# Patient Record
Sex: MALE | Race: WHITE | URBAN - METROPOLITAN AREA
[De-identification: names, ages, dates, MRNs, and addresses within clinical notes are randomized per-mention and may not be internally consistent; named-entity substitution may affect disease eponyms.]

---

## 2023-12-22 ENCOUNTER — HOSPITAL ENCOUNTER (INPATIENT)
Age: 73
DRG: 949 | End: 2023-12-22
Attending: STUDENT IN AN ORGANIZED HEALTH CARE EDUCATION/TRAINING PROGRAM | Admitting: STUDENT IN AN ORGANIZED HEALTH CARE EDUCATION/TRAINING PROGRAM
Payer: MEDICARE

## 2023-12-22 DIAGNOSIS — I49.8 ATRIAL ARRHYTHMIA: Primary | ICD-10-CM

## 2023-12-22 PROBLEM — S06.5XAA SDH (SUBDURAL HEMATOMA) (HCC): Status: ACTIVE | Noted: 2023-12-22

## 2023-12-22 LAB
GLUCOSE BLD-MCNC: 141 MG/DL (ref 70–99)
PERFORMED ON: ABNORMAL

## 2023-12-22 PROCEDURE — 2700000000 HC OXYGEN THERAPY PER DAY

## 2023-12-22 PROCEDURE — 6370000000 HC RX 637 (ALT 250 FOR IP): Performed by: STUDENT IN AN ORGANIZED HEALTH CARE EDUCATION/TRAINING PROGRAM

## 2023-12-22 PROCEDURE — 1280000000 HC REHAB R&B

## 2023-12-22 PROCEDURE — 94761 N-INVAS EAR/PLS OXIMETRY MLT: CPT

## 2023-12-22 RX ORDER — QUETIAPINE FUMARATE 25 MG/1
12.5 TABLET, FILM COATED ORAL NIGHTLY PRN
Status: DISCONTINUED | OUTPATIENT
Start: 2023-12-22 | End: 2024-01-14 | Stop reason: HOSPADM

## 2023-12-22 RX ORDER — SENNA AND DOCUSATE SODIUM 50; 8.6 MG/1; MG/1
2 TABLET, FILM COATED ORAL 2 TIMES DAILY
Status: DISCONTINUED | OUTPATIENT
Start: 2023-12-22 | End: 2024-01-14 | Stop reason: HOSPADM

## 2023-12-22 RX ORDER — INSULIN LISPRO 100 [IU]/ML
0-8 INJECTION, SOLUTION INTRAVENOUS; SUBCUTANEOUS EVERY 6 HOURS
Status: DISCONTINUED | OUTPATIENT
Start: 2023-12-23 | End: 2024-01-13

## 2023-12-22 RX ORDER — INSULIN LISPRO 100 [IU]/ML
0-8 INJECTION, SOLUTION INTRAVENOUS; SUBCUTANEOUS
Status: DISCONTINUED | OUTPATIENT
Start: 2023-12-22 | End: 2023-12-22

## 2023-12-22 RX ORDER — ATORVASTATIN CALCIUM 40 MG/1
40 TABLET, FILM COATED ORAL NIGHTLY
Status: DISCONTINUED | OUTPATIENT
Start: 2023-12-22 | End: 2024-01-14 | Stop reason: HOSPADM

## 2023-12-22 RX ORDER — MODAFINIL 200 MG/1
300 TABLET ORAL DAILY
Status: DISCONTINUED | OUTPATIENT
Start: 2023-12-23 | End: 2024-01-10

## 2023-12-22 RX ORDER — DEXTROSE MONOHYDRATE 100 MG/ML
INJECTION, SOLUTION INTRAVENOUS CONTINUOUS PRN
Status: DISCONTINUED | OUTPATIENT
Start: 2023-12-22 | End: 2024-01-14 | Stop reason: HOSPADM

## 2023-12-22 RX ORDER — SIMETHICONE 80 MG
80 TABLET,CHEWABLE ORAL 4 TIMES DAILY PRN
Status: DISCONTINUED | OUTPATIENT
Start: 2023-12-22 | End: 2024-01-14 | Stop reason: HOSPADM

## 2023-12-22 RX ORDER — DIGOXIN 125 MCG
125 TABLET ORAL DAILY
Status: DISCONTINUED | OUTPATIENT
Start: 2023-12-23 | End: 2024-01-11

## 2023-12-22 RX ORDER — MECOBALAMIN 5000 MCG
5 TABLET,DISINTEGRATING ORAL NIGHTLY
Status: DISCONTINUED | OUTPATIENT
Start: 2023-12-22 | End: 2024-01-14 | Stop reason: HOSPADM

## 2023-12-22 RX ORDER — INSULIN LISPRO 100 [IU]/ML
0-4 INJECTION, SOLUTION INTRAVENOUS; SUBCUTANEOUS NIGHTLY
Status: DISCONTINUED | OUTPATIENT
Start: 2023-12-22 | End: 2023-12-22

## 2023-12-22 RX ORDER — BISACODYL 10 MG
10 SUPPOSITORY, RECTAL RECTAL DAILY PRN
Status: DISCONTINUED | OUTPATIENT
Start: 2023-12-22 | End: 2024-01-14 | Stop reason: HOSPADM

## 2023-12-22 RX ORDER — METOPROLOL TARTRATE 50 MG/1
100 TABLET, FILM COATED ORAL EVERY 6 HOURS
Status: DISCONTINUED | OUTPATIENT
Start: 2023-12-22 | End: 2024-01-04

## 2023-12-22 RX ORDER — ZINC OXIDE 20 %
OINTMENT (GRAM) TOPICAL 4 TIMES DAILY PRN
Status: DISCONTINUED | OUTPATIENT
Start: 2023-12-22 | End: 2024-01-14 | Stop reason: HOSPADM

## 2023-12-22 RX ORDER — DONEPEZIL HYDROCHLORIDE 5 MG/1
5 TABLET, FILM COATED ORAL DAILY
Status: DISCONTINUED | OUTPATIENT
Start: 2023-12-23 | End: 2024-01-14 | Stop reason: HOSPADM

## 2023-12-22 RX ORDER — GLUCAGON 1 MG/ML
1 KIT INJECTION PRN
Status: DISCONTINUED | OUTPATIENT
Start: 2023-12-22 | End: 2024-01-14 | Stop reason: HOSPADM

## 2023-12-22 RX ORDER — PROCHLORPERAZINE MALEATE 5 MG/1
5 TABLET ORAL EVERY 6 HOURS PRN
Status: DISCONTINUED | OUTPATIENT
Start: 2023-12-22 | End: 2024-01-14 | Stop reason: HOSPADM

## 2023-12-22 RX ORDER — ACETAMINOPHEN 325 MG/1
650 TABLET ORAL EVERY 4 HOURS PRN
Status: DISCONTINUED | OUTPATIENT
Start: 2023-12-22 | End: 2024-01-14 | Stop reason: HOSPADM

## 2023-12-22 RX ORDER — POLYETHYLENE GLYCOL 3350 17 G/17G
17 POWDER, FOR SOLUTION ORAL DAILY
Status: DISCONTINUED | OUTPATIENT
Start: 2023-12-23 | End: 2024-01-14 | Stop reason: HOSPADM

## 2023-12-22 RX ORDER — ACETAMINOPHEN 500 MG
1000 TABLET ORAL EVERY 8 HOURS SCHEDULED
Status: DISCONTINUED | OUTPATIENT
Start: 2023-12-22 | End: 2024-01-08

## 2023-12-22 RX ORDER — OXYCODONE HYDROCHLORIDE 5 MG/1
5 TABLET ORAL EVERY 4 HOURS PRN
Status: DISCONTINUED | OUTPATIENT
Start: 2023-12-22 | End: 2024-01-14 | Stop reason: HOSPADM

## 2023-12-22 RX ADMIN — METOPROLOL TARTRATE 100 MG: 50 TABLET, FILM COATED ORAL at 20:36

## 2023-12-22 RX ADMIN — ACETAMINOPHEN 1000 MG: 500 TABLET ORAL at 23:50

## 2023-12-22 RX ADMIN — LEVETIRACETAM 750 MG: 500 TABLET, FILM COATED ORAL at 23:49

## 2023-12-22 RX ADMIN — METFORMIN HYDROCHLORIDE 500 MG: 500 TABLET ORAL at 23:50

## 2023-12-22 RX ADMIN — SENNOSIDES AND DOCUSATE SODIUM 2 TABLET: 50; 8.6 TABLET ORAL at 23:54

## 2023-12-22 RX ADMIN — APIXABAN 5 MG: 5 TABLET, FILM COATED ORAL at 23:17

## 2023-12-22 RX ADMIN — ATORVASTATIN CALCIUM 40 MG: 40 TABLET, FILM COATED ORAL at 23:49

## 2023-12-22 ASSESSMENT — PAIN SCALES - WONG BAKER: WONGBAKER_NUMERICALRESPONSE: 0

## 2023-12-22 NOTE — PROGRESS NOTES
NURSING ASSESSMENT: 2011 [facility]    Patient:Isac CHOI [address] [facility] Dx/Hx: SDH (subdural hematoma) ([address] [facility]) [S06. 5XAA]   WXW:1/9/0425  AWP:[id number]  Date of Admit: 12/22/2023  Room #: 8631/5547-34    Subjective:   Patient admitted to [facility] from  via stretcher. Alert and oriented x0. Family oriented to room and call light system. Oriented to rehab routine and therapy schedules. Informed about care conferences and ordering of meals with PCA. Drug / Medication Review:   Medications were reviewed by RN at time of admission  [x]  No potential or actual clinically significant medication issues were noted. []  Potential or actual clinically significant medication issues were found and MD was notified. (Specified below if applicable)   []  Allergy to medication   []  Drug interactions (drug/drug, drug/food, drug/disease interactions)   []  Duplicate drug   []  Omission (drug missing from prescribed regimen)   []  Non adherence   []  Adverse reaction   []  Wrong patient, drug, dose, route, time error   []  Ineffective drug therapy    Unable to assess patient alert to no one, unable to answer appropriately. Patient Mood Interview  Symptom Presence  0. No (enter 0 in column 2)  1. Yes (enter 0-3 in column 2)  9. No response (leave column 2 blank  Symptom Frequency  0. Never or 1 day  1. 2-6 days (several days)  2. 7-11 days (half or more days)  3. 12-14 days (nearly everyday) 1. Symptom Presence 2. Symptom Frequency    Enter scores in boxes   Little interest or pleasure in doing things       Feeling down, depressed, or hopeless       If either A or B is coded 2 or 3, CONTINUE asking the questions below. If not, END the interview.      Trouble falling or staying asleep, or sleeping too much       Feeling tired or having little energy       Poor appetite or overeating       Feeling bad about yourself - or that you are a failure or have let yourself or your family down   Trouble concentrating on things, such as reading the newspaper or watching television       Moving or speaking so slowly that other people could have noticed.  Or the opposite- being so fidgety or restless that you have been moving around a lot more than usual.       Thoughts that you would be better off dead, or of hurting yourself in some way.       Total Severity: Add scores for all frequency responses in column 2       Social isolation (from [employer])  How often do you feel lonely or isolated from those around you?   [] 0. Never  [] 1. Rarely  [] 2. Sometimes  [] 3. Often  [] 4. Always  [] 7. Patient declines to respond  [x] 8. Patient unable to respond     Unable to assess patient, alert to no one, unable to answer appropriately.    Admission BIMS:  Number of word repeated after first attempt:  [x]  0. None []  1. One []  2. Two []  3. Three    Able to report correct year:  [x]  0. Missed by >5 years, or no answer  []  1. Missed by 2-5 years  []  2. Missed by 1 year  []  3. Correct    Able to report correct month:   [x]  0. Missed by >1 month, or no answer  []  1. Missed by 6 days to 1 month  []  2. Accurate within 5 days    Able to report correct day of the week:  []  0. Incorrect or no answer  []  1. Correct    Recall:   Able to recall \"sock\":  [x]  0. No could not recall  []  1. Yes, after cueing  []  2. Yes, no cue required  Able to recall \"blue\":  [x]  0. No could not recall  []  1. Yes, after cueing  []  2. Yes, no cue required  Able to recall \"bed\":  [x]  0. No could not recall  []  1. Yes, after cueing  []  2. Yes, no cue required      4 Eyes Skin Assessment   The patient is being assessed for: Admission     I agree that 2 RN's have performed a thorough Head to Toe Skin Assessment on the patient. ALL assessment sites listed below have been assessed.       Areas assessed by both nurses:   [x]   Head, Face, and Ears   [x]   Shoulders, Back, and Chest, Abdomen  [x]   Arms, Elbows, and Hands   [x]

## 2023-12-22 NOTE — PROGRESS NOTES
IRF KEILY Admission Assessment  [facility]    Patient:Isac CHOI [address] [facility] Dx/Hx: SDH (subdural hematoma) ([address]) [S06. 5XAA]   CPI:8/8/7[birth year]  ZOSCARY:[id number]  Date of Admit: 12/22/2023     Pain Effect on Sleep:  Over the past 5 days, how much of the time has pain made it hard for you to sleep at night?  []  0. Does not apply - I have not had any pain or hurting in the past 5 days  [x]  1. Rarely or not at all  []  2. Occasionally  []  3. Frequently  []  4. Almost constantly  []  8. Unable to answer    Over the past 5 days, how often have you limited your participation in rehabilitation therapy sessions due to pain?  []  0. Does not apply - I have not received rehabilitation therapy in the past 5 days  [x]  1. Rarely or not at all  []  2. Occasionally  []  3. Frequently  []  4. Almost constantly  []  8. Unable to answer    Pain Interference with Day-to-Day Activities:  Over the past 5 days, how often have you limited your day-to-day activities (excluding rehabilitation therapy session)? [x]  1. Rarely or not at all  []  2. Occasionally  []  3. Frequently  []  4. Almost constantly  []  8. Unable to answer      High-Risk Drug Classes: Use and Indication   Is taking: Check if the pt is taking any medications by pharmacological classification  Indication noted: If column 1 is checked, check if there is an indication noted for all meds in the drug class Is taking  (check all that apply) Indication noted (check all that apply)   Antipsychotic [x] [x]   Anticoagulant [x] [x]   Antibiotic [] []   Opioid [] []   Antiplatelet [] []   Hypoglycemic (including insulin) [x] [x]   None of the above [] []     Special Treatments, Procedures, and Programs   Check all of the following treatments, procedures, and programs that apply on admission. On admission (check all that apply)   Cancer Treatments    A1. Chemotherapy []   A2. IV []   A3. Oral []   A10. Other []   B1. Radiation []   Respiratory Therapies    C1. Oxygen Therapy []   C2. Continuous [x]   C3. Intermittent []   C4. High-concentration []   D1. Suctioning []   D2. Scheduled []   D3. As needed []   E1. Tracheostomy Care []   F1. Invasive Mechanical Ventilator (ventilator or respirator) []   G1. Non-invasive Mechanical Ventilator []   G2. BiPAP []   G3. CPAP []   Other    H1. IV Medications []   H2. Vasoactive medications []   H3. Antibiotics []   H4. Anticoagulation []   H10. Other []   I1. Transfusions []   J1. Dialysis []   J2. Hemodialysis []   J3. Peritoneal dialysis []   O1. IV access []   O2. Peripheral []   O3. Midline []   O4. Central (PICC, tunneled, port) []   None of the above []     Signature:  Aditya Miller RN

## 2023-12-22 NOTE — PLAN OF CARE
ARU PATIENT TREATMENT PLAN  [address], [address] [place] [address]  [phone]    Princess Ramey    : 1950  Acct #: [de-identified]  MRN: [id number]   PHYSICIAN:  Sunday Ramirez MD  Primary Problem    Patient Active Problem List   Diagnosis    Chest pain    DM (diabetes mellitus) ([address])    HTN (hypertension)    Obesity    SDH (subdural hematoma) ([facility])       Rehabilitation Diagnosis:     SDH (subdural hematoma) ([address]) [S06. 5XAA]       ADMIT DATE:2023    Patient Goals: Pt. Unable to respond    Admitting Impairments: Pt. Admitted d/t SDH resulting in Decreased functional mobility ; Decreased endurance;Decreased ADL status; Decreased balance;Decreased strength;Decreased safe awareness;Decreased cognition;Decreased fine motor control;Decreased high-level IADLs;Decreased ROM   Barriers: level of assistance, endurance, comorbidities     Participation: Fair     CARE PLAN     NURSING:  Princess Ramey while on this unit will:     [x] Be continent of bowel and bladder     [x] Have an adequate number of bowel movements  [] Urinate with no urinary retention >300ml in bladder  [] Complete bladder protocol with scott removal  [] Maintain O2 SATs at ___%  [x] Have pain managed while on ARU       [] Be pain free by discharge   [x] Have no skin breakdown while on ARU  [] Have improved skin integrity via wound measurements  [] Have no signs/symptoms of infection at the wound site  [x] Be free from injury during hospitalization   [x] Complete education with patient/family with understanding demonstrated for:  [x] Adjustment   [x] Other:   Nursing interventions may include bowel/bladder training, education for medical assistive devices, medication education, O2 saturation management, energy conservation, stress management techniques, fall prevention, alarms protocol, seating and positioning, skin/wound care, pressure relief instruction,dressing changes,  infection protection, DVT Partial/moderate assistance   Sit to Stand CARE Score: 88 Discharge Goal: Partial/moderate assistance   Chair/Bed to Chair Transfer CARE Score: 88 Discharge Goal: Partial/moderate assistance   Toilet Transfer CARE Score: 88 Discharge Goal: Partial/moderate assistance   Car Transfer CARE Score: 88 Discharge Goal: Partial/moderate assistance   Walk 10 Feet CARE Score: 88 Discharge Goal: Partial/moderate assistance   Walk 50 Feet, 2 Turns CARE Score: 88 Discharge Goal: Substantial/maximal assistance   Walk 150 Feet CARE Score: 88 Discharge Goal: Dependent   Walk 10 Feet, Uneven Surface CARE Score: 88 Discharge Goal: Substantial/maximal assistance   1 Step (Curb) CARE Score: 88 Discharge Goal: Partial/moderate assistance   4 Steps CARE Score: 88 Discharge Goal: Substantial/maximal assistance   12 Steps CARE Score: 88 Discharge Goal: Substantial/maximal assistance    Object CARE Score: 88 Discharge Goal: Partial/moderate assistance   Wheel 50 feet, 2 turns       Wheel 150 Feet              Isac Lemus will be seen a minimum of 3 hours of therapy per day, a minimum of 5 out of 7 days per week.    [] In this rare instance due to the nature of this patient's medical involvement, this patient will be seen 15 hours per week (900 minutes within a 7 day period).    Treatments may include therapeutic exercises, gait training, neuromuscular re-ed, transfer training, community reintegration, bed mobility, w/c mobility and training, self care, home mgmt, cognitive training, energy conservation,dysphagia tx, speech/language/communication therapy, group therapy, and patient/family education. In addition, dietician/nutritionist may monitor calorie count as well as intake and collaboratively work with SLP on dietary upgrades.  Neuropsychology/Psychology may evaluate and provide necessary support.    Medical issues being managed closely and that require 24 hour availability of a physician:   [x] Swallowing Precautions  [x]

## 2023-12-23 LAB
ANION GAP SERPL CALCULATED.3IONS-SCNC: 8 MMOL/L (ref 3–16)
BASOPHILS # BLD: 0 K/UL (ref 0–0.2)
BASOPHILS NFR BLD: 0.4 %
BUN SERPL-MCNC: 17 MG/DL (ref 7–20)
CALCIUM SERPL-MCNC: 8.5 MG/DL (ref 8.3–10.6)
CHLORIDE SERPL-SCNC: 102 MMOL/L (ref 99–110)
CO2 SERPL-SCNC: 29 MMOL/L (ref 21–32)
CREAT SERPL-MCNC: <0.5 MG/DL (ref 0.8–1.3)
DEPRECATED RDW RBC AUTO: 15.5 % (ref 12.4–15.4)
EOSINOPHIL # BLD: 0.2 K/UL (ref 0–0.6)
EOSINOPHIL NFR BLD: 3.1 %
GFR SERPLBLD CREATININE-BSD FMLA CKD-EPI: >60 ML/MIN/{1.73_M2}
GLUCOSE BLD-MCNC: 142 MG/DL (ref 70–99)
GLUCOSE BLD-MCNC: 173 MG/DL (ref 70–99)
GLUCOSE BLD-MCNC: 197 MG/DL (ref 70–99)
GLUCOSE BLD-MCNC: 202 MG/DL (ref 70–99)
GLUCOSE BLD-MCNC: 235 MG/DL (ref 70–99)
GLUCOSE SERPL-MCNC: 179 MG/DL (ref 70–99)
HCT VFR BLD AUTO: 30.5 % (ref 40.5–52.5)
HGB BLD-MCNC: 9.9 G/DL (ref 13.5–17.5)
LYMPHOCYTES # BLD: 1.1 K/UL (ref 1–5.1)
LYMPHOCYTES NFR BLD: 18.5 %
MCH RBC QN AUTO: 32.9 PG (ref 26–34)
MCHC RBC AUTO-ENTMCNC: 32.3 G/DL (ref 31–36)
MCV RBC AUTO: 101.7 FL (ref 80–100)
MONOCYTES # BLD: 0.4 K/UL (ref 0–1.3)
MONOCYTES NFR BLD: 6.6 %
NEUTROPHILS # BLD: 4.2 K/UL (ref 1.7–7.7)
NEUTROPHILS NFR BLD: 71.4 %
PERFORMED ON: ABNORMAL
PLATELET # BLD AUTO: 219 K/UL (ref 135–450)
PMV BLD AUTO: 8.9 FL (ref 5–10.5)
POTASSIUM SERPL-SCNC: 4.5 MMOL/L (ref 3.5–5.1)
RBC # BLD AUTO: 3 M/UL (ref 4.2–5.9)
SODIUM SERPL-SCNC: 139 MMOL/L (ref 136–145)
TRIGL SERPL-MCNC: 111 MG/DL (ref 0–150)
WBC # BLD AUTO: 5.9 K/UL (ref 4–11)

## 2023-12-23 PROCEDURE — 92610 EVALUATE SWALLOWING FUNCTION: CPT

## 2023-12-23 PROCEDURE — 97535 SELF CARE MNGMENT TRAINING: CPT

## 2023-12-23 PROCEDURE — 92523 SPEECH SOUND LANG COMPREHEN: CPT

## 2023-12-23 PROCEDURE — 97530 THERAPEUTIC ACTIVITIES: CPT

## 2023-12-23 PROCEDURE — 80048 BASIC METABOLIC PNL TOTAL CA: CPT

## 2023-12-23 PROCEDURE — 6370000000 HC RX 637 (ALT 250 FOR IP): Performed by: STUDENT IN AN ORGANIZED HEALTH CARE EDUCATION/TRAINING PROGRAM

## 2023-12-23 PROCEDURE — 84478 ASSAY OF TRIGLYCERIDES: CPT

## 2023-12-23 PROCEDURE — 1280000000 HC REHAB R&B

## 2023-12-23 PROCEDURE — 97163 PT EVAL HIGH COMPLEX 45 MIN: CPT

## 2023-12-23 PROCEDURE — 97167 OT EVAL HIGH COMPLEX 60 MIN: CPT

## 2023-12-23 PROCEDURE — 2700000000 HC OXYGEN THERAPY PER DAY

## 2023-12-23 PROCEDURE — 94761 N-INVAS EAR/PLS OXIMETRY MLT: CPT

## 2023-12-23 PROCEDURE — 92526 ORAL FUNCTION THERAPY: CPT

## 2023-12-23 PROCEDURE — 85025 COMPLETE CBC W/AUTO DIFF WBC: CPT

## 2023-12-23 PROCEDURE — 36415 COLL VENOUS BLD VENIPUNCTURE: CPT

## 2023-12-23 RX ADMIN — LEVETIRACETAM 750 MG: 500 TABLET, FILM COATED ORAL at 23:04

## 2023-12-23 RX ADMIN — SENNOSIDES AND DOCUSATE SODIUM 2 TABLET: 50; 8.6 TABLET ORAL at 23:03

## 2023-12-23 RX ADMIN — INSULIN LISPRO 2 UNITS: 100 INJECTION, SOLUTION INTRAVENOUS; SUBCUTANEOUS at 14:02

## 2023-12-23 RX ADMIN — ACETAMINOPHEN 1000 MG: 500 TABLET ORAL at 14:38

## 2023-12-23 RX ADMIN — ACETAMINOPHEN 1000 MG: 500 TABLET ORAL at 04:45

## 2023-12-23 RX ADMIN — METOPROLOL TARTRATE 100 MG: 50 TABLET, FILM COATED ORAL at 16:58

## 2023-12-23 RX ADMIN — DONEPEZIL HYDROCHLORIDE 5 MG: 5 TABLET, FILM COATED ORAL at 11:19

## 2023-12-23 RX ADMIN — METOPROLOL TARTRATE 100 MG: 50 TABLET, FILM COATED ORAL at 11:18

## 2023-12-23 RX ADMIN — Medication 5 MG: at 23:03

## 2023-12-23 RX ADMIN — INSULIN LISPRO 2 UNITS: 100 INJECTION, SOLUTION INTRAVENOUS; SUBCUTANEOUS at 23:22

## 2023-12-23 RX ADMIN — ATORVASTATIN CALCIUM 40 MG: 40 TABLET, FILM COATED ORAL at 23:04

## 2023-12-23 RX ADMIN — DIGOXIN 125 MCG: 125 TABLET ORAL at 11:19

## 2023-12-23 RX ADMIN — APIXABAN 5 MG: 5 TABLET, FILM COATED ORAL at 23:04

## 2023-12-23 RX ADMIN — METFORMIN HYDROCHLORIDE 500 MG: 500 TABLET ORAL at 23:04

## 2023-12-23 RX ADMIN — ACETAMINOPHEN 1000 MG: 500 TABLET ORAL at 23:03

## 2023-12-23 RX ADMIN — LEVETIRACETAM 750 MG: 500 TABLET, FILM COATED ORAL at 11:17

## 2023-12-23 RX ADMIN — METOPROLOL TARTRATE 100 MG: 50 TABLET, FILM COATED ORAL at 04:46

## 2023-12-23 RX ADMIN — METFORMIN HYDROCHLORIDE 500 MG: 500 TABLET ORAL at 11:18

## 2023-12-23 RX ADMIN — METOPROLOL TARTRATE 100 MG: 50 TABLET, FILM COATED ORAL at 23:03

## 2023-12-23 RX ADMIN — APIXABAN 5 MG: 5 TABLET, FILM COATED ORAL at 11:19

## 2023-12-23 NOTE — PROGRESS NOTES
Speech Language Pathology  Clinical Bedside Swallow Assessment  Facility/Department: [facility]        Recommendations:  Diet recommendation: NPO; NPO and Ice chips with SLP/RN only to assist with oral care; Meds via alt means of nutrition  Instrumentation: FEES is recommended to further evaluate pharyngeal phase and mechanisms (e.g. vocal folds, UES, reflux, etc)  Risk management: upright for all intake, total feed, oral care q4 hrs to reduce adverse affects in the event of aspiration, STRICT aspiration precautions, and hold PO and contact SLP if s/s of aspiration or worsening respiratory status develop. NAME:Isac Camejo  : 1950 (74 y.o.)   MRN: [id number]  ROOM: [address] [place] [address]  ADMISSION DATE: 2023  PATIENT DIAGNOSIS(ES): SDH (subdural hematoma) ([address]) [S06. 5XAA]  No chief complaint on file. Patient Active Problem List    Diagnosis Date Noted    Chest pain 10/16/2014    SDH (subdural hematoma) ([address]) 2023    DM (diabetes mellitus) ([address]) 10/16/2014    HTN (hypertension) 10/16/2014    Obesity 10/16/2014     Past Medical History:   Diagnosis Date    Acid reflux     CAD (coronary artery disease)     Diabetes mellitus ([address])     Heartburn     Hiatal hernia     Hypertension     Seasonal allergies      Past Surgical History:   Procedure Laterality Date    COLONOSCOPY      POLYPS    COLONOSCOPY  3/16/16    polyps    CORONARY ARTERY BYPASS GRAFT  10/2014    X5     Allergies   Allergen Reactions    Heparin Other (See Comments)     Causes clots     DATE ONSET: 2023    Date of Evaluation: 2023   Evaluating Therapist: Delphine Hadley, SLP    Chart Reviewed: : [x] Yes [] No     Pain: Pt unable to state pain, no s/s of physical discomfort observed. Current Diet: Diet NPO  ADULT TUBE FEEDING; PEG; Standard with Fiber; Continuous; 60; No; 100;  Other (specify); q2 hours      Most Recent Imaging    No orders to display       Reason for Referral  Franckeshia Bottom was referred for a bedside [x] No  SOB with PO intake: [] Yes [x] No  Increased WOB with PO intake: [] Yes [x] No  Coughing/Choking with PO intake: [] Yes [x] No  Weight loss: [] Yes [x] No  Reduced Appetite: [] Yes [x] No  Premature satiety: [] Yes [x] No  Recent and/or Recurrent PNA: [] Yes [x] No  Changes in voice/vocal quality: [] Yes [x] No  Trouble with Mastication: [] Yes [x] No  Avoidance of certain foods due to trouble swallowing/chewing: [] Yes [x] No    *N/A pt is not able to respond appropriately to questions regarding swallowing due to cognitive status. Family denies any swallow deficits prior to recent event     Baseline Method of Oral Meds: Whole with thin liquid     Additional Reported Symptoms/Complaints/Hospital Course: Extensive course at [facility], refer to EMR for full detail    Pt's goals: pt unable to state due to language and cognitive deficits; pt's family at bedside states their goal is for pt to return home      Vitals/labs:   SpO2: 95%  RR: 17  BP: 117/57  HR: 74  O2 device: Nasal cannula     Lab Values:    CBC:   Recent Labs     12/23/23  0833   WBC 5.9   HGB 9.9*         BMP:  Recent Labs     12/23/23  0833      K 4.5      CO2 29   BUN 17   CREATININE <0.5*   GLUCOSE 179*      Cranial nerve exam:   CN V (trigeminal): ophthalmic, maxillary, and mandibular facial sensation- WNL  CN VII (facial): WNL  CN IX/X (glossopharyngeal/vagus): vocal quality: WNL; cough: Strong-perceptually  CN XII (hypoglossal): WNL  *Informally assessed due to pt's limited ability to follow commands    Laryngeal function exam:   Secretions: Xerostomia, thick coating on lingual surface  Vocal quality: See CN exam above  Cough: See CN exam above    Oral Care Status:    [] Oral Care WFL  [] Poor oral care status  [] Edentulous  [] Upper Dentures  [] Lower Dentures  [x] Missing/Broken Teeth  [] Evidence of dental cavities/carries  [x] Other: thick coating on lingual surface    PO trials:   IDDSI 0 (thin): via ice  intake, total feed, oral care q4 hrs to reduce adverse affects in the event of aspiration, STRICT aspiration precautions, and hold PO and contact SLP if s/s of aspiration or worsening respiratory status develop.    Prognosis: Fair - Good    Recommended Intervention:    [x] Dysphagia tx  [] Videostroboscopy                      [x] NPO   [] MBS       [] Speech/Cog Eval    [x] Therapeutic PO Trials     [x] Ice Chips   [] Other:  [x] FEES                                                 Dysphagia Therapeutic Intervention:   []  Bolus control Exercises  []  Oral Motor Exercises  []  Thompson Water Protocol  []  Thermal Stimulation  [x]  Oral Care    []  Vital Stim/NMES  []  Laryngeal Exercises  [x]  Patient/Family Education  []  Pharyngeal Exercises  [x]  Therapeutic PO trials with SLP  []  Diet tolerance monitoring  []  Other:     Referrals:   [] ENT    []   [] Pulmonology [] GI  [] Neurology  [] RD  [] OT/ PT  [x] N/A    Goals:  Short Term Goals:  Timeframe for Short Term Goals: 14 Days (1/6/2023)  Goal 1: The patient will tolerate therapeutic diet upgrade trials with no clinical s/s of aspiration 5/5  Goal 2: The patient/caregiver will demonstrate understanding of compensatory swallow strategies, for improved swallow safety  Goal 3: The patient will tolerate repeat BSE when able for ongoing assessment  Goal 4: The patient will tolerate instrumental assessment when able     Long Term Goals:   Timeframe for Long Term Goals: 21 Days (1/11/2024)  Goal 1: The patient will tolerate least restrictive diet with no clinical s/s of aspiration or worsening respiratory/pulmonary status    Treatment:  Skilled instruction completed with patient re: evidenced based practice regarding recommendations and POC, importance of oral care to reduce adverse affects in the event of aspiration, and instruction of recommended compensatory strategies developed based upon clinical exam. Pt unable to recall/demonstrate compensatory

## 2023-12-23 NOTE — PROGRESS NOTES
Physical Therapy  Facility/Department: [facility] ARU  Rehabilitation Physical Therapy Initial Assessment    NAME: Monik Mack  : 1950 (74 y.o.)  MRN: [id number]  CODE STATUS: Full Code    Date of Service: 23      Past Medical History:   Diagnosis Date    Acid reflux     CAD (coronary artery disease)     Diabetes mellitus ([address])     Heartburn     Hiatal hernia     Hypertension     Seasonal allergies      Past Surgical History:   Procedure Laterality Date    COLONOSCOPY      POLYPS    COLONOSCOPY  3/16/16    polyps    CORONARY ARTERY BYPASS GRAFT  10/2014    X5       Patient assessed for rehabilitation services?: Yes  Referring Practitioner: Oly Goldman MD  Referral Date : 23    Restrictions:  Restrictions/Precautions: Seizure; Fall Risk;Contact Precautions; Up as Tolerated;NPO;Aspiration Risk  Position Activity Restriction  Other position/activity restrictions: PEG     SUBJECTIVE  Subjective: Pt resting initially semi fowlers in bed with some unintelligle mumbling/non-verbal responses but soon fell asleep and unable to rouse. Wife, son and daughter present. Wife asked if PT could return to allow pt to take a nap. Pt provided 1 hour rest before returning to complete evaluation/treatment.               Prior Level of Function:  Social/Functional History  Lives With: Spouse (family lives across the street)  Type of Home: House  Home Layout: One level  Home Access: Stairs to enter without rails (planning to install railings prior to pt return home)  Entrance Stairs - Number of Steps: 2  Bathroom Shower/Tub: Walk-in shower  Bathroom Toilet: Standard (sink in front of toilet)  Bathroom Equipment: Grab bars in shower, Built-in shower seat, Toilet raiser, 3-in-1 commode  Bathroom Accessibility: Walker accessible  Home Equipment: Claudia , standard, Walker, rolling, Rollator, Cane  Has the patient had two or more falls in the past year or any fall with injury in the past year?: Yes (had a fall 1 Therapy Plan  General Plan: 5-7 times per week  Therapy Duration: 3 Weeks  Current Treatment Recommendations: Strengthening;ROM;Balance training;Functional mobility training;Transfer training;Endurance training;Neuromuscular re-education;Gait training;Stair training;Home exercise program;Safety education & training;Patient/Caregiver education & training;Equipment evaluation, education, & procurement;Therapeutic activities  Safety Devices  Type of Devices: All fall risk precautions in place;Call light within reach;Left in bed (handoff to PCA)  Restraints  Restraints Initially in Place: No    EDUCATION  Education  Education Given To: Patient;Family  Education Provided: Role of Therapy;Plan of Care;Precautions;Mobility Training  Education Method: Verbal  Barriers to Learning: Other (Comment) (asleep/decreased alertness)  Education Outcome: Continued education needed    ELOS: 21 days         Therapy Time   Individual Concurrent Group Co-treatment   Time In  (12:30 to 13:00)         Time Out  (14:00 to 14:30)         Minutes  60 total           Timed Code Treatment Minutes: 60 Minutes       Traci Olivia PT, 12/23/23 at 4:00 PM

## 2023-12-23 NOTE — PROGRESS NOTES
Transportation needs:    Has lack of transportation kept you from medical appointments, meetings, work, or ADL's? [] Yes, it has kept me from medical appointments or from getting my meds  [] Yes, It has kept me from non-medical meetings, appointments, work, or getting things I need  [] No  [x] Pt unable to respond  [] Pt declines to respond     How often do you need to have someone help you when you read instructions, pamphlets, or other written material from your doctor or pharmacy? [] Never                             [] Always  [] Rarely                            [x] Patient unable to respond  [] Sometimes                     [] Pt declines to respond  [] Often         Ethnicity: Are you of , /a, or [place] origin?   [] No, not of , /a, or [place] origin  [] [facility], [facility] [address] [place] [address] Galdino Cooper/adi  [] Yes, [facility]  [] Yes, [place]  [] Yes, another , , or [place] origin  [x] Pt unable to respond  [] Pt declines to respond     Race: [] White                                                [] [place]              [] [address]  [] [place] or [place] American                [] [place]          [] [place] or [place]  [] American [place] or [place] Native     [] [place]             [] Ethiopian  []  [place]                                      [] [place]      [] Other   [] [place]                                             [] Other        [x] Pt unable to respond                      [] Pt declines to respond                  [] None of the above   Preferred Language: Eros

## 2023-12-23 NOTE — PROGRESS NOTES
Speech Language Pathology  Facility/Department: [facility] ARU  Initial Speech/Language/Cognitive Assessment       NAME:Isac Brooks Farragut  : 1950 (74 y.o.)   MRN: [id number]  ROOM: [facility]/8490-  ADMISSION DATE: 2023  PATIENT DIAGNOSIS(ES): SDH (subdural hematoma) ([address]) [S06. 5XAA]  Patient Active Problem List    Diagnosis Date Noted    Chest pain 10/16/2014    SDH (subdural hematoma) ([address]) 2023    DM (diabetes mellitus) ([address]) 10/16/2014    HTN (hypertension) 10/16/2014    Obesity 10/16/2014     Past Medical History:   Diagnosis Date    Acid reflux     CAD (coronary artery disease)     Diabetes mellitus ([address])     Heartburn     Hiatal hernia     Hypertension     Seasonal allergies      Past Surgical History:   Procedure Laterality Date    COLONOSCOPY      POLYPS    COLONOSCOPY  3/16/16    polyps    CORONARY ARTERY BYPASS GRAFT  10/2014    X5     Allergies   Allergen Reactions    Heparin Other (See Comments)     Causes clots       Date of Evaluation: 2023   Evaluating Therapist: RED Hollingsworth    Subjective:   Chart Reviewed: : [x] Yes [] No    Onset Date: 2023    Most Recent Imaging     No orders to display       Medical record review/interview:  Per MD H&P on 2023: \"Isac Macias is a 74year old male with a past medical history significant for afib on Eliquis, CAD s/p CABG (), HLD, DM2, HTN, and GERD who presented to [facility] on 23 as a transfer from [place] after a fall with head trauma while on anticoagulation. CT head revealed bilateral SDH, diffuse traumatic SAH, and skull base fractures through the sella turcica and right lateral wall of the sphenoid sinus. He was given kcentra, keppra, 1 unit platelets, and FFP.  His hospital course was notable for pulmonary insufficiency requiring high flow oxygen, atrial fibrillation with RVR, HIT, intermittent fevers, right peroneal DVT, RLL segmental PE, pulmonary edema, colonic pseudoobstruction, aspiration pneumonia, and dysphagia. EEG was negative for seizures. On 12/16 he underwent PEG placement. He remains NPO with tube feeds. He was admitted to [facility] on 12/22 due to functional deficits below his baseline. Today he is seen with his wife and daughter present. History is limited from the patient. His family reports that he has been more engaged with his environment lately. He has been holding their hands and smiling at them. They report that he has been constipated and required an enema yesterday. He lives with his wife in a one level home.\"     Behavior / Cognition: alert, cooperative, and impulsive    Primary Complaint: Unable to state     Pt's goals: pt unable to state due to language and cognitive deficits; pt's family at bedside states their goal is for pt to return home     Pain: Pt unable to state pain, no s/s of physical discomfort observed.     Vitals/labs:   SpO2: 93%  RR: 17  BP: 117/57  HR: 74    Vision / Hearing:  Vision: WFL  Hearing: WFL    Previous Level of Function:  Living Status: Spouse  Occupation: Retired - previously employed as    Homemaking Responsibilities:   Meal Prep Responsibility: Primary  Laundry Responsibility: Primary  Cleaning Responsibility: Primary  Bill Paying / Finance Responsibility: Primary  Shopping Responsibility: Primary  Health Care Management: Primary  Active : Yes  Mode of Transportation: Car  Additional Comments: Biographical information obtained from family at bedside. Per caregiver report, pt very active prior to recent injury. 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       Assessment   Cognitive Diagnosis: Suspect severe deficits   Communication Diagnosis: Suspect severe receptive and expressive aphasia, further evaluation warranted as alertness improves    Impressions:   Much of biographical information obtained from caregivers at bedside due to pt reduced cognitive and language skills. Per caregivers,

## 2023-12-23 NOTE — PROGRESS NOTES
Alert, non-verbal. GCS 12 (E: 4 V:2 M: 6). No grimacing. Appears comfortable in bed. With episodes of tachycardia, regular-irregular heart rhythms -on metoprolol and eliquis. No active bleeding. With intact PEG, with minimal leaking, milky and color, no bleeding. With penile abrasion, with bruising on both hips.

## 2023-12-24 LAB
GLUCOSE BLD-MCNC: 153 MG/DL (ref 70–99)
GLUCOSE BLD-MCNC: 154 MG/DL (ref 70–99)
GLUCOSE BLD-MCNC: 186 MG/DL (ref 70–99)
GLUCOSE BLD-MCNC: 200 MG/DL (ref 70–99)
PERFORMED ON: ABNORMAL

## 2023-12-24 PROCEDURE — 1280000000 HC REHAB R&B

## 2023-12-24 PROCEDURE — 6370000000 HC RX 637 (ALT 250 FOR IP): Performed by: STUDENT IN AN ORGANIZED HEALTH CARE EDUCATION/TRAINING PROGRAM

## 2023-12-24 PROCEDURE — 94761 N-INVAS EAR/PLS OXIMETRY MLT: CPT

## 2023-12-24 PROCEDURE — 2700000000 HC OXYGEN THERAPY PER DAY

## 2023-12-24 RX ORDER — LEVETIRACETAM 100 MG/ML
750 SOLUTION ORAL 2 TIMES DAILY
Status: DISCONTINUED | OUTPATIENT
Start: 2023-12-24 | End: 2024-01-14 | Stop reason: HOSPADM

## 2023-12-24 RX ADMIN — Medication 5 MG: at 22:02

## 2023-12-24 RX ADMIN — ACETAMINOPHEN 1000 MG: 500 TABLET ORAL at 15:13

## 2023-12-24 RX ADMIN — ATORVASTATIN CALCIUM 40 MG: 40 TABLET, FILM COATED ORAL at 22:02

## 2023-12-24 RX ADMIN — APIXABAN 5 MG: 5 TABLET, FILM COATED ORAL at 09:29

## 2023-12-24 RX ADMIN — LEVETIRACETAM 750 MG: 100 SOLUTION ORAL at 22:03

## 2023-12-24 RX ADMIN — METFORMIN HYDROCHLORIDE 500 MG: 500 TABLET ORAL at 09:29

## 2023-12-24 RX ADMIN — ACETAMINOPHEN 1000 MG: 500 TABLET ORAL at 06:11

## 2023-12-24 RX ADMIN — MODAFINIL 300 MG: 200 TABLET ORAL at 09:29

## 2023-12-24 RX ADMIN — SENNOSIDES AND DOCUSATE SODIUM 2 TABLET: 50; 8.6 TABLET ORAL at 22:02

## 2023-12-24 RX ADMIN — METOPROLOL TARTRATE 100 MG: 50 TABLET, FILM COATED ORAL at 06:11

## 2023-12-24 RX ADMIN — DONEPEZIL HYDROCHLORIDE 5 MG: 5 TABLET, FILM COATED ORAL at 09:29

## 2023-12-24 RX ADMIN — ACETAMINOPHEN 1000 MG: 500 TABLET ORAL at 22:02

## 2023-12-24 RX ADMIN — METOPROLOL TARTRATE 100 MG: 50 TABLET, FILM COATED ORAL at 22:02

## 2023-12-24 RX ADMIN — METFORMIN HYDROCHLORIDE 500 MG: 500 TABLET ORAL at 22:02

## 2023-12-24 RX ADMIN — DIGOXIN 125 MCG: 125 TABLET ORAL at 09:29

## 2023-12-24 RX ADMIN — LEVETIRACETAM 750 MG: 100 SOLUTION ORAL at 11:49

## 2023-12-24 RX ADMIN — METOPROLOL TARTRATE 100 MG: 50 TABLET, FILM COATED ORAL at 09:29

## 2023-12-24 RX ADMIN — METOPROLOL TARTRATE 100 MG: 50 TABLET, FILM COATED ORAL at 17:37

## 2023-12-24 RX ADMIN — APIXABAN 5 MG: 5 TABLET, FILM COATED ORAL at 22:03

## 2023-12-24 RX ADMIN — INSULIN LISPRO 2 UNITS: 100 INJECTION, SOLUTION INTRAVENOUS; SUBCUTANEOUS at 12:36

## 2023-12-24 NOTE — CONSULTS
Comprehensive Nutrition Assessment    Type and Reason for Visit:  Initial, Consult, Positive Nutrition Screen    Nutrition Recommendations/Plan:   NPO - ADAT per SLP  Modify current TF: Initiate glucerna 1.5 (diabetic formula) at 25 mL/hr and as tolerated, increase by 25 mL/hr q 4 hours until goal of 60 mL/hr.  Recommend 60 mL H20 q 4 hours.  Recommend reevaluate IV fluids at this time.  Increase flush if Na+ increases greater than 145 mEq/L.  Monitor for tolerance (bowel habits, N/V, cramping, abdominal exam findings: distended, firm, tense, guarded, discomfort).  Monitor nutrition adequacy, pertinent labs, bowel habits, wt changes, and clinical progress     Malnutrition Assessment:  Malnutrition Status:  At risk for malnutrition (Comment) (12/24/23 1158)    Context:  Acute Illness     Findings of the 6 clinical characteristics of malnutrition:  Energy Intake:  Mild decrease in energy intake (Comment)  Weight Loss:  Greater than 7.5% over 3 months     Fluid Accumulation:  Mild Extremities    Nutrition Assessment:    Consult for TF order and management and positive nutrition screen for decreased appetite and weight loss: 72 yo M w pmh CAD, DM, HTN, s/p PEG placement 12/16 admitted with subdural hematoma. Currently NPO with Osmolite 1.5 running at goal of 60 ml/hr. SLP recommending NPO 12/23. Pt sleeping at time of visit, spoke with daughter. Reports pt seems to be tolerating TF. Reports baseline po intake prior to fall. Reports recent weight loss has been intentional and gradual. Denied any TF questions. Pt had bowel movement this AM. Recommend modifying TF to carb control d/t elevated BG. Updated recommendations provided. Will monitor.    Nutrition Related Findings:    -235 x24 hrs. On bowel reg. +active BS. +2 pitting BLE edema. No updated electrolyte labs. Wound Type: Open Wounds, Deep Tissue Injury       Current Nutrition Intake & Therapies:    Average Meal Intake: NPO  Average Supplements Intake:  Swallowing, Weight, GI Status    Discharge Planning:     Too soon to determine     Bethany Brar, [address] [facility]  Contact: Office: [phone]; [address]: [address]

## 2023-12-24 NOTE — PLAN OF CARE
Problem: Discharge Planning  Goal: Discharge to home or other facility with appropriate resources  Outcome: Progressing     Problem: Pain  Goal: Verbalizes/displays adequate comfort level or baseline comfort level  Outcome: Progressing     Problem: Skin/Tissue Integrity  Goal: Absence of new skin breakdown  Description: 1. Monitor for areas of redness and/or skin breakdown  2. Assess vascular access sites hourly  3. Every 4-6 hours minimum:  Change oxygen saturation probe site  4. Every 4-6 hours:  If on nasal continuous positive airway pressure, respiratory therapy assess nares and determine need for appliance change or resting period. Outcome: Progressing     Problem: Safety - Adult  Goal: Free from fall injury  Outcome: Progressing     Problem: ABCDS Injury Assessment  Goal: Absence of physical injury  Outcome: Progressing     Problem: Confusion  Goal: Confusion, delirium, dementia, or psychosis is improved or at baseline  Description: INTERVENTIONS:  1. Assess for possible contributors to thought disturbance, including medications, impaired vision or hearing, underlying metabolic abnormalities, dehydration, psychiatric diagnoses, and notify attending LIP  2. [place] high risk fall precautions, as indicated  3. Provide frequent short contacts to provide reality reorientation, refocusing and direction  4. Decrease environmental stimuli, including noise as appropriate  5. Monitor and intervene to maintain adequate nutrition, hydration, elimination, sleep and activity  6. If unable to ensure safety without constant attention obtain sitter and review sitter guidelines with assigned personnel  7.  Initiate Psychosocial CNS and Spiritual Care consult, as indicated  Outcome: Progressing

## 2023-12-25 LAB
ANION GAP SERPL CALCULATED.3IONS-SCNC: 9 MMOL/L (ref 3–16)
BASOPHILS # BLD: 0 K/UL (ref 0–0.2)
BASOPHILS NFR BLD: 0.4 %
BUN SERPL-MCNC: 20 MG/DL (ref 7–20)
CALCIUM SERPL-MCNC: 9 MG/DL (ref 8.3–10.6)
CHLORIDE SERPL-SCNC: 103 MMOL/L (ref 99–110)
CO2 SERPL-SCNC: 30 MMOL/L (ref 21–32)
CREAT SERPL-MCNC: <0.5 MG/DL (ref 0.8–1.3)
DEPRECATED RDW RBC AUTO: 15.9 % (ref 12.4–15.4)
EOSINOPHIL # BLD: 0.1 K/UL (ref 0–0.6)
EOSINOPHIL NFR BLD: 0.9 %
GFR SERPLBLD CREATININE-BSD FMLA CKD-EPI: >60 ML/MIN/{1.73_M2}
GLUCOSE BLD-MCNC: 125 MG/DL (ref 70–99)
GLUCOSE BLD-MCNC: 134 MG/DL (ref 70–99)
GLUCOSE BLD-MCNC: 149 MG/DL (ref 70–99)
GLUCOSE BLD-MCNC: 157 MG/DL (ref 70–99)
GLUCOSE SERPL-MCNC: 144 MG/DL (ref 70–99)
HCT VFR BLD AUTO: 29.5 % (ref 40.5–52.5)
HGB BLD-MCNC: 9.5 G/DL (ref 13.5–17.5)
LYMPHOCYTES # BLD: 0.9 K/UL (ref 1–5.1)
LYMPHOCYTES NFR BLD: 16.1 %
MCH RBC QN AUTO: 33.1 PG (ref 26–34)
MCHC RBC AUTO-ENTMCNC: 32.2 G/DL (ref 31–36)
MCV RBC AUTO: 102.8 FL (ref 80–100)
MONOCYTES # BLD: 0.4 K/UL (ref 0–1.3)
MONOCYTES NFR BLD: 7.5 %
NEUTROPHILS # BLD: 4.2 K/UL (ref 1.7–7.7)
NEUTROPHILS NFR BLD: 75.1 %
PERFORMED ON: ABNORMAL
PLATELET # BLD AUTO: 255 K/UL (ref 135–450)
PMV BLD AUTO: 8.6 FL (ref 5–10.5)
POTASSIUM SERPL-SCNC: 4.8 MMOL/L (ref 3.5–5.1)
RBC # BLD AUTO: 2.87 M/UL (ref 4.2–5.9)
SODIUM SERPL-SCNC: 142 MMOL/L (ref 136–145)
WBC # BLD AUTO: 5.5 K/UL (ref 4–11)

## 2023-12-25 PROCEDURE — 6370000000 HC RX 637 (ALT 250 FOR IP): Performed by: STUDENT IN AN ORGANIZED HEALTH CARE EDUCATION/TRAINING PROGRAM

## 2023-12-25 PROCEDURE — 36415 COLL VENOUS BLD VENIPUNCTURE: CPT

## 2023-12-25 PROCEDURE — 94761 N-INVAS EAR/PLS OXIMETRY MLT: CPT

## 2023-12-25 PROCEDURE — 97530 THERAPEUTIC ACTIVITIES: CPT

## 2023-12-25 PROCEDURE — 1280000000 HC REHAB R&B

## 2023-12-25 PROCEDURE — 97110 THERAPEUTIC EXERCISES: CPT

## 2023-12-25 PROCEDURE — 92507 TX SP LANG VOICE COMM INDIV: CPT

## 2023-12-25 PROCEDURE — 80048 BASIC METABOLIC PNL TOTAL CA: CPT

## 2023-12-25 PROCEDURE — 2700000000 HC OXYGEN THERAPY PER DAY

## 2023-12-25 PROCEDURE — 92526 ORAL FUNCTION THERAPY: CPT

## 2023-12-25 PROCEDURE — 85025 COMPLETE CBC W/AUTO DIFF WBC: CPT

## 2023-12-25 RX ADMIN — METOPROLOL TARTRATE 100 MG: 50 TABLET, FILM COATED ORAL at 17:01

## 2023-12-25 RX ADMIN — METFORMIN HYDROCHLORIDE 500 MG: 500 TABLET ORAL at 08:00

## 2023-12-25 RX ADMIN — SENNOSIDES AND DOCUSATE SODIUM 2 TABLET: 50; 8.6 TABLET ORAL at 22:01

## 2023-12-25 RX ADMIN — METFORMIN HYDROCHLORIDE 500 MG: 500 TABLET ORAL at 22:01

## 2023-12-25 RX ADMIN — LEVETIRACETAM 750 MG: 100 SOLUTION ORAL at 10:47

## 2023-12-25 RX ADMIN — APIXABAN 5 MG: 5 TABLET, FILM COATED ORAL at 22:02

## 2023-12-25 RX ADMIN — ACETAMINOPHEN 1000 MG: 500 TABLET ORAL at 22:01

## 2023-12-25 RX ADMIN — METOPROLOL TARTRATE 100 MG: 50 TABLET, FILM COATED ORAL at 10:48

## 2023-12-25 RX ADMIN — DONEPEZIL HYDROCHLORIDE 5 MG: 5 TABLET, FILM COATED ORAL at 07:50

## 2023-12-25 RX ADMIN — POLYETHYLENE GLYCOL 3350 17 G: 17 POWDER, FOR SOLUTION ORAL at 08:01

## 2023-12-25 RX ADMIN — RUGBY ZINC OXIDE 20%: 20 OINTMENT TOPICAL at 22:03

## 2023-12-25 RX ADMIN — ACETAMINOPHEN 1000 MG: 500 TABLET ORAL at 04:58

## 2023-12-25 RX ADMIN — MODAFINIL 300 MG: 200 TABLET ORAL at 07:50

## 2023-12-25 RX ADMIN — METOPROLOL TARTRATE 100 MG: 50 TABLET, FILM COATED ORAL at 22:02

## 2023-12-25 RX ADMIN — APIXABAN 5 MG: 5 TABLET, FILM COATED ORAL at 07:59

## 2023-12-25 RX ADMIN — Medication 5 MG: at 22:01

## 2023-12-25 RX ADMIN — SENNOSIDES AND DOCUSATE SODIUM 2 TABLET: 50; 8.6 TABLET ORAL at 08:01

## 2023-12-25 RX ADMIN — DIGOXIN 125 MCG: 125 TABLET ORAL at 07:50

## 2023-12-25 RX ADMIN — LEVETIRACETAM 750 MG: 100 SOLUTION ORAL at 22:02

## 2023-12-25 RX ADMIN — ATORVASTATIN CALCIUM 40 MG: 40 TABLET, FILM COATED ORAL at 22:02

## 2023-12-25 ASSESSMENT — PAIN SCALES - GENERAL
PAINLEVEL_OUTOF10: 8
PAINLEVEL_OUTOF10: 0

## 2023-12-25 ASSESSMENT — PAIN SCALES - WONG BAKER: WONGBAKER_NUMERICALRESPONSE: 0

## 2023-12-25 ASSESSMENT — PAIN DESCRIPTION - LOCATION: LOCATION: CHEST

## 2023-12-25 NOTE — PROGRESS NOTES
Occupational Therapy  Facility/Department: [facility]  Rehabilitation Occupational Therapy Daily Treatment Note    Date: 23  Patient Name: Isac Lemus       Room: 0154/0154-01  MRN: [id number]  Account: [id number]   : 1950  (73 y.o.) Gender: male                    Past Medical History:  has a past medical history of Acid reflux, CAD (coronary artery disease), Diabetes mellitus (HCC), Heartburn, Hiatal hernia, Hypertension, and Seasonal allergies.  Past Surgical History:   has a past surgical history that includes Coronary artery bypass graft (10/2014); Colonoscopy (); and Colonoscopy (3/16/16).    Restrictions  Restrictions/Precautions: Seizure, Fall Risk, Up as Tolerated, NPO, Aspiration Risk, General Precautions  Other position/activity restrictions: PEG    Subjective  Subjective: pt in bed and inc lethargy this date. RN approved therapy. pt wife present  Restrictions/Precautions: Seizure;Fall Risk;Up as Tolerated;NPO;Aspiration Risk;General Precautions   Pt did not appear to be in pain when asked, pt responded \"yeah\" when asked if he wanted to sit in the recliner          Objective     Cognition  Overall Cognitive Status: Exceptions  Arousal/Alertness: Inconsistent responses to stimuli;Unresponsive to stimuli  Following Commands: Does not follow commands  Attention Span: Unable to maintain attention  Memory:  (GIA)  Safety Judgement: Decreased awareness of need for assistance;Decreased awareness of need for safety  Problem Solving: Assistance required to generate solutions;Assistance required to correct errors made;Assistance required to identify errors made;Assistance required to implement solutions;Decreased awareness of errors  Insights: Not aware of deficits  Initiation: Requires cues for all  Sequencing: Requires cues for all  Orientation  Overall Orientation Status: Impaired (pt with only grunt noises this date due to inc lethargy)  Orientation Level: Unable to assess      Call light within reach; All fall risk precautions in place; Chair alarm in place;Gait belt;Patient at risk for falls; Left in chair;Nurse notified  Restraints  Initially in place: No    Patient Education  Education  Education Given To: Patient  Education Provided: Role of Therapy;Plan of Care;Safety;ADL Function;Mobility Training;Transfer Training;Energy Conservation;Cognition; Family Education;Equipment;DME/Home Modifications; Fall Prevention Strategies  Education Method: Demonstration;Verbal  Barriers to Learning: Cognition  Education Outcome: Unable to verbalize; Unable to demonstrate understanding;Continued education needed  Disease specific: Pt educated on benefits of OOB activity to decrease risks associated with prolonged bedrest while in hospital.       Plan  Occupational Therapy Plan  Times Per Week: 5-7x/wk  Current Treatment Recommendations: Strengthening;ROM;Balance training;Functional mobility training; Endurance training;Neuromuscular re-education;Cognitive reorientation;Pain management; Safety education & training;Patient/Caregiver education & training;Equipment evaluation, education, & procurement;Positioning;Self-Care / ADL; Coordination training;Gait training    Goals all non-met goals ongoing as of 12/25/2023  Patient Goals   Patient goals : \"to say a few words so we can know what he wants, and hopefully walk with a walker\" - per wife as pt unable to respond  Short Term Goals  Time Frame for Short Term Goals: by 1/04/23 unless otherwise noted  Short Term Goal 1: Pt will tolerate dynamic sitting balance ax with CGA  in prep for ADLs  Short Term Goal 2: Pt will follow commands to sequence 2 step task with only 1 tactile/visual cue  Short Term Goal 3: Pt will perform sit pivot transfer to toilet with mod Ax2  Short Term Goal 4: Pt will perform grooming tasks with mod A and 2-3 VC for sequencing  Short Term Goal 5: Pt will tolerate x15 BUE ther ex  Long Term Goals  Time Frame for Long Term Goals : to be met by 1/11/23 unless otherwise noted  Long Term Goal 1: Pt will perform TB dressing with min A and LRAD  Long Term Goal 2: Pt will perform TB bathing with min A and LRAD  Long Term Goal 3: Pt will perform toileting tasks with min A and LRAD  Long Term Goal 4: Pt will grooming tasks with min A        Therapy Time   Individual Concurrent Group Co-treatment   Time In   0815       Time Out   0915       Minutes   60       Timed Code Treatment Minutes: 60 Minutes       KARAN Recinos/L

## 2023-12-25 NOTE — PROGRESS NOTES
[facility] Inpatient Rehabilitation Progress Note    Isac Lemus  12/24/2023  [id number]    Chief Complaint: SDH (subdural hematoma) (HCC)    Subjective:   No acute events overnight. Patient family at bedside, state that he is more awake today and seems more interactive.    ROS: Patient Denies Fevers/Chills, Nausea/vomiting, or Chest pain.     Objective:  Patient Vitals for the past 24 hrs:   BP Temp Temp src Pulse Resp SpO2   12/24/23 1737 (!) 109/54 -- -- 82 -- --   12/24/23 0929 (!) 101/57 -- -- 74 -- --   12/24/23 0857 (!) 101/57 98 °F (36.7 °C) Axillary 74 16 96 %   12/24/23 0600 112/67 -- -- 62 -- --   12/23/23 2000 119/67 97 °F (36.1 °C) Axillary 74 18 95 %       Gen: No distress, pleasant.   HEENT: Normocephalic, atraumatic.   CV: Regular rate, rhythm  Resp: No respiratory distress.   Abd: Soft, nontender   Ext: No edema.   Neuro: Alert, oriented, appropriately interactive.     Wt Readings from Last 3 Encounters:   12/22/23 96.9 kg (213 lb 10 oz)   04/06/16 115.7 kg (255 lb)   03/16/16 115.7 kg (255 lb)       Laboratory data:   Lab Results   Component Value Date    WBC 5.9 12/23/2023    HGB 9.9 (L) 12/23/2023    HCT 30.5 (L) 12/23/2023    .7 (H) 12/23/2023     12/23/2023       Lab Results   Component Value Date/Time     12/23/2023 08:33 AM    K 4.5 12/23/2023 08:33 AM     12/23/2023 08:33 AM    CO2 29 12/23/2023 08:33 AM    BUN 17 12/23/2023 08:33 AM    CREATININE <0.5 12/23/2023 08:33 AM    GLUCOSE 179 12/23/2023 08:33 AM    CALCIUM 8.5 12/23/2023 08:33 AM        Therapy progress:    PT    Supine to Sit:    Sit to Supine:     Sit to Stand:    Chair/Bed to Chair Transfer:    Car Transfer:    Ambulation 10 ft:    Ambulation 50 ft:    Ambulation 150 ft:    Stairs - 1 Step:    Stairs - 4 Step:    Stairs - 12 Step:      OT    Eating:    Oral Hygiene: Dependent  Bathing: Dependent  Upper Body Dressing: Dependent  Lower Body Dressing: Dependent  Toilet Transfer:    Toilet Hygiene:  nightly     PPX  DVT: on [facility]  GI: not indicated     Follow up appointments: PCP, Neurosurgery    Varun Paul DO   12/24/2023, 7:24 PM

## 2023-12-25 NOTE — PROGRESS NOTES
[facility]: ACUTE REHAB UNIT  SPEECH-LANGUAGE PATHOLOGY      [x] Daily  [] Weekly Care Conference Note  [] Discharge    Patient:Isac Mejia      BJL:[birth year]  BOC:[id number]  Rehab Dx/Hx: SDH (subdural hematoma) ([address]) [S06. 5XAA]    Precautions: falls and aspirations  Home situation: home with wife, family nearby   Dx: [x] Aphasia  [x] Dysarthria  [] Apraxia   [x] Oropharyngeal dysphagia [x] Cognitive Impairment  [] Other:   Date of Admit: 12/22/2023  Room #: 6827/6835-41    Current functional status (updated daily):         Pt being seen for : [x] Speech/Language Treatment  [x] Dysphagia Treatment [x] Cognitive Treatment  [] Other:  Communication: []WFL  [x] Aphasia  [x] Dysarthria  [] Apraxia  [] Pragmatic Impairment [] Non-verbal  [] Hearing Loss  [] Other:   Cognition: [] WFL  [] Mild  [] Moderate  [] Severe [x] Unable to Assess  [x] Other: suspect deficit  Memory: [] WFL  [] Mild  [] Moderate  [] Severe [x] Unable to Assess  [] Other:  Behavior: [] Alert  [x] Cooperative  []  Pleasant  [] Confused  [] Agitated  [] Uncooperative  [] Distractible [] Motivated  [] Self-Limiting [] Anxious  [] Other:  Endurance:  [x] Adequate for participation in SLP sessions  [] Reduced overall  [] Lethargic  [] Other:  Safety: [] No concerns at this time  [] Reduced insight into deficits  []  Reduced safety awareness [] Not following call light procedures  [x] Unable to Assess  [] Other:    Current Diet Order:Diet NPO  ADULT TUBE FEEDING; PEG; Diabetic; Continuous; 25; Yes; 25; Q 4 hours; 60; 60; Q 4 hours  Swallowing Precautions: Reflux and aspiration precautions; Frequent oral hygiene; allow small volume ice chips with SLP/RN only, hold PO and contact SLP if s/s aspiration or worsening respiratory status develop        Date: 12/25/2023      Tx session 1  6047-0987 Tx session 2   Total Timed Code Min 0    Total Treatment Minutes 60    Individual Treatment Minutes 60    Group Treatment Minutes 0 0   Co-Treat Minutes 0 0 attention.    Goal 2: Pt will communicate basic wants and needs via verbal speech, yes/no, etc. with 80% accuracy given mod cues   -yes/no: responding in over 50% of opportunities, responses approx 75% accurate  -automatic speech: unable to elicit despite cueing and modeling  -occasional mumbling, poor articulatory precision, often out of context. However, did respond \"alright\" x1 when asked if he was ready for more ice.      Other areas targeted: Followed one-step directions with VISUAL cues/clinician model in >50% of opportunities.    Education:   Education with wife on deficits, progress, and ongoing plan of care.    Safety Devices: [] Call light within reach  [x] Chair alarm activated  [] Bed alarm activated  [x] Other: wife at bedside [] Call light within reach  [] Chair alarm activated  [] Bed alarm activated  [] Other:    Assessment: Patient continues to present with significant cognitive-communication deficits and oropharyngeal dysphagia s/p SDH. However, level of alertness and participation do appear to be improving. Decreased endurance as session progressed.  Presents with overt s/s oral and pharyngeal dysphagia. Team can consider FEES to assess for safety of PO intake and to identify areas of weakness for targeted intervention. At this time recommend continue NPO with small volume ice chips with SLP/RN only following oral hygiene and with strict aspiration precautions.  Presents with significant aphasia, benefiting from visual cues/modeling for participation in routines as well as use of yes/no questions. Anticipate continued improvement as intervention and healing continue.   Plan: Continue as per plan of care.      Additional Information:     Barriers toward progress: Pain, Confusion, Communication deficit, Decreased endurance, and Medical complications  Discharge recommendations:  [] Home independently  [] Home with assistance [x]  24 hour supervision  [] ECF [] Other:  Continued Tx Upon Discharge: ?

## 2023-12-25 NOTE — PLAN OF CARE
Patient free of falls during the shift, call light within reach, wife at bedside. bed locked and in lowest position. Will Continue to monitor closely.

## 2023-12-25 NOTE — PROGRESS NOTES
Physical Therapy  Facility/Department: [facility]  Rehabilitation Physical Therapy Treatment Note    NAME: Jacque Cabot  : 1950 (74 y.o.)  MRN: [id number]  CODE STATUS: Full Code    Date of Service: 23       Restrictions:  Restrictions/Precautions: Seizure, Fall Risk, Up as Tolerated, NPO, Aspiration Risk, General Precautions  Position Activity Restriction  Other position/activity restrictions: PEG     SUBJECTIVE  Subjective  Subjective: pt's wife present. Pt arasleep at apporoach and lethargic entire session w moments of more alertness  Pain: pt w/ no response to questions        Post Treatment Pain Screening   Pt unable to articulate. OBJECTIVE  Cognition  Overall Cognitive Status: Exceptions  Arousal/Alertness: Inconsistent responses to stimuli;Unresponsive to stimuli  Following Commands: Does not follow commands  Attention Span: Unable to maintain attention  Memory:  (GIA)  Safety Judgement: Decreased awareness of need for assistance;Decreased awareness of need for safety  Problem Solving: Assistance required to generate solutions;Assistance required to correct errors made;Assistance required to identify errors made;Assistance required to implement solutions;Decreased awareness of errors  Insights: Not aware of deficits  Initiation: Requires cues for all  Sequencing: Requires cues for all  Orientation  Overall Orientation Status: Impaired (pt with only grunt noises this date due to inc lethargy)  Orientation Level: Unable to assess    Functional Mobility  Sit to Supine  Assistance Level: Requires x 2 assistance;Maximum assistance;Dependent  Scooting  Assistance Level: Requires x 2 assistance;Maximum assistance;Dependent  Balance  Standing Balance: Maximum assistance  Transfers  Surface:  To chair with arms  Bed To/From Chair  Assistance Level: Dependent (lucas haile)      Environmental Mobility  Ambulation  Surface:  (GIA)             PT Exercises  Exercise Treatment: PROM BLEs w/ only

## 2023-12-26 LAB
GLUCOSE BLD-MCNC: 128 MG/DL (ref 70–99)
GLUCOSE BLD-MCNC: 133 MG/DL (ref 70–99)
GLUCOSE BLD-MCNC: 134 MG/DL (ref 70–99)
GLUCOSE BLD-MCNC: 143 MG/DL (ref 70–99)
GLUCOSE BLD-MCNC: 162 MG/DL (ref 70–99)
PERFORMED ON: ABNORMAL

## 2023-12-26 PROCEDURE — 97535 SELF CARE MNGMENT TRAINING: CPT

## 2023-12-26 PROCEDURE — 92526 ORAL FUNCTION THERAPY: CPT

## 2023-12-26 PROCEDURE — 97530 THERAPEUTIC ACTIVITIES: CPT

## 2023-12-26 PROCEDURE — 1280000000 HC REHAB R&B

## 2023-12-26 PROCEDURE — 97112 NEUROMUSCULAR REEDUCATION: CPT

## 2023-12-26 PROCEDURE — 92507 TX SP LANG VOICE COMM INDIV: CPT

## 2023-12-26 PROCEDURE — 6370000000 HC RX 637 (ALT 250 FOR IP): Performed by: STUDENT IN AN ORGANIZED HEALTH CARE EDUCATION/TRAINING PROGRAM

## 2023-12-26 RX ADMIN — LEVETIRACETAM 750 MG: 100 SOLUTION ORAL at 11:53

## 2023-12-26 RX ADMIN — MODAFINIL 300 MG: 200 TABLET ORAL at 07:32

## 2023-12-26 RX ADMIN — APIXABAN 5 MG: 5 TABLET, FILM COATED ORAL at 21:56

## 2023-12-26 RX ADMIN — DONEPEZIL HYDROCHLORIDE 5 MG: 5 TABLET, FILM COATED ORAL at 07:33

## 2023-12-26 RX ADMIN — METFORMIN HYDROCHLORIDE 500 MG: 500 TABLET ORAL at 07:33

## 2023-12-26 RX ADMIN — Medication 5 MG: at 21:57

## 2023-12-26 RX ADMIN — METOPROLOL TARTRATE 100 MG: 50 TABLET, FILM COATED ORAL at 11:53

## 2023-12-26 RX ADMIN — SENNOSIDES AND DOCUSATE SODIUM 2 TABLET: 50; 8.6 TABLET ORAL at 07:32

## 2023-12-26 RX ADMIN — METFORMIN HYDROCHLORIDE 500 MG: 500 TABLET ORAL at 21:57

## 2023-12-26 RX ADMIN — METOPROLOL TARTRATE 100 MG: 50 TABLET, FILM COATED ORAL at 05:06

## 2023-12-26 RX ADMIN — ATORVASTATIN CALCIUM 40 MG: 40 TABLET, FILM COATED ORAL at 21:57

## 2023-12-26 RX ADMIN — APIXABAN 5 MG: 5 TABLET, FILM COATED ORAL at 07:33

## 2023-12-26 RX ADMIN — DIGOXIN 125 MCG: 125 TABLET ORAL at 07:33

## 2023-12-26 RX ADMIN — METOPROLOL TARTRATE 100 MG: 50 TABLET, FILM COATED ORAL at 17:15

## 2023-12-26 RX ADMIN — ACETAMINOPHEN 1000 MG: 500 TABLET ORAL at 21:57

## 2023-12-26 RX ADMIN — ACETAMINOPHEN 1000 MG: 500 TABLET ORAL at 05:07

## 2023-12-26 RX ADMIN — METOPROLOL TARTRATE 100 MG: 50 TABLET, FILM COATED ORAL at 21:57

## 2023-12-26 ASSESSMENT — PAIN SCALES - WONG BAKER: WONGBAKER_NUMERICALRESPONSE: 0

## 2023-12-26 ASSESSMENT — PAIN SCALES - GENERAL: PAINLEVEL_OUTOF10: 0

## 2023-12-26 NOTE — CARE COORDINATION
[facility]    Referral received from  to follow for home care services.      [facility] unable to accept  Patient not a previous patient of [facility]  Will need alternate agency for tubefeeds    12/27 referral sent to Yvon Valdez at [facility]; accepted; Isabel Kaye will call patient and pull referral when notified of discharge    Gracelyn Bloch RN, BSN [phone]  [facility]   [phone] fax [address] [address] [phone]

## 2023-12-26 NOTE — PROGRESS NOTES
[facility]: ACUTE REHAB UNIT  SPEECH-LANGUAGE PATHOLOGY      [x] Daily  [] Weekly Care Conference Note  [] Discharge    Patient:Isac Chadwick      JNN:[birth year]  [facility] access hospital:[phone]  Rehab Dx/Hx: SDH (subdural hematoma) ([address]) [S06. 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; independent with household tasks and medication/financial management; family nearby  [address] Dx: [x] Aphasia  [x] Dysarthria  [] Apraxia   [x] Oropharyngeal dysphagia [x] Cognitive Impairment  [] Other:   Date of Admit: 12/22/2023  Room #: 1408/8679-12    Current functional status (updated daily):         Pt being seen for : [x] Speech/Language Treatment  [x] Dysphagia Treatment [x] Cognitive Treatment  [] Other:  Communication: []WFL  [x] Aphasia  [x] Dysarthria  [] Apraxia  [] Pragmatic Impairment [] Non-verbal  [] Hearing Loss  [] Other:   Cognition: [] WFL  [] Mild  [] Moderate  [] Severe [x] Unable to Assess  [x] Other: suspect deficit  Memory: [] WFL  [] Mild  [] Moderate  [] Severe [x] Unable to Assess  [] Other:  Behavior: [] Alert  [] Cooperative  []  Pleasant  [] Confused  [] Agitated  [] Uncooperative  [x] Distractible [] Motivated  [] Self-Limiting [] Anxious  [] Other:  Endurance:  [] Adequate for participation in SLP sessions  [x] Reduced overall  [x] Lethargic  [] Other:  Safety: [] No concerns at this time  [] Reduced insight into deficits  []  Reduced safety awareness [] Not following call light procedures  [x] Unable to Assess  [] Other:    Current Diet Order:Diet NPO  ADULT TUBE FEEDING; PEG; Diabetic; Continuous; 25; Yes; 25; Q 4 hours; 60; 60; Q 4 hours  Swallowing Precautions: Reflux and aspiration precautions; Frequent oral hygiene; allow small volume ice chips with SLP/RN only, hold PO and contact SLP if s/s aspiration or worsening respiratory status develop        Date: 12/26/2023      Tx session 1  [id number] Tx session

## 2023-12-26 NOTE — PATIENT CARE CONFERENCE
[facility]  Inpatient Rehabilitation  Weekly Team Conference Note    Date: 2023  Patient Name: Rishi Chou        MRN: [id number]    : 1950  (74 y.o.)  Gender: male   Referring Practitioner: Allie Toure MD         Interventions to be utilized toward barriers to discharge, per discipline:  [address] observed barriers to dc: Cognitive deficit, Communication deficit, Decreased endurance, Upper extremity weakness, Lower extremity weakness, Incontinence of bowel, Incontinence of bladder, Skin Care, and Medication managment  Nursing interventions:  Assist with ADLs, increase strength to improve balance and mobility, pain control and medication management  Family Education: Yes  Fall Risk:  Yes    Stay with me?: Yes    PHYSICAL THERAPY  Physical therapy observed barriers to dc:    Baseline: lives with wife in 1 level home with 2 MARGUERITE. Ind with no AD for mobility    Current level: TD bed mobility, TD via cathryn for transfers   Barriers to DC: level of assist/alertness, limited assist upon d/c, participation/cog   Needs in order to achieve dc home/next level of care: anticipate pt will require some level of physical assist upon dc. Rec family training and DME TBD pending progress. Physical therapy interventions:   Current Treatment Recommendations: Strengthening, ROM, Balance training, Functional mobility training, Transfer training, Endurance training, Neuromuscular re-education, Gait training, Stair training, Home exercise program, Safety education & training, Patient/Caregiver education & training, Equipment evaluation, education, & procurement, Therapeutic activities    PT Goals:            Short Term Goals  Time Frame for Short Term Goals: 10 days (24)  Short Term Goal 1: Pt to perform bed mobility with mod A x 2. Short Term Goal 2: Pt to tolerate sitting EOB x 3 min with mod A x 1 for balance support.   Short Term Goal 3: Pt to transfer bed <> chair with Recommendations: Strengthening, ROM, Balance training, Functional mobility training, Endurance training, Neuromuscular re-education, Cognitive reorientation, Pain management, Safety education & training, Patient/Caregiver education & training, Equipment evaluation, education, & procurement, Positioning, Self-Care / ADL, Coordination training, Gait training    OT Goals:  Patient Goals   Patient goals : \"to say a few words so we can know what he wants, and hopefully walk with a walker\" - per wife as pt unable to respond  Short Term Goals  Time Frame for Short Term Goals: by 1/04/23 unless otherwise noted  Short Term Goal 1: Pt will tolerate dynamic sitting balance ax with CGA  in prep for ADLs  Short Term Goal 2: Pt will follow commands to sequence 2 step task with only 1 tactile/visual cue  Short Term Goal 3: Pt will perform sit pivot transfer to toilet with mod Ax2  Short Term Goal 4: Pt will perform grooming tasks with mod A and 2-3 VC for sequencing  Short Term Goal 5: Pt will tolerate x15 BUE ther ex  Long Term Goals  Time Frame for Long Term Goals : to be met by 1/11/23 unless otherwise noted  Long Term Goal 1: Pt will perform TB dressing with min A and LRAD  Long Term Goal 2: Pt will perform TB bathing with min A and LRAD  Long Term Goal 3: Pt will perform toileting tasks with min A and LRAD  Long Term Goal 4: Pt will grooming tasks with min A    OT Assessment:  Assessment  Assessment: Pt agreeable to OT/PT session. Pt demonstrated mild improvement in alertness during EOB ADLs but still required total assist for all transfers, sitting balance, bed mobility, and ADLs. Pt demonstrated severe lethargy and inability to follow commands with total assist for all tasks. Pt able to minimally grasp hand during dynamic sitting balance tasks but no initiation for all ADLs. Pt closing eyes for 25-50% of session. Pt tolerated PROM and family educated on benefit of PROM for UE/LEs. Family verbalized understanding. Pt

## 2023-12-26 NOTE — PLAN OF CARE
Problem: Safety - Adult  Goal: Free from fall injury  12/26/2023 1108 by Purnima Munroe  Outcome: Progressing  12/26/2023 0155 by Naga Smyth RN  Outcome: Progressing

## 2023-12-26 NOTE — PROGRESS NOTES
Physical Therapy  Facility/Department: [facility]  Rehabilitation Physical Therapy Treatment Note    NAME: Gabino Hamilton  : 1950 (74 y.o.)  MRN: [id number]  CODE STATUS: Full Code    Date of Service: 23       Restrictions:  Restrictions/Precautions: Seizure, Fall Risk, Up as Tolerated, NPO, Aspiration Risk, General Precautions  Position Activity Restriction  Other position/activity restrictions: PEG, purewick, tube feed     SUBJECTIVE  Subjective  Subjective: pt found in bed, wife present throughout. lethargic and limited to no participation during session  Pain: pt with no pain indicators during session, does not respond verbally or with communication board          OBJECTIVE  Cognition  Overall Cognitive Status: Exceptions  Arousal/Alertness: Inconsistent responses to stimuli;Unresponsive to stimuli  Following Commands: Does not follow commands  Attention Span: Unable to maintain attention  Memory:  (GIA)  Safety Judgement: Decreased awareness of need for assistance;Decreased awareness of need for safety  Problem Solving: Decreased awareness of errors  Insights: Not aware of deficits  Initiation: Requires cues for all  Sequencing: Requires cues for all  Cognition Comment: no voluntary movements in relation of commands  Orientation  Overall Orientation Status: Impaired  Orientation Level: Unable to assess    Functional Mobility  Roll Left  Assistance Level: Dependent  Roll Right  Assistance Level: Dependent  Sit to Supine  Assistance Level: Dependent  Supine to Sit  Assistance Level: Dependent  Balance  Sitting Balance: Dependent/Total  Standing Balance  Activity: pt sitting EOB with PT providing TD for sitting balance (pt unable to hold self in midline, hold self upright. multiple LOB anterior/posterior/latearlly) with no self correct or attempt to self correct.  x 25-30 min while OT assisted/ facilitated ADL  Bed To/From Chair  Assistance Level: Dependent  Skilled Clinical Factors: via cathryn        PT Exercises  Exercise Treatment: therapist provided PROM x 15 BLE ankle DF/PF, inversion/eversion, toe circumduction/flexion/extension, knee and hip flexion/extension and hip IR/ER      ASSESSMENT/PROGRESS TOWARDS GOALS  Vital Signs  Pulse: 89  Heart Rate Source: Monitor  BP: 119/69  BP Location: Left upper arm  MAP (Calculated): 86  SpO2: 93 %  O2 Device: Nasal cannula  Comment: 3L02    Assessment  Assessment: pt seen as co treat with OT for increased safeyt progressing functional mobility. pt initially with eyes open however limited to no command following demonstrated. as session conitnues, lethargy increases with eyes closing often. pt requires TD for bed mobility, TD for transfers via cathryn and TD for sitting balance EOB. pt limited by arousal sate, command following and balance. conitnue to assess d/c pending progress. family stating they wish to take him home  Activity Tolerance: Patient limited by fatigue;Patient limited by endurance;Treatment limited secondary to decreased cognition;Patient tolerated treatment well  Discharge Recommendations: Continue to assess pending progress    Goals  Patient Goals   Patient Goals : pt unable to verbalize  Short Term Goals  Time Frame for Short Term Goals: 10 days (1/1/24)  Short Term Goal 1: Pt to perform bed mobility with mod A x 2.  Short Term Goal 2: Pt to tolerate sitting EOB x 3 min with mod A x 1 for balance support.  Short Term Goal 3: Pt to transfer bed <> chair with LRAD with mod A x 2.  Short Term Goal 4: Pt to perform 12-15 reps of LE HEP to target strength/ROM.  Short Term Goal 5: Pt to perform sit to/from stand transfer at LRAD with mod Ax 2.  Long Term Goals  Time Frame for Long Term Goals : 21 days (1/12/24)  Long Term Goal 1: Pt to perform bed mobility with min Ax 1.  Long Term Goal 2: Pt to transfer bed <> chair with LRAD with min Ax 1.  Long Term Goal 3: Pt to ambulate 25 ft with LRAD with min A x1.  Long Term Goal 4: Pt to manage 2 steps with rail (for

## 2023-12-26 NOTE — PROGRESS NOTES
Occupational Therapy  Facility/Department: [facility]  Rehabilitation Occupational Therapy Daily Treatment Note    Date: 23  Patient Name: Oly Godoy       Room: 0964/7646-10  MRN: [id number]  Account: [de-identified]   : 1950  (74 y.o.) Gender: male                    Past Medical History:  has a past medical history of Acid reflux, CAD (coronary artery disease), Diabetes mellitus ([address]), Heartburn, Hiatal hernia, Hypertension, and Seasonal allergies. Past Surgical History:   has a past surgical history that includes Coronary artery bypass graft (10/2014); Colonoscopy (); and Colonoscopy (3/16/16). Restrictions  Restrictions/Precautions: Seizure, Fall Risk, Up as Tolerated, NPO, Aspiration Risk, General Precautions  Other position/activity restrictions: PEG, purewick, tube feed    Subjective  Subjective: Pt in bed, lethargic, eyes open but not responding to any commands, no s/s of pain, spouse present, agreeable to OT/PT session, /69, HR 98, O2 sats 94% on 3L O2. Restrictions/Precautions: Seizure; Fall Risk;Up as Tolerated;NPO;Aspiration Risk;General Precautions             Objective     Cognition  Overall Cognitive Status: Exceptions  Arousal/Alertness: Inconsistent responses to stimuli;Unresponsive to stimuli  Following Commands: Does not follow commands  Attention Span: Unable to maintain attention  Memory:  (GIA)  Safety Judgement: Decreased awareness of need for assistance;Decreased awareness of need for safety  Problem Solving: Decreased awareness of errors  Insights: Not aware of deficits  Initiation: Requires cues for all  Sequencing: Requires cues for all  Cognition Comment: no voluntary movements in relation of commands  Orientation  Overall Orientation Status: Impaired  Orientation Level: Unable to assess         ADL  Feeding  Assistance Level: Dependent  Skilled Clinical Factors: PEG  Grooming/Oral Hygiene  Assistance Level: Dependent  Skilled Clinical Factors: total assist to complete oral hygiene, max VCs to open mouth, assist to fully open mouth  Upper Extremity Bathing  Assistance Level: Dependent  Skilled Clinical Factors: total assist to sit at EOB and total assist to use bath wipes to bathe UB  Lower Extremity Bathing  Assistance Level: Dependent  Skilled Clinical Factors: total assist to sit at EOB and total assist to use bath wipes to bathe LEs  Upper Extremity Dressing  Assistance Level: Dependent  Skilled Clinical Factors: to doff/don gown  Lower Extremity Dressing  Assistance Level: Dependent  Toileting  Assistance Level: Dependent  Skilled Clinical Factors: external catheter          Functional Mobility  Assistance Level: Dependent  Skilled Clinical Factors: total assist for sitting balance at EOB, total assist for transfer bed>recliner with MaxiMove  Bed Mobility  Overall Assistance Level: Dependent  Roll Left  Assistance Level: Dependent  Roll Right  Assistance Level: Dependent  Sit to Supine  Assistance Level: Dependent  Supine to Sit  Assistance Level: Dependent  Scooting  Assistance Level: Dependent  Transfers  Surface: From bed;To chair with arms  Device: Lift equipment  Bed To/From Chair  Assistance Level: Dependent  Skilled Clinical Factors: MaxiMove bed>recliner   Weight Bearing  Weight Bearing Technique: Yes  RUE Weight Bearing: Forearm seated  LUE Weight Bearing: Forearm seated  Response To Weight Bearing Technique: elbow props x5 each side while seated on EOB with total assist of PT for trunk and max A of OT for supporting UEs, minimal ability grasp OTs hand when correcting posture back to midline, total assist for all task  OT Exercises  PROM Exercises: PROM BUEs for shoulder flexion to 90*, shoulder abduction to 90*, elbow flexion/extension, wrist flexion/extension, supination/pronation, and finger flexiox/extension x10     Assessment  Assessment  Assessment: Pt agreeable to OT/PT session. Pt demonstrated mild improvement in alertness during EOB ADLs but still

## 2023-12-26 NOTE — CARE COORDINATION
Case Management ARU Admission [address]     Rehab Dx/Hx: SDH (subdural hematoma) ([address]) [S06. 5XAA]   SHREYA:[birth year]  XWD:[id number]  Date of Admit: 12/22/2023  Room #: 3877/5773-13       Objective:  reviewed chart and to complete initial assessment and review the role of Case Management while on the ARU. Educate patient on team conferences. Discuss family training with the patient/family on how it is encouraged on the unit. Order for \"discharge planning\" has been addressed. Family Present: No   Primary : [facility] Decision Maker:   Primary Decision Maker: Hiram Anaya Spouse - [phone]    Secondary Decision Maker: Faiza Michel - Child - 21    Current PCP:  VANGIE Thomas CNP       Admit date:  12/22/2023   Insurance: [facility] MEDICARE ADVANTAGE    Precert required for SNF:  [] No       [x] Yes   3 Night stay required: [x] No       [] Yes   Admitting diagnosis: SDH (subdural hematoma) ([address]) [S06. 5XAA]       Current home situation: Independent PLOF. Lives with spouse in a one level home with 2 steps to enter. Home has walk-n-shower. DME at home: [x] Darrel Bird     [x] Jluis Lineadi       [] RTS        [] [facility]      [] Shower Chair        [] O2       [] HHN     [] CPAP       [] BiPap      [] Hospital Bed       [] W/C        [] Other:    Medical concerns requiring training: Restrictions:  Restrictions/Precautions: Seizure, Fall Risk, Up as Tolerated, NPO, Aspiration Risk, General Precautions  Position Activity Restriction  Other position/activity restrictions: PEG w/tube feeds   Medication concerns:  Require financial assistance with meds?  [x] No       [] If yes, please explain:   [] Yes              Services prior to admission: [facility]   Preference for [address] or OP Therapy: [x] Home health       [] Outpatient       [] No preference   Patient's goal(s): pt unable to verbalize    Working or volunteering PTA? retired       Transportation needs: Spouse assist   Has lack of transportation kept you from medical appointments, meetings, work, or ADL's? [] Yes, it has kept me from medical appointments or from getting my meds  [] Yes, It has kept me from non-medical meetings, appointments, work, or getting things I need  [x] No  [] Pt unable to respond  [] Pt declines to respond     How often do you need to have someone help you when you read instructions, pamphlets, or other written material from your doctor or pharmacy? [] Never                             [] Always  [] Rarely                            [] Patient unable to respond  [x] Sometimes                     [] Pt declines to respond  [] Often   Active with: [] Senior services        [] [place] on aging         [] Other:         Dialysis Facility (if applicable) Name:  Address:  Dialysis Schedule:  Phone:  Fax:       Support system at home and in the community: Spouse and children   Caregiver physical ability: [x] Cue patient for safety  [] Physical lift/lower: [] Light [] Moderate [] Heavy  [x] Cook / Clean  [x] Transport   Who will be the one to contact for family training: Nancy   24 hour care on discharge?: Yes, wife can assist   What level does the patient need to be at to return home:  Mod I   Discharge plan:  Pending therapy recommendations   Barriers to discharge: Patient progress, tube feed       Ethnicity: Are you of , /a, or Cameroonian origin?  [x] No, not of , /a, or Cameroonian origin  [] Yes, Angolan, Angolan American, Chicano/a  [] Yes, Nepalese  [] Yes, Solomon Islander  [] Yes, another , , or Cameroonian origin  [] Pt unable to respond  [] Pt declines to respond     Race: [x] White                                                [] Mosotho              []   [] Black or                 [] Guyanese          [] Chadian or Benito  []  or      [] Thai

## 2023-12-27 LAB
ANION GAP SERPL CALCULATED.3IONS-SCNC: 8 MMOL/L (ref 3–16)
BASOPHILS # BLD: 0 K/UL (ref 0–0.2)
BASOPHILS NFR BLD: 0.5 %
BUN SERPL-MCNC: 23 MG/DL (ref 7–20)
CALCIUM SERPL-MCNC: 9.2 MG/DL (ref 8.3–10.6)
CHLORIDE SERPL-SCNC: 104 MMOL/L (ref 99–110)
CO2 SERPL-SCNC: 32 MMOL/L (ref 21–32)
CREAT SERPL-MCNC: <0.5 MG/DL (ref 0.8–1.3)
DEPRECATED RDW RBC AUTO: 16 % (ref 12.4–15.4)
EOSINOPHIL # BLD: 0.1 K/UL (ref 0–0.6)
EOSINOPHIL NFR BLD: 1.1 %
GFR SERPLBLD CREATININE-BSD FMLA CKD-EPI: >60 ML/MIN/{1.73_M2}
GLUCOSE BLD-MCNC: 148 MG/DL (ref 70–99)
GLUCOSE BLD-MCNC: 149 MG/DL (ref 70–99)
GLUCOSE SERPL-MCNC: 142 MG/DL (ref 70–99)
HCT VFR BLD AUTO: 31.9 % (ref 40.5–52.5)
HGB BLD-MCNC: 10.4 G/DL (ref 13.5–17.5)
LYMPHOCYTES # BLD: 1.4 K/UL (ref 1–5.1)
LYMPHOCYTES NFR BLD: 15.2 %
MCH RBC QN AUTO: 33.5 PG (ref 26–34)
MCHC RBC AUTO-ENTMCNC: 32.7 G/DL (ref 31–36)
MCV RBC AUTO: 102.2 FL (ref 80–100)
MONOCYTES # BLD: 0.6 K/UL (ref 0–1.3)
MONOCYTES NFR BLD: 6.5 %
NEUTROPHILS # BLD: 7 K/UL (ref 1.7–7.7)
NEUTROPHILS NFR BLD: 76.7 %
PERFORMED ON: ABNORMAL
PERFORMED ON: ABNORMAL
PLATELET # BLD AUTO: 281 K/UL (ref 135–450)
PMV BLD AUTO: 8.5 FL (ref 5–10.5)
POTASSIUM SERPL-SCNC: 4.6 MMOL/L (ref 3.5–5.1)
RBC # BLD AUTO: 3.12 M/UL (ref 4.2–5.9)
SODIUM SERPL-SCNC: 144 MMOL/L (ref 136–145)
WBC # BLD AUTO: 9.2 K/UL (ref 4–11)

## 2023-12-27 PROCEDURE — 1280000000 HC REHAB R&B

## 2023-12-27 PROCEDURE — 97112 NEUROMUSCULAR REEDUCATION: CPT

## 2023-12-27 PROCEDURE — 36415 COLL VENOUS BLD VENIPUNCTURE: CPT

## 2023-12-27 PROCEDURE — 97535 SELF CARE MNGMENT TRAINING: CPT

## 2023-12-27 PROCEDURE — 92526 ORAL FUNCTION THERAPY: CPT

## 2023-12-27 PROCEDURE — 92507 TX SP LANG VOICE COMM INDIV: CPT

## 2023-12-27 PROCEDURE — 97530 THERAPEUTIC ACTIVITIES: CPT

## 2023-12-27 PROCEDURE — 6370000000 HC RX 637 (ALT 250 FOR IP): Performed by: STUDENT IN AN ORGANIZED HEALTH CARE EDUCATION/TRAINING PROGRAM

## 2023-12-27 PROCEDURE — 80048 BASIC METABOLIC PNL TOTAL CA: CPT

## 2023-12-27 PROCEDURE — 85025 COMPLETE CBC W/AUTO DIFF WBC: CPT

## 2023-12-27 RX ADMIN — METOPROLOL TARTRATE 100 MG: 50 TABLET, FILM COATED ORAL at 04:41

## 2023-12-27 RX ADMIN — LEVETIRACETAM 750 MG: 100 SOLUTION ORAL at 11:18

## 2023-12-27 RX ADMIN — MODAFINIL 300 MG: 200 TABLET ORAL at 08:50

## 2023-12-27 RX ADMIN — Medication 5 MG: at 22:13

## 2023-12-27 RX ADMIN — APIXABAN 5 MG: 5 TABLET, FILM COATED ORAL at 08:49

## 2023-12-27 RX ADMIN — APIXABAN 5 MG: 5 TABLET, FILM COATED ORAL at 22:13

## 2023-12-27 RX ADMIN — ATORVASTATIN CALCIUM 40 MG: 40 TABLET, FILM COATED ORAL at 22:13

## 2023-12-27 RX ADMIN — LEVETIRACETAM 750 MG: 100 SOLUTION ORAL at 00:04

## 2023-12-27 RX ADMIN — ACETAMINOPHEN 1000 MG: 500 TABLET ORAL at 04:42

## 2023-12-27 RX ADMIN — METOPROLOL TARTRATE 100 MG: 50 TABLET, FILM COATED ORAL at 11:18

## 2023-12-27 RX ADMIN — METOPROLOL TARTRATE 100 MG: 50 TABLET, FILM COATED ORAL at 22:13

## 2023-12-27 RX ADMIN — METFORMIN HYDROCHLORIDE 500 MG: 500 TABLET ORAL at 08:50

## 2023-12-27 RX ADMIN — DONEPEZIL HYDROCHLORIDE 5 MG: 5 TABLET, FILM COATED ORAL at 08:50

## 2023-12-27 RX ADMIN — METFORMIN HYDROCHLORIDE 500 MG: 500 TABLET ORAL at 22:13

## 2023-12-27 RX ADMIN — DIGOXIN 125 MCG: 125 TABLET ORAL at 08:50

## 2023-12-27 RX ADMIN — LEVETIRACETAM 750 MG: 100 SOLUTION ORAL at 22:12

## 2023-12-27 RX ADMIN — ACETAMINOPHEN 1000 MG: 500 TABLET ORAL at 22:12

## 2023-12-27 RX ADMIN — METOPROLOL TARTRATE 100 MG: 50 TABLET, FILM COATED ORAL at 16:28

## 2023-12-27 RX ADMIN — ACETAMINOPHEN 1000 MG: 500 TABLET ORAL at 16:00

## 2023-12-27 ASSESSMENT — PAIN SCALES - WONG BAKER
WONGBAKER_NUMERICALRESPONSE: 0
WONGBAKER_NUMERICALRESPONSE: 0

## 2023-12-27 ASSESSMENT — PAIN - FUNCTIONAL ASSESSMENT: PAIN_FUNCTIONAL_ASSESSMENT: ACTIVITIES ARE NOT PREVENTED

## 2023-12-27 ASSESSMENT — PAIN SCALES - GENERAL
PAINLEVEL_OUTOF10: 0
PAINLEVEL_OUTOF10: 0

## 2023-12-27 NOTE — PROGRESS NOTES
Physical Therapy  Facility/Department: [facility]  Rehabilitation Physical Therapy Treatment Note    NAME: Isac Lemus  : 1950 (73 y.o.)  MRN: [id number]  CODE STATUS: Full Code    Date of Service: 23       Restrictions:  Restrictions/Precautions: Seizure, Fall Risk, Up as Tolerated, NPO, Aspiration Risk, General Precautions  Position Activity Restriction  Other position/activity restrictions: PEG, purewick, tube feed     SUBJECTIVE  Subjective  Subjective: pt found in bed  Pain: no pain indicated verbally or physically          OBJECTIVE  Cognition  Overall Cognitive Status: Exceptions  Arousal/Alertness: Inconsistent responses to stimuli;Unresponsive to stimuli  Following Commands: Does not follow commands  Attention Span: Unable to maintain attention  Safety Judgement: Decreased awareness of need for assistance;Decreased awareness of need for safety  Problem Solving: Decreased awareness of errors  Insights: Not aware of deficits  Initiation: Requires cues for all  Sequencing: Requires cues for all  Cognition Comment: no voluntary movements in relation of commands  Orientation  Overall Orientation Status: Impaired  Orientation Level: Unable to assess    Functional Mobility  Roll Left  Assistance Level: Dependent  Skilled Clinical Factors: supine in bed to complete pericare  Roll Right  Assistance Level: Dependent  Supine to Sit  Assistance Level: Dependent  Balance  Sitting Balance: Dependent/Total  Standing Balance  Activity: pt sitting EOB with PT providing TD for sitting balance (pt unable to hold self in midline, hold self upright. multiple LOB anterior/posterior/latearlly) with no self correct or attempt to self correct. x 15 min  Bed To/From Chair  Assistance Level: Dependent  Skilled Clinical Factors: via cathryn    Neuromuscular Education  NDT Treatment: Sitting  Facilitation techniques: pt completed 2 forward weight shifts to increase WBing through BLEs, TD (max A of 3) and TD to return trunk  Therapy Plan  General Plan: 5-7 times per week  Current Treatment Recommendations: Strengthening;ROM;Balance training;Functional mobility training;Transfer training; Endurance training;Neuromuscular re-education;Gait training;Stair training;Home exercise program;Safety education & training;Patient/Caregiver education & training;Equipment evaluation, education, & procurement; Therapeutic activities  Safety Devices  Type of Devices: All fall risk precautions in place;Call light within reach; Left in chair;Chair alarm in place; Heels elevated for pressure relief;Nurse notified; All harry prominences offloaded    EDUCATION  Education  Education Given To: Patient; Family  Education Provided: Role of Therapy;Plan of Care;Precautions; Mobility Training  Education Method: Verbal  Barriers to Learning: Cognition; Hearing  Education Outcome: Continued education needed; Unable to verbalize; Unable to demonstrate understanding        Therapy Time   Individual Concurrent Group Co-treatment   Time In       0930   Time Out       1030   Minutes       60     Timed Code Treatment Minutes: 61 Minutes       [place], [place], 12/27/23 at 11:27 AM       Second Session Therapy Time: Pegvalencia Sumner PT, DPT)   Individual Concurrent Group Co-treatment   Time In       1030   Time Out       1100   Minutes       30     Timed Code Treatment Minutes:  30 minutes    Total Treatment Minutes:  60 minutes    Pegge Sumner PT, DPT

## 2023-12-27 NOTE — CARE COORDINATION
Weekly team care conference with interdisciplinary teamon:12/27/23 Chart reviewed for length of stay. IP day # 5  Unit: ARU   Diagnosis and current status as per MD progress:SDH (subdural hematoma)   Following:N/A  Planned Discharge Date: 01/14/24  Durable medical equipment needed: CTA  Discharge Plan: Home with home care/Private duty resources    Discussion: CM spoke with patient and family in patients room. CM discussed team conference DCP including dc date of 01/14/24, therapy recommendations CTA and DME CTA. Daughter stated that they will take patient home and subacute skilled nursing facility is not an option. CM acknowledged and will help coordinate services for home including home care ([facility]), community services (COA,senior services) and DME needed. CM asked if the patient was a  and spouse stated yes. CM encourage her to call and find out what kind of programs or services that he can get and update writer.     Fay Barr RN

## 2023-12-27 NOTE — PROGRESS NOTES
[facility]: ACUTE REHAB UNIT  SPEECH-LANGUAGE PATHOLOGY      [x] Daily  [] Weekly Care Conference Note  [] Discharge    Patient:Isac Mora      LUT:[birth year]  IMQ:[id number]  Rehab Dx/Hx: SDH (subdural hematoma) ([address]) [S06. 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; independent with household tasks and medication/financial management; family nearby  [address] Dx: [x] Aphasia  [x] Dysarthria  [] Apraxia   [x] Oropharyngeal dysphagia [x] Cognitive Impairment  [] Other:   Date of Admit: 12/22/2023  Room #: 3483/8471-14    Current functional status (updated daily):         Pt being seen for : [x] Speech/Language Treatment  [x] Dysphagia Treatment [x] Cognitive Treatment  [] Other:  Communication: []WFL  [x] Aphasia  [x] Dysarthria  [] Apraxia  [] Pragmatic Impairment [] Non-verbal  [] Hearing Loss  [] Other:   Cognition: [] WFL  [] Mild  [] Moderate  [] Severe [x] Unable to Assess  [x] Other: suspect deficit  Memory: [] WFL  [] Mild  [] Moderate  [] Severe [x] Unable to Assess  [] Other:  Behavior: [] Alert  [] Cooperative  []  Pleasant  [] Confused  [] Agitated  [] Uncooperative  [x] Distractible [] Motivated  [] Self-Limiting [] Anxious  [] Other:  Endurance:  [] Adequate for participation in SLP sessions  [x] Reduced overall  [x] Lethargic  [] Other:  Safety: [] No concerns at this time  [] Reduced insight into deficits  []  Reduced safety awareness [] Not following call light procedures  [x] Unable to Assess  [] Other:    Current Diet Order:Diet NPO  ADULT TUBE FEEDING; PEG; Diabetic; Continuous; 25; Yes; 25; Q 4 hours; 60; 60; Q 4 hours  Swallowing Precautions: Reflux and aspiration precautions; Frequent oral hygiene; allow small volume ice chips with SLP/RN only, hold PO and contact SLP if s/s aspiration or worsening respiratory status develop        Date: 12/27/2023      Tx session 1  [id number] Tx session

## 2023-12-27 NOTE — PROGRESS NOTES
Occupational Therapy  Facility/Department: [facility]  Rehabilitation Occupational Therapy Daily Treatment Note    Date: 23  Patient Name: Jaylyn Haywood       Room: 6318/0165-20  MRN: [id number]  Account: [de-identified]   : 1950  (74 y.o.) Gender: male                    Past Medical History:  has a past medical history of Acid reflux, CAD (coronary artery disease), Diabetes mellitus ([address]), Heartburn, Hiatal hernia, Hypertension, and Seasonal allergies. Past Surgical History:   has a past surgical history that includes Coronary artery bypass graft (10/2014); Colonoscopy (); and Colonoscopy (3/16/16). Restrictions  Restrictions/Precautions: Seizure, Fall Risk, Up as Tolerated, NPO, Aspiration Risk, General Precautions  Other position/activity restrictions: PEG, purewick, tube feed    Subjective  Subjective: Pt in bed, awake on arrival, closing eyes once repositioned in bed, no s/s of pain, spouse present and agreeable to OT/PT session, /62, , O2 sats 95% on 3L O2. Restrictions/Precautions: Seizure; Fall Risk;Up as Tolerated;NPO;Aspiration Risk;General Precautions             Objective     Cognition  Overall Cognitive Status: Exceptions  Arousal/Alertness: Inconsistent responses to stimuli;Unresponsive to stimuli  Following Commands: Does not follow commands  Attention Span: Unable to maintain attention  Memory:  (GIA)  Safety Judgement: Decreased awareness of need for assistance;Decreased awareness of need for safety  Problem Solving: Decreased awareness of errors  Insights: Not aware of deficits  Initiation: Requires cues for all  Sequencing: Requires cues for all  Orientation  Overall Orientation Status: Impaired  Orientation Level: Unable to assess         ADL  Feeding  Assistance Level: Dependent  Skilled Clinical Factors: PEG  Grooming/Oral Hygiene  Assistance Level: Dependent  Skilled Clinical Factors: total assist to complete oral hygiene, max VCs to open mouth, Ute to hold Strategies;Precautions;Visual Perceptual Function  Education Provided Comments: role of OT, ADL sequencing, posture/sitting balance, family educated on benefit of OOB and therapy as well as PROM  Education Method: Demonstration;Verbal  Barriers to Learning: Cognition  Education Outcome: Unable to verbalize;Unable to demonstrate understanding;Continued education needed  Skilled Clinical Factors: Pt unable to demonstrate/verbalize understanding. Family verbalized understanding of education but would benefit from reinforcement.    Plan  Occupational Therapy Plan  Times Per Week: 5-7x/wk  Current Treatment Recommendations: Strengthening;ROM;Balance training;Functional mobility training;Endurance training;Neuromuscular re-education;Cognitive reorientation;Pain management;Safety education & training;Patient/Caregiver education & training;Equipment evaluation, education, & procurement;Positioning;Self-Care / ADL;Coordination training;Gait training    Goals  Short Term Goals  Time Frame for Short Term Goals: by 1/04/23 unless otherwise noted  Short Term Goal 1: Pt will tolerate dynamic sitting balance ax with CGA  in prep for ADLs  Short Term Goal 2: Pt will follow commands to sequence 2 step task with only 1 tactile/visual cue  Short Term Goal 3: Pt will perform sit pivot transfer to toilet with mod Ax2  Short Term Goal 4: Pt will perform grooming tasks with mod A and 2-3 VC for sequencing  Short Term Goal 5: Pt will tolerate x15 BUE ther ex  Long Term Goals  Time Frame for Long Term Goals : to be met by 1/11/23 unless otherwise noted  Long Term Goal 1: Pt will perform TB dressing with min A and LRAD  Long Term Goal 2: Pt will perform TB bathing with min A and LRAD  Long Term Goal 3: Pt will perform toileting tasks with min A and LRAD  Long Term Goal 4: Pt will grooming tasks with min A    Therapy Time   Individual Concurrent Group Co-treatment   Time In       0900   Time Out       0930   Minutes       30   Timed Code

## 2023-12-27 NOTE — PLAN OF CARE
Patient repositioned every 2 hours, pillow support provided, heels elevated and skin cleansed PRN as well. Will continue to monitor the patient closely.  Marcin Mansfield RN,BSN

## 2023-12-28 LAB
ALBUMIN SERPL-MCNC: 2.7 G/DL (ref 3.4–5)
ALBUMIN/GLOB SERPL: 0.7 {RATIO} (ref 1.1–2.2)
ALP SERPL-CCNC: 94 U/L (ref 40–129)
ALT SERPL-CCNC: 42 U/L (ref 10–40)
ANION GAP SERPL CALCULATED.3IONS-SCNC: 8 MMOL/L (ref 3–16)
AST SERPL-CCNC: 47 U/L (ref 15–37)
BASE EXCESS BLDA CALC-SCNC: 7.9 MMOL/L (ref -3–3)
BILIRUB SERPL-MCNC: 0.3 MG/DL (ref 0–1)
BUN SERPL-MCNC: 22 MG/DL (ref 7–20)
CALCIUM SERPL-MCNC: 9.1 MG/DL (ref 8.3–10.6)
CHLORIDE SERPL-SCNC: 103 MMOL/L (ref 99–110)
CO2 BLDA-SCNC: 34.8 MMOL/L
CO2 SERPL-SCNC: 32 MMOL/L (ref 21–32)
COHGB MFR BLDA: 0.5 % (ref 0–1.5)
CREAT SERPL-MCNC: <0.5 MG/DL (ref 0.8–1.3)
GFR SERPLBLD CREATININE-BSD FMLA CKD-EPI: >60 ML/MIN/{1.73_M2}
GLUCOSE BLD-MCNC: 134 MG/DL (ref 70–99)
GLUCOSE BLD-MCNC: 135 MG/DL (ref 70–99)
GLUCOSE BLD-MCNC: 141 MG/DL (ref 70–99)
GLUCOSE BLD-MCNC: 146 MG/DL (ref 70–99)
GLUCOSE SERPL-MCNC: 158 MG/DL (ref 70–99)
HCO3 BLDA-SCNC: 33.3 MMOL/L (ref 21–29)
HGB BLDA-MCNC: 11.7 G/DL (ref 13.5–17.5)
METHGB MFR BLDA: 0.6 %
O2 THERAPY: ABNORMAL
PCO2 BLDA: 50.1 MMHG (ref 35–45)
PERFORMED ON: ABNORMAL
PH BLDA: 7.44 [PH] (ref 7.35–7.45)
PO2 BLDA: 70.2 MMHG (ref 75–108)
POTASSIUM SERPL-SCNC: 4.7 MMOL/L (ref 3.5–5.1)
PROT SERPL-MCNC: 6.6 G/DL (ref 6.4–8.2)
SAO2 % BLDA: 93 %
SODIUM SERPL-SCNC: 143 MMOL/L (ref 136–145)

## 2023-12-28 PROCEDURE — 94761 N-INVAS EAR/PLS OXIMETRY MLT: CPT

## 2023-12-28 PROCEDURE — 97530 THERAPEUTIC ACTIVITIES: CPT

## 2023-12-28 PROCEDURE — 6370000000 HC RX 637 (ALT 250 FOR IP): Performed by: STUDENT IN AN ORGANIZED HEALTH CARE EDUCATION/TRAINING PROGRAM

## 2023-12-28 PROCEDURE — 82803 BLOOD GASES ANY COMBINATION: CPT

## 2023-12-28 PROCEDURE — 1280000000 HC REHAB R&B

## 2023-12-28 PROCEDURE — 97535 SELF CARE MNGMENT TRAINING: CPT

## 2023-12-28 PROCEDURE — 92526 ORAL FUNCTION THERAPY: CPT

## 2023-12-28 PROCEDURE — 92507 TX SP LANG VOICE COMM INDIV: CPT

## 2023-12-28 PROCEDURE — 80053 COMPREHEN METABOLIC PANEL: CPT

## 2023-12-28 PROCEDURE — 36415 COLL VENOUS BLD VENIPUNCTURE: CPT

## 2023-12-28 PROCEDURE — 36600 WITHDRAWAL OF ARTERIAL BLOOD: CPT

## 2023-12-28 PROCEDURE — 97112 NEUROMUSCULAR REEDUCATION: CPT

## 2023-12-28 PROCEDURE — 2700000000 HC OXYGEN THERAPY PER DAY

## 2023-12-28 RX ADMIN — APIXABAN 5 MG: 5 TABLET, FILM COATED ORAL at 22:23

## 2023-12-28 RX ADMIN — METFORMIN HYDROCHLORIDE 500 MG: 500 TABLET ORAL at 08:29

## 2023-12-28 RX ADMIN — METFORMIN HYDROCHLORIDE 500 MG: 500 TABLET ORAL at 22:28

## 2023-12-28 RX ADMIN — LEVETIRACETAM 750 MG: 100 SOLUTION ORAL at 22:24

## 2023-12-28 RX ADMIN — METOPROLOL TARTRATE 100 MG: 50 TABLET, FILM COATED ORAL at 10:42

## 2023-12-28 RX ADMIN — ACETAMINOPHEN 1000 MG: 500 TABLET ORAL at 15:01

## 2023-12-28 RX ADMIN — ACETAMINOPHEN 1000 MG: 500 TABLET ORAL at 05:36

## 2023-12-28 RX ADMIN — METOPROLOL TARTRATE 100 MG: 50 TABLET, FILM COATED ORAL at 16:01

## 2023-12-28 RX ADMIN — LEVETIRACETAM 750 MG: 100 SOLUTION ORAL at 10:42

## 2023-12-28 RX ADMIN — DONEPEZIL HYDROCHLORIDE 5 MG: 5 TABLET, FILM COATED ORAL at 08:29

## 2023-12-28 RX ADMIN — METOPROLOL TARTRATE 100 MG: 50 TABLET, FILM COATED ORAL at 22:28

## 2023-12-28 RX ADMIN — METOPROLOL TARTRATE 100 MG: 50 TABLET, FILM COATED ORAL at 05:36

## 2023-12-28 RX ADMIN — APIXABAN 5 MG: 5 TABLET, FILM COATED ORAL at 08:29

## 2023-12-28 RX ADMIN — SENNOSIDES AND DOCUSATE SODIUM 2 TABLET: 50; 8.6 TABLET ORAL at 22:28

## 2023-12-28 RX ADMIN — DIGOXIN 125 MCG: 125 TABLET ORAL at 08:29

## 2023-12-28 RX ADMIN — ATORVASTATIN CALCIUM 40 MG: 40 TABLET, FILM COATED ORAL at 22:23

## 2023-12-28 RX ADMIN — MODAFINIL 300 MG: 200 TABLET ORAL at 08:29

## 2023-12-28 RX ADMIN — Medication 5 MG: at 22:28

## 2023-12-28 RX ADMIN — ACETAMINOPHEN 1000 MG: 500 TABLET ORAL at 22:23

## 2023-12-28 ASSESSMENT — PAIN SCALES - WONG BAKER
WONGBAKER_NUMERICALRESPONSE: 0
WONGBAKER_NUMERICALRESPONSE: 4
WONGBAKER_NUMERICALRESPONSE: 0

## 2023-12-28 ASSESSMENT — PAIN - FUNCTIONAL ASSESSMENT
PAIN_FUNCTIONAL_ASSESSMENT: PREVENTS OR INTERFERES SOME ACTIVE ACTIVITIES AND ADLS
PAIN_FUNCTIONAL_ASSESSMENT: ACTIVITIES ARE NOT PREVENTED

## 2023-12-28 ASSESSMENT — PAIN DESCRIPTION - DESCRIPTORS
DESCRIPTORS: PATIENT UNABLE TO DESCRIBE
DESCRIPTORS: ACHING

## 2023-12-28 ASSESSMENT — PAIN SCALES - GENERAL
PAINLEVEL_OUTOF10: 0
PAINLEVEL_OUTOF10: 4
PAINLEVEL_OUTOF10: 0
PAINLEVEL_OUTOF10: 0

## 2023-12-28 ASSESSMENT — PAIN DESCRIPTION - LOCATION: LOCATION: GENERALIZED

## 2023-12-28 NOTE — PROGRESS NOTES
12/28/23 1627   Oxygen Therapy/Pulse Ox   O2 Therapy Oxygen   O2 Device Nasal cannula   O2 Flow Rate (L/min) 2 L/min   SpO2 93 %   Blood Gas  Performed? Yes   $ABG $Arterial Puncture   Rafi's Test #1 Pos   Site #1 Right Radial   Site Prepped #1 Yes   Number of Attempts #1 1   Pressure Held #1 Yes   Complications #1 None   Post-procedure #1 Standard   Specimen Status #1 To lab   How Tolerated? GIA     This RT took over coverage of ARU at 1500, noticed ABG order from 1245. Sent secure message to Dr. Carolee Mijares to see if ABG was still needed.  ABG obtained at this time

## 2023-12-28 NOTE — PROGRESS NOTES
Comprehensive Nutrition Assessment    Type and Reason for Visit:  Reassess    Nutrition Recommendations/Plan:   NPO - ADAT per SLP  Continue Glucerna 1.5 (diabetic formula) at goal of 60 mL/hr via PEG. Recommend 60 mL H20 q 4 hours. Recommend reevaluate IV fluids at this time. Increase flush if Na+ increases greater than 145 mEq/L - RD to order  Monitor for tolerance (bowel habits, N/V, cramping, abdominal exam findings: distended, firm, tense, guarded, discomfort). Monitor nutrition adequacy, pertinent labs, bowel habits, wt changes, and clinical progress     Malnutrition Assessment:  Malnutrition Status: At risk for malnutrition (Comment) (12/28/23 1220)    Context:  Acute Illness     Findings of the 6 clinical characteristics of malnutrition:  Energy Intake:  Mild decrease in energy intake (Comment)  Weight Loss:  Greater than 7.5% over 3 months     Fluid Accumulation:  Mild Extremities    Nutrition Assessment:    Follow up: Pt continues to be NPO with TF at goal of 60 ml/hr via PEG. Pt limited historian, spoke to family in room. Reports pt has been tolerating TF. Reports pt has been having looser bowel movements but has also been on a stool softener. Stool softener not given today. Family denied any TF questions or nutrition concerns at time of visit. Weight appears stable since admit per EMR. Continue current recommendations. Will continue to monitor. Nutrition Related Findings:    -149 x24 hrs. x2 BM 12/27. +2 pitting BLE edema. No updated electolyte labs today.  Wound Type: Open Wounds, Deep Tissue Injury       Current Nutrition Intake & Therapies:    Average Meal Intake: NPO  Average Supplements Intake: NPO  Diet NPO  ADULT TUBE FEEDING; PEG; Diabetic; Continuous; 25; Yes; 25; Q 4 hours; 60; 60; Q 4 hours  Current Tube Feeding (TF) Orders:  Feeding Route: PEG  Formula: Diabetic  Schedule: Continuous  Goal TF & Flush Orders Provides: Glucerna at goal rate of 60 ml/hr x20 hrs to provide 1200 ml

## 2023-12-28 NOTE — PROGRESS NOTES
Physical Therapy  Facility/Department: [facility]  Rehabilitation Physical Therapy Treatment Note    NAME: Isac Lemus  : 1950 (73 y.o.)  MRN: [id number]  CODE STATUS: Full Code    Date of Service: 23       Restrictions:  Restrictions/Precautions: Seizure, Fall Risk, Up as Tolerated, NPO, Aspiration Risk, General Precautions  Position Activity Restriction  Other position/activity restrictions: PEG, tube feed     SUBJECTIVE  Subjective  Subjective: pt found in bed, incontinent of bowel  Pain: no pain indicated     OBJECTIVE  Cognition  Overall Cognitive Status: Exceptions  Arousal/Alertness: Inconsistent responses to stimuli;Unresponsive to stimuli  Following Commands: Does not follow commands  Attention Span: Unable to maintain attention  Memory:  (GIA)  Safety Judgement: Decreased awareness of need for assistance;Decreased awareness of need for safety  Problem Solving: Decreased awareness of errors  Insights: Not aware of deficits  Initiation: Requires cues for all  Sequencing: Requires cues for all  Cognition Comment: very minimal voluntary movements in relation of commands  Orientation  Overall Orientation Status: Impaired  Orientation Level: Unable to assess    Functional Mobility  Roll Left  Assistance Level: Dependent  Roll Right  Assistance Level: Dependent  Bed To/From Chair  Assistance Level: Dependent  Skilled Clinical Factors: via cathryn from bed to tilt table      Neuromuscular Education  NDT Treatment: Standing  Neuromuscular Comments: pt in tilt table for approx 7-8 min. pt initially at 30* standing with WBing through Bles appearing more alert and following commands to squeeze therapist hands. /70, pulse= 59 bpm. therapist increased tilt table to 45* standing , Bp remains stable at 112/70 and pt continues to apppear more alert and following commands to look L and squeeze hands.       Pt in bed at end of session with call light/needs within reach and family present. Tube feed doffed  pending progress  PT Equipment Recommendations  Equipment Needed: Yes    Goals  Patient Goals   Patient Goals : pt unable to verbalize  Short Term Goals  Time Frame for Short Term Goals: 10 days (1/1/24)  Short Term Goal 1: Pt to perform bed mobility with mod A x 2.  Short Term Goal 2: Pt to tolerate sitting EOB x 3 min with mod A x 1 for balance support.  Short Term Goal 3: Pt to transfer bed <> chair with LRAD with mod A x 2.  Short Term Goal 4: Pt to perform 12-15 reps of LE HEP to target strength/ROM.  Short Term Goal 5: Pt to perform sit to/from stand transfer at LRAD with mod Ax 2.  Long Term Goals  Time Frame for Long Term Goals : 21 days (1/12/24)  Long Term Goal 1: Pt to perform bed mobility with min Ax 1.  Long Term Goal 2: Pt to transfer bed <> chair with LRAD with min Ax 1.  Long Term Goal 3: Pt to ambulate 25 ft with LRAD with min A x1.  Long Term Goal 4: Pt to manage 2 steps with rail (for home entry) with LRAD with min A x1.  Long Term Goal 5: Pt to perform sit to/from stand transfer at LRAD with min A x 1.    PLAN OF CARE/SAFETY  Physical Therapy Plan  General Plan: 5-7 times per week  Current Treatment Recommendations: Strengthening;ROM;Balance training;Functional mobility training;Transfer training;Endurance training;Neuromuscular re-education;Gait training;Stair training;Home exercise program;Safety education & training;Patient/Caregiver education & training;Equipment evaluation, education, & procurement;Therapeutic activities  Safety Devices  Type of Devices: All fall risk precautions in place;Call light within reach;Left in chair;Chair alarm in place;Heels elevated for pressure relief;Nurse notified;All harry prominences offloaded    EDUCATION  Education  Education Given To: Patient;Family  Education Provided: Role of Therapy;Plan of Care;Precautions;Mobility Training  Education Method: Verbal  Barriers to Learning: Cognition;Hearing  Education Outcome: Continued education needed;Unable to  verbalize; Unable to demonstrate understanding        Therapy Time   Individual Concurrent Group Co-treatment   Time In       1000   Time Out       1030   Minutes       30     Timed Code Treatment Minutes: 30 Minutes     Second Session Therapy Time:   Individual Concurrent Group Co-treatment   Time In       1400   Time Out       1430   Minutes       30     Timed Code Treatment Minutes:  30    Total Treatment Minutes:  4101 Pradip Wesley, PT, 12/28/23 at 11:48 AM

## 2023-12-28 NOTE — PLAN OF CARE
Problem: Discharge Planning  Goal: Discharge to home or other facility with appropriate resources  Outcome: Progressing  Flowsheets (Taken 12/28/2023 1308)  Discharge to home or other facility with appropriate resources:   Identify barriers to discharge with patient and caregiver   Arrange for needed discharge resources and transportation as appropriate   Identify discharge learning needs (meds, wound care, etc)     Problem: Pain  Goal: Verbalizes/displays adequate comfort level or baseline comfort level  Outcome: Progressing     Problem: Skin/Tissue Integrity  Goal: Absence of new skin breakdown  Description: 1. Monitor for areas of redness and/or skin breakdown  2. Assess vascular access sites hourly  3. Every 4-6 hours minimum:  Change oxygen saturation probe site  4. Every 4-6 hours:  If on nasal continuous positive airway pressure, respiratory therapy assess nares and determine need for appliance change or resting period. 12/28/2023 1311 by Davon Ojeda RN  Outcome: Progressing  12/28/2023 0041 by Gladys Flores RN  Outcome: Progressing     Problem: Safety - Adult  Goal: Free from fall injury  Outcome: Progressing     Problem: ABCDS Injury Assessment  Goal: Absence of physical injury  Outcome: Progressing     Problem: Confusion  Goal: Confusion, delirium, dementia, or psychosis is improved or at baseline  Description: INTERVENTIONS:  1. Assess for possible contributors to thought disturbance, including medications, impaired vision or hearing, underlying metabolic abnormalities, dehydration, psychiatric diagnoses, and notify attending LIP  2. [place] high risk fall precautions, as indicated  3. Provide frequent short contacts to provide reality reorientation, refocusing and direction  4. Decrease environmental stimuli, including noise as appropriate  5. Monitor and intervene to maintain adequate nutrition, hydration, elimination, sleep and activity  6.  If unable to ensure safety without constant attention obtain sitter and review sitter guidelines with assigned personnel  7. Initiate Psychosocial CNS and Spiritual Care consult, as indicated  Outcome: Progressing     Problem: Nutrition Deficit:  Goal: Optimize nutritional status  Outcome: Progressing  Flowsheets (Taken 12/28/2023 1216 by Rose Marie Story, SUKUMAR)  Nutrient intake appropriate for improving, restoring, or maintaining nutritional needs:   Assess nutritional status and recommend course of action   Recommend appropriate diets, oral nutritional supplements, and vitamin/mineral supplements   Recommend, monitor, and adjust tube feedings and TPN/PPN based on assessed needs   Monitor oral intake, labs, and treatment plans

## 2023-12-28 NOTE — PROGRESS NOTES
Occupational Therapy  Facility/Department: [facility]  Rehabilitation Occupational Therapy Daily Treatment Note    Date: 23  Patient Name: Maria Kennedy       Room: 9874/0698-00  MRN: [id number]  Account: [de-identified]   : 1950  (74 y.o.) Gender: male                    Past Medical History:  has a past medical history of Acid reflux, CAD (coronary artery disease), Diabetes mellitus ([address]), Heartburn, Hiatal hernia, Hypertension, and Seasonal allergies. Past Surgical History:   has a past surgical history that includes Coronary artery bypass graft (10/2014); Colonoscopy (); and Colonoscopy (3/16/16). Restrictions  Restrictions/Precautions: Seizure, Fall Risk, Up as Tolerated, NPO, Aspiration Risk, General Precautions  Other position/activity restrictions: PEG, tube feed    Subjective  Subjective: Pt in bed, slow to arouse initially but more alert after bed mobility for remainder of session, no s/s of pain, family present and agreeable to OT/PT session, /56, , O2 sats 94% on 2.5L O2. Restrictions/Precautions: Seizure; Fall Risk;Up as Tolerated;NPO;Aspiration Risk;General Precautions             Objective     Cognition  Overall Cognitive Status: Exceptions  Arousal/Alertness: Inconsistent responses to stimuli;Unresponsive to stimuli  Following Commands: Does not follow commands  Attention Span: Unable to maintain attention  Memory:  (GIA)  Safety Judgement: Decreased awareness of need for assistance;Decreased awareness of need for safety  Problem Solving: Decreased awareness of errors  Insights: Not aware of deficits  Initiation: Requires cues for all  Sequencing: Requires cues for all  Cognition Comment: very minimal voluntary movements in relation of commands  Orientation  Overall Orientation Status: Impaired  Orientation Level: Unable to assess   Perception  Overall Perceptual Status: Impaired  Unilateral Attention: Cues to attend right visual field (Gaze averted to L for majority of discomfort on face. Pt demonstrated increased verbalizations this session but not intelligible. Pt tolerated tilt table well for 3 minutes at 30* and 45* each with vitals stable. Pt able to follow minimal commands of squeezing hands or looking toward family. Pt returned to bed at end of session.   Second Session: Pt's family present for family training during second session. Pt assisted to roll by therapists and family members. Pt's family demonstrated ability to assist with rolling and verbalized understanding of body mechanics and technique for bed mobility. Pt sat at EOB for ~5 minutes with total assist from PT for sitting balance while OT provided [place] to brush teeth and rinse mouth with total assistance. Pt awake and minimal verbalizations during sitting at EOB. Pt assisted to recliner via MaxiMove at end of session. Continue POC.  Activity Tolerance: Patient limited by fatigue;Patient limited by endurance;Treatment limited secondary to decreased cognition  Discharge Recommendations: 24 hour supervision or assist;Home with Home health OT;S Level 4  OT Equipment Recommendations  Other: CTA  Safety Devices  Safety Devices in place: Yes  Type of devices: Call light within reach;All fall risk precautions in place;Gait belt;Patient at risk for falls;Nurse notified;Left in bed;Bed alarm in place    Patient Education  Education  Education Given To: Patient;Family  Education Provided: Role of Therapy;Plan of Care;Safety;ADL Function;Mobility Training;Transfer Training;Energy Conservation;Cognition;Family Education;Equipment;DME/Home Modifications;Fall Prevention Strategies;Precautions;Visual Perceptual Function  Education Provided Comments: role of OT, ADL sequencing, posture/sitting balance, weight bearing/upright posture family educated on benefit of OOB and therapy as well as PROM  Education Method: Demonstration;Verbal  Barriers to Learning: Cognition  Education Outcome: Unable to verbalize;Unable to demonstrate  understanding;Continued education needed    Plan  Occupational Therapy Plan  Times Per Week: 5-7x/wk  Current Treatment Recommendations: Strengthening;ROM;Balance training;Functional mobility training;Endurance training;Neuromuscular re-education;Cognitive reorientation;Pain management;Safety education & training;Patient/Caregiver education & training;Equipment evaluation, education, & procurement;Positioning;Self-Care / ADL;Coordination training;Gait training    Goals  Patient Goals   Patient goals : \"to say a few words so we can know what he wants, and hopefully walk with a walker\" - per wife as pt unable to respond  Short Term Goals  Time Frame for Short Term Goals: by 1/04/23 unless otherwise noted  Short Term Goal 1: Pt will tolerate dynamic sitting balance ax with CGA  in prep for ADLs  Short Term Goal 2: Pt will follow commands to sequence 2 step task with only 1 tactile/visual cue  Short Term Goal 3: Pt will perform sit pivot transfer to toilet with mod Ax2  Short Term Goal 4: Pt will perform grooming tasks with mod A and 2-3 VC for sequencing  Short Term Goal 5: Pt will tolerate x15 BUE ther ex  Long Term Goals  Time Frame for Long Term Goals : to be met by 1/11/23 unless otherwise noted  Long Term Goal 1: Pt will perform TB dressing with min A and LRAD  Long Term Goal 2: Pt will perform TB bathing with min A and LRAD  Long Term Goal 3: Pt will perform toileting tasks with min A and LRAD  Long Term Goal 4: Pt will grooming tasks with min A    Therapy Time   Individual Concurrent Group Co-treatment   Time In       1000   Time Out       1030   Minutes       30   Timed Code Treatment Minutes: 30 Minutes   Second Session Therapy Time:   Individual Concurrent Group Co-treatment   Time In     1400   Time Out     1430   Minutes     30     Timed Code Treatment Minutes:  30 Minutes    Total Treatment Minutes:  60 minutes      Marito Florian, OT

## 2023-12-28 NOTE — PROGRESS NOTES
Consult to pulmonology placed at this time. Informed of ABG results.    Pulmonologist says he will see the patient in the am.

## 2023-12-28 NOTE — PROGRESS NOTES
[facility]: ACUTE REHAB UNIT  SPEECH-LANGUAGE PATHOLOGY      [x] Daily  [] Weekly Care Conference Note  [] Discharge    Patient:Isac Davila      TBY:7/0/0661  [facility]:[phone]  Rehab Dx/Hx: SDH (subdural hematoma) ([address]) [S06. 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; independent with household tasks and medication/financial management; family nearby  [address] Dx: [x] Aphasia  [x] Dysarthria  [] Apraxia   [x] Oropharyngeal dysphagia [x] Cognitive Impairment  [] Other:   Date of Admit: 12/22/2023  Room #: 0156/0156-01    Current functional status (updated daily):         Pt being seen for : [x] Speech/Language Treatment  [x] Dysphagia Treatment [] Cognitive Treatment  [] Other:  Communication: []WFL  [x] Aphasia  [x] Dysarthria  [] Apraxia  [] Pragmatic Impairment [] Non-verbal  [] Hearing Loss  [] Other:   Cognition: [] WFL  [] Mild  [] Moderate  [] Severe [x] Unable to Assess  [x] Other: suspect deficit  Memory: [] WFL  [] Mild  [] Moderate  [] Severe [x] Unable to Assess  [] Other:  Behavior: [] Alert  [] Cooperative  [x]  Pleasant  [] Confused  [] Agitated  [] Uncooperative  [x] Distractible [] Motivated  [] Self-Limiting [] Anxious  [] Other:  Endurance:  [] Adequate for participation in SLP sessions  [x] Reduced overall  [x] Lethargic  [] Other:  Safety: [] No concerns at this time  [] Reduced insight into deficits  []  Reduced safety awareness [] Not following call light procedures  [x] Unable to Assess  [] Other:    Current Diet Order:Diet NPO  ADULT TUBE FEEDING; PEG; Diabetic; Continuous; 25; Yes; 25; Q 4 hours; 60; 60; Q 4 hours  Swallowing Precautions: Reflux and aspiration precautions; Frequent oral hygiene; allow small volume ice chips with SLP/RN only, hold PO and contact SLP if s/s aspiration or worsening respiratory status develop        Date: 12/28/2023      Tx session 1  [id number] Tx session

## 2023-12-28 NOTE — PROGRESS NOTES
[facility] Inpatient Rehabilitation Progress Note    Isac Lemus  12/28/2023  [id number]    Chief Complaint: SDH (subdural hematoma) (HCC)    Subjective:   No acute events overnight. Today Jack is seen with his wife and daughter present. He is asleep in bed. Discussed Keppra course with family.     ROS: unable to reliably obtain    Objective:  Patient Vitals for the past 24 hrs:   BP Temp Temp src Pulse Resp SpO2   12/28/23 1215 -- -- -- -- -- 94 %   12/28/23 1144 124/65 -- -- 98 -- 94 %   12/28/23 1030 124/65 -- -- 98 -- --   12/28/23 0825 131/61 98.3 °F (36.8 °C) Oral 95 18 94 %   12/28/23 0515 129/71 -- -- 65 -- --   12/27/23 2330 -- -- -- 88 -- --   12/27/23 2200 117/70 99 °F (37.2 °C) Axillary 100 18 94 %   12/27/23 1625 132/79 100 °F (37.8 °C) Axillary 71 -- --     Gen: No distress  HEENT: Normocephalic, atraumatic.   CV: extremities well perfused  Resp: No respiratory distress. On oxygen via nasal cannula  Abd: Soft, nondistended  Ext: No edema.   Neuro: asleep in bed    Wt Readings from Last 3 Encounters:   12/26/23 97.1 kg (214 lb 1.1 oz)   04/06/16 115.7 kg (255 lb)   03/16/16 115.7 kg (255 lb)       Laboratory data:   Lab Results   Component Value Date    WBC 9.2 12/27/2023    HGB 10.4 (L) 12/27/2023    HCT 31.9 (L) 12/27/2023    .2 (H) 12/27/2023     12/27/2023       Lab Results   Component Value Date/Time     12/27/2023 06:55 AM    K 4.6 12/27/2023 06:55 AM     12/27/2023 06:55 AM    CO2 32 12/27/2023 06:55 AM    BUN 23 12/27/2023 06:55 AM    CREATININE <0.5 12/27/2023 06:55 AM    GLUCOSE 142 12/27/2023 06:55 AM    CALCIUM 9.2 12/27/2023 06:55 AM        Therapy progress:    PT    Supine to Sit:    Sit to Supine:     Sit to Stand:    Chair/Bed to Chair Transfer:    Car Transfer:    Ambulation 10 ft:    Ambulation 50 ft:    Ambulation 150 ft:    Stairs - 1 Step:    Stairs - 4 Step:    Stairs - 12 Step:      OT    Eating:    Oral Hygiene: Dependent  Bathing: Dependent  Upper  lisinopril, losartan held  - metoprolol     Bowels: adjust medications as needed for regular bowel movements     Bladder: Check PVR x 3. [place] if PVR > 200ml or if any volume is > 500 ml.       Sleep: melatonin provided nightly     PPX  DVT: on AC  GI: not indicated     Follow up appointments: PCP, Neurosurgery    Therapy progress: pending therapies today  Services: [facility] PT, OT, ST, nursing  EDOD: 1/14    Chandler Iqbal MD   12/28/2023, 12:36 PM

## 2023-12-29 ENCOUNTER — APPOINTMENT (OUTPATIENT)
Dept: GENERAL RADIOLOGY | Age: 73
DRG: 949 | End: 2023-12-29
Attending: STUDENT IN AN ORGANIZED HEALTH CARE EDUCATION/TRAINING PROGRAM
Payer: MEDICARE

## 2023-12-29 ENCOUNTER — APPOINTMENT (OUTPATIENT)
Dept: CT IMAGING | Age: 73
DRG: 949 | End: 2023-12-29
Attending: STUDENT IN AN ORGANIZED HEALTH CARE EDUCATION/TRAINING PROGRAM
Payer: MEDICARE

## 2023-12-29 PROBLEM — J96.12 CHRONIC RESPIRATORY FAILURE WITH HYPERCAPNIA (HCC): Status: ACTIVE | Noted: 2023-12-29

## 2023-12-29 LAB
ANION GAP SERPL CALCULATED.3IONS-SCNC: 4 MMOL/L (ref 3–16)
BASE EXCESS BLDA CALC-SCNC: 10.8 MMOL/L (ref -3–3)
BASOPHILS # BLD: 0 K/UL (ref 0–0.2)
BASOPHILS NFR BLD: 0.3 %
BUN SERPL-MCNC: 24 MG/DL (ref 7–20)
CALCIUM SERPL-MCNC: 8.9 MG/DL (ref 8.3–10.6)
CHLORIDE SERPL-SCNC: 105 MMOL/L (ref 99–110)
CO2 BLDA-SCNC: 38.6 MMOL/L
CO2 SERPL-SCNC: 35 MMOL/L (ref 21–32)
COHGB MFR BLDA: 1.6 % (ref 0–1.5)
CREAT SERPL-MCNC: <0.5 MG/DL (ref 0.8–1.3)
DEPRECATED RDW RBC AUTO: 16.8 % (ref 12.4–15.4)
EOSINOPHIL # BLD: 0.1 K/UL (ref 0–0.6)
EOSINOPHIL NFR BLD: 0.7 %
GFR SERPLBLD CREATININE-BSD FMLA CKD-EPI: >60 ML/MIN/{1.73_M2}
GLUCOSE BLD-MCNC: 139 MG/DL (ref 70–99)
GLUCOSE BLD-MCNC: 147 MG/DL (ref 70–99)
GLUCOSE BLD-MCNC: 150 MG/DL (ref 70–99)
GLUCOSE BLD-MCNC: 151 MG/DL (ref 70–99)
GLUCOSE BLD-MCNC: 159 MG/DL (ref 70–99)
GLUCOSE BLD-MCNC: 168 MG/DL (ref 70–99)
GLUCOSE SERPL-MCNC: 150 MG/DL (ref 70–99)
HCO3 BLDA-SCNC: 36.9 MMOL/L (ref 21–29)
HCT VFR BLD AUTO: 32 % (ref 40.5–52.5)
HGB BLD-MCNC: 10.3 G/DL (ref 13.5–17.5)
HGB BLDA-MCNC: 12.1 G/DL (ref 13.5–17.5)
LYMPHOCYTES # BLD: 1.3 K/UL (ref 1–5.1)
LYMPHOCYTES NFR BLD: 16.5 %
MCH RBC QN AUTO: 33.3 PG (ref 26–34)
MCHC RBC AUTO-ENTMCNC: 32.1 G/DL (ref 31–36)
MCV RBC AUTO: 103.6 FL (ref 80–100)
METHGB MFR BLDA: 0.4 %
MONOCYTES # BLD: 0.7 K/UL (ref 0–1.3)
MONOCYTES NFR BLD: 8.3 %
NEUTROPHILS # BLD: 6 K/UL (ref 1.7–7.7)
NEUTROPHILS NFR BLD: 74.2 %
O2 THERAPY: ABNORMAL
PCO2 BLDA: 55.8 MMHG (ref 35–45)
PERFORMED ON: ABNORMAL
PH BLDA: 7.44 [PH] (ref 7.35–7.45)
PLATELET # BLD AUTO: 295 K/UL (ref 135–450)
PMV BLD AUTO: 8.6 FL (ref 5–10.5)
PO2 BLDA: 63.8 MMHG (ref 75–108)
POTASSIUM SERPL-SCNC: 4.7 MMOL/L (ref 3.5–5.1)
RBC # BLD AUTO: 3.09 M/UL (ref 4.2–5.9)
SAO2 % BLDA: 92.3 %
SODIUM SERPL-SCNC: 144 MMOL/L (ref 136–145)
WBC # BLD AUTO: 8.1 K/UL (ref 4–11)

## 2023-12-29 PROCEDURE — APPSS60 APP SPLIT SHARED TIME 46-60 MINUTES: Performed by: NURSE PRACTITIONER

## 2023-12-29 PROCEDURE — 82803 BLOOD GASES ANY COMBINATION: CPT

## 2023-12-29 PROCEDURE — 99222 1ST HOSP IP/OBS MODERATE 55: CPT | Performed by: INTERNAL MEDICINE

## 2023-12-29 PROCEDURE — 97530 THERAPEUTIC ACTIVITIES: CPT

## 2023-12-29 PROCEDURE — 80048 BASIC METABOLIC PNL TOTAL CA: CPT

## 2023-12-29 PROCEDURE — 1280000000 HC REHAB R&B

## 2023-12-29 PROCEDURE — 6370000000 HC RX 637 (ALT 250 FOR IP): Performed by: STUDENT IN AN ORGANIZED HEALTH CARE EDUCATION/TRAINING PROGRAM

## 2023-12-29 PROCEDURE — 92526 ORAL FUNCTION THERAPY: CPT

## 2023-12-29 PROCEDURE — 92611 MOTION FLUOROSCOPY/SWALLOW: CPT

## 2023-12-29 PROCEDURE — 71045 X-RAY EXAM CHEST 1 VIEW: CPT

## 2023-12-29 PROCEDURE — 99222 1ST HOSP IP/OBS MODERATE 55: CPT | Performed by: PSYCHIATRY & NEUROLOGY

## 2023-12-29 PROCEDURE — 71250 CT THORAX DX C-: CPT

## 2023-12-29 PROCEDURE — 36600 WITHDRAWAL OF ARTERIAL BLOOD: CPT

## 2023-12-29 PROCEDURE — 87040 BLOOD CULTURE FOR BACTERIA: CPT

## 2023-12-29 PROCEDURE — 92507 TX SP LANG VOICE COMM INDIV: CPT

## 2023-12-29 PROCEDURE — 97110 THERAPEUTIC EXERCISES: CPT

## 2023-12-29 PROCEDURE — 36415 COLL VENOUS BLD VENIPUNCTURE: CPT

## 2023-12-29 PROCEDURE — 97112 NEUROMUSCULAR REEDUCATION: CPT

## 2023-12-29 PROCEDURE — 2700000000 HC OXYGEN THERAPY PER DAY

## 2023-12-29 PROCEDURE — 97535 SELF CARE MNGMENT TRAINING: CPT

## 2023-12-29 PROCEDURE — 85025 COMPLETE CBC W/AUTO DIFF WBC: CPT

## 2023-12-29 PROCEDURE — 94761 N-INVAS EAR/PLS OXIMETRY MLT: CPT

## 2023-12-29 RX ADMIN — ACETAMINOPHEN 1000 MG: 500 TABLET ORAL at 05:49

## 2023-12-29 RX ADMIN — METOPROLOL TARTRATE 100 MG: 50 TABLET, FILM COATED ORAL at 05:49

## 2023-12-29 RX ADMIN — METOPROLOL TARTRATE 100 MG: 50 TABLET, FILM COATED ORAL at 15:41

## 2023-12-29 RX ADMIN — ATORVASTATIN CALCIUM 40 MG: 40 TABLET, FILM COATED ORAL at 21:35

## 2023-12-29 RX ADMIN — APIXABAN 5 MG: 5 TABLET, FILM COATED ORAL at 10:16

## 2023-12-29 RX ADMIN — MODAFINIL 300 MG: 200 TABLET ORAL at 10:16

## 2023-12-29 RX ADMIN — LEVETIRACETAM 750 MG: 100 SOLUTION ORAL at 10:21

## 2023-12-29 RX ADMIN — ACETAMINOPHEN 1000 MG: 500 TABLET ORAL at 15:41

## 2023-12-29 RX ADMIN — SENNOSIDES AND DOCUSATE SODIUM 2 TABLET: 50; 8.6 TABLET ORAL at 10:16

## 2023-12-29 RX ADMIN — Medication 5 MG: at 23:24

## 2023-12-29 RX ADMIN — METOPROLOL TARTRATE 100 MG: 50 TABLET, FILM COATED ORAL at 21:35

## 2023-12-29 RX ADMIN — ACETAMINOPHEN 1000 MG: 500 TABLET ORAL at 21:35

## 2023-12-29 RX ADMIN — METOPROLOL TARTRATE 100 MG: 50 TABLET, FILM COATED ORAL at 10:16

## 2023-12-29 RX ADMIN — DIGOXIN 125 MCG: 125 TABLET ORAL at 10:16

## 2023-12-29 RX ADMIN — METFORMIN HYDROCHLORIDE 500 MG: 500 TABLET ORAL at 21:35

## 2023-12-29 RX ADMIN — APIXABAN 5 MG: 5 TABLET, FILM COATED ORAL at 21:35

## 2023-12-29 RX ADMIN — LEVETIRACETAM 750 MG: 100 SOLUTION ORAL at 23:24

## 2023-12-29 RX ADMIN — DONEPEZIL HYDROCHLORIDE 5 MG: 5 TABLET, FILM COATED ORAL at 10:16

## 2023-12-29 RX ADMIN — METFORMIN HYDROCHLORIDE 500 MG: 500 TABLET ORAL at 10:16

## 2023-12-29 RX ADMIN — SENNOSIDES AND DOCUSATE SODIUM 2 TABLET: 50; 8.6 TABLET ORAL at 21:35

## 2023-12-29 ASSESSMENT — PAIN SCALES - WONG BAKER
WONGBAKER_NUMERICALRESPONSE: 0

## 2023-12-29 ASSESSMENT — PAIN DESCRIPTION - DESCRIPTORS: DESCRIPTORS: ACHING

## 2023-12-29 ASSESSMENT — PAIN SCALES - GENERAL: PAINLEVEL_OUTOF10: 2

## 2023-12-29 ASSESSMENT — PAIN - FUNCTIONAL ASSESSMENT: PAIN_FUNCTIONAL_ASSESSMENT: ACTIVITIES ARE NOT PREVENTED

## 2023-12-29 ASSESSMENT — PAIN DESCRIPTION - LOCATION: LOCATION: GENERALIZED

## 2023-12-29 NOTE — PLAN OF CARE
Problem: Pain  Goal: Verbalizes/displays adequate comfort level or baseline comfort level  12/29/2023 1041 by Flakito Moore RN  Outcome: Progressing  Flowsheets (Taken 12/29/2023 1015)  Verbalizes/displays adequate comfort level or baseline comfort level: Encourage patient to monitor pain and request assistance  12/29/2023 0045 by Viviana James RN  Outcome: Progressing

## 2023-12-29 NOTE — PROGRESS NOTES
Occupational Therapy  Facility/Department: [facility]  Rehabilitation Occupational Therapy Daily Treatment Note    Date: 23  Patient Name: Isac Lemus       Room: 0156/0156-01  MRN: [id number]  Account: [id number]   : 1950  (73 y.o.) Gender: male                    Past Medical History:  has a past medical history of Acid reflux, CAD (coronary artery disease), Diabetes mellitus (HCC), Heartburn, Hiatal hernia, Hypertension, and Seasonal allergies.  Past Surgical History:   has a past surgical history that includes Coronary artery bypass graft (10/2014); Colonoscopy (); and Colonoscopy (3/16/16).    Restrictions  Restrictions/Precautions: Seizure, Fall Risk, Up as Tolerated, NPO, Aspiration Risk, General Precautions  Other position/activity restrictions: PEG, tube feed    Subjective  Subjective: Pt in w/c on arrival, eyes closed, minimal arousal during entrance, no s/s of pain, family present and agreeable to OT/PT session, /67, HR 86, O2 sats 94% on 2L O2.  Restrictions/Precautions: Seizure;Fall Risk;Up as Tolerated;NPO;Aspiration Risk;General Precautions             Objective     Cognition  Overall Cognitive Status: Exceptions  Arousal/Alertness: Inconsistent responses to stimuli;Unresponsive to stimuli  Following Commands: Does not follow commands;Inconsistently follows commands  Attention Span: Unable to maintain attention  Memory:  (GIA)  Safety Judgement: Decreased awareness of need for assistance;Decreased awareness of need for safety  Problem Solving: Decreased awareness of errors  Insights: Not aware of deficits  Initiation: Requires cues for all  Sequencing: Requires cues for all  Cognition Comment: very minimal voluntary movements in relation of commands  Orientation  Overall Orientation Status: Impaired  Orientation Level: Unable to assess         ADL  Feeding  Assistance Level: Dependent  Skilled Clinical Factors: PEG  Grooming/Oral Hygiene  Assistance Level: Dependent  Skilled  session. Pt demonstrated improved alertness this date, especially during shower. Pt exhibited signs/symptoms of discomfort in shower chair with attempting to shift weight at times but still required total assist for all components of task and inability to support self. Pt varied from min A to max A in tilt-in-space shower chair depending on RUE propped properly on armrest to support trunk. Pt completed minimal grasp of washcloth to attempt to wash RUE with LUE, and able to move hand a few inches either direction to bathe self. Pt transferred shower>bed via MaxiMove to dress in bed. Pt incontinent of bowel in bed. Pt sat on EOB with total assist and elbow props x5 each direction completed with max A from PT at trunk and min/mod A from OT at UE with commands given while in prop/sitting up and 10-25% ability to follow command of squeezing hand, looking up, or closing eyes. Pt demonstrated increased alertness when looking outside when sitting on EOB. Pt assisted to recliner at end of session via MaxiMStriiv. Pt continues to require co-treat for PT to assist with sitting balance while OT assisted with ADLs and UE commands/weight bearing. Continue POC.  Activity Tolerance: Patient limited by fatigue;Patient limited by endurance;Treatment limited secondary to decreased cognition  Discharge Recommendations: 24 hour supervision or assist;Home with Home health OT;S Level 4  OT Equipment Recommendations  Other: CTA  Safety Devices  Safety Devices in place: Yes  Type of devices: Call light within reach;All fall risk precautions in place;Gait belt;Patient at risk for falls;Nurse notified;Left in chair;Chair alarm in place    Patient Education  Education  Education Given To: Patient;Family  Education Provided: Role of Therapy;Plan of Care;Safety;ADL Function;Mobility Training;Transfer Training;Energy Conservation;Cognition;Family Education;Equipment;DME/Home Modifications;Fall Prevention Strategies;Precautions;Visual Perceptual  Minutes: [id number]

## 2023-12-29 NOTE — PLAN OF CARE
Q2 turns. Problem: Skin/Tissue Integrity  Goal: Absence of new skin breakdown  Description: 1. Monitor for areas of redness and/or skin breakdown  2. Assess vascular access sites hourly  3. Every 4-6 hours minimum:  Change oxygen saturation probe site  4. Every 4-6 hours:  If on nasal continuous positive airway pressure, respiratory therapy assess nares and determine need for appliance change or resting period.   12/29/2023 0045 by Sherren Harman, RN  Outcome: Progressing  12/28/2023 1311 by Ammy Torres RN  Outcome: Progressing

## 2023-12-29 NOTE — PROGRESS NOTES
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; independent with household tasks and medication/financial management; family nearby   Dx: [x] Aphasia  [x] Dysarthria  [] Apraxia   [x] Oropharyngeal dysphagia [x] Cognitive Impairment  [] Other:   Date of Admit: 2023  Room #: 0156/0156-01    Current functional status (updated daily):         Pt being seen for : [x] Speech/Language Treatment  [x] Dysphagia Treatment [] Cognitive Treatment  [] Other:  Communication: []WFL  [x] Aphasia  [x] Dysarthria  [] Apraxia  [] Pragmatic Impairment [] Non-verbal  [] Hearing Loss  [] Other:   Cognition: [] WFL  [] Mild  [] Moderate  [] Severe [x] Unable to Assess  [x] Other: suspect deficit  Memory: [] WFL  [] Mild  [] Moderate  [] Severe [x] Unable to Assess  [] Other:  Behavior: [] Alert  [] Cooperative  [x]  Pleasant  [] Confused  [] Agitated  [] Uncooperative  [x] Distractible [] Motivated  [] Self-Limiting [] Anxious  [] Other:  Endurance:  [] Adequate for participation in SLP sessions  [x] Reduced overall  [x] Lethargic  [] Other:  Safety: [] No concerns at this time  [] Reduced insight into deficits  []  Reduced safety awareness [] Not following call light procedures  [x] Unable to Assess  [] Other:    Current Diet Order:Diet NPO  ADULT TUBE FEEDING; PEG; Diabetic; Continuous; 25; Yes; 25; Q 4 hours; 60; 60; Q 4 hours  Swallowing Precautions: Reflux and aspiration precautions; Frequent oral hygiene; allow small volume ice chips with SLP/RN only, hold PO and contact SLP if s/s aspiration or worsening respiratory status develop        Date: 2023      Tx session 1  0268 - 2520  Tennille Pagan MA  CCC-SLP Tx session 2  [id number]  Jane Negrete MA CCC-SLP   Total Timed Code Min 0 0   Total Treatment Minutes 30 30   Individual Treatment Minutes 30 30   Group Treatment Minutes 0 0   Co-Treat Minutes 0 0   Variance/Reason:  N/A N/A   Pain None indicated  None indicated    Pain Intervention [] RN notified  [] Repositioned  [] Intervention offered and patient declined  [x] N/A  [] Other:  [] RN notified  [] Repositioned  [] Intervention offered and patient declined  [x] N/A  [] Other:   Subjective     Pt awake and alert at beginning of session when SLP arrived. About 15 minutes into session, pt becoming increasingly lethargic. Wife and daughter present at bedside.     Pt had x2 instances of voicing during session, but did not appear to be intentional. Pt lethargic in chair upon arrival. Pt with eyes open and some unintelligible voicing. Pt's wife and daughter present at bed time. Attempts to rouse pt via sternal rubs, loud volume, lights on, and cold water swabs to lips did not improve alertness.    Objective:  Goals     Dysphagia Goals:   Short-term Goals  Timeframe for Short-term Goals: Extend goals through 01/13/24    Goal 1: The patient will tolerate therapeutic diet upgrade trials with no clinical s/s of aspiration 5/5    Oral care completed with moistened toothette. Pt with significantly dry lingual surface - hard to clear.     Ice chips: x4  -pt required MAX cues in order to acknowledge ice chip in oral cavity. Pt allowing to melt on lingual surface  -pt with eventual swallow with x2 ice chips - multiple swallows. Increased RR 28-32 following ice chips.   -x1 no swallow response - suspect passive spillover BOT   -x1 SLP had to expel from oral cavity due to pt not responding to ice chip     Lemon glycerin swabs (gustatory stimulation) x5  -pt with no swallow response / initiation as assessed vial anterior neck palpation    Therapeutic diet tolerance assessed via the following:    - Moistened toothette to  the lips x3. Pt with no oral cavity opening in response to toothette.  - Glyceral lemon swabs x3. Pt with oral cavity opening in response to trials. Pt with no perceived swallow initiation in response to trials, assessed via anterior neck palpation.  - Ice chips x2. Pt with x1 perceived swallow initiation that did not clear oral residue assessed via anterior neck palpation. Pt with 2/2 whole ice chips remaining in oral cavity following lip closure and 1 of 2 attempts to swallow. SLP removed ice chips from oral cavity. Pt's SPO2 94% across all trials.    Given pt's lethargy and oral residue, further trials discontinued.   Goal 2: The patient/caregiver will demonstrate understanding of compensatory swallow strategies, for improved swallow safety   SLP edu family re: oral care. Family verbalized understanding.   SLP edu family re: modes of communication for Y/N response once pt presents with consistent movement.   Goal discontinued 12/27/23.    Goal discontinued 12/27/23.    Goal 4: The patient will tolerate instrumental assessment when able    Not addressed this date, team can consider as participation in PO trials improves at bedside  Not addressed this date, team can consider as participation in PO trials improves at bedside    Cognitive-linguistic Goals:  Short Term Goals  Time Frame for Short Term Goals: Extend goals through 01/13/24    Goal updated 12/27/23:  Pt will improve attention to right during structured and unstructured tasks with 50% accuracy given max cues.   Pt with intermittent gaze to family members on right, but pt only opening right eye. Pt with reduced attention / alertness during therapy session, increasing as session continued.  Pt with minimal lateral gaze at this time, likely d/t lethargy. Pt with intermittent responses to family voices in R and L fields.   Goal 2: Pt will communicate basic wants and needs via verbal speech, yes/no, etc. with 80% accuracy given mod cues   SLP continues to  entire session. Pt with decreased awareness of presentation of po trials and residue in oral cavity. Pt with fewer swallow responses during this session. Pt's presumed x1 swallow was not successful to clear ice chip from oral cavity. Pt with no command following/observed intentional movement at this time, no Y/N response established. Pt is not yet appropriate for an instrumental swallow evaluation. Recommend 24 hr supervision, ongoing ST at d/c. Continue goals above. Plan: Continue as per plan of care. Additional Information:     Barriers toward progress: Pain, Confusion, Communication deficit, Decreased endurance, and Medical complications  Discharge recommendations:  [] Home independently  [] Home with assistance [x]  24 hour supervision  [] ECF [] Other:  Continued Tx Upon Discharge: ? [x] Yes [] No [] TBD based on progress while on ARU [] Vital Stim indicated [] Other:   Estimated discharge date: 01/14/24    Interventions used this date:  [x] Speech/Language Treatment  [] Instruction in HEP [] Group [x] Dysphagia Treatment [] Cognitive Treatment   [] Other:       Total Time Breakdown / Charges    Time in Time out Total Time / units   Cognitive Tx      Speech Tx 1120  1330 1130  1345 10 min / 1 unit   15 min/ 1 unit   Dysphagia Tx 1100  1345 1120  1400 20 min / 1 unit   15 min/ 1 unit       Electronically Signed by     Tx session 1:  Yunior Hyatt  [address] [facility] #[id number]  Speech Language Pathologist    Tx session 2:  Devonna Merlin MA, [address] [facility]  Speech Language Pathologist

## 2023-12-29 NOTE — CONSULTS
In patient Neurology consult        [facility] Neurology            Roly Shankar  1950    Date of Service: 12/29/2023    Referring Physician: Nakita Correa MD    Most of the history was obtained from detailed chart reviewing and discussion with the patient's nurse. The patient is currently confused and unable to provide me with accurate history. Reason for the consult and CC: Recommendations for AED    HPI:   The patient is a 74 y.o.  male, with a PMH of A. Fib on Eliquis, CAD, DM2, HTN, HLD, who was admitted to the ARU on 12/22 following a TBI. The patient unfortunately sustained a fall on 11/28/23 and was admitted to [address] [place] [address] for a SDH, SAH, and basal skull fractures. He did not undergo neurosurgical intervention. He was restarted on Eliquis on 12/15 at [address] [place] [address]. His CT scan was stable after resumption of Eliquis. He had a cEEG at [address] [place] [address] which had rare, epileptiform discharges. He was started on Keppra 750 mg BID for 30 days. He has also had intermittent fevers, which only occur at night. We are consulted to give further input on extending his course of Keppra past 30 days. At the time of my exam, he is drowsy, but wakes up to verbal and tactile stimuli. He is able to squeeze my hands, but cannot follow any other commands. He said \"hi\" to his wife at the bedside, but was not able to speak any further. His family notes he has been essentially non-verbal and lethargic since the accident. I personally reviewed and updated social history, past medical history, medications, allergy, surgical history, and family history as documented in the patient's electronic health records. ROS: 10-14 system review, reviewed with patient's family and/or nurse was unremarkable except mentioned in HPI.      Constitutional:   Vitals:    12/28/23 1627 12/28/23 2200 12/29/23 0530 12/29/23 1015   BP:  128/69 100/73 105/60   Pulse:  88 (!) 116 (!) 119   Resp:  20 20 20   Temp:  (!) 100.5 Review (any 3)  [x] Collateral history obtained from:  outside records at [facility]  [x] All available Consultant notes from yesterday/today were reviewed  [x] All current labs were reviewed and interpreted for clinical significance   [] Appropriate follow-up labs were ordered    Data reviewed including CBC, CMP and LFT  Independent historian giving baseline confusion.   Data:  LABS:   Lab Results   Component Value Date/Time     12/29/2023 06:56 AM    K 4.7 12/29/2023 06:56 AM     12/29/2023 06:56 AM    CO2 35 12/29/2023 06:56 AM    BUN 24 12/29/2023 06:56 AM    CREATININE <0.5 12/29/2023 06:56 AM    GFRAA >60 04/06/2016 12:36 AM    LABGLOM >60 12/29/2023 06:56 AM    GLUCOSE 150 12/29/2023 06:56 AM    PHOS 3.8 10/18/2014 06:01 AM    MG 2.30 10/26/2014 04:38 AM    CALCIUM 8.9 12/29/2023 06:56 AM     Lab Results   Component Value Date/Time    WBC 8.1 12/29/2023 06:56 AM    RBC 3.09 12/29/2023 06:56 AM    HGB 10.3 12/29/2023 06:56 AM    HCT 32.0 12/29/2023 06:56 AM    .6 12/29/2023 06:56 AM    RDW 16.8 12/29/2023 06:56 AM     12/29/2023 06:56 AM     Lab Results   Component Value Date    INR 1.46 (H) 10/26/2014    PROTIME 16.1 (H) 10/26/2014       Neuroimaging were independently reviewed by me.   Reviewed notes from different physicians including H&P and ED notes.  Reviewed lab and blood testing    Impression:     Traumatic brain injury - secondary to fall on 11/28.  He suffered bilateral SDH, extensive, bilateral SAH and left to right shift.  He was on AC at the time of his fall.  Hx of DVT on Eliquis  Dysphagia, s/p PEG  A. Fib  DM2  HTN  HLD    Recommendation:     Ok to continue Keppra for now considering the patient is still quite ill and in rehab.  He reportedly had an abnormal cEEG at [facility].  We discussed repeating his EEG here, but family wishes to avoid if possible.    Now with low grade temp overnight - ARU physician working up.  Continue Eliquis.  Continue statin,  Follow BP and

## 2023-12-29 NOTE — PROGRESS NOTES
Physical Therapy  Facility/Department: [facility]  Rehabilitation Physical Therapy Treatment Note    NAME: Jeffry Collins  : 1950 (74 y.o.)  MRN: [id number]  CODE STATUS: Full Code    Date of Service: 23       Restrictions:  Restrictions/Precautions: Seizure, Fall Risk, Up as Tolerated, NPO, Aspiration Risk, General Precautions  Position Activity Restriction  Other position/activity restrictions: PEG, tube feed     SUBJECTIVE  Subjective  Subjective: pt found in chair with staff and family present  Pain: no pain indicated               OBJECTIVE  Cognition  Overall Cognitive Status: Exceptions  Arousal/Alertness: Inconsistent responses to stimuli;Unresponsive to stimuli  Following Commands: Does not follow commands; Inconsistently follows commands  Attention Span: Unable to maintain attention  Memory:  (GIA)  Safety Judgement: Decreased awareness of need for assistance;Decreased awareness of need for safety  Problem Solving: Decreased awareness of errors  Insights: Not aware of deficits  Initiation: Requires cues for all  Sequencing: Requires cues for all  Cognition Comment: very minimal voluntary movements in relation of commands  Orientation  Overall Orientation Status: Impaired  Orientation Level: Unable to assess    Functional Mobility  Roll Left  Assistance Level: Dependent  Skilled Clinical Factors: supine in bed to complete pericare (2x R and L)  Roll Right  Assistance Level: Dependent  Skilled Clinical Factors: supine in bed to complete pericare (2x R and L)  Sit to Supine  Assistance Level: Dependent;Maximum assistance; Requires x 2 assistance  Supine to Sit  Assistance Level: Dependent; Requires x 2 assistance;Maximum assistance  Balance  Sitting Balance: Dependent/Total  Standing Balance:  (NT this date)  Standing Balance  Activity: sitting EOB 15 min with MAX A for balance- completed oral care with OT assistance, lateral elbow props 5x R and L (MIN-MOD + MAX A for returning to midline); shower for 15 min with MIN-MAX A for balance- completed UE and LE bathing with OT assistance, pt presented with heavy lean to the R with both shower and EOB and required manual cues for correction/midline  Bed To/From Chair  Assistance Level: Dependent  Skilled Clinical Factors: via cathryn- WC to shower chair, shower chair to bed, bed to recliner                           ASSESSMENT/PROGRESS TOWARDS GOALS  Vital Signs  Pulse: 86  Heart Rate Source: Monitor  BP: 104/67  BP Location: Left upper arm  MAP (Calculated): 79  SpO2: 94 %  O2 Device: Nasal cannula  Comment: 2 L    Assessment  Assessment: Pt seen as co-treat with OT due to complexity and level of assist in order to promote safe environment with transfers and increase overall effectiveness of treatment.  Pt seen for transfers, sitting balance, and endurance training. Pt requires MAX A for sitting balance. Pt was sleepy upon arrival but opened eyes once in the shower with water. Pt demonstrated minimal response to commands for participation in activities. Pt requires cathryn for transfers. Pt is MAX Ax2 for bed mobility. Pt would continue to benefit from skilled PT to aid in deficits and a safe DC.  Activity Tolerance: Patient limited by fatigue;Patient limited by endurance;Treatment limited secondary to decreased cognition  Discharge Recommendations: Continue to assess pending progress  PT Equipment Recommendations  Equipment Needed: Yes      Goals  Patient Goals   Patient Goals : pt unable to verbalize  Short Term Goals  Time Frame for Short Term Goals: 10 days (1/1/24)  Short Term Goal 1: Pt to perform bed mobility with mod A x 2.  Short Term Goal 2: Pt to tolerate sitting EOB x 3 min with mod A x 1 for balance support.  Short Term Goal 3: Pt to transfer bed <> chair with LRAD with mod A x 2.  Short Term Goal 4: Pt to perform 12-15 reps of LE HEP to target strength/ROM.  Short Term Goal 5: Pt to perform sit to/from stand transfer at LRAD with mod Ax 2.  Long Term

## 2023-12-29 NOTE — CARE COORDINATION
CM sent referral to [facility] w/[employer] for tube feed. [facility] will pull from [facility] and run benefits.     Alice Shankar RN

## 2023-12-29 NOTE — PROGRESS NOTES
Daughter is in the patients room sitting at bedside. Very pleasant. Cooperative. Oral care completed. Tube feedings continued. Occasionally, moves around head from left or right.

## 2023-12-29 NOTE — PROGRESS NOTES
12/29/23 1515   Oxygen Therapy/Pulse Ox   O2 Therapy Oxygen   $Oxygen $Daily Charge   O2 Device Nasal cannula   O2 Flow Rate (L/min) 2 L/min   SpO2 93 %   Blood Gas  Performed? Yes   $ABG $Arterial Puncture   Rafi's Test #1 Pos   Site #1 Right Radial   Site Prepped #1 Yes   Number of Attempts #1 1   Pressure Held #1 Yes   Complications #1 None   Post-procedure #1 Standard   Specimen Status #1 To lab   How Tolerated?  Tolerated well

## 2023-12-29 NOTE — CONSULTS
PULMONARY AND CRITICAL CARE INPATIENT NOTE        Gladys Walker   : 1950  MRN: [id number]     Admitting Physician: Nakita Correa MD  Attending Physician: Nakita Correa MD  PCP: VANGIE Loyola CNP    Admission: 2023   Date of Service: 2023    No chief complaint on file. ASSESSMENT & PLAN       74 y.o. pleasant  male patient with:    Hospital Problems             Last Modified POA    * (Principal) SDH (subdural hematoma) ([address]) 2023 Yes      # Mild hypercapnia. Seems subacute to chronic and compensated on blood gas  # Mild hypoxemia. # DVT/PE/HIT during admission at  recently on apixaban  # Pneumonia during admission at [facility] recently treated with antibiotics  # Disturbed sleep wake cycle  # Recent fall with TBI/subarachnoid, subdural hemorrhage with basal skull fracture  # Seizures secondary to TBI on antibiotics  # Persistent encephalopathy/sleepiness since TBI  # Very likely undiagnosed sleep apnea. Loud snoring with persistent daytime fatigue and unrefreshing sleep. No previous sleep studies  # Obesity class I  # Alok syndrome during recent admission requiring decompression colonoscopy  # Hypertension, diabetes type 2, CAD, atrial fibrillation on anticoagulation  Repeat ABG to reevaluate the CO2 level  Will start empiric treatment for sleep apnea at bedtime and to help the patient get used to CPAP/BiPAP  Will arrange for sleep study after discharge to qualify for home unit if study is positive  CT chest without contrast to follow-up on the pneumonia and evaluate for basilar atelectasis  Continue anticoagulation for A-fib and PE  No clear indication for inhalers currently with no history of asthma, COPD, smoking or bedside wheezing  Aspiration precautions  Continue to wean oxygen with target 90 to 94%. DVT ppx measures.       LOS: Hospital Day: 8    Code:Full Code          Subjective/Objective           CC/Reason for Consult: Hypercapnia        HPI:

## 2023-12-30 LAB
BACTERIA BLD CULT ORG #2: NORMAL
BACTERIA BLD CULT: NORMAL
BASE EXCESS BLDA CALC-SCNC: 9.4 MMOL/L (ref -3–3)
CO2 BLDA-SCNC: 37.9 MMOL/L
COHGB MFR BLDA: 0.8 % (ref 0–1.5)
GLUCOSE BLD-MCNC: 126 MG/DL (ref 70–99)
GLUCOSE BLD-MCNC: 139 MG/DL (ref 70–99)
GLUCOSE BLD-MCNC: 144 MG/DL (ref 70–99)
GLUCOSE BLD-MCNC: 148 MG/DL (ref 70–99)
HCO3 BLDA-SCNC: 36.1 MMOL/L (ref 21–29)
HGB BLDA-MCNC: 13.1 G/DL (ref 13.5–17.5)
METHGB MFR BLDA: 0.7 %
O2 THERAPY: ABNORMAL
PCO2 BLDA: 58.5 MMHG (ref 35–45)
PERFORMED ON: ABNORMAL
PH BLDA: 7.41 [PH] (ref 7.35–7.45)
PHOSPHATE SERPL-MCNC: 3.8 MG/DL (ref 2.5–4.9)
PO2 BLDA: 79.3 MMHG (ref 75–108)
SAO2 % BLDA: 95.2 %

## 2023-12-30 PROCEDURE — 94660 CPAP INITIATION&MGMT: CPT

## 2023-12-30 PROCEDURE — 2700000000 HC OXYGEN THERAPY PER DAY

## 2023-12-30 PROCEDURE — 6370000000 HC RX 637 (ALT 250 FOR IP): Performed by: INTERNAL MEDICINE

## 2023-12-30 PROCEDURE — 36600 WITHDRAWAL OF ARTERIAL BLOOD: CPT

## 2023-12-30 PROCEDURE — 92507 TX SP LANG VOICE COMM INDIV: CPT

## 2023-12-30 PROCEDURE — 94761 N-INVAS EAR/PLS OXIMETRY MLT: CPT

## 2023-12-30 PROCEDURE — 92526 ORAL FUNCTION THERAPY: CPT

## 2023-12-30 PROCEDURE — 84100 ASSAY OF PHOSPHORUS: CPT

## 2023-12-30 PROCEDURE — 36415 COLL VENOUS BLD VENIPUNCTURE: CPT

## 2023-12-30 PROCEDURE — 1280000000 HC REHAB R&B

## 2023-12-30 PROCEDURE — 97535 SELF CARE MNGMENT TRAINING: CPT

## 2023-12-30 PROCEDURE — 82803 BLOOD GASES ANY COMBINATION: CPT

## 2023-12-30 PROCEDURE — 99233 SBSQ HOSP IP/OBS HIGH 50: CPT | Performed by: PSYCHIATRY & NEUROLOGY

## 2023-12-30 PROCEDURE — 6360000002 HC RX W HCPCS: Performed by: INTERNAL MEDICINE

## 2023-12-30 PROCEDURE — 6370000000 HC RX 637 (ALT 250 FOR IP): Performed by: STUDENT IN AN ORGANIZED HEALTH CARE EDUCATION/TRAINING PROGRAM

## 2023-12-30 PROCEDURE — 97530 THERAPEUTIC ACTIVITIES: CPT

## 2023-12-30 PROCEDURE — 94640 AIRWAY INHALATION TREATMENT: CPT

## 2023-12-30 PROCEDURE — 94762 N-INVAS EAR/PLS OXIMTRY CONT: CPT

## 2023-12-30 RX ORDER — FUROSEMIDE 20 MG/1
20 TABLET ORAL ONCE
Status: COMPLETED | OUTPATIENT
Start: 2023-12-30 | End: 2023-12-30

## 2023-12-30 RX ORDER — IPRATROPIUM BROMIDE AND ALBUTEROL SULFATE 2.5; .5 MG/3ML; MG/3ML
1 SOLUTION RESPIRATORY (INHALATION)
Status: DISCONTINUED | OUTPATIENT
Start: 2023-12-30 | End: 2024-01-01

## 2023-12-30 RX ORDER — ACETYLCYSTEINE 200 MG/ML
600 SOLUTION ORAL; RESPIRATORY (INHALATION)
Status: DISCONTINUED | OUTPATIENT
Start: 2023-12-30 | End: 2024-01-01

## 2023-12-30 RX ORDER — GUAIFENESIN 600 MG/1
600 TABLET, EXTENDED RELEASE ORAL 2 TIMES DAILY
Status: DISCONTINUED | OUTPATIENT
Start: 2023-12-30 | End: 2024-01-13

## 2023-12-30 RX ORDER — IPRATROPIUM BROMIDE AND ALBUTEROL SULFATE 2.5; .5 MG/3ML; MG/3ML
1 SOLUTION RESPIRATORY (INHALATION) EVERY 4 HOURS PRN
Status: DISCONTINUED | OUTPATIENT
Start: 2023-12-30 | End: 2024-01-14 | Stop reason: HOSPADM

## 2023-12-30 RX ORDER — FUROSEMIDE 10 MG/ML
20 INJECTION INTRAMUSCULAR; INTRAVENOUS ONCE
Status: DISCONTINUED | OUTPATIENT
Start: 2023-12-30 | End: 2023-12-30

## 2023-12-30 RX ADMIN — Medication 5 MG: at 20:14

## 2023-12-30 RX ADMIN — FUROSEMIDE 20 MG: 20 TABLET ORAL at 16:11

## 2023-12-30 RX ADMIN — MODAFINIL 300 MG: 200 TABLET ORAL at 10:49

## 2023-12-30 RX ADMIN — METOPROLOL TARTRATE 100 MG: 50 TABLET, FILM COATED ORAL at 05:16

## 2023-12-30 RX ADMIN — METOPROLOL TARTRATE 100 MG: 50 TABLET, FILM COATED ORAL at 16:11

## 2023-12-30 RX ADMIN — APIXABAN 5 MG: 5 TABLET, FILM COATED ORAL at 09:26

## 2023-12-30 RX ADMIN — ACETAMINOPHEN 1000 MG: 500 TABLET ORAL at 20:14

## 2023-12-30 RX ADMIN — ACETAMINOPHEN 1000 MG: 500 TABLET ORAL at 05:16

## 2023-12-30 RX ADMIN — METFORMIN HYDROCHLORIDE 500 MG: 500 TABLET ORAL at 20:14

## 2023-12-30 RX ADMIN — ATORVASTATIN CALCIUM 40 MG: 40 TABLET, FILM COATED ORAL at 20:14

## 2023-12-30 RX ADMIN — METOPROLOL TARTRATE 100 MG: 50 TABLET, FILM COATED ORAL at 09:27

## 2023-12-30 RX ADMIN — LEVETIRACETAM 750 MG: 100 SOLUTION ORAL at 23:26

## 2023-12-30 RX ADMIN — APIXABAN 5 MG: 5 TABLET, FILM COATED ORAL at 20:14

## 2023-12-30 RX ADMIN — LEVETIRACETAM 750 MG: 100 SOLUTION ORAL at 11:48

## 2023-12-30 RX ADMIN — METOPROLOL TARTRATE 100 MG: 50 TABLET, FILM COATED ORAL at 20:34

## 2023-12-30 RX ADMIN — IPRATROPIUM BROMIDE AND ALBUTEROL SULFATE 1 DOSE: 2.5; .5 SOLUTION RESPIRATORY (INHALATION) at 19:32

## 2023-12-30 RX ADMIN — METFORMIN HYDROCHLORIDE 500 MG: 500 TABLET ORAL at 09:26

## 2023-12-30 RX ADMIN — SENNOSIDES AND DOCUSATE SODIUM 2 TABLET: 50; 8.6 TABLET ORAL at 20:14

## 2023-12-30 RX ADMIN — DONEPEZIL HYDROCHLORIDE 5 MG: 5 TABLET, FILM COATED ORAL at 09:26

## 2023-12-30 RX ADMIN — ACETAMINOPHEN 1000 MG: 500 TABLET ORAL at 14:30

## 2023-12-30 RX ADMIN — DIGOXIN 125 MCG: 125 TABLET ORAL at 09:26

## 2023-12-30 RX ADMIN — ACETYLCYSTEINE 600 MG: 200 INHALANT RESPIRATORY (INHALATION) at 19:32

## 2023-12-30 ASSESSMENT — PAIN DESCRIPTION - LOCATION: LOCATION: GENERALIZED

## 2023-12-30 ASSESSMENT — PAIN SCALES - WONG BAKER
WONGBAKER_NUMERICALRESPONSE: 0

## 2023-12-30 ASSESSMENT — PAIN DESCRIPTION - DESCRIPTORS: DESCRIPTORS: ACHING

## 2023-12-30 ASSESSMENT — PAIN SCALES - GENERAL
PAINLEVEL_OUTOF10: 2
PAINLEVEL_OUTOF10: 0

## 2023-12-30 ASSESSMENT — PAIN - FUNCTIONAL ASSESSMENT: PAIN_FUNCTIONAL_ASSESSMENT: PREVENTS OR INTERFERES SOME ACTIVE ACTIVITIES AND ADLS

## 2023-12-30 NOTE — PLAN OF CARE
Problem: Safety - Adult  Goal: Free from fall injury  12/30/2023 1016 by Eleuterio Gaitan  Outcome: Progressing  12/29/2023 2342 by Georgina Marx RN  Outcome: Progressing  Flowsheets (Taken 12/29/2023 2340)  Free From Fall Injury: Instruct family/caregiver on patient safety

## 2023-12-30 NOTE — PROGRESS NOTES
Physical Therapy  Facility/Department: [facility]  Rehabilitation Physical Therapy Treatment Note    NAME: Vijay Lopez  : 1950 (74 y.o.)  MRN: [id number]  CODE STATUS: Full Code    Date of Service: 23       Restrictions:  Restrictions/Precautions: Seizure, Fall Risk, Up as Tolerated, NPO, Aspiration Risk, General Precautions  Position Activity Restriction  Other position/activity restrictions: PEG, tube feed     SUBJECTIVE  Subjective  Subjective: Pt resting in bed upon arrival and not resistant to PT/OT cotx session. Spouse Preston Joy) present in room throughout session  Pain: Pt is without signs/symptoms of pain            OBJECTIVE  Orientation  Overall Orientation Status: Impaired  Orientation Level: Unable to assess    Functional Mobility  Roll Left  Assistance Level: Dependent; Requires x 2 assistance  Skilled Clinical Factors: total A x 2 to roll R and L during pericare, brief change, and placement of maxi-move sling. Pt completes x 3 reps to each direction  Roll Right  Assistance Level: Dependent; Requires x 2 assistance  Skilled Clinical Factors: total A x 2 to roll R and L during pericare, brief change, and placement of maxi-move sling. Pt completes x 3 reps to each direction  Transfers  Surface: To chair with arms  Bed To/From Chair  Assistance Level: Dependent; Requires x 2 assistance  Skilled Clinical Factors: via maxi-move to transfer bed>recliner chair        Balance (2nd session)  Dynamic Sitting Balance Exercises:   Pt completes dynamic unsupported sitting in recliner chair with max A x 2 to weight shift anteriorly from supported>unsupported sitting. Pt does attempt to pull using BUE however requires 2 person assist to achieve upright. Pt able to tolerate unsupported sitting for brief periods (~5-10 sec) with max A x 1-2 before needing rest break supported against chair. Pt completes x 5 reps total during session, with prolonged supported rest breaks between trials.  He fatigues quickly with unsupported sitting d/t weakness. Cues required for initiation d/t cognition      ASSESSMENT/PROGRESS TOWARDS GOALS  Vital Signs  Pulse: 76  Heart Rate Source: Monitor  BP: 116/72  BP Location: Left upper arm  MAP (Calculated): 87  SpO2: 94 %  O2 Device: Nasal cannula (2L)    Assessment  Assessment: Pt seen for PT/OT cotx session d/t medical complexity, cognition, increased level of assist, decreased activity tolerance, and generalized weakness. Pt requires skilled 2 person assist for safe facilitation of and progression of functional mobility. Focus of 1st session on rolling and transferring OOB to chair. Pt able to complete rolling with total A x 2 and requires use of maxi-move for transfer OOB to recliner chair. Focus of 2nd session on unsupported sitting balance and core strengthening. Able to complete unsupported sitting in recliner chair with max A x 2, however only tolerates for ~5-10 sec d/t weakness and poor balance. He requires increased time and multiple therapeutic rest breaks during 2nd session, although does demo improved alertness in 2nd session. Pt remains limited by cognition, generalized weakness, poor balance, and poor activity tolerance.  Activity Tolerance: Patient limited by fatigue;Patient limited by endurance;Treatment limited secondary to decreased cognition  Discharge Recommendations: Continue to assess pending progress  PT Equipment Recommendations  Other: TBD pending progress    Goals  Patient Goals   Patient Goals : pt unable to verbalize  Short Term Goals  Time Frame for Short Term Goals: 10 days (1/1/24)  Short Term Goal 1: Pt to perform bed mobility with mod A x 2.  Short Term Goal 2: Pt to tolerate sitting EOB x 3 min with mod A x 1 for balance support.  Short Term Goal 3: Pt to transfer bed <> chair with LRAD with mod A x 2.  Short Term Goal 4: Pt to perform 12-15 reps of LE HEP to target strength/ROM.  Short Term Goal 5: Pt to perform sit to/from stand transfer at LRAD with

## 2023-12-30 NOTE — PROGRESS NOTES
12/30/23 0437   NIV Type   Equipment Type V60   Mode CPAP   Mask Type Full face mask   Mask Size Large   Assessment   Respirations (!) 34   SpO2 95 %   Comfort Level Good   Using Accessory Muscles No   Mask Compliance Good   Settings/Measurements   PIP Observed 10 cm H20   CPAP/EPAP 8 cmH2O   Vt (Measured) 350 mL   FiO2  30 %   Minute Volume (L/min) 11 Liters   Mask Leak (lpm) 6 lpm   Patient's Home Machine No   Alarm Settings   Alarms On Y

## 2023-12-30 NOTE — PLAN OF CARE
Problem: Discharge Planning  Goal: Discharge to home or other facility with appropriate resources  Outcome: Progressing  Flowsheets (Taken 12/29/2023 1015 by Breana Sahu RN)  Discharge to home or other facility with appropriate resources: Identify barriers to discharge with patient and caregiver     Problem: Pain  Goal: Verbalizes/displays adequate comfort level or baseline comfort level  12/29/2023 2342 by Slick Coyne RN  Outcome: Progressing     Problem: Skin/Tissue Integrity  Goal: Absence of new skin breakdown  Description: 1. Monitor for areas of redness and/or skin breakdown  2. Assess vascular access sites hourly  3. Every 4-6 hours minimum:  Change oxygen saturation probe site  4. Every 4-6 hours:  If on nasal continuous positive airway pressure, respiratory therapy assess nares and determine need for appliance change or resting period. Outcome: Progressing     Problem: Safety - Adult  Goal: Free from fall injury  Outcome: Progressing  Flowsheets (Taken 12/29/2023 2340)  Free From Fall Injury: Instruct family/caregiver on patient safety     Problem: ABCDS Injury Assessment  Goal: Absence of physical injury  Outcome: Progressing  Flowsheets (Taken 12/29/2023 2340)  Absence of Physical Injury: Implement safety measures based on patient assessment     Problem: Confusion  Goal: Confusion, delirium, dementia, or psychosis is improved or at baseline  Description: INTERVENTIONS:  1. Assess for possible contributors to thought disturbance, including medications, impaired vision or hearing, underlying metabolic abnormalities, dehydration, psychiatric diagnoses, and notify attending LIP  2. [place] high risk fall precautions, as indicated  3. Provide frequent short contacts to provide reality reorientation, refocusing and direction  4. Decrease environmental stimuli, including noise as appropriate  5. Monitor and intervene to maintain adequate nutrition, hydration, elimination, sleep and activity  6.  If

## 2023-12-30 NOTE — PROGRESS NOTES
12/30/23 0151   NIV Type   $NIV $Daily Charge   Equipment Type V60   Mode CPAP   Mask Type Full face mask   Mask Size Large   Assessment   Respirations 29   SpO2 98 %   Comfort Level Good   Using Accessory Muscles No   Mask Compliance Good   Settings/Measurements   PIP Observed 8 cm H20   CPAP/EPAP 8 cmH2O   Vt (Measured) 330 mL   FiO2  30 %   Minute Volume (L/min) 8 Liters   Mask Leak (lpm) 6 lpm   Patient's Home Machine No   Alarm Settings   Alarms On Y

## 2023-12-30 NOTE — PROGRESS NOTES
Occupational Therapy  Facility/Department: [facility]  Rehabilitation Occupational Therapy Daily Treatment Note    Date: 23  Patient Name: Isac Lemus       Room: 0156/0156-01  MRN: [id number]  Account: [id number]   : 1950  (73 y.o.) Gender: male                  Past Medical History:  has a past medical history of Acid reflux, CAD (coronary artery disease), Diabetes mellitus (HCC), Heartburn, Hiatal hernia, Hypertension, and Seasonal allergies.  Past Surgical History:   has a past surgical history that includes Coronary artery bypass graft (10/2014); Colonoscopy (); and Colonoscopy (3/16/16).    Restrictions  Restrictions/Precautions: Seizure, Fall Risk, Up as Tolerated, NPO, Aspiration Risk, General Precautions  Other position/activity restrictions: PEG, tube feed    Subjective  Subjective: Pt in w/c on arrival, eyes closed, minimal alertness during entrance, no s/s of pain, family present and agreeable to OT/PT session  Restrictions/Precautions: Seizure;Fall Risk;Up as Tolerated;NPO;Aspiration Risk;General Precautions             Objective     Cognition  Overall Cognitive Status: Exceptions  Arousal/Alertness: Inconsistent responses to stimuli;Unresponsive to stimuli  Following Commands: Does not follow commands;Inconsistently follows commands  Attention Span: Unable to maintain attention  Safety Judgement: Decreased awareness of need for assistance;Decreased awareness of need for safety  Problem Solving: Decreased awareness of errors  Insights: Not aware of deficits  Initiation: Requires cues for all  Sequencing: Requires cues for all  Cognition Comment: miniaml voluntary movements, [place] for all PROM/AROM  Orientation  Overall Orientation Status: Impaired  Orientation Level: Unable to assess         ADL  Feeding  Assistance Level: Dependent  Skilled Clinical Factors: PEG  Grooming/Oral Hygiene  Assistance Level: Dependent;Maximum assistance  Skilled Clinical Factors: max A for face washing,  dependent A to [facility] cloth, once brought to face, patient able to wash face with min A, persevarting with VC/TC to end task.  Upper Extremity Dressing  Assistance Level: Dependent;Requires x 2 assistance;Increased time to complete  Skilled Clinical Factors: to doff/don gown  Lower Extremity Dressing  Assistance Level: Dependent;Requires x 2 assistance  Skilled Clinical Factors: total assist to don brief while rolling in bed  Putting On/Taking Off Footwear  Assistance Level: Dependent  Skilled Clinical Factors: to don/doff B socks  Toileting  Assistance Level: Dependent;Requires x 2 assistance  Skilled Clinical Factors: incontinent of bowel; assist x 2 bed level rolling and hygiene       Roll Left  Assistance Level: Dependent;Requires x 2 assistance  Skilled Clinical Factors: total A x 2 to roll R and L during pericare, brief change, and placement of maxi-move sling. Pt completes x 3 reps to each direction  Roll Right  Assistance Level: Dependent;Requires x 2 assistance  Skilled Clinical Factors: total A x 2 to roll R and L during pericare, brief change, and placement of maxi-move sling. Pt completes x 3 reps to each direction  Transfers  Surface: To chair with arms  Bed To/From Chair  Assistance Level: Dependent;Requires x 2 assistance  Skilled Clinical Factors: via maxi-move to transfer bed>recliner chair           Balance (2nd session)  Dynamic Sitting Balance Exercises:   Pt completes dynamic unsupported sitting in recliner chair with max A x 2 to weight shift anteriorly from supported>unsupported sitting. Pt does attempt to pull using BUE however requires 2 person assist to achieve upright. Pt able to tolerate unsupported sitting for brief periods (~5-10 sec) with max A x 1-2 before needing rest break supported against chair. Pt completes x 5 reps total during session, with prolonged supported rest breaks between trials. He fatigues quickly with unsupported sitting d/t  Strategies;Precautions; Visual Perceptual Function  Education Provided Comments: role of OT, ADL sequencing, posture/sitting balance, weight bearing/upright posture family educated on benefit of OOB and participation in ADLs  Education Method: Demonstration;Verbal  Barriers to Learning: Cognition  Education Outcome: Unable to verbalize; Unable to demonstrate understanding;Continued education needed  Skilled Clinical Factors: Pt unable to demonstrate/verbalize understanding. Family verbalized understanding of education but would benefit from reinforcement. Plan  Occupational Therapy Plan  Times Per Week: 5-7x/wk  Current Treatment Recommendations: Strengthening;ROM;Balance training;Functional mobility training; Endurance training;Neuromuscular re-education;Cognitive reorientation;Pain management; Safety education & training;Patient/Caregiver education & training;Equipment evaluation, education, & procurement;Positioning;Self-Care / ADL; Coordination training;Gait training    Goals  Patient Goals   Patient goals : Koby Ort say a few words so we can know what he wants, and hopefully walk with a walker\" - per wife as pt unable to respond  Short Term Goals  Time Frame for Short Term Goals: by 1/04/23 unless otherwise noted - all goals continued 12/30  Short Term Goal 1: Pt will tolerate dynamic sitting balance ax with CGA  in prep for ADLs  Short Term Goal 2: Pt will follow commands to sequence 2 step task with only 1 tactile/visual cue  Short Term Goal 3: Pt will perform sit pivot transfer to toilet with mod Ax2  Short Term Goal 4: Pt will perform grooming tasks with mod A and 2-3 VC for sequencing  Short Term Goal 5: Pt will tolerate x15 BUE ther ex  Long Term Goals  Time Frame for Long Term Goals : to be met by 1/11/23 unless otherwise noted  Long Term Goal 1: Pt will perform TB dressing with min A and LRAD  Long Term Goal 2: Pt will perform TB bathing with min A and LRAD  Long Term Goal 3: Pt will perform toileting tasks

## 2023-12-31 VITALS
SYSTOLIC BLOOD PRESSURE: 137 MMHG | OXYGEN SATURATION: 95 % | RESPIRATION RATE: 34 BRPM | DIASTOLIC BLOOD PRESSURE: 65 MMHG | BODY MASS INDEX: 29.27 KG/M2 | HEIGHT: 67 IN | HEART RATE: 76 BPM | TEMPERATURE: 97.1 F | WEIGHT: 186.51 LBS

## 2023-12-31 LAB
GLUCOSE BLD-MCNC: 125 MG/DL (ref 70–99)
GLUCOSE BLD-MCNC: 128 MG/DL (ref 70–99)
GLUCOSE BLD-MCNC: 148 MG/DL (ref 70–99)
GLUCOSE BLD-MCNC: 149 MG/DL (ref 70–99)
PERFORMED ON: ABNORMAL

## 2023-12-31 PROCEDURE — 99232 SBSQ HOSP IP/OBS MODERATE 35: CPT | Performed by: INTERNAL MEDICINE

## 2023-12-31 PROCEDURE — 94761 N-INVAS EAR/PLS OXIMETRY MLT: CPT

## 2023-12-31 PROCEDURE — 6370000000 HC RX 637 (ALT 250 FOR IP): Performed by: INTERNAL MEDICINE

## 2023-12-31 PROCEDURE — 6370000000 HC RX 637 (ALT 250 FOR IP): Performed by: STUDENT IN AN ORGANIZED HEALTH CARE EDUCATION/TRAINING PROGRAM

## 2023-12-31 PROCEDURE — 1280000000 HC REHAB R&B

## 2023-12-31 PROCEDURE — 2700000000 HC OXYGEN THERAPY PER DAY

## 2023-12-31 PROCEDURE — 94640 AIRWAY INHALATION TREATMENT: CPT

## 2023-12-31 PROCEDURE — 94660 CPAP INITIATION&MGMT: CPT

## 2023-12-31 RX ADMIN — ACETAMINOPHEN 1000 MG: 500 TABLET ORAL at 05:39

## 2023-12-31 RX ADMIN — GUAIFENESIN 600 MG: 600 TABLET ORAL at 20:21

## 2023-12-31 RX ADMIN — METOPROLOL TARTRATE 100 MG: 50 TABLET, FILM COATED ORAL at 16:59

## 2023-12-31 RX ADMIN — DIGOXIN 125 MCG: 125 TABLET ORAL at 07:53

## 2023-12-31 RX ADMIN — ACETYLCYSTEINE 600 MG: 200 INHALANT RESPIRATORY (INHALATION) at 08:21

## 2023-12-31 RX ADMIN — LEVETIRACETAM 750 MG: 100 SOLUTION ORAL at 10:47

## 2023-12-31 RX ADMIN — METFORMIN HYDROCHLORIDE 500 MG: 500 TABLET ORAL at 20:20

## 2023-12-31 RX ADMIN — ACETAMINOPHEN 1000 MG: 500 TABLET ORAL at 14:55

## 2023-12-31 RX ADMIN — APIXABAN 5 MG: 5 TABLET, FILM COATED ORAL at 07:53

## 2023-12-31 RX ADMIN — METOPROLOL TARTRATE 100 MG: 50 TABLET, FILM COATED ORAL at 22:53

## 2023-12-31 RX ADMIN — MODAFINIL 300 MG: 200 TABLET ORAL at 07:53

## 2023-12-31 RX ADMIN — ACETYLCYSTEINE 600 MG: 200 INHALANT RESPIRATORY (INHALATION) at 19:08

## 2023-12-31 RX ADMIN — IPRATROPIUM BROMIDE AND ALBUTEROL SULFATE 1 DOSE: 2.5; .5 SOLUTION RESPIRATORY (INHALATION) at 19:08

## 2023-12-31 RX ADMIN — SENNOSIDES AND DOCUSATE SODIUM 2 TABLET: 50; 8.6 TABLET ORAL at 20:20

## 2023-12-31 RX ADMIN — METOPROLOL TARTRATE 100 MG: 50 TABLET, FILM COATED ORAL at 03:11

## 2023-12-31 RX ADMIN — METOPROLOL TARTRATE 100 MG: 50 TABLET, FILM COATED ORAL at 10:47

## 2023-12-31 RX ADMIN — APIXABAN 5 MG: 5 TABLET, FILM COATED ORAL at 20:20

## 2023-12-31 RX ADMIN — Medication 5 MG: at 20:19

## 2023-12-31 RX ADMIN — IPRATROPIUM BROMIDE AND ALBUTEROL SULFATE 1 DOSE: 2.5; .5 SOLUTION RESPIRATORY (INHALATION) at 08:21

## 2023-12-31 RX ADMIN — LEVETIRACETAM 750 MG: 100 SOLUTION ORAL at 22:56

## 2023-12-31 RX ADMIN — METFORMIN HYDROCHLORIDE 500 MG: 500 TABLET ORAL at 07:53

## 2023-12-31 RX ADMIN — GUAIFENESIN 600 MG: 600 TABLET ORAL at 07:53

## 2023-12-31 RX ADMIN — DONEPEZIL HYDROCHLORIDE 5 MG: 5 TABLET, FILM COATED ORAL at 07:53

## 2023-12-31 RX ADMIN — ACETAMINOPHEN 1000 MG: 500 TABLET ORAL at 20:20

## 2023-12-31 RX ADMIN — ATORVASTATIN CALCIUM 40 MG: 40 TABLET, FILM COATED ORAL at 20:20

## 2023-12-31 ASSESSMENT — PAIN SCALES - GENERAL
PAINLEVEL_OUTOF10: 0
PAINLEVEL_OUTOF10: 1
PAINLEVEL_OUTOF10: 0

## 2023-12-31 ASSESSMENT — PAIN SCALES - WONG BAKER
WONGBAKER_NUMERICALRESPONSE: 0
WONGBAKER_NUMERICALRESPONSE: 0

## 2023-12-31 ASSESSMENT — PAIN DESCRIPTION - LOCATION: LOCATION: GENERALIZED

## 2023-12-31 ASSESSMENT — PAIN - FUNCTIONAL ASSESSMENT: PAIN_FUNCTIONAL_ASSESSMENT: PREVENTS OR INTERFERES SOME ACTIVE ACTIVITIES AND ADLS

## 2023-12-31 ASSESSMENT — PAIN DESCRIPTION - DESCRIPTORS: DESCRIPTORS: ACHING

## 2023-12-31 NOTE — PROGRESS NOTES
12/30/23 1950   Oxygen Therapy/Pulse Ox   O2 Therapy Oxygen   O2 Device Nasal cannula   O2 Flow Rate (L/min) 2 L/min   SpO2 95 %   $Pulse Oximeter $Overnight     PT PLACED ON OVERNIGHT PULSE OX MONITOR. NO COMPLICATIONS.

## 2023-12-31 NOTE — PROGRESS NOTES
12/31/23 0403   NIV Type   $NIV $Daily Charge   NIV Started/Stopped On   Equipment Type v60   Mode Bilevel   Mask Type Full face mask   Mask Size Large   Assessment   Respirations 21   Comfort Level Good   Using Accessory Muscles No   Mask Compliance Good   Skin Assessment Clean, dry, & intact   Skin Protection for O2 Device Yes   Orientation Middle   Location Nose   Settings/Measurements   PIP Observed 13 cm H20   IPAP 12 cmH20   CPAP/EPAP 8 cmH2O   Vt (Measured) 418 mL   Rate Ordered 12   FiO2  30 %   I Time/ I Time % 1 s   Minute Volume (L/min) 8.8 Liters   Mask Leak (lpm) 6 lpm   Patient's Home Machine No   Alarm Settings   Alarms On Y   Low Pressure (cmH2O) 6 cmH2O   High Pressure (cmH2O) 30 cmH2O   Apnea (secs) 20 secs   RR Low (bpm) 6   RR High (bpm) 45 br/min

## 2023-12-31 NOTE — PROGRESS NOTES
[facility] Inpatient Rehabilitation Progress Note    Isac Lemus  12/30/2023  [id number]    Chief Complaint: SDH (subdural hematoma) (HCC)    Subjective:   Fever overnight. Family present at bedside, state that they feel patient has had his best day yet with therapy.    ROS: unable to reliably obtain    Objective:  Patient Vitals for the past 24 hrs:   BP Temp Temp src Pulse Resp SpO2 Weight   12/30/23 1950 -- -- -- -- -- 95 % --   12/30/23 1940 134/73 98.6 °F (37 °C) Axillary 76 16 -- --   12/30/23 1932 -- -- -- -- -- 91 % --   12/30/23 1611 112/62 -- -- 84 -- -- --   12/30/23 1148 116/72 -- -- 76 -- 94 % --   12/30/23 0915 120/68 97.9 °F (36.6 °C) Axillary 89 20 93 % --   12/30/23 0507 138/73 -- -- 85 -- -- --   12/30/23 0437 -- -- -- -- (!) 34 95 % --   12/30/23 0347 -- -- -- -- -- -- 90.2 kg (198 lb 13.7 oz)   12/30/23 0151 -- -- -- -- 29 98 % --     Gen: No distress  HEENT: Normocephalic, atraumatic.   CV: extremities well perfused  Resp: No respiratory distress. On oxygen via nasal cannula  Abd: Soft, nondistended  Ext: No edema.   Neuro: asleep in bed    Wt Readings from Last 3 Encounters:   12/30/23 90.2 kg (198 lb 13.7 oz)   04/06/16 115.7 kg (255 lb)   03/16/16 115.7 kg (255 lb)       Laboratory data:   Lab Results   Component Value Date    WBC 8.1 12/29/2023    HGB 10.3 (L) 12/29/2023    HCT 32.0 (L) 12/29/2023    .6 (H) 12/29/2023     12/29/2023       Lab Results   Component Value Date/Time     12/29/2023 06:56 AM    K 4.7 12/29/2023 06:56 AM     12/29/2023 06:56 AM    CO2 35 12/29/2023 06:56 AM    BUN 24 12/29/2023 06:56 AM    CREATININE <0.5 12/29/2023 06:56 AM    GLUCOSE 150 12/29/2023 06:56 AM    CALCIUM 8.9 12/29/2023 06:56 AM        Therapy progress:    PT    Supine to Sit:    Sit to Supine:     Sit to Stand:    Chair/Bed to Chair Transfer:    Car Transfer:    Ambulation 10 ft:    Ambulation 50 ft:    Ambulation 150 ft:    Stairs - 1 Step:    Stairs - 4 Step:     Stairs - 12 Step:      OT    Eating:    Oral Hygiene: Dependent  Bathing: Dependent  Upper Body Dressing: Dependent  Lower Body Dressing: Dependent  Toilet Transfer:    Toilet Hygiene: Dependent      Speech therapy:    Diet NPO  ADULT TUBE FEEDING; PEG; Diabetic; Continuous; 25; Yes; 25; Q 4 hours; 60; 60; Q 4 hours    Body mass index is 31.15 kg/m².    Assessment and Plan:  Isac Lemus is a 73 year old male with a past medical history significant for afib on Eliquis, CAD s/p CABG (2014), HLD, DM2, HTN, and GERD who presented to [facility] on 11/28/23 as a transfer from [facility] after a fall with head trauma while on anticoagulation, found to have bilateral SDH, SAH, and skull fracture. He was admitted to [facility] on 12/22 due to functional deficits below his baseline.     Traumatic Brain Injury  - with bilateral SDH, SAH, skull base fracture, 4 mm MLS, right traverse/sigmoid sinus thrombosis   - no surgical intervention  - serial scans during acute stay stable  - Keppra 750 mg BID, EEG showing rate epileptiform discharges during acute stay. OSH recommending 30 days of ppx. Consulted Neurology, due to abnormal findings on OSH EEG will continue Keppra for now.   - Aricept and provigil  - PT, OT, ST  -Neurology note reviewed, no changes to management    Pulmonary insufficiency  - wean oxygen for goal SpO2>88%  - ABG showing CO2 retention  - XR chest negative for acute process  - consult Pulmonology, appreciate assistance  -Pulmonology note reviewed-Very likely undiagnosed sleep apnea.  Plan for overnight desaturation study on 2L, resume PAP therapy tomorrow, continue to wean O2. Otherwise continue current plan of management.    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     Dysphagia  - NPO  - s/p PEG 12/16  - on continuous tube feeds  - ST and Dietician following   -Tube feeds adjusted per dietician recs     Intermittent

## 2023-12-31 NOTE — PLAN OF CARE
Problem: Discharge Planning  Goal: Discharge to home or other facility with appropriate resources  Outcome: Progressing     Problem: Pain  Goal: Verbalizes/displays adequate comfort level or baseline comfort level  Outcome: Progressing     Problem: Skin/Tissue Integrity  Goal: Absence of new skin breakdown  Description: 1.  Monitor for areas of redness and/or skin breakdown  2.  Assess vascular access sites hourly  3.  Every 4-6 hours minimum:  Change oxygen saturation probe site  4.  Every 4-6 hours:  If on nasal continuous positive airway pressure, respiratory therapy assess nares and determine need for appliance change or resting period.  Outcome: Progressing     Problem: Safety - Adult  Goal: Free from fall injury  Outcome: Progressing     Problem: ABCDS Injury Assessment  Goal: Absence of physical injury  Outcome: Progressing     Problem: Confusion  Goal: Confusion, delirium, dementia, or psychosis is improved or at baseline  Description: INTERVENTIONS:  1. Assess for possible contributors to thought disturbance, including medications, impaired vision or hearing, underlying metabolic abnormalities, dehydration, psychiatric diagnoses, and notify attending LIP  2. [place] high risk fall precautions, as indicated  3. Provide frequent short contacts to provide reality reorientation, refocusing and direction  4. Decrease environmental stimuli, including noise as appropriate  5. Monitor and intervene to maintain adequate nutrition, hydration, elimination, sleep and activity  6. If unable to ensure safety without constant attention obtain sitter and review sitter guidelines with assigned personnel  7. Initiate Psychosocial CNS and Spiritual Care consult, as indicated  Outcome: Progressing     Problem: Nutrition Deficit:  Goal: Optimize nutritional status  Outcome: Progressing     Continue with plan of care.

## 2023-12-31 NOTE — PROGRESS NOTES
PULMONARY AND CRITICAL CARE INPATIENT NOTE        Isac Lemus   : 1950  MRN: [id number]     Admitting Physician: Nati Mcelroy MD  Attending Physician: Nati Mcelroy MD  PCP: Emilie Cleary APRN - CNP    Admission: 2023   Date of Service: 2023    No chief complaint on file.          ASSESSMENT & PLAN       73 y.o. pleasant  male patient with:    Hospital Problems             Last Modified POA    * (Principal) SDH (subdural hematoma) ([facility]) 2023 Yes    Chronic respiratory failure with hypercapnia ([facility]) 2023 Yes      # Mild hypercapnia.  Seems subacute to chronic and compensated on blood gas.    # Mild hypoxemia.  # Dependent atelectasis and mild bilateral effusions  # DVT/PE/HIT during admission at  recently on apixaban  # Pneumonia during admission at  recently treated with antibiotics  # Disturbed sleep wake cycle  # Recent fall with TBI/subarachnoid, subdural hemorrhage with basal skull fracture  # Seizures secondary to TBI on antibiotics  # Persistent encephalopathy/sleepiness since TBI  # Very likely undiagnosed sleep apnea.  Loud snoring with persistent daytime fatigue and unrefreshing sleep.  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  Results not available for review.  The nurse/RT will obtain a copy and have it available for review tomorrow.  BiPAP will be applied at night 12/8 with backup of 16.  Repeat ABG at 8 in the morning  Patient had good night sleep and improvement on his daytime alertness with CPAP alone though CO2 did not improve  Patient may need sleep study as outpatient if cannot qualify for BiPAP as inpatient  Mucinex, Mucomyst with DuoNebs for basilar atelectasis.  Family is concerned about Volara System (Oscillation & Lung Expansion Therapy)

## 2024-01-01 ENCOUNTER — APPOINTMENT (OUTPATIENT)
Dept: CT IMAGING | Age: 74
DRG: 949 | End: 2024-01-01
Attending: STUDENT IN AN ORGANIZED HEALTH CARE EDUCATION/TRAINING PROGRAM
Payer: MEDICARE

## 2024-01-01 PROBLEM — E11.65 UNCONTROLLED TYPE 2 DIABETES MELLITUS WITH HYPERGLYCEMIA (HCC): Status: ACTIVE | Noted: 2024-01-01

## 2024-01-01 PROBLEM — D53.9 MACROCYTIC ANEMIA: Status: ACTIVE | Noted: 2024-01-01

## 2024-01-01 PROBLEM — R79.9 ELEVATED BUN: Status: ACTIVE | Noted: 2024-01-01

## 2024-01-01 PROBLEM — E87.0 HYPERNATREMIA: Status: ACTIVE | Noted: 2024-01-01

## 2024-01-01 LAB
ANION GAP SERPL CALCULATED.3IONS-SCNC: 7 MMOL/L (ref 3–16)
BACTERIA URNS QL MICRO: ABNORMAL /HPF
BASE EXCESS BLDA CALC-SCNC: 10.1 MMOL/L (ref -3–3)
BASOPHILS # BLD: 0 K/UL (ref 0–0.2)
BASOPHILS NFR BLD: 0.5 %
BILIRUB UR QL STRIP.AUTO: NEGATIVE
BUN SERPL-MCNC: 30 MG/DL (ref 7–20)
CALCIUM SERPL-MCNC: 9.1 MG/DL (ref 8.3–10.6)
CHLORIDE SERPL-SCNC: 108 MMOL/L (ref 99–110)
CLARITY UR: CLEAR
CO2 BLDA-SCNC: 39.1 MMOL/L
CO2 SERPL-SCNC: 35 MMOL/L (ref 21–32)
COHGB MFR BLDA: 1 % (ref 0–1.5)
COLOR UR: YELLOW
CREAT SERPL-MCNC: <0.5 MG/DL (ref 0.8–1.3)
DEPRECATED RDW RBC AUTO: 16.7 % (ref 12.4–15.4)
EOSINOPHIL # BLD: 0 K/UL (ref 0–0.6)
EOSINOPHIL NFR BLD: 0.7 %
GFR SERPLBLD CREATININE-BSD FMLA CKD-EPI: >60 ML/MIN/{1.73_M2}
GLUCOSE BLD-MCNC: 137 MG/DL (ref 70–99)
GLUCOSE BLD-MCNC: 145 MG/DL (ref 70–99)
GLUCOSE BLD-MCNC: 149 MG/DL (ref 70–99)
GLUCOSE BLD-MCNC: 162 MG/DL (ref 70–99)
GLUCOSE SERPL-MCNC: 164 MG/DL (ref 70–99)
GLUCOSE UR STRIP.AUTO-MCNC: NEGATIVE MG/DL
HCO3 BLDA-SCNC: 37.3 MMOL/L (ref 21–29)
HCT VFR BLD AUTO: 32.8 % (ref 40.5–52.5)
HGB BLD-MCNC: 10.5 G/DL (ref 13.5–17.5)
HGB BLDA-MCNC: 16 G/DL (ref 13.5–17.5)
HGB UR QL STRIP.AUTO: NEGATIVE
KETONES UR STRIP.AUTO-MCNC: NEGATIVE MG/DL
LEUKOCYTE ESTERASE UR QL STRIP.AUTO: NEGATIVE
LYMPHOCYTES # BLD: 1.6 K/UL (ref 1–5.1)
LYMPHOCYTES NFR BLD: 25.6 %
MCH RBC QN AUTO: 33 PG (ref 26–34)
MCHC RBC AUTO-ENTMCNC: 32.1 G/DL (ref 31–36)
MCV RBC AUTO: 102.8 FL (ref 80–100)
METHGB MFR BLDA: 0.7 %
MONOCYTES # BLD: 0.6 K/UL (ref 0–1.3)
MONOCYTES NFR BLD: 9.1 %
NEUTROPHILS # BLD: 4.1 K/UL (ref 1.7–7.7)
NEUTROPHILS NFR BLD: 64.1 %
NITRITE UR QL STRIP.AUTO: NEGATIVE
O2 THERAPY: ABNORMAL
PCO2 BLDA: 59.2 MMHG (ref 35–45)
PERFORMED ON: ABNORMAL
PH BLDA: 7.42 [PH] (ref 7.35–7.45)
PH UR STRIP.AUTO: 5.5 [PH] (ref 5–8)
PHOSPHATE SERPL-MCNC: 3.2 MG/DL (ref 2.5–4.9)
PLATELET # BLD AUTO: 277 K/UL (ref 135–450)
PMV BLD AUTO: 8.3 FL (ref 5–10.5)
PO2 BLDA: 78.5 MMHG (ref 75–108)
POTASSIUM SERPL-SCNC: 4.3 MMOL/L (ref 3.5–5.1)
PROT UR STRIP.AUTO-MCNC: ABNORMAL MG/DL
RBC # BLD AUTO: 3.19 M/UL (ref 4.2–5.9)
RBC #/AREA URNS HPF: ABNORMAL /HPF (ref 0–4)
SAO2 % BLDA: 95.2 %
SARS-COV-2 RDRP RESP QL NAA+PROBE: NOT DETECTED
SODIUM SERPL-SCNC: 150 MMOL/L (ref 136–145)
SP GR UR STRIP.AUTO: 1.02 (ref 1–1.03)
UA COMPLETE W REFLEX CULTURE PNL UR: ABNORMAL
UA DIPSTICK W REFLEX MICRO PNL UR: YES
URN SPEC COLLECT METH UR: ABNORMAL
UROBILINOGEN UR STRIP-ACNC: 1 E.U./DL
WBC # BLD AUTO: 6.4 K/UL (ref 4–11)
WBC #/AREA URNS HPF: ABNORMAL /HPF (ref 0–5)

## 2024-01-01 PROCEDURE — 1280000000 HC REHAB R&B

## 2024-01-01 PROCEDURE — 87635 SARS-COV-2 COVID-19 AMP PRB: CPT

## 2024-01-01 PROCEDURE — 94640 AIRWAY INHALATION TREATMENT: CPT

## 2024-01-01 PROCEDURE — 81001 URINALYSIS AUTO W/SCOPE: CPT

## 2024-01-01 PROCEDURE — 6370000000 HC RX 637 (ALT 250 FOR IP): Performed by: STUDENT IN AN ORGANIZED HEALTH CARE EDUCATION/TRAINING PROGRAM

## 2024-01-01 PROCEDURE — C9113 INJ PANTOPRAZOLE SODIUM, VIA: HCPCS | Performed by: INTERNAL MEDICINE

## 2024-01-01 PROCEDURE — 99232 SBSQ HOSP IP/OBS MODERATE 35: CPT | Performed by: INTERNAL MEDICINE

## 2024-01-01 PROCEDURE — 6370000000 HC RX 637 (ALT 250 FOR IP): Performed by: INTERNAL MEDICINE

## 2024-01-01 PROCEDURE — 82803 BLOOD GASES ANY COMBINATION: CPT

## 2024-01-01 PROCEDURE — 2700000000 HC OXYGEN THERAPY PER DAY

## 2024-01-01 PROCEDURE — 80048 BASIC METABOLIC PNL TOTAL CA: CPT

## 2024-01-01 PROCEDURE — 74177 CT ABD & PELVIS W/CONTRAST: CPT

## 2024-01-01 PROCEDURE — 6360000002 HC RX W HCPCS: Performed by: INTERNAL MEDICINE

## 2024-01-01 PROCEDURE — 87040 BLOOD CULTURE FOR BACTERIA: CPT

## 2024-01-01 PROCEDURE — 6360000002 HC RX W HCPCS: Performed by: STUDENT IN AN ORGANIZED HEALTH CARE EDUCATION/TRAINING PROGRAM

## 2024-01-01 PROCEDURE — 2580000003 HC RX 258: Performed by: STUDENT IN AN ORGANIZED HEALTH CARE EDUCATION/TRAINING PROGRAM

## 2024-01-01 PROCEDURE — 85025 COMPLETE CBC W/AUTO DIFF WBC: CPT

## 2024-01-01 PROCEDURE — 94761 N-INVAS EAR/PLS OXIMETRY MLT: CPT

## 2024-01-01 PROCEDURE — 36415 COLL VENOUS BLD VENIPUNCTURE: CPT

## 2024-01-01 PROCEDURE — 6360000004 HC RX CONTRAST MEDICATION: Performed by: STUDENT IN AN ORGANIZED HEALTH CARE EDUCATION/TRAINING PROGRAM

## 2024-01-01 PROCEDURE — 31720 CLEARANCE OF AIRWAYS: CPT

## 2024-01-01 PROCEDURE — 84100 ASSAY OF PHOSPHORUS: CPT

## 2024-01-01 PROCEDURE — 36600 WITHDRAWAL OF ARTERIAL BLOOD: CPT

## 2024-01-01 PROCEDURE — 94660 CPAP INITIATION&MGMT: CPT

## 2024-01-01 RX ORDER — IPRATROPIUM BROMIDE AND ALBUTEROL SULFATE 2.5; .5 MG/3ML; MG/3ML
1 SOLUTION RESPIRATORY (INHALATION) EVERY 4 HOURS PRN
Status: DISCONTINUED | OUTPATIENT
Start: 2024-01-01 | End: 2024-01-14 | Stop reason: HOSPADM

## 2024-01-01 RX ORDER — PANTOPRAZOLE SODIUM 40 MG/10ML
40 INJECTION, POWDER, LYOPHILIZED, FOR SOLUTION INTRAVENOUS DAILY
Status: DISCONTINUED | OUTPATIENT
Start: 2024-01-01 | End: 2024-01-14 | Stop reason: HOSPADM

## 2024-01-01 RX ORDER — DEXTROSE MONOHYDRATE 50 MG/ML
INJECTION, SOLUTION INTRAVENOUS CONTINUOUS
Status: ACTIVE | OUTPATIENT
Start: 2024-01-01 | End: 2024-01-01

## 2024-01-01 RX ADMIN — PANTOPRAZOLE SODIUM 40 MG: 40 INJECTION, POWDER, FOR SOLUTION INTRAVENOUS at 21:29

## 2024-01-01 RX ADMIN — ACETAMINOPHEN 1000 MG: 500 TABLET ORAL at 17:33

## 2024-01-01 RX ADMIN — MODAFINIL 300 MG: 200 TABLET ORAL at 10:04

## 2024-01-01 RX ADMIN — ACETAMINOPHEN 1000 MG: 500 TABLET ORAL at 23:52

## 2024-01-01 RX ADMIN — METOPROLOL TARTRATE 100 MG: 50 TABLET, FILM COATED ORAL at 17:32

## 2024-01-01 RX ADMIN — Medication 5 MG: at 21:29

## 2024-01-01 RX ADMIN — ACETAMINOPHEN 1000 MG: 500 TABLET ORAL at 05:52

## 2024-01-01 RX ADMIN — METOPROLOL TARTRATE 100 MG: 50 TABLET, FILM COATED ORAL at 04:55

## 2024-01-01 RX ADMIN — GUAIFENESIN 600 MG: 600 TABLET ORAL at 08:16

## 2024-01-01 RX ADMIN — APIXABAN 5 MG: 5 TABLET, FILM COATED ORAL at 21:28

## 2024-01-01 RX ADMIN — LEVETIRACETAM 750 MG: 100 SOLUTION ORAL at 23:51

## 2024-01-01 RX ADMIN — SENNOSIDES AND DOCUSATE SODIUM 2 TABLET: 50; 8.6 TABLET ORAL at 21:28

## 2024-01-01 RX ADMIN — CEFEPIME 2000 MG: 2 INJECTION, POWDER, FOR SOLUTION INTRAVENOUS at 23:52

## 2024-01-01 RX ADMIN — VANCOMYCIN HYDROCHLORIDE 2000 MG: 10 INJECTION, POWDER, LYOPHILIZED, FOR SOLUTION INTRAVENOUS at 20:28

## 2024-01-01 RX ADMIN — DEXTROSE MONOHYDRATE: 50 INJECTION, SOLUTION INTRAVENOUS at 14:34

## 2024-01-01 RX ADMIN — DONEPEZIL HYDROCHLORIDE 5 MG: 5 TABLET, FILM COATED ORAL at 08:16

## 2024-01-01 RX ADMIN — METOPROLOL TARTRATE 100 MG: 50 TABLET, FILM COATED ORAL at 10:04

## 2024-01-01 RX ADMIN — ATORVASTATIN CALCIUM 40 MG: 40 TABLET, FILM COATED ORAL at 21:28

## 2024-01-01 RX ADMIN — IPRATROPIUM BROMIDE AND ALBUTEROL SULFATE 1 DOSE: 2.5; .5 SOLUTION RESPIRATORY (INHALATION) at 07:05

## 2024-01-01 RX ADMIN — METOPROLOL TARTRATE 100 MG: 50 TABLET, FILM COATED ORAL at 21:28

## 2024-01-01 RX ADMIN — METFORMIN HYDROCHLORIDE 500 MG: 500 TABLET ORAL at 21:29

## 2024-01-01 RX ADMIN — DIGOXIN 125 MCG: 125 TABLET ORAL at 08:16

## 2024-01-01 RX ADMIN — SENNOSIDES AND DOCUSATE SODIUM 2 TABLET: 50; 8.6 TABLET ORAL at 08:16

## 2024-01-01 RX ADMIN — IOPAMIDOL 75 ML: 755 INJECTION, SOLUTION INTRAVENOUS at 20:19

## 2024-01-01 RX ADMIN — ACETYLCYSTEINE 600 MG: 200 INHALANT RESPIRATORY (INHALATION) at 07:06

## 2024-01-01 RX ADMIN — APIXABAN 5 MG: 5 TABLET, FILM COATED ORAL at 08:16

## 2024-01-01 RX ADMIN — POLYETHYLENE GLYCOL 3350 17 G: 17 POWDER, FOR SOLUTION ORAL at 10:45

## 2024-01-01 RX ADMIN — LEVETIRACETAM 750 MG: 100 SOLUTION ORAL at 10:46

## 2024-01-01 RX ADMIN — METFORMIN HYDROCHLORIDE 500 MG: 500 TABLET ORAL at 08:16

## 2024-01-01 RX ADMIN — ACETAMINOPHEN 650 MG: 325 TABLET, FILM COATED ORAL at 07:33

## 2024-01-01 ASSESSMENT — PAIN SCALES - GENERAL: PAINLEVEL_OUTOF10: 0

## 2024-01-01 ASSESSMENT — PAIN SCALES - WONG BAKER
WONGBAKER_NUMERICALRESPONSE: 0
WONGBAKER_NUMERICALRESPONSE: 0

## 2024-01-01 NOTE — PROGRESS NOTES
4 Eyes Skin Assessment     The patient is being assess for  Shift Handoff    I agree that 2 RN's have performed a thorough Head to Toe Skin Assessment on the patient. ALL assessment sites listed below have been assessed.       Areas assessed by both nurses: SOMMER/ HAMILTON  [x]   Head, Face, and Ears   [x]   Shoulders, Back, and Chest  [x]   Arms, Elbows, and Hands   [x]   Coccyx, Sacrum, and IschIum  [x]   Legs, Feet, and Heels        Does the Patient have Skin Breakdown?  Yes LDA WOUND CARE was Initiated documentation include the Ilda-wound, Wound Assessment, Measurements, Dressing Treatment, Drainage, and Color\",         Devon Prevention initiated:  Yes   Wound Care Orders initiated:  Yes      WOC nurse consulted for Pressure Injury (Stage 3,4, Unstageable, DTI, NWPT, and Complex wounds), New and Established Ostomies:  Yes      Nurse 1 eSignature: Electronically signed by Eleuterio Shrestha RN on 1/1/24 at 6:40 AM EST    **SHARE this note so that the co-signing nurse is able to place an eSignature**    Nurse 2 eSignature: {Esignature:[id number]}

## 2024-01-01 NOTE — PROGRESS NOTES
See ABG below from this morning. Results very similar to ABG on 12/30. Patients Respiratory Rate during ABG this morning was 24-28 bpm.             Latest Reference Range & Units 01/01/24   07:15   O2 Therapy  Unknown   Hemoglobin, Art, Extended 13.5 - 17.5 g/dL 16.0   pH, Arterial 7.350 - 7.450  7.417   pCO2, Arterial 35.0 - 45.0 mmHg 59.2 (H)   pO2, Arterial 75.0 - 108.0 mmHg 78.5   HCO3, Arterial 21.0 - 29.0 mmol/L 37.3 (H)   TCO2 (calc), Art Not Established mmol/L 39.1   Base Excess, Arterial -3.0 - 3.0 mmol/L 10.1 (H)   O2 Sat, Arterial >92 % 95.2   Methemoglobin, Arterial <1.5 % 0.7   Carboxyhgb, Arterial 0.0 - 1.5 % 1.0   (H): Data is abnormally high     Latest Reference Range & Units 12/30/23 04:50   O2 Therapy  Unknown   Hemoglobin, Art, Extended 13.5 - 17.5 g/dL 13.1 (L)   pH, Arterial 7.350 - 7.450  7.408   pCO2, Arterial 35.0 - 45.0 mmHg 58.5 (H)   pO2, Arterial 75.0 - 108.0 mmHg 79.3   HCO3, Arterial 21.0 - 29.0 mmol/L 36.1 (H)   TCO2 (calc), Art Not Established mmol/L 37.9   Base Excess, Arterial -3.0 - 3.0 mmol/L 9.4 (H)   O2 Sat, Arterial >92 % 95.2   Methemoglobin, Arterial <1.5 % 0.7   Carboxyhgb, Arterial 0.0 - 1.5 % 0.8   (L): Data is abnormally low  (H): Data is abnormally high

## 2024-01-01 NOTE — PROGRESS NOTES
PULMONARY AND CRITICAL CARE INPATIENT NOTE        Isac Lemus   : 1950  MRN: [id number]     Admitting Physician: Nati Mcelroy MD  Attending Physician: Nati Mcelroy MD  PCP: Emilie Cleary APRN - CNP    Admission: 2023   Date of Service: 2024      Reason for consult-Hypercapnia          ASSESSMENT & PLAN       73 y.o. pleasant  male patient with:    Hospital Problems             Last Modified POA    * (Principal) SDH (subdural hematoma) ([facility]) 2023 Yes    Chronic respiratory failure with hypercapnia ([facility]) 2023 Yes    Hypernatremia 2024 Yes    Elevated BUN 2024 Yes    Uncontrolled type 2 diabetes mellitus with hyperglycemia ([facility]) 2024 Yes      # Mild hypercapnia.  Seems subacute to chronic and compensated on blood gas.    # Mild hypoxemia.  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  Loud snoring with persistent daytime fatigue and unrefreshing sleep.  No previous sleep studies  #Hypernatremia  #Uncontrolled DM with hyperglycemia    # Hypertension, diabetes type 2, CAD, atrial fibrillation on anticoagulation  Oxygen supplementation to keep saturation 90 to 94% only  Please titrate the oxygen as per the above parameters  Pulmonary toilet  Patient is spiking fever and has been started on cefepime and vancomycin by the admitting provider and further treatment as per clinical status and cultures  Blood cultures along with urinalysis and reflex urine culture ordered  Patient has compensated respiratory acidosis  Can be given BiPAP on an intermittent basis  Patient has hypernatremia along with

## 2024-01-01 NOTE — PROGRESS NOTES
12/31/23 2346   NIV Type   Equipment Type V60   Mode Bilevel   Mask Type Full face mask   Mask Size Large   Assessment   Respirations (!) 34   SpO2 95 %   Comfort Level Good   Using Accessory Muscles No   Mask Compliance Good   Skin Assessment Clean, dry, & intact   Skin Protection for O2 Device Yes   Location Nose   Breath Sounds   Right Upper Lobe Diminished   Right Middle Lobe Diminished   Right Lower Lobe Diminished   Left Upper Lobe Diminished   Left Lower Lobe Diminished   Settings/Measurements   IPAP 12 cmH20   CPAP/EPAP 8 cmH2O   Vt (Measured) 313 mL   Rate Ordered 12   FiO2  30 %   Minute Volume (L/min) 12.5 Liters   Mask Leak (lpm) 16 lpm   Patient's Home Machine No   Alarm Settings   Alarms On Y

## 2024-01-01 NOTE — PROGRESS NOTES
01/01/24 0415   NIV Type   $NIV $Daily Charge   NIV Started/Stopped On   Equipment Type v60   Mode Bilevel   Mask Type Full face mask   Mask Size Large   Assessment   Respirations (!) 37   SpO2 95 %   Comfort Level Good   Using Accessory Muscles No   Mask Compliance Good   Skin Assessment Clean, dry, & intact   Settings/Measurements   PIP Observed 14 cm H20   IPAP 12 cmH20   CPAP/EPAP 8 cmH2O   Vt (Measured) 429 mL   Rate Ordered 12   FiO2  30 %   I Time/ I Time % 1 s   Minute Volume (L/min) 14.5 Liters   Patient's Home Machine No   Alarm Settings   Alarms On Y   Low Pressure (cmH2O) 6 cmH2O   High Pressure (cmH2O) 30 cmH2O   RR Low (bpm) 6   RR High (bpm) 45 br/min

## 2024-01-01 NOTE — PROGRESS NOTES
[facility] Progress Note    Isac Lemus  12/[birth year]20[birth year]  [id number]    Chief Complaint: SDH (subdural hematoma) (HCC)    Subjective:   Fever overnight. Family present at bedside. State they feel he is doing well, questions concerning overnight pulse oximetry.    ROS: unable to reliably obtain    Objective:  Patient Vitals for the past 24 hrs:   BP Temp Temp src Pulse Resp SpO2 Weight   12/31/23 1913 -- -- -- -- -- 93 % --   12/31/23 1659 130/74 -- -- 89 -- -- --   12/31/23 1030 130/71 -- -- 83 20 98 % --   12/31/23 0745 120/64 97.7 °F (36.5 °C) Axillary 98 20 93 % --   12/31/23 0606 -- -- -- -- -- -- 84.6 kg (186 lb 8.2 oz)   12/31/23 0403 -- -- -- -- 21 96 % --   12/31/23 0353 -- -- -- -- -- 94 % --   12/31/23 0311 119/70 -- -- (!) 133 -- -- --   12/31/23 0032 -- -- -- -- -- 93 % --     Gen: No distress  HEENT: Normocephalic, atraumatic.   CV: extremities well perfused  Resp: No respiratory distress. On oxygen via nasal cannula  Abd: Soft, nondistended  Ext: No edema.   Neuro: asleep in bed    Wt Readings from Last 3 Encounters:   12/31/23 84.6 kg (186 lb 8.2 oz)   04/06/16 115.7 kg (255 lb)   03/16/16 115.7 kg (255 lb)       Laboratory data:   Lab Results   Component Value Date    WBC 8.1 12/29/2023    HGB 10.3 (L) 12/29/2023    HCT 32.0 (L) 12/29/2023    .6 (H) 12/29/2023     12/29/2023       Lab Results   Component Value Date/Time     12/29/2023 06:56 AM    K 4.7 12/29/2023 06:56 AM     12/29/2023 06:56 AM    CO2 35 12/29/2023 06:56 AM    BUN 24 12/29/2023 06:56 AM    CREATININE <0.5 12/29/2023 06:56 AM    GLUCOSE 150 12/29/2023 06:56 AM    CALCIUM 8.9 12/29/2023 06:56 AM        Therapy progress:    PT    Supine to Sit:    Sit to Supine:     Sit to Stand:    Chair/Bed to Chair Transfer:    Car Transfer:    Ambulation 10 ft:    Ambulation 50 ft:    Ambulation 150 ft:    Stairs - 1 Step:    Stairs - 4 Step:    Stairs - 12 Step:      OT    Eating:    Oral  Hygiene: Dependent  Bathing: Dependent  Upper Body Dressing: Dependent  Lower Body Dressing: Dependent  Toilet Transfer:    Toilet Hygiene: Dependent      Speech therapy:    Diet NPO  ADULT TUBE FEEDING; PEG; Diabetic; Continuous; 25; Yes; 25; Q 4 hours; 60; 60; Q 4 hours    Body mass index is 29.21 kg/m².    Assessment and Plan:  Isac Lemus is a 73 year old male with a past medical history significant for afib on Eliquis, CAD s/p CABG (2014), HLD, DM2, HTN, and GERD who presented to [facility] on 11/28/23 as a transfer from [facility] after a fall with head trauma while on anticoagulation, found to have bilateral SDH, SAH, and skull fracture. He was admitted to [facility] on 12/22 due to functional deficits below his baseline.     Traumatic Brain Injury  - with bilateral SDH, SAH, skull base fracture, 4 mm MLS, right traverse/sigmoid sinus thrombosis   - no surgical intervention  - serial scans during acute stay stable  - Keppra 750 mg BID, EEG showing rate epileptiform discharges during acute stay. OSH recommending 30 days of ppx. Consulted Neurology, due to abnormal findings on OSH EEG will continue Keppra for now.   - Aricept and provigil  - PT, OT, ST  -Neurology note reviewed, no changes to management    COVID 19 testing pending    Pulmonary insufficiency  - wean oxygen for goal SpO2>88%  - ABG showing CO2 retention  - XR chest negative for acute process  - consult Pulmonology, appreciate assistance  -Pulmonology note reviewed-Very likely undiagnosed sleep apnea.  Plan for overnight desaturation study on 2L, resume PAP therapy tomorrow, continue to wean O2. Otherwise continue current plan of management.   -update 12/31- pending pulse oximetry study, repeat ABG.    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     Dysphagia  - NPO  - s/p PEG 12/16  - on continuous tube feeds  - ST and Dietician following   -Tube feeds adjusted

## 2024-01-01 NOTE — PLAN OF CARE
Problem: Discharge Planning  Goal: Discharge to home or other facility with appropriate resources  1/1/2024 0135 by Eleuterio Shrestha RN  Outcome: Progressing  12/31/2023 1811 by MIRELLA PRAJAPATI  Outcome: Progressing     Problem: Pain  Goal: Verbalizes/displays adequate comfort level or baseline comfort level  1/1/2024 0135 by Eleuterio Shrestha RN  Outcome: Progressing  12/31/2023 1811 by MIRELLA PRAJAPATI  Outcome: Progressing     Problem: Skin/Tissue Integrity  Goal: Absence of new skin breakdown  Description: 1.  Monitor for areas of redness and/or skin breakdown  2.  Assess vascular access sites hourly  3.  Every 4-6 hours minimum:  Change oxygen saturation probe site  4.  Every 4-6 hours:  If on nasal continuous positive airway pressure, respiratory therapy assess nares and determine need for appliance change or resting period.  1/1/2024 0135 by Eleuterio Shrestha RN  Outcome: Progressing  12/31/2023 1811 by MIRELLA PRAJAPATI  Outcome: Progressing     Problem: Safety - Adult  Goal: Free from fall injury  1/1/2024 0135 by Eleuterio Shrestha RN  Outcome: Progressing  12/31/2023 1811 by MIRELLA PRAJAPATI  Outcome: Progressing     Problem: ABCDS Injury Assessment  Goal: Absence of physical injury  12/31/2023 1811 by MIRELLA PRAJAPATI  Outcome: Progressing     Problem: Nutrition Deficit:  Goal: Optimize nutritional status  12/31/2023 1811 by MIRELLA PRAJAPATI  Outcome: Progressing

## 2024-01-01 NOTE — CONSULTS
Pharmacy Note  Vancomycin Consult    Isac Lemus is a 73 y.o. male started on Vancomycin for Empiric coverage for an undetermined duration; consult received from Dr. Mcelroy to manage therapy. Also receiving the following antibiotics: cefepime.    Allergies:  Heparin     Tmax: 101.5  Recent Labs     01/01/24  0808   CREATININE <0.5*     Recent Labs     01/01/24  0808   WBC 6.4     Estimated Creatinine Clearance: 133 mL/min (based on SCr of 0.5 mg/dL).    Intake/Output Summary (Last 24 hours) at 1/1/2024 1759  Last data filed at 1/1/2024 1740  Gross per 24 hour   Intake 0 ml   Output --   Net 0 ml     Wt Readings from Last 1 Encounters:   12/31/23 84.6 kg (186 lb 8.2 oz)       Body mass index is 29.21 kg/m².    Date Culture Results   12/29 Blood x2 NGTD          Goal Vancomycin AUC: 400-600   AUC24,ss: 479 mg/L.hr  Probability of AUC24 > 400: 70 %  Ctrough,ss: 11.2 mg/L  Probability of Ctrough,ss > 20: 12 %  Probability of nephrotoxicity (Lodise MIESHA 2009): 7 %     Assessment/Plan:  Will initiate Vancomycin with a one-time loading dose of 2000 mg x1, followed by 1500 mg IV every 12 hours.   Calculated AUC: 479 mg/L.hr Predicted Trough: 11.2 mcg/L     Vancomycin level ordered for 1/2 at 1630.   Timing of vancomycin levels will be determined based on culture results, renal function, and clinical response.     Thank you for the consult,  Jeanette Kohler, PharmD 1/1/2024 5:59 PM    Vancomycin Day 2   Estimated Creatinine Clearance: 137 mL/min (based on SCr of 0.5 mg/dL).  Vancomycin 1500 mg q12h   Projected AUC - 496, Trough - 11.8 mcg/mL  Vancomycin level ordered for today 1630  King Rios, PharmD 1/2/2024 8:21 AM

## 2024-01-02 LAB
ANION GAP SERPL CALCULATED.3IONS-SCNC: 7 MMOL/L (ref 3–16)
BACTERIA BLD CULT ORG #2: NORMAL
BACTERIA BLD CULT: NORMAL
BUN SERPL-MCNC: 25 MG/DL (ref 7–20)
CALCIUM SERPL-MCNC: 8.6 MG/DL (ref 8.3–10.6)
CHLORIDE SERPL-SCNC: 105 MMOL/L (ref 99–110)
CO2 SERPL-SCNC: 33 MMOL/L (ref 21–32)
CREAT SERPL-MCNC: <0.5 MG/DL (ref 0.8–1.3)
GFR SERPLBLD CREATININE-BSD FMLA CKD-EPI: >60 ML/MIN/{1.73_M2}
GLUCOSE BLD-MCNC: 131 MG/DL (ref 70–99)
GLUCOSE BLD-MCNC: 139 MG/DL (ref 70–99)
GLUCOSE BLD-MCNC: 148 MG/DL (ref 70–99)
GLUCOSE BLD-MCNC: 155 MG/DL (ref 70–99)
GLUCOSE SERPL-MCNC: 153 MG/DL (ref 70–99)
PERFORMED ON: ABNORMAL
POTASSIUM SERPL-SCNC: 4.2 MMOL/L (ref 3.5–5.1)
PROCALCITONIN SERPL IA-MCNC: 0.2 NG/ML (ref 0–0.15)
SODIUM SERPL-SCNC: 145 MMOL/L (ref 136–145)

## 2024-01-02 PROCEDURE — 84145 PROCALCITONIN (PCT): CPT

## 2024-01-02 PROCEDURE — 97110 THERAPEUTIC EXERCISES: CPT

## 2024-01-02 PROCEDURE — 36415 COLL VENOUS BLD VENIPUNCTURE: CPT

## 2024-01-02 PROCEDURE — 6370000000 HC RX 637 (ALT 250 FOR IP): Performed by: INTERNAL MEDICINE

## 2024-01-02 PROCEDURE — 92507 TX SP LANG VOICE COMM INDIV: CPT

## 2024-01-02 PROCEDURE — 1280000000 HC REHAB R&B

## 2024-01-02 PROCEDURE — 6360000002 HC RX W HCPCS: Performed by: INTERNAL MEDICINE

## 2024-01-02 PROCEDURE — 2700000000 HC OXYGEN THERAPY PER DAY

## 2024-01-02 PROCEDURE — 97112 NEUROMUSCULAR REEDUCATION: CPT

## 2024-01-02 PROCEDURE — 6370000000 HC RX 637 (ALT 250 FOR IP): Performed by: STUDENT IN AN ORGANIZED HEALTH CARE EDUCATION/TRAINING PROGRAM

## 2024-01-02 PROCEDURE — 80048 BASIC METABOLIC PNL TOTAL CA: CPT

## 2024-01-02 PROCEDURE — 99223 1ST HOSP IP/OBS HIGH 75: CPT | Performed by: INTERNAL MEDICINE

## 2024-01-02 PROCEDURE — 94660 CPAP INITIATION&MGMT: CPT

## 2024-01-02 PROCEDURE — C9113 INJ PANTOPRAZOLE SODIUM, VIA: HCPCS | Performed by: INTERNAL MEDICINE

## 2024-01-02 PROCEDURE — 2580000003 HC RX 258: Performed by: STUDENT IN AN ORGANIZED HEALTH CARE EDUCATION/TRAINING PROGRAM

## 2024-01-02 PROCEDURE — 6360000002 HC RX W HCPCS: Performed by: STUDENT IN AN ORGANIZED HEALTH CARE EDUCATION/TRAINING PROGRAM

## 2024-01-02 PROCEDURE — 97530 THERAPEUTIC ACTIVITIES: CPT

## 2024-01-02 PROCEDURE — 97037 APPL MODALITY 1+LLLT PO PAIN: CPT

## 2024-01-02 PROCEDURE — 92526 ORAL FUNCTION THERAPY: CPT

## 2024-01-02 PROCEDURE — 97535 SELF CARE MNGMENT TRAINING: CPT

## 2024-01-02 PROCEDURE — 99232 SBSQ HOSP IP/OBS MODERATE 35: CPT | Performed by: INTERNAL MEDICINE

## 2024-01-02 PROCEDURE — 94761 N-INVAS EAR/PLS OXIMETRY MLT: CPT

## 2024-01-02 RX ORDER — CASTOR OIL AND BALSAM, PERU 788; 87 MG/G; MG/G
OINTMENT TOPICAL 2 TIMES DAILY
Status: DISCONTINUED | OUTPATIENT
Start: 2024-01-02 | End: 2024-01-14 | Stop reason: HOSPADM

## 2024-01-02 RX ADMIN — PANTOPRAZOLE SODIUM 40 MG: 40 INJECTION, POWDER, FOR SOLUTION INTRAVENOUS at 09:41

## 2024-01-02 RX ADMIN — Medication 5 MG: at 21:18

## 2024-01-02 RX ADMIN — GUAIFENESIN 600 MG: 600 TABLET ORAL at 21:18

## 2024-01-02 RX ADMIN — CEFEPIME 2000 MG: 2 INJECTION, POWDER, FOR SOLUTION INTRAVENOUS at 10:58

## 2024-01-02 RX ADMIN — POLYETHYLENE GLYCOL 3350 17 G: 17 POWDER, FOR SOLUTION ORAL at 09:57

## 2024-01-02 RX ADMIN — APIXABAN 5 MG: 5 TABLET, FILM COATED ORAL at 21:18

## 2024-01-02 RX ADMIN — METOPROLOL TARTRATE 100 MG: 50 TABLET, FILM COATED ORAL at 17:06

## 2024-01-02 RX ADMIN — METOPROLOL TARTRATE 100 MG: 50 TABLET, FILM COATED ORAL at 09:44

## 2024-01-02 RX ADMIN — ACETAMINOPHEN 1000 MG: 500 TABLET ORAL at 15:13

## 2024-01-02 RX ADMIN — GUAIFENESIN 600 MG: 600 TABLET ORAL at 09:45

## 2024-01-02 RX ADMIN — SENNOSIDES AND DOCUSATE SODIUM 2 TABLET: 50; 8.6 TABLET ORAL at 09:44

## 2024-01-02 RX ADMIN — VANCOMYCIN HYDROCHLORIDE 1500 MG: 10 INJECTION, POWDER, LYOPHILIZED, FOR SOLUTION INTRAVENOUS at 05:20

## 2024-01-02 RX ADMIN — DIGOXIN 125 MCG: 125 TABLET ORAL at 09:44

## 2024-01-02 RX ADMIN — ATORVASTATIN CALCIUM 40 MG: 40 TABLET, FILM COATED ORAL at 21:18

## 2024-01-02 RX ADMIN — APIXABAN 5 MG: 5 TABLET, FILM COATED ORAL at 09:45

## 2024-01-02 RX ADMIN — ACETAMINOPHEN 1000 MG: 500 TABLET ORAL at 20:58

## 2024-01-02 RX ADMIN — METFORMIN HYDROCHLORIDE 500 MG: 500 TABLET ORAL at 21:18

## 2024-01-02 RX ADMIN — MODAFINIL 300 MG: 200 TABLET ORAL at 09:44

## 2024-01-02 RX ADMIN — METOPROLOL TARTRATE 100 MG: 50 TABLET, FILM COATED ORAL at 05:19

## 2024-01-02 RX ADMIN — DONEPEZIL HYDROCHLORIDE 5 MG: 5 TABLET, FILM COATED ORAL at 09:44

## 2024-01-02 RX ADMIN — LEVETIRACETAM 750 MG: 100 SOLUTION ORAL at 11:03

## 2024-01-02 RX ADMIN — LEVETIRACETAM 750 MG: 100 SOLUTION ORAL at 21:22

## 2024-01-02 RX ADMIN — METFORMIN HYDROCHLORIDE 500 MG: 500 TABLET ORAL at 09:45

## 2024-01-02 RX ADMIN — ACETAMINOPHEN 1000 MG: 500 TABLET ORAL at 11:01

## 2024-01-02 ASSESSMENT — PAIN SCALES - WONG BAKER
WONGBAKER_NUMERICALRESPONSE: 0

## 2024-01-02 ASSESSMENT — PAIN SCALES - GENERAL
PAINLEVEL_OUTOF10: 0

## 2024-01-02 NOTE — CONSULTS
Infectious Diseases   Consult Note      Reason for Consult:   fever  Requesting Physician:  Dr. Mcelroy       Date of Admission: 12/22/2023  Subjective:   CHIEF COMPLAINT:   none given       HPI:    Isac Lemus is a 73yoM with history of afib, CAD s/p CABG, HLD, DM, HTN, GERD    Admitted [facility] 11/28-12/18/23 with traumatic SDH, SAH, skull base fracture  His hospital course was notable for pulmonary insufficiency requiring high flow oxygen, atrial fibrillation with RVR, HIT, intermittent fevers, right peroneal DVT, RLL segmental PE, pulmonary edema, colonic pseudoobstruction, aspiration pneumonia, and dysphagia. EEG was negative for seizures. On 12/16/23 he underwent PEG placement.     He was admitted to ARU on 12/22/23    Fever to 101.5 in the last 24 hours.   He was started on empiric abx on 1/1/24.    From DC summary, \"Fevers; Patient has been having intermittent fevers throughout hospitalization, many different etiologies possible including clot burden, recent TBI with bleed, post-op. Infectious workup including multiple sets of blood cultures, UA were negative, CXR was stable.     Wife and granddaughter note that he was having evening or nocturnal fever for 1-2 weeks prior to admission at [facility], without any associated symptoms.    Last colonoscopy was 11/2022.    Patient does not contribute to the history.                    Current abx:  Cefepime 2g q12, s 1/1  Vanc 1500 q12, s 1/1       Past Surgical History:       Diagnosis Date    Acid reflux     CAD (coronary artery disease)     Diabetes mellitus (HCC)     Heartburn     Hiatal hernia     Hypertension     Seasonal allergies          Procedure Laterality Date    COLONOSCOPY  2012    POLYPS    COLONOSCOPY  3/16/16    polyps    CORONARY ARTERY BYPASS GRAFT  10/2014    X5       Social History:    TOBACCO:   reports that he has never smoked. He does not have any smokeless tobacco history on file.  ETOH:   reports no history of alcohol use.  There is no history  Nightly  metoprolol tartrate (LOPRESSOR) tablet 100 mg, 100 mg, Per G Tube, Q6H  apixaban (ELIQUIS) tablet 5 mg, 5 mg, Per G Tube, BID  metFORMIN (GLUCOPHAGE) tablet 500 mg, 500 mg, Per G Tube, BID  glucose chewable tablet 16 g, 4 tablet, Oral, PRN  dextrose bolus 10% 125 mL, 125 mL, IntraVENous, PRN **OR** dextrose bolus 10% 250 mL, 250 mL, IntraVENous, PRN  glucagon injection 1 mg, 1 mg, SubCUTAneous, PRN  dextrose 10 % infusion, , IntraVENous, Continuous PRN  insulin lispro (HUMALOG) injection vial 0-8 Units, 0-8 Units, SubCUTAneous, Q6H  prochlorperazine (COMPAZINE) tablet 5 mg, 5 mg, Per G Tube, Q6H PRN      Allergies   Allergen Reactions    Heparin Other (See Comments)     Causes clots        REVIEW OF SYSTEMS:    Unable to obtain due to aphasia       Objective:   PHYSICAL EXAM:      VITALS:  /73   Pulse 86   Temp 98.9 °F (37.2 °C) (Axillary)   Resp 20   Ht 1.702 m (5' 7\")   Wt 90 kg (198 lb 6.6 oz)   SpO2 96%   BMI 31.08 kg/m²      24HR INTAKE/OUTPUT:    Intake/Output Summary (Last 24 hours) at 1/2/2024 1440  Last data filed at 1/2/2024 0944  Gross per 24 hour   Intake 2723.57 ml   Output 200 ml   Net 2523.57 ml     CONSTITUTIONAL:  Awake, alert, cooperative with simple commands   No apparent distress   HEENT:  Sclera white, conjunctiva full.  OP with moist mucosal membranes, no thrush, tongue protrudes midline  Poor dentition without dental abscess  Loose lower incisors   NECK:  Supple, symmetrical, trachea midline, no adenopathy  LUNGS:  no increased work of breathing   CTA gerardo without W/R/R  CARDIOVASCULAR:   RRR  ABDOMEN:  normal bowel sounds, soft, flat, NT   PSYCHIATRIC:  No obvious depression or anxiety.  MUSCULOSKELETAL: No obvious misalignment or effusion of the joints. No clubbing, cyanosis of the digits.  SKIN:  normal skin color, texture, turgor and no redness, warmth, or swelling. No palpable nodules or stigmata of embolic phenomenon    ACCESS:  PIV in place   No phlebitis

## 2024-01-02 NOTE — PATIENT CARE CONFERENCE
[facility]  Inpatient Rehabilitation  Weekly Team Conference Note    Date: 1/3/2024  Patient Name: Isac Lemus        MRN: [id number]    : 1950  (73 y.o.)  Gender: male   Referring Practitioner: Nati Mcelroy MD         Interventions to be utilized toward barriers to discharge, per discipline:  NURSING  Nursing observed barriers to dc: Cognitive deficit, Limited participation, Communication deficit, Decreased endurance, Upper extremity weakness, Lower extremity weakness, Incontinence of bowel, Incontinence of bladder, Medical complications, and Wound Care  Nursing interventions: skin, wound care, turning and repositioning, family education,medications  Family Education: Yes  Fall Risk:  Yes    Stay with me?: Yes    PHYSICAL THERAPY  Physical therapy observed barriers to dc:     Baseline: lives with wife in 1 level home with 2 MARGUERITE. Ind with no AD for mobility               Current level: TD bed mobility, TD via cathryn for transfers, TD sitting balance with trunk support              Barriers to DC: level of assist/alertness, limited assist upon d/c, participation/cog              Needs in order to achieve dc home/next level of care: anticipate pt will require some level of physical assist upon dc. Rec family training and DME TBD pending progress.       Physical therapy interventions:   Current Treatment Recommendations: Strengthening, ROM, Balance training, Functional mobility training, Transfer training, Endurance training, Neuromuscular re-education, Gait training, Stair training, Home exercise program, Safety education & training, Patient/Caregiver education & training, Equipment evaluation, education, & procurement, Therapeutic activities, Cognitive/Perceptual training, Co-Treatment    PT Goals:            Short Term Goals  Time Frame for Short Term Goals: 10 days (24)  Short Term Goal 1: Pt to perform bed mobility with mod A x 2.- GOAL NOT MET, max A of 2  Short Term  have spontaneous speech utterance with response to SLP by stating \"hi.\" SLP attempted HUE with carrier phrase for name and singing happy birthday - no attempts at verbalizations. Pt with improved ability to follow command to open oral cavity following model. Continued difficulty to establish a consistent yes/no response. Pt accepting of ice chips - continues to have inconsistency with swallow response. Suspect swallow is delayed with premature and/or passive spillover BOT. Pt with improved RR with ice chips. Continue goals above.     Tx session 2: pt alert and awake upon arrival of SLP. Pt assisting SLP in holding cup for ice chip trials. Pt with intermittent responses with head nod 'yes' for more ice chips, and did shake head 'no' x2 during session. This was the first time a 'no' response was initiated with this SLP. Pt with improved oral acceptance and awareness for ice chips this date. Pt continues to have dried secretions in oral cavity, and ongoing edu to family and staff re: importance of oral care. Pt's family played his favorite song - pt did not attempt to vocalize with song, but did tap hands following cues and model from SLP. Pt able to repeat his name x1 during session - benefited from tactile cue with hand squeeze. Pt also able to repeat 'bye' at end of session. Continue goals above.     NUTRITION  Weight - Scale: 96.9 kg (213 lb 10 oz) / Body mass index is 33.46 kg/m².  Diet Order: Diet NPO  ADULT TUBE FEEDING; PEG; Diabetic; Continuous; 25; Yes; 25; Q 4 hours; 60; 80; Q 4 hours  PO Meals Eaten (%): 0%  Nutrition Education/Counseling: No recommendation at this time      CASE MANAGEMENT  Assessment: 73 yr old male. Dx:SDH (subdural hematoma).Pulm, Neuro and ID following. Patient is independent PLOF. Lives w/spouse in a one level home with 2 step sto enter. Home has walk-n-shower. Patient owns standard walker, rolling walker and cane. Prior services [facility]. Wife transports patient and can provide 24/7.  Therapy recommendations are pending. Following for tube feeds. [facility] and [facility] following.       Interdisciplinary Goals:   1.) pt family will participate in family training for increased safety upon d/c.   2.) Pt will implement compensatory strategies to promote communication effectiveness across disciplines    3.) Pt will complete grooming w/ max A   4.)  5.)      []  Family Training discussed at conference and to be scheduled.      Discharge Plan   Estimated discharge date: 1/14/24  Destination: skilled nursing facility  Pass:No  Services at Discharge: SNF Physical Therapy, Occupational Therapy, Speech Therapy, and Nursing   Equipment at Discharge: Defer to SNF    Team Members Present at Conference:  : Dian Mckee RN    Occupational Therapist: Zachary Gomes, OTR/L   Physical Therapist: Bell Johnson PT, DPT   Speech Therapist: Tennille Pagan MA CCC-SLP  Nurse: Kierra Fitzgerald RN  Dietician: Linda Heredia RDN, LD  : Bobo Pedersen, OTR/L  Psychiatry: N/A    Family members present at conference: No      I led this team conference and I approve the established interdisciplinary plan of care as documented within the medical record of Isac Muse MD: Electronically signed by Nati Mcelroy MD on 1/3/2024 at 12:08 PM

## 2024-01-02 NOTE — DISCHARGE INSTR - COC
Continuity of Care Form    Patient Name: Isac Lemus   :  1950  MRN:  [id number]    Admit date:  2023  Discharge date:  ***    Code Status Order: Full Code   Advance Directives:     Admitting Physician:  Nati Mcelroy MD  PCP: Emilie Cleary, VANGIE - CNP    Discharging Nurse: ***  Discharging Hospital Unit/Room#: 0156/0156-01  Discharging Unit Phone Number: ***    Emergency Contact:   Extended Emergency Contact Information  Primary Emergency Contact: Nancy Lemus  Address: [address] [address] [facility]  Home Phone: [phone]  Work Phone: [phone]  Relation: Spouse  Secondary Emergency Contact: Nancy Lemus  Address: [address] [place] [address] [facility]  Home Phone: [phone]  Work Phone: [phone]  Relation: Spouse    Past Surgical History:  Past Surgical History:   Procedure Laterality Date    COLONOSCOPY      POLYPS    COLONOSCOPY  3/16/16    polyps    CORONARY ARTERY BYPASS GRAFT  10/2014    X5       Immunization History:   Immunization History   Administered Date(s) Administered    COVID-19, PFIZER PURPLE top, DILUTE for use, (age 12 y+), 30mcg/0.3mL 04/10/2021, 2021       Active Problems:  Patient Active Problem List   Diagnosis Code    Chest pain R07.9    DM (diabetes mellitus) ([facility]) E11.9    HTN (hypertension) I10    Obesity E66.9    SDH (subdural hematoma) ([facility]) S06.5XAA    Chronic respiratory failure with hypercapnia ([facility]) J96.12    Hypernatremia E87.0    Elevated BUN R79.9    Uncontrolled type 2 diabetes mellitus with hyperglycemia ([facility]) E11.65    Macrocytic anemia D53.9       Isolation/Infection:   Isolation            No Isolation          Patient Infection Status       None to display                     Nurse Assessment:  Last Vital Signs: /73   Pulse 86   Temp 98.9 °F (37.2 °C) (Axillary)   Resp 20   Ht 1.702 m (5' 7\")   Wt 90 kg (198 lb 6.6 oz)    SpO2 96%   BMI 31.08 kg/m²     Last documented pain score (0-10 scale): Pain Level: 0  Last Weight:   Wt Readings from Last 1 Encounters:   01/02/24 90 kg (198 lb 6.6 oz)     Mental Status:  {IP PT MENTAL STATUS:20030}    IV Access:  { HERNANDO IV ACCESS:046393320}    Nursing Mobility/ADLs:  Walking   {CHP DME ADLs:689304033}  Transfer  {CHP DME ADLs:869791254}  Bathing  {CHP DME ADLs:960344741}  Dressing  {CHP DME ADLs:915388059}  Toileting  {CHP DME ADLs:797164071}  Feeding  {CHP DME ADLs:198661927}  Med Admin  {CHP DME ADLs:990285865}  Med Delivery   { HERNANDO MED Delivery:175524372}    Wound Care Documentation and Therapy:  Incision 10/20/14 Sternum Anterior (Active)   Number of days: 3361       Incision 10/20/14 Chest Anterior (Active)   Number of days: 3361       Incision 10/20/14 Leg Right (Active)   Number of days: 3361       Wound 12/22/23 Buttocks DTI to bilateral buttocks (Active)   Wound Image   12/22/23 1944   Wound Etiology Deep tissue/Injury 01/02/24 1038   Dressing Status New dressing applied 01/02/24 1038   Wound Cleansed Soap and water 01/02/24 1038   Dressing/Treatment Zinc paste 01/02/24 1038   Offloading for Diabetic Foot Ulcers Offloading ordered 01/02/24 1038   Wound Assessment Purple/maroon 01/01/24 2114   Drainage Amount None (dry) 01/01/24 2114   Drainage Description Serosanguinous 01/01/24 2114   Odor None 01/01/24 2114   Ilda-wound Assessment Maceration;Non-blanchable erythema 01/01/24 2114   Margins Unattached edges 01/01/24 2114   Number of days: 10        Elimination:  Continence:   Bowel: {YES / NO:19727}  Bladder: {YES / NO:19727}  Urinary Catheter: {Urinary Catheter:742146232}   Colostomy/Ileostomy/Ileal Conduit: {YES / NO:19727}       Date of Last BM: ***    Intake/Output Summary (Last 24 hours) at 1/2/2024 1342  Last data filed at 1/2/2024 0944  Gross per 24 hour   Intake 2723.57 ml   Output 200 ml   Net 2523.57 ml     I/O last 3 completed shifts:  In: 3410.6 [NG/GT:2957; IV  Piggyback:453.6]  Out: 200 [Urine:200]    Safety Concerns:     { HERNANDO Safety Concerns:075737183}    Impairments/Disabilities:      { HERNANDO Impairments/Disabilities:495158417}    Nutrition Therapy:  Current Nutrition Therapy:   { HERNANDO Diet List:191778724}    Routes of Feeding: {CHP DME Other Feedings:794364066}  Liquids: {Slp liquid thickness:46875}  Daily Fluid Restriction: {CH DME Yes amt example:800034225}  Last Modified Barium Swallow with Video (Video Swallowing Test): {Done Not Done Date:}    Treatments at the Time of Hospital Discharge:   Respiratory Treatments: ***  Oxygen Therapy:  {Therapy; copd oxygen:71961}  Ventilator:    {[facility] Vent List:728390524}    Rehab Therapies: {THERAPEUTIC INTERVENTION:2279635660}  Weight Bearing Status/Restrictions: {[facility] Weight Bearin}  Other Medical Equipment (for information only, NOT a DME order):  {EQUIPMENT:487703570}  Other Treatments: ***    Patient's personal belongings (please select all that are sent with patient):  {[facility] DME Belongings:696378496}    RN SIGNATURE:  {Esignature:893609461}    CASE MANAGEMENT/SOCIAL WORK SECTION    Inpatient Status Date: ***    Readmission Risk Assessment Score:  Readmission Risk              Risk of Unplanned Readmission:  20           Discharging home care Agency     [facility]   [facility]   [address]   [place] [id number] [phone]    [employer]>tube feeds  Services: Home Health Services  Address: [address] [place], [place] [address]  Phone: [phone]        / signature: Electronically signed by Dian Mckee RN on 24 at 1:43 PM EST    PHYSICIAN SECTION    Prognosis: {Prognosis:6108978594}    Condition at Discharge: { Patient Condition:378019338}    Rehab Potential (if transferring to Rehab): {Prognosis:3279361911}    Recommended Labs or Other Treatments After Discharge: ***    Physician Certification: I

## 2024-01-02 NOTE — PROGRESS NOTES
PULMONARY AND CRITICAL CARE INPATIENT NOTE        Isac Lemus   : 1950  MRN: [id number]     Admitting Physician: Nati Mcelroy MD  Attending Physician: Nati Mcelroy MD  PCP: Emilie Cleary APRN - CNP    Admission: 2023   Date of Service: 2024      Reason for consult-Hypercapnia          ASSESSMENT & PLAN       73 y.o. pleasant  male patient with:    Hospital Problems             Last Modified POA    * (Principal) SDH (subdural hematoma) ([facility]) 2023 Yes    Chronic respiratory failure with hypercapnia ([facility]) 2023 Yes    Hypernatremia 2024 Yes    Elevated BUN 2024 Yes    Uncontrolled type 2 diabetes mellitus with hyperglycemia ([facility]) 2024 Yes    Macrocytic anemia 2024 Yes      # Mild hypercapnia.  Seems subacute to chronic and compensated on blood gas.    # Mild hypoxemia.  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  Loud snoring with persistent daytime fatigue and unrefreshing sleep.  No previous sleep studies  #Hypernatremia  #Uncontrolled DM with hyperglycemia    # Hypertension, diabetes type 2, CAD, atrial fibrillation on anticoagulation  Oxygen supplementation to keep saturation 90 to 94% only  Please titrate the oxygen as per the above parameters  Pulmonary toilet  Patient is spiking fever and has been started on cefepime and vancomycin by the admitting provider has been discontinued by ID  Blood cultures along with urinalysis and reflex urine culture ordered  Patient has compensated respiratory acidosis  Can be given BiPAP on an intermittent basis  Patient's sodium levels have  improved  Free water via NG tube increased and dose titration as per sodium levels as per primary team  Bronchodilators  Management of diabetes mellitus as per primary team  Blood glucose monitoring with sliding scale insulin  Enteral feeds as per metabolic support  Eliquis to continue  Monitor cardiac rhythm and hemodynamics closely  Monitor input output and BMP  Correct electrolytes on whenever necessary basis  Keppra to continue  Monitor for any bleeding  PUD prophylaxis      LOS: Hospital Day: 12    Code:Full Code    No other recommendations from pulmonary/critical care standpoint of view-will sign off-please call on whenever necessary basis          Subjective/Objective           CC/Reason for Consult: Hypercapnia        HPI: December 29, 2023  73-year-old female patient with hypertension, coronary artery disease, diabetes mellitus, atrial fibrillation on anticoagulation who was admitted to [facility] inpatient rehab unit on December 22 after recent fall with traumatic brain injury/subarachnoid hemorrhage/subdural hematoma and basilar skull fracture.  Patient was admitted at  and there was concern about some admitted to form activity on his EEG and was started on Keppra.  Patient had intermittent fevers during hospital stay at [facility] with negative infectious workup..  Patient had right extremity DVT but he was restarted on Eliquis for his atrial fibrillation.  Pulmonary team was consulted for hypercapnia seen on the blood gas.  Patient had borderline temperatures over last 24 hours with tachycardia in the 100 1520 range.  He remained on 2 L of oxygen.  His blood gas showed pH 7 point 4/50/70/33 which is chronic compensated respiratory stenosis.  I's and O's -600 cc.  Patient has been on Keppra, digoxin, Aricept, modafinil, insulin, atorvastatin, Toprol, apixaban, metformin.  Blood sugar remained within range.  Blood cultures and C. difficile negative.  Chemistry and CBC have been stable with mild  please rule out pneumonia  TECHNOLOGIST PROVIDED HISTORY:  Reason for exam:->fever, please rule out pneumonia  Reason for Exam: fever, please rule out pneumonia     FINDINGS:  The patient is rotated.  With that said the cardiomediastinal silhouette  appears within normal limits.  No pneumothorax or pleural effusion.  No acute  airspace infiltrates     IMPRESSION:  No acute cardiopulmonary findings      Physical Exam:  General appearance: In no apparent distress.  Sleeping  HEENT: Moist mucus membranes.  Cardiac: Normal S1 and S2.  Lungs: Good air entry bilaterally.  Abdomen: Soft.  PEG tube present  Back & Extremities: Symmetric pulses with good perfusion.  Neurological: No focal deficit..  Limited response to stimuli but moves upper and lower extremities and head.  Does not open eyes spontaneously.  Sleeping when evaluated      ________________________________________________________  Electronically signed by:  ABDIAS FLAHERTY MD,FACP    1/2/2024    10:10 AM.

## 2024-01-02 NOTE — PROGRESS NOTES
01/01/24 2035   RT Protocol   History Pulmonary Disease 0   Respiratory pattern 0   Breath sounds 2   Cough 0   Indications for Bronchodilator Therapy None   Bronchodilator Assessment Score 2

## 2024-01-02 NOTE — PROGRESS NOTES
01/02/24 0319   NIV Type   $NIV $Daily Charge   NIV Started/Stopped On   Equipment Type v60   Mode Bilevel   Mask Type Full face mask   Mask Size Large   Assessment   Respirations 24   Settings/Measurements   IPAP 12 cmH20   CPAP/EPAP 8 cmH2O   Vt (Measured) 425 mL   Rate Ordered 12   FiO2  30 %   Mask Leak (lpm) 25 lpm   Patient's Home Machine No   Alarm Settings   Alarms On Y   Low Pressure (cmH2O) 6 cmH2O   High Pressure (cmH2O) 30 cmH2O

## 2024-01-02 NOTE — PLAN OF CARE
Problem: Discharge Planning  Goal: Discharge to home or other facility with appropriate resources  Outcome: Progressing     Problem: Pain  Goal: Verbalizes/displays adequate comfort level or baseline comfort level  Outcome: Progressing     Problem: Skin/Tissue Integrity  Goal: Absence of new skin breakdown  Description: 1.  Monitor for areas of redness and/or skin breakdown  2.  Assess vascular access sites hourly  3.  Every 4-6 hours minimum:  Change oxygen saturation probe site  4.  Every 4-6 hours:  If on nasal continuous positive airway pressure, respiratory therapy assess nares and determine need for appliance change or resting period.  Outcome: Progressing     Problem: Safety - Adult  Goal: Free from fall injury  1/2/2024 0454 by Lilliam Castaneda RN  Outcome: Progressing     Problem: ABCDS Injury Assessment  Goal: Absence of physical injury  1/2/2024 0454 by Lilliam Castaneda RN  Outcome: Progressing     Problem: Confusion  Goal: Confusion, delirium, dementia, or psychosis is improved or at baseline  Description: INTERVENTIONS:  1. Assess for possible contributors to thought disturbance, including medications, impaired vision or hearing, underlying metabolic abnormalities, dehydration, psychiatric diagnoses, and notify attending LIP  2. [place] high risk fall precautions, as indicated  3. Provide frequent short contacts to provide reality reorientation, refocusing and direction  4. Decrease environmental stimuli, including noise as appropriate  5. Monitor and intervene to maintain adequate nutrition, hydration, elimination, sleep and activity  6. If unable to ensure safety without constant attention obtain sitter and review sitter guidelines with assigned personnel  7. Initiate Psychosocial CNS and Spiritual Care consult, as indicated  Outcome: Progressing     Problem: Nutrition Deficit:  Goal: Optimize nutritional status  Outcome: Progressing     Problem: Chronic Conditions and Co-morbidities  Goal: Patient's

## 2024-01-02 NOTE — PROGRESS NOTES
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; independent with household tasks and medication/financial management; family nearby   Dx: [x] Aphasia  [x] Dysarthria  [] Apraxia   [x] Oropharyngeal dysphagia [x] Cognitive Impairment  [] Other:   Date of Admit: 2023  Room #: 0156/0156-01    Current functional status (updated daily):       +    Pt being seen for : [x] Speech/Language Treatment  [x] Dysphagia Treatment [] Cognitive Treatment  [] Other:  Communication: []WFL  [x] Aphasia  [x] Dysarthria  [] Apraxia  [] Pragmatic Impairment [] Non-verbal  [] Hearing Loss  [] Other:   Cognition: [] WFL  [] Mild  [] Moderate  [] Severe [x] Unable to Assess  [x] Other: suspect deficit  Memory: [] WFL  [] Mild  [] Moderate  [] Severe [x] Unable to Assess  [] Other:  Behavior: [] Alert  [] Cooperative  [x]  Pleasant  [] Confused  [] Agitated  [] Uncooperative  [x] Distractible [] Motivated  [] Self-Limiting [] Anxious  [] Other:  Endurance:  [] Adequate for participation in SLP sessions  [x] Reduced overall  [x] Lethargic  [] Other:  Safety: [] No concerns at this time  [] Reduced insight into deficits  []  Reduced safety awareness [] Not following call light procedures  [x] Unable to Assess  [] Other:    Current Diet Order:Diet NPO  ADULT TUBE FEEDING; PEG; Diabetic; Continuous; 25; Yes; 25; Q 4 hours; 60; 60; Q 4 hours  Swallowing Precautions: Reflux and aspiration precautions; Frequent oral hygiene; allow small volume ice chips with SLP/RN only, hold PO and contact SLP if s/s aspiration or worsening respiratory status develop        Date: 2024      Tx session 1  1100 - 1130 Tx session  speech, yes/no, etc. with 80% accuracy given mod cues   Pt verbalized 'hi' in repsonse to SLP stating hi.    Automatics   -SLP attempted HUE with carrier phrase \"my name is Don.\" No attempts at verbalizations  -singing happy birthday - no attempts at verbalizations    Yes/No  -pt nodded head 'yes' x1 - no other attempts to indicate yes/no SLP had pt's family play his favorite song ([employer])   -pt with no attempts at initiating verbalizations, but did tap along x5 during the song when modeled by SLP  -pt also did smile at SLP when she started to clap along to the song     Automatics:  -SLP provided tactile cue for pt to attempt to improve initiation for his name by squeezing pt's hand  -pt then repeated \"Don\" x1 out of 10 trials     Pt verbalized 'bye' in response to SLP stating bye at end of session.     With ice chips, pt did nod head 'yes' x2 - appeared to be appropriate. Pt then shook head 'no' x2 when offered ice chips.      Other areas targeted: Pt with improved command following with opening mouth for SLP to check oral cavity (required model). Pt also with improved command following to keep mouth open during oral care.   N/a   Education:   SLP edu pt re: importance of oral care, progress  SLP edu pt family re: oral care importance, automatics    Safety Devices: [x] Call light within reach  [] Chair alarm activated  [x] Bed alarm activated  [x] Other: wife at bedside [x] Call light within reach  [x] Chair alarm activated  [] Bed alarm activated  [x] Other: wife and granddaughter at bedside   Assessment: Tx session 1: Pt alert, participated in therapy session. Pt did have spontaneous speech utterance with response to SLP by stating \"hi.\" SLP attempted HUE with carrier phrase for name and singing happy birthday - no attempts at verbalizations. Pt with improved ability to follow command to open oral cavity following model. Continued difficulty to establish a consistent yes/no response. Pt  accepting of ice chips - continues to have inconsistency with swallow response. Suspect swallow is delayed with premature and/or passive spillover BOT. Pt with improved RR with ice chips. Continue goals above.     Tx session 2: pt alert and awake upon arrival of SLP. Pt assisting SLP in holding cup for ice chip trials. Pt with intermittent responses with head nod 'yes' for more ice chips, and did shake head 'no' x2 during session. This was the first time a 'no' response was initiated with this SLP. Pt with improved oral acceptance and awareness for ice chips this date. Pt continues to have dried secretions in oral cavity, and ongoing edu to family and staff re: importance of oral care. Pt's family played his favorite song - pt did not attempt to vocalize with song, but did tap hands following cues and model from SLP. Pt able to repeat his name x1 during session - benefited from tactile cue with hand squeeze. Pt also able to repeat 'bye' at end of session. Continue goals above.      Plan: Continue as per plan of care.      Additional Information:     Barriers toward progress: Pain, Confusion, Communication deficit, Decreased endurance, and Medical complications  Discharge recommendations:  [] Home independently  [] Home with assistance [x]  24 hour supervision  [] ECF [] Other:  Continued Tx Upon Discharge: ? [x] Yes [] No [] TBD based on progress while on ARU [] Vital Stim indicated [] Other:   Estimated discharge date: 01/14/24    Interventions used this date:  [x] Speech/Language Treatment  [] Instruction in HEP [] Group [x] Dysphagia Treatment [] Cognitive Treatment   [] Other:      Total Time Breakdown / Charges    Time in Time out Total Time / units   Cognitive Tx      Speech Tx 1115  1445 1130  1500 15 min / 1 unit   15 min / 1 unit    Dysphagia Tx 1100  1430 1115  1445 15 min / 1 unit   15 min / 1 unit        Electronically Signed by     Tennille Pagan MA CCC-SLP #[id number]  Speech Language Pathologist

## 2024-01-02 NOTE — PROGRESS NOTES
[facility] Inpatient Rehabilitation Progress Note    Isac Lemus  1/2/2024  [id number]    Chief Complaint: SDH (subdural hematoma) (HCC)    Subjective:   Fevers yesterday. UA and CTAP unremarkable. Started on empiric abx. ID consulted to determine whether antibiotics should be continued. Na high yesterday and gave D5. Today Jack is seen with his family and nursing present. They report that he was more lethargic yesterday, but appears more interactive today.     ROS: unable to reliably obtain    Objective:  Patient Vitals for the past 24 hrs:   BP Temp Temp src Pulse Resp SpO2 Weight   01/02/24 0944 130/74 -- -- 67 -- -- --   01/02/24 0745 123/69 98.9 °F (37.2 °C) Axillary 96 20 95 % --   01/02/24 0656 -- -- -- -- -- -- 90 kg (198 lb 6.6 oz)   01/02/24 0504 (!) 143/78 -- -- 78 -- -- --   01/02/24 0319 -- -- -- -- 24 -- --   01/01/24 2114 133/81 97.8 °F (36.6 °C) Axillary (!) 108 18 95 % --   01/01/24 1930 -- 99.9 °F (37.7 °C) Axillary -- -- -- --   01/01/24 1732 (!) 142/68 (!) 101.5 °F (38.6 °C) Axillary 80 20 -- --     Gen: No distress  HEENT: Normocephalic, atraumatic.   CV: extremities well perfused  Resp: No respiratory distress. Lungs CTAB  Abd: Soft, nondistended  Ext: No edema.   Neuro: asleep in bed    Wt Readings from Last 3 Encounters:   01/02/24 90 kg (198 lb 6.6 oz)   04/06/16 115.7 kg (255 lb)   03/16/16 115.7 kg (255 lb)       Laboratory data:   Lab Results   Component Value Date    WBC 6.4 01/01/2024    HGB 10.5 (L) 01/01/2024    HCT 32.8 (L) 01/01/2024    .8 (H) 01/01/2024     01/01/2024       Lab Results   Component Value Date/Time     01/02/2024 06:43 AM    K 4.2 01/02/2024 06:43 AM     01/02/2024 06:43 AM    CO2 33 01/02/2024 06:43 AM    BUN 25 01/02/2024 06:43 AM    CREATININE <0.5 01/02/2024 06:43 AM    GLUCOSE 153 01/02/2024 06:43 AM    CALCIUM 8.6 01/02/2024 06:43 AM        Therapy progress:    PT    Supine to Sit:    Sit to Supine:     Sit to Stand:    Chair/Bed

## 2024-01-02 NOTE — PROGRESS NOTES
01/01/24 2330   NIV Type   NIV Started/Stopped On   Equipment Type v60   Mode Bilevel   Mask Type Full face mask   Mask Size Large   Settings/Measurements   IPAP 12 cmH20   CPAP/EPAP 8 cmH2O   Vt (Measured) 313 mL   Rate Ordered 12   FiO2  30 %   Mask Leak (lpm) 23 lpm   Patient's Home Machine No   Alarm Settings   Alarms On Y   Low Pressure (cmH2O) 6 cmH2O   High Pressure (cmH2O) 30 cmH2O

## 2024-01-02 NOTE — PROGRESS NOTES
Occupational Therapy  Facility/Department: [facility]  Daily Treatment Note  NAME: Isac Lemus  : 1950  MRN: [id number]    Date of Service: 2024    Discharge Recommendations:  24 hour supervision or assist, Home with Home health OT, S Level 4, Subacute/Skilled Nursing Facility  OT Equipment Recommendations  Other: CTA      Patient Diagnosis(es): There were no encounter diagnoses.     Assessment    Assessment: Pt seen for OT/PT cotx session d/t medical complexity, cognition, inc BECK, dec activity tolerance and generalized weakness requiring the skills of 2 person assist for safe facilitation of and progression of functional mobility and ADL participation. Pt noted inc alertness this date w/ B eyes open during entirity of OOB activity, inc command follow (squeeze hands x3, thumbs up x1, wave x2, grasp/release cone x1), and progressed to x5-10 seconds of tripod sitting EOM c min x2 before losing balance requiring previous max A to maintain. Family training continued this date -- pt wife and granddaughter present and assisted w/ bed mobility to complete LB dress and place cathyrn pad, therapists also provided family w/ education on maximove technique for functional transfers in prep for inc family participation w/ transfer training in upcoming sessions in prep for d/c home. Pt still dependent x2 to complete all bed mobility, functional transfers & ADLs. Pt still functioning below baseline and would benefit from skilled OT services to improve functional independence and safety.  Activity Tolerance: Patient limited by fatigue;Patient limited by endurance;Treatment limited secondary to decreased cognition  Discharge Recommendations: 24 hour supervision or assist;Home with Home health OT;S Level 4;Subacute/Skilled Nursing Facility  Other: CTA      Plan   Occupational Therapy Plan  Times Per Week: 5-7x/wk  Current Treatment Recommendations: Strengthening;ROM;Balance training;Functional mobility training;Endurance  x15 BUE ther ex  Long Term Goals  Time Frame for Long Term Goals : to be met by 1/11/23 unless otherwise noted  Long Term Goal 1: Pt will perform TB dressing with min A and LRAD  Long Term Goal 2: Pt will perform TB bathing with min A and LRAD  Long Term Goal 3: Pt will perform toileting tasks with min A and LRAD  Long Term Goal 4: Pt will grooming tasks with min A  Patient Goals   Patient goals : \"to say a few words so we can know what he wants, and hopefully walk with a walker\" - per wife as pt unable to respond      Therapy Time   Individual Concurrent Group Co-treatment   Time In       1230   Time Out       1330   Minutes       60   Timed Code Treatment Minutes: 60 Minutes       Zachary Gomes, OT

## 2024-01-02 NOTE — PLAN OF CARE
Problem: ABCDS Injury Assessment  Goal: Absence of physical injury  1/2/2024 1044 by Kierra Fitzgerald RN  Outcome: Progressing    Problem: Nutrition Deficit:  Goal: Optimize nutritional status  1/2/2024 1044 by Kierra Fitzgerald, RN  Outcome: Progressing

## 2024-01-02 NOTE — PROGRESS NOTES
Physical Therapy  Facility/Department: [facility]  Rehabilitation Physical Therapy Treatment Note    NAME: Isac Lemus  : 1950 (73 y.o.)  MRN: [id number]  CODE STATUS: Full Code    Date of Service: 24       Restrictions:  Restrictions/Precautions: Seizure, Fall Risk, Up as Tolerated, NPO, Aspiration Risk, General Precautions  Position Activity Restriction  Other position/activity restrictions: PEG, tube feed     SUBJECTIVE  Subjective  Subjective: pt found in bed, wife and granddtr present for session  Pain: Pt is without signs/symptoms of pain          OBJECTIVE  Cognition  Overall Cognitive Status: Exceptions  Arousal/Alertness: Inconsistent responses to stimuli;Delayed responses to stimuli  Following Commands: Does not follow commands;Inconsistently follows commands  Attention Span: Unable to maintain attention  Memory:  (GIA)  Safety Judgement: Decreased awareness of need for assistance;Decreased awareness of need for safety  Problem Solving: Decreased awareness of errors  Insights: Not aware of deficits  Initiation: Requires cues for all  Sequencing: Requires cues for all  Cognition Comment: miniaml voluntary movements, [place] for all PROM/AROM  Orientation  Overall Orientation Status: Impaired  Orientation Level: Unable to assess    Functional Mobility  Roll Left  Assistance Level: Dependent;Requires x 2 assistance  Skilled Clinical Factors: completed initially wtih therapists to assist wtih pericare and then family with CGA of therapist adn cues while assisting to don pants and place cathryn pad. therapists also educating family on safe body mechanics  Roll Right  Assistance Level: Dependent;Requires x 2 assistance  Scooting  Assistance Level: Requires x 2 assistance;Maximum assistance;Dependent  Balance  Sitting Balance: Dependent/Total  Bed To/From Chair  Assistance Level: Dependent;Requires x 2 assistance  Skilled Clinical Factors: via maxi-move to transfer bed to wc and wc <> low mat and wc to  recliner      Neuromuscular Education  NDT Treatment: Sitting  Neuromuscular Comments: pt sitting EOM x 15-20 min with PT positioned posteriorly and OT anteriorly. pt completed 3 sets of anterior/posterior leans with minimal acitvation/participation felt from pt, TD for sitting balance. pt also sitting tripod with BUEs positioned on lap wtih min A of PT to maintain shoulders midline, min a of OT to maintain position of UEs adn pt holding wtih min A of 2 for 5-10 sec before significant LOB with TD to correct to midline.      PT Exercises  A/AROM Exercises: attempted cervical ROM, therapist provided AAROM flexion/extension x 3-5 reps intermittently and appropx 2 of gentle rotation however pt distracted by environment and resistive to movements, thus ceased activity.    Pt in recliner at end of session with call light/needs within reach and alarm donned.   ASSESSMENT/PROGRESS TOWARDS GOALS  Vital Signs  Pulse: 89  Heart Rate Source: Monitor  BP: 131/78  BP Location: Left upper arm  MAP (Calculated): 96  SpO2: 97 %  O2 Device: Nasal cannula  Comment: 2.5Lo2    Assessment  Assessment: pt seen as co treat with OT for increased safety progressing functional mobility. pt family presnet (wife and granddtr). therapist re-educating wife on safe body mechanics while assistign pt with  pericare (pt found to be incontinent of bowel). with CGA and cues from PT/OT, wife and granddtr assisted pt with donning pants and cathryn pad in bed. recommend continued family training if family plans to d/c home. pt appears more alert this date and follows commands approx 25% of time. pt grossly requires TD for static/dyn sitting balance EOM and sits with BUE support on thighs for 5-10 sec with min A of 2 prior to LOB. rec family training prior to d/c  with multiple family member. continue to assess d/c plan pending pt progress, family comfortability. if pt d/c home, rec tilt in space w/c, hospital bed wtih rails, cathryn, ramp to enter

## 2024-01-02 NOTE — CARE COORDINATION
Clinicals faxed to [facility] insurance for continued stay review.    Thank you.   Krys Ryan M.A, CCC-SLP/ CBIS

## 2024-01-02 NOTE — CONSULTS
[facility] Nurse  Consult Note       NAME:  Isac Lemus  MEDICAL RECORD NUMBER:  [id number]  AGE: 73 y.o.   GENDER: male  : 1950  TODAY'S DATE:  2024    Subjective; Spouse at bedside.  Patient  non verbal.   Reason for WOCN Evaluation and Assessment:    wound eval    Isac Lemus is a 73 y.o. male referred by:   [] Physician  [x] Nursing  [] Other:     Wound Identification:  Wound Type: pressure  Contributing Factors: chronic pressure, decreased mobility, shear force, and obesity    Wound History: 73 year old male with a past medical history significant for afib on Eliquis, CAD s/p CABG (), HLD, DM2, HTN, and GERD who presented to [facility] on 23 as a transfer from [facility] after a fall with head trauma while on anticoagulation.   Current Wound Care Treatment:  foam    Patient Goal of Care:  [x] Wound Healing  [] Odor Control  [] Palliative Care  [x] Pain Control   [] Other:         PAST MEDICAL HISTORY        Diagnosis Date    Acid reflux     CAD (coronary artery disease)     Diabetes mellitus (HCC)     Heartburn     Hiatal hernia     Hypertension     Seasonal allergies        PAST SURGICAL HISTORY    Past Surgical History:   Procedure Laterality Date    COLONOSCOPY      POLYPS    COLONOSCOPY  3/16/16    polyps    CORONARY ARTERY BYPASS GRAFT  10/2014    X5       FAMILY HISTORY    Family History   Problem Relation Age of Onset    Cancer Mother         BREAST    Heart Disease Father     Cancer Father         PROSTATE       SOCIAL HISTORY    Social History     Tobacco Use    Smoking status: Never   Substance Use Topics    Alcohol use: No    Drug use: No       ALLERGIES    Allergies   Allergen Reactions    Heparin Other (See Comments)     Causes clots       MEDICATIONS    No current facility-administered medications on file prior to encounter.     Current Outpatient Medications on File Prior to Encounter   Medication Sig Dispense Refill    aspirin 81 MG EC tablet  hours  Dietician consult:  Yes    Discharge Plan:  Placement for patient upon discharge: skilled nursing    Patient appropriate for Outpatient Wound Care Center: Yes    Referrals:  [x]  / discharge planner following  [] Home Health Care  [] Supplies  [] Other    Patient/Caregiver Teaching:  Level of patient/caregiver understanding able to:   [] Indicates understanding       [x] Needs reinforcement  [] Unsuccessful      [] Verbal Understanding  [] Demonstrated understanding       [] No evidence of learning  [] Refused teaching         [] N/A       Electronically signed by Deedee Stokes RN, BSN on 1/2/2024 at 6:43 PM

## 2024-01-03 LAB
ALBUMIN SERPL-MCNC: 2.6 G/DL (ref 3.4–5)
ALBUMIN/GLOB SERPL: 0.7 {RATIO} (ref 1.1–2.2)
ALP SERPL-CCNC: 85 U/L (ref 40–129)
ALT SERPL-CCNC: 26 U/L (ref 10–40)
ANION GAP SERPL CALCULATED.3IONS-SCNC: 4 MMOL/L (ref 3–16)
ANION GAP SERPL CALCULATED.3IONS-SCNC: 6 MMOL/L (ref 3–16)
AST SERPL-CCNC: 27 U/L (ref 15–37)
BASOPHILS # BLD: 0 K/UL (ref 0–0.2)
BASOPHILS NFR BLD: 0.6 %
BILIRUB SERPL-MCNC: <0.2 MG/DL (ref 0–1)
BUN SERPL-MCNC: 24 MG/DL (ref 7–20)
BUN SERPL-MCNC: 24 MG/DL (ref 7–20)
CALCIUM SERPL-MCNC: 8.5 MG/DL (ref 8.3–10.6)
CALCIUM SERPL-MCNC: 8.5 MG/DL (ref 8.3–10.6)
CHLORIDE SERPL-SCNC: 105 MMOL/L (ref 99–110)
CHLORIDE SERPL-SCNC: 107 MMOL/L (ref 99–110)
CO2 SERPL-SCNC: 35 MMOL/L (ref 21–32)
CO2 SERPL-SCNC: 35 MMOL/L (ref 21–32)
CREAT SERPL-MCNC: <0.5 MG/DL (ref 0.8–1.3)
CREAT SERPL-MCNC: <0.5 MG/DL (ref 0.8–1.3)
CRP SERPL-MCNC: 52.4 MG/L (ref 0–5.1)
DEPRECATED RDW RBC AUTO: 16.8 % (ref 12.4–15.4)
EOSINOPHIL # BLD: 0.1 K/UL (ref 0–0.6)
EOSINOPHIL NFR BLD: 1.9 %
GFR SERPLBLD CREATININE-BSD FMLA CKD-EPI: >60 ML/MIN/{1.73_M2}
GFR SERPLBLD CREATININE-BSD FMLA CKD-EPI: >60 ML/MIN/{1.73_M2}
GLUCOSE BLD-MCNC: 111 MG/DL (ref 70–99)
GLUCOSE BLD-MCNC: 132 MG/DL (ref 70–99)
GLUCOSE BLD-MCNC: 136 MG/DL (ref 70–99)
GLUCOSE BLD-MCNC: 138 MG/DL (ref 70–99)
GLUCOSE SERPL-MCNC: 144 MG/DL (ref 70–99)
GLUCOSE SERPL-MCNC: 146 MG/DL (ref 70–99)
HCT VFR BLD AUTO: 30.7 % (ref 40.5–52.5)
HGB BLD-MCNC: 9.6 G/DL (ref 13.5–17.5)
LYMPHOCYTES # BLD: 1.1 K/UL (ref 1–5.1)
LYMPHOCYTES NFR BLD: 20.6 %
MCH RBC QN AUTO: 32.5 PG (ref 26–34)
MCHC RBC AUTO-ENTMCNC: 31.3 G/DL (ref 31–36)
MCV RBC AUTO: 103.7 FL (ref 80–100)
MONOCYTES # BLD: 0.5 K/UL (ref 0–1.3)
MONOCYTES NFR BLD: 9.4 %
NEUTROPHILS # BLD: 3.7 K/UL (ref 1.7–7.7)
NEUTROPHILS NFR BLD: 67.5 %
PERFORMED ON: ABNORMAL
PHOSPHATE SERPL-MCNC: 2.6 MG/DL (ref 2.5–4.9)
PLATELET # BLD AUTO: 203 K/UL (ref 135–450)
PMV BLD AUTO: 8.5 FL (ref 5–10.5)
POTASSIUM SERPL-SCNC: 4.2 MMOL/L (ref 3.5–5.1)
POTASSIUM SERPL-SCNC: 4.4 MMOL/L (ref 3.5–5.1)
PROT SERPL-MCNC: 6.2 G/DL (ref 6.4–8.2)
RBC # BLD AUTO: 2.96 M/UL (ref 4.2–5.9)
SODIUM SERPL-SCNC: 146 MMOL/L (ref 136–145)
SODIUM SERPL-SCNC: 146 MMOL/L (ref 136–145)
WBC # BLD AUTO: 5.5 K/UL (ref 4–11)

## 2024-01-03 PROCEDURE — 97530 THERAPEUTIC ACTIVITIES: CPT

## 2024-01-03 PROCEDURE — 86140 C-REACTIVE PROTEIN: CPT

## 2024-01-03 PROCEDURE — 36415 COLL VENOUS BLD VENIPUNCTURE: CPT

## 2024-01-03 PROCEDURE — 6370000000 HC RX 637 (ALT 250 FOR IP): Performed by: STUDENT IN AN ORGANIZED HEALTH CARE EDUCATION/TRAINING PROGRAM

## 2024-01-03 PROCEDURE — 80053 COMPREHEN METABOLIC PANEL: CPT

## 2024-01-03 PROCEDURE — 85025 COMPLETE CBC W/AUTO DIFF WBC: CPT

## 2024-01-03 PROCEDURE — 84100 ASSAY OF PHOSPHORUS: CPT

## 2024-01-03 PROCEDURE — 6370000000 HC RX 637 (ALT 250 FOR IP): Performed by: INTERNAL MEDICINE

## 2024-01-03 PROCEDURE — 92507 TX SP LANG VOICE COMM INDIV: CPT

## 2024-01-03 PROCEDURE — 6360000002 HC RX W HCPCS: Performed by: INTERNAL MEDICINE

## 2024-01-03 PROCEDURE — 97535 SELF CARE MNGMENT TRAINING: CPT

## 2024-01-03 PROCEDURE — 99232 SBSQ HOSP IP/OBS MODERATE 35: CPT | Performed by: INTERNAL MEDICINE

## 2024-01-03 PROCEDURE — C9113 INJ PANTOPRAZOLE SODIUM, VIA: HCPCS | Performed by: INTERNAL MEDICINE

## 2024-01-03 PROCEDURE — 1280000000 HC REHAB R&B

## 2024-01-03 PROCEDURE — 94660 CPAP INITIATION&MGMT: CPT

## 2024-01-03 PROCEDURE — 92526 ORAL FUNCTION THERAPY: CPT

## 2024-01-03 RX ADMIN — METOPROLOL TARTRATE 100 MG: 50 TABLET, FILM COATED ORAL at 21:30

## 2024-01-03 RX ADMIN — METFORMIN HYDROCHLORIDE 500 MG: 500 TABLET ORAL at 10:13

## 2024-01-03 RX ADMIN — ACETAMINOPHEN 1000 MG: 500 TABLET ORAL at 04:43

## 2024-01-03 RX ADMIN — MODAFINIL 300 MG: 200 TABLET ORAL at 10:13

## 2024-01-03 RX ADMIN — CASTOR OIL AND BALSAM, PERU: 788; 87 OINTMENT TOPICAL at 03:00

## 2024-01-03 RX ADMIN — ACETAMINOPHEN 1000 MG: 500 TABLET ORAL at 21:29

## 2024-01-03 RX ADMIN — POLYETHYLENE GLYCOL 3350 17 G: 17 POWDER, FOR SOLUTION ORAL at 10:22

## 2024-01-03 RX ADMIN — APIXABAN 5 MG: 5 TABLET, FILM COATED ORAL at 10:13

## 2024-01-03 RX ADMIN — METOPROLOL TARTRATE 100 MG: 50 TABLET, FILM COATED ORAL at 04:43

## 2024-01-03 RX ADMIN — APIXABAN 5 MG: 5 TABLET, FILM COATED ORAL at 21:29

## 2024-01-03 RX ADMIN — GUAIFENESIN 600 MG: 600 TABLET ORAL at 21:29

## 2024-01-03 RX ADMIN — SENNOSIDES AND DOCUSATE SODIUM 2 TABLET: 50; 8.6 TABLET ORAL at 10:13

## 2024-01-03 RX ADMIN — GUAIFENESIN 600 MG: 600 TABLET ORAL at 10:13

## 2024-01-03 RX ADMIN — METFORMIN HYDROCHLORIDE 500 MG: 500 TABLET ORAL at 21:29

## 2024-01-03 RX ADMIN — ATORVASTATIN CALCIUM 40 MG: 40 TABLET, FILM COATED ORAL at 21:29

## 2024-01-03 RX ADMIN — LEVETIRACETAM 750 MG: 100 SOLUTION ORAL at 23:19

## 2024-01-03 RX ADMIN — METOPROLOL TARTRATE 100 MG: 50 TABLET, FILM COATED ORAL at 10:13

## 2024-01-03 RX ADMIN — DIGOXIN 125 MCG: 125 TABLET ORAL at 10:13

## 2024-01-03 RX ADMIN — CASTOR OIL AND BALSAM, PERU: 788; 87 OINTMENT TOPICAL at 10:10

## 2024-01-03 RX ADMIN — DONEPEZIL HYDROCHLORIDE 5 MG: 5 TABLET, FILM COATED ORAL at 10:13

## 2024-01-03 RX ADMIN — METOPROLOL TARTRATE 100 MG: 50 TABLET, FILM COATED ORAL at 16:55

## 2024-01-03 RX ADMIN — LEVETIRACETAM 750 MG: 100 SOLUTION ORAL at 10:56

## 2024-01-03 RX ADMIN — Medication 5 MG: at 21:29

## 2024-01-03 RX ADMIN — ACETAMINOPHEN 1000 MG: 500 TABLET ORAL at 14:13

## 2024-01-03 RX ADMIN — PANTOPRAZOLE SODIUM 40 MG: 40 INJECTION, POWDER, FOR SOLUTION INTRAVENOUS at 10:13

## 2024-01-03 RX ADMIN — CASTOR OIL AND BALSAM, PERU: 788; 87 OINTMENT TOPICAL at 23:18

## 2024-01-03 ASSESSMENT — PAIN SCALES - GENERAL
PAINLEVEL_OUTOF10: 0
PAINLEVEL_OUTOF10: 0

## 2024-01-03 ASSESSMENT — PAIN SCALES - WONG BAKER: WONGBAKER_NUMERICALRESPONSE: 0

## 2024-01-03 NOTE — PROGRESS NOTES
Infectious Disease Follow up Notes    CC :  FUO, lethargy     Antibiotics:  none     Admit Date:   12/22/2023  Hospital Day: 13    Subjective:   Had a \"great day\" today per family at bedside as well as RN.  Several loose stools during PT session.  TF at goal rate.   No new concerns otherwise     Objective:     Patient Vitals for the past 8 hrs:   BP Pulse SpO2   01/03/24 1512 123/62 98 94 %   01/03/24 1000 123/62 98 --       EXAM:  Resting comfortably  No rash exposed skin             Scheduled Meds:   balsum peru-castor oil   Topical BID    pantoprazole  40 mg IntraVENous Daily    guaiFENesin  600 mg Oral BID    levETIRAcetam  750 mg PEG Tube BID    acetaminophen  1,000 mg Per G Tube 3 times per day    digoxin  125 mcg Per G Tube Daily    donepezil  5 mg Oral Daily    melatonin  5 mg Per G Tube Nightly    modafinil  300 mg Per G Tube Daily    [Held by provider] polyethylene glycol  17 g Per G Tube Daily    [Held by provider] sennosides-docusate sodium  2 tablet Per G Tube BID    atorvastatin  40 mg Per G Tube Nightly    metoprolol tartrate  100 mg Per G Tube Q6H    apixaban  5 mg Per G Tube BID    metFORMIN  500 mg Per G Tube BID    insulin lispro  0-8 Units SubCUTAneous Q6H       Continuous Infusions:   dextrose            Data Review:    Lab Results   Component Value Date    WBC 5.5 01/03/2024    HGB 9.6 (L) 01/03/2024    HCT 30.7 (L) 01/03/2024    .7 (H) 01/03/2024     01/03/2024     Lab Results   Component Value Date    CREATININE <0.5 (L) 01/03/2024    CREATININE <0.5 (L) 01/03/2024    BUN 24 (H) 01/03/2024    BUN 24 (H) 01/03/2024     (H) 01/03/2024     (H) 01/03/2024    K 4.2 01/03/2024    K 4.4 01/03/2024     01/03/2024     01/03/2024    CO2 35 (H) 01/03/2024    CO2 35 (H) 01/03/2024       Hepatic Function Panel:   Lab Results   Component Value Date/Time    ALKPHOS 85 01/03/2024 06:48 AM     ALT 26 01/03/2024 06:48 AM    AST 27 01/03/2024 06:48 AM    PROT 6.2 01/03/2024 06:48 AM    BILITOT <0.2 01/03/2024 06:48 AM    BILIDIR 0.2 10/22/2014 04:20 AM    IBILI 0.4 10/22/2014 04:20 AM    LABALBU 2.6 01/03/2024 06:48 AM       Cultures:   12/29   BC x2 neg  1/1       COVID NAAT neg               BC x1 NGTD              UA neg         Radiology Review:  All pertinent images / reports were reviewed as a part of this visit.      CT ap 1/1/24  IMPRESSION:  1.  Irregular wall thickening involving the distal sigmoid and proximal  rectum, while this may be due to an inflammatory process, underlying  neoplastic disease can have a similar appearance.   2.  Similar appearance of basilar airspace disease favoring subsegmental  atelectasis.   3.  Gastrostomy tube in place.     CT chest 12/29/23 - atelectasis     Assessment:     Patient Active Problem List    Diagnosis Date Noted    Chest pain 10/16/2014     Priority: High    Hypernatremia 01/01/2024    Elevated BUN 01/01/2024    Uncontrolled type 2 diabetes mellitus with hyperglycemia (HCC) 01/01/2024    Macrocytic anemia 01/01/2024    Chronic respiratory failure with hypercapnia (HCC) 12/29/2023    SDH (subdural hematoma) ([facility]) 12/22/2023    DM (diabetes mellitus) (HCC) 10/16/2014    HTN (hypertension) 10/16/2014    Obesity 10/16/2014       Background of DM, HTN     Admitted to [facility] 11/28/23 with traumatic SDH and SAH and basilar skull fracture   Course complicated by   -DVT/SVT/HIT  -constipation / ogilve   -aspiration pna  -dysphagia s/p PEG      Transferred to ARU 12/18/23     Evaluated now regarding fever and lethargy.  Mental status back to recent baseline.  Evaluation for source of infection negative to date.  He is getting scheduled APAP, though was having fever despite that earlier in the week.    He was having fever for ~1 week prior to admission at  as well as through the bulk of that admission.    Without other clinical changes  Suspecting central fever

## 2024-01-03 NOTE — PROGRESS NOTES
Occupational Therapy  Facility/Department: [facility]  Rehabilitation Occupational Therapy Daily Treatment Note    Date: 1/3/24  Patient Name: Isac Lemus       Room: 0156/0156-01  MRN: [id number]  Account: [id number]   : 1950  (73 y.o.) Gender: male         Past Medical History:  has a past medical history of Acid reflux, CAD (coronary artery disease), Diabetes mellitus (HCC), Heartburn, Hiatal hernia, Hypertension, and Seasonal allergies.  Past Surgical History:   has a past surgical history that includes Coronary artery bypass graft (10/2014); Colonoscopy (); and Colonoscopy (3/16/16).    Restrictions  Restrictions/Precautions: Seizure, Fall Risk, Up as Tolerated, NPO, Aspiration Risk, General Precautions  Other position/activity restrictions: PEG, tube feed    Subjective  Subjective: Pt supine in bed upon arrival, eyes closed, minimal alertness during session until EOS; family present t/o entire session and agreeable to OT/PT. /62, HR 98, O2 94%  Restrictions/Precautions: Seizure;Fall Risk;Up as Tolerated;NPO;Aspiration Risk;General Precautions    Objective  Cognition  Overall Cognitive Status: Exceptions  Arousal/Alertness: Delayed responses to stimuli;Unresponsive to stimuli  Following Commands: Does not follow commands;Inconsistently follows commands  Attention Span: Unable to maintain attention  Safety Judgement: Decreased awareness of need for assistance;Decreased awareness of need for safety  Problem Solving: Decreased awareness of errors  Insights: Not aware of deficits  Initiation: Requires cues for all  Sequencing: Requires cues for all  Orientation  Overall Orientation Status: Impaired  Orientation Level: Unable to assess       ADL  Feeding  Assistance Level: Dependent  Skilled Clinical Factors: PEG  Upper Extremity Bathing  Assistance Level: Dependent  Skilled Clinical Factors: total assist for bathing supine in bed  Lower Extremity Bathing  Assistance Level: Dependent  Skilled

## 2024-01-03 NOTE — PLAN OF CARE
Problem: Confusion  Goal: Confusion, delirium, dementia, or psychosis is improved or at baseline  Description: INTERVENTIONS:  1. Assess for possible contributors to thought disturbance, including medications, impaired vision or hearing, underlying metabolic abnormalities, dehydration, psychiatric diagnoses, and notify attending LIP  2. [place] high risk fall precautions, as indicated  3. Provide frequent short contacts to provide reality reorientation, refocusing and direction  4. Decrease environmental stimuli, including noise as appropriate  5. Monitor and intervene to maintain adequate nutrition, hydration, elimination, sleep and activity  6. If unable to ensure safety without constant attention obtain sitter and review sitter guidelines with assigned personnel  7. Initiate Psychosocial CNS and Spiritual Care consult, as indicated  Outcome: Not Progressing     Problem: Discharge Planning  Goal: Discharge to home or other facility with appropriate resources  Outcome: Not Progressing     Problem: Pain  Goal: Verbalizes/displays adequate comfort level or baseline comfort level  Outcome: Not Progressing     Problem: Skin/Tissue Integrity  Goal: Absence of new skin breakdown  Description: 1.  Monitor for areas of redness and/or skin breakdown  2.  Assess vascular access sites hourly  3.  Every 4-6 hours minimum:  Change oxygen saturation probe site  4.  Every 4-6 hours:  If on nasal continuous positive airway pressure, respiratory therapy assess nares and determine need for appliance change or resting period.  Outcome: Not Progressing     Problem: Safety - Adult  Goal: Free from fall injury  Outcome: Not Progressing     Problem: ABCDS Injury Assessment  Goal: Absence of physical injury  1/3/2024 0032 by Camille Simms, RN  Outcome: Not Progressing  1/2/2024 1044 by Kierra Fitzgerald, RN  Outcome: Progressing     Problem: Confusion  Goal: Confusion, delirium, dementia, or psychosis is improved or at

## 2024-01-03 NOTE — PROGRESS NOTES
01/03/24 0321   NIV Type   Equipment Type v60   Mode Bilevel   Mask Type Full face mask   Mask Size Large   Assessment   Pulse (!) 101   Respirations 29   SpO2 94 %   Comfort Level Good   Using Accessory Muscles No   Mask Compliance Good   Skin Protection for O2 Device Yes   Orientation Middle   Location Nose   Intervention(s) Reposition Device   Settings/Measurements   PIP Observed 13 cm H20   IPAP 12 cmH20   CPAP/EPAP 8 cmH2O   Vt (Measured) 407 mL   Rate Ordered 12   FiO2  30 %   I Time/ I Time % 1 s   Minute Volume (L/min) 9.6 Liters   Mask Leak (lpm) 5 lpm   Patient's Home Machine No   Alarm Settings   Alarms On Y   Low Pressure (cmH2O) 6 cmH2O   High Pressure (cmH2O) 30 cmH2O   Apnea (secs) 20 secs   RR Low (bpm) 6   RR High (bpm) 45 br/min

## 2024-01-03 NOTE — PROGRESS NOTES
01/02/24 2134   NIV Type   NIV Started/Stopped On   Equipment Type v60   Mode Bilevel   Mask Type Full face mask   Mask Size Large   Assessment   Pulse (!) 115   Respirations 28   SpO2 94 %   Comfort Level Good   Using Accessory Muscles No   Mask Compliance Good   Skin Assessment Clean, dry, & intact   Skin Protection for O2 Device Yes   Orientation Middle   Location Nose   Settings/Measurements   PIP Observed 13 cm H20   IPAP 12 cmH20   CPAP/EPAP 8 cmH2O   Vt (Measured) 421 mL   Rate Ordered 12   FiO2  30 %   I Time/ I Time % 1 s   Minute Volume (L/min) 12.3 Liters   Mask Leak (lpm) 7 lpm   Patient's Home Machine No   Alarm Settings   Alarms On Y   Low Pressure (cmH2O) 6 cmH2O   High Pressure (cmH2O) 30 cmH2O   Apnea (secs) 20 secs   RR Low (bpm) 6   RR High (bpm) 45 br/min

## 2024-01-03 NOTE — PROGRESS NOTES
01/03/24 0014   NIV Type   $NIV $Daily Charge   Equipment Type v60   Mode Bilevel   Mask Type Full face mask   Mask Size Large   Assessment   Respirations 29   Comfort Level Good   Using Accessory Muscles No   Mask Compliance Good   Skin Protection for O2 Device Yes   Orientation Middle   Location Nose   Settings/Measurements   PIP Observed 13 cm H20   IPAP 12 cmH20   CPAP/EPAP 8 cmH2O   Vt (Measured) 400 mL   Rate Ordered 12   FiO2  30 %   I Time/ I Time % 1 s   Minute Volume (L/min) 11 Liters   Mask Leak (lpm) 9 lpm   Patient's Home Machine No   Alarm Settings   Alarms On Y   Low Pressure (cmH2O) 6 cmH2O   High Pressure (cmH2O) 30 cmH2O   Apnea (secs) 20 secs   RR Low (bpm) 6   RR High (bpm) 45 br/min

## 2024-01-03 NOTE — PLAN OF CARE
Problem: Nutrition Deficit:  Goal: Optimize nutritional status  1/3/2024 1214 by Kierra Fitzgerald RN  Outcome: Progressing     Problem: Safety - Adult  Goal: Free from fall injury  1/3/2024 1214 by Kierra Fitzgerald RN  Outcome: Progressing     Problem: Pain  Goal: Verbalizes/displays adequate comfort level or baseline comfort level  1/3/2024 1214 by Kierra Fitzgerald RN  Outcome: Progressing

## 2024-01-03 NOTE — FLOWSHEET NOTE
01/02/24 2049   Vital Signs   Temp 99 °F (37.2 °C)   Temp Source Oral   Pulse (!) 116   Heart Rate Source Monitor   BP (!) 99/54   MAP (Calculated) 69   BP Location Left upper arm   BP Method Automatic   Patient Position Semi fowlers   Pain Assessment   Pain Assessment Jimenez-Boggs FACES   Pain Level 0   Jimenez-Baker Pain Rating 0   Oxygen Therapy   SpO2 96 %   O2 Device Nasal cannula   O2 Flow Rate (L/min) 2.5 L/min     Pt remains lethargic. Best response eye opening to strong stimulation (turning for marcus care), not responsive to name, unable to follow commands, able to squeeze right hand spontaneously. Vitals as above. Toprol held per order paramaters as systolic less than 100. Pt turned and cleansed for BM incontinence. Medications given via PEG tube. Peg tube site intact, no residual volulme via PEG. Medicaitons adminsitered without difficulty. Lips and tongue dry, oral care provided with moistening swabs. RT at bedside to place ordered BiPAP HS. Family at side updated on plan of care before leaving for night. External male purewick in place for urinary incontinence. Pillows used to elevate legs, ble edema +2 pitting. DTI index reviewed due to score >50. Vasiliy charge RN aware of pt state, unchanged per prior report.  Plan of care ongoing.

## 2024-01-03 NOTE — PROGRESS NOTES
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; independent with household tasks and medication/financial management; family nearby   Dx: [x] Aphasia  [x] Dysarthria  [] Apraxia   [x] Oropharyngeal dysphagia [x] Cognitive Impairment  [] Other:   Date of Admit: 2023  Room #: 0156/0156-01    Current functional status (updated daily):       +    Pt being seen for : [x] Speech/Language Treatment  [x] Dysphagia Treatment [] Cognitive Treatment  [] Other:  Communication: []WFL  [x] Aphasia  [x] Dysarthria  [] Apraxia  [] Pragmatic Impairment [] Non-verbal  [] Hearing Loss  [] Other:   Cognition: [] WFL  [] Mild  [] Moderate  [] Severe [x] Unable to Assess  [x] Other: suspect deficit  Memory: [] WFL  [] Mild  [] Moderate  [] Severe [x] Unable to Assess  [] Other:  Behavior: [] Alert  [] Cooperative  [x]  Pleasant  [] Confused  [] Agitated  [] Uncooperative  [x] Distractible [] Motivated  [] Self-Limiting [] Anxious  [] Other:  Endurance:  [] Adequate for participation in SLP sessions  [x] Reduced overall  [x] Lethargic  [] Other:  Safety: [] No concerns at this time  [] Reduced insight into deficits  []  Reduced safety awareness [] Not following call light procedures  [x] Unable to Assess  [] Other:    Current Diet Order:Diet NPO  ADULT TUBE FEEDING; PEG; Diabetic; Continuous; 25; Yes; 25; Q 4 hours; 60; 60; Q 4 hours  Swallowing Precautions: Reflux and aspiration precautions; Frequent oral hygiene; allow small volume ice chips with SLP/RN only, hold PO and contact SLP if s/s aspiration or worsening respiratory status develop        Date: 1/3/2024      Tx session 1  1000 - 1030 Tx session  appeared to be appropriate ~50% of opportunities     Repetition  -pt repeated single words (e.g. ice) x2  -pt repeated phrase (\"make it wet\" in regards to toothette) x1 Automatics:  -SLP provided tactile cue for pt to attempt to improve initiation by squeezing pt's hand  -pt repeated \"Don\" x1   -pt repeated \"Zainab\" (wife's name) x1  -pt repeated \"Gil\" (son's name) x2  -pt unable to repeat Alyssa (granddaughter's name)    Pt  with reduced spontaneous verbalizations this session, but did wave bye to SLP given min cues     Pt continues to have increased responses to yes/npo with head nod and shake. Pt verbalized \"yup\" x1     Other areas targeted: Pt with improved command following with opening mouth for SLP to check oral cavity - pt did not require mode this date. Pt also with improved command following to keep mouth open during oral care.   N/a   Education:   SLP edu pt re: importance of oral care, progress towards goals  SLP edu pt family re: oral care importance, automatics    Safety Devices: [x] Call light within reach  [] Chair alarm activated  [x] Bed alarm activated  [x] Other: wife at bedside [x] Call light within reach  [x] Chair alarm activated  [] Bed alarm activated  [x] Other: wife and granddaughter at bedside   Assessment: Tx session 1: Pt alert, participated in therapy session. Pt did have spontaneous speech utterance with response to SLP by stating \"bye bye\" at end of session. Pt overall with improved alertness and command following, which has improved pt's alibility to keep oral cavity opened for oral care and initiate a swallow response. Pt also with improved ability to repeat SLP, although not yet consistent. Minimal, but good, progress being made. Continue goals above.     Tx session 2: Pt sleeping upon arrival and required a few minutes to wake up. Pt continues to have increase use of head shake / nod for yes/no questions, but still not consistently providing a response. Pt did verbalize 'yep.' Pt  able to repeat his name x1, son's name x2, and wife's name x1. More difficulty with granddaughter's name. Pt continues to have dried secretions in oral cavity, and ongoing edu to family and staff re: importance of oral care because it does appear to be improving. Pt did cough following an ice chip this date - cough extremely weak. Continue goals above.      Plan: Continue as per plan of care.      Additional Information:     Barriers toward progress: Pain, Confusion, Communication deficit, Decreased endurance, and Medical complications  Discharge recommendations:  [] Home independently  [] Home with assistance [x]  24 hour supervision  [] ECF [] Other:  Continued Tx Upon Discharge: ? [x] Yes [] No [] TBD based on progress while on ARU [] Vital Stim indicated [] Other:   Estimated discharge date: 01/14/24    Interventions used this date:  [x] Speech/Language Treatment  [] Instruction in HEP [] Group [x] Dysphagia Treatment [] Cognitive Treatment   [] Other:      Total Time Breakdown / Charges    Time in Time out Total Time / units   Cognitive Tx      Speech Tx 0930  1500 0945  1515 15 min / 1 unit  15 min / 1 unit   Dysphagia Tx 0945  1515 1000  1530 15 min / 1 unit   15 min / 1 unit        Electronically Signed by     Tennille Pagan MA CCC-SLP #[id number]  Speech Language Pathologist

## 2024-01-03 NOTE — PROGRESS NOTES
Comprehensive Nutrition Assessment    Type and Reason for Visit:  Reassess    Nutrition Recommendations/Plan:   Recommend modifying to bolus feeds of Jevity 1.5, x 5 cans daily.  Begin with 120 mL and increase by 60 mL at each following feed (180 mL at 2nd feeding and full can (237 mL) at 3rd feeding), or as tolerated.   80 mL free water flush before and after each administration.  Monitor Na and need for adjustments  Monitor TF tolerance (abdominal pain, bloating, distention, diarrhea)  Monitor nutrition adequacy, pertinent labs, bowel habits, wt changes, and clinical progress     Malnutrition Assessment:  Malnutrition Status:  At risk for malnutrition (Comment) (12/28/23 1220)    Context:  Acute Illness     Findings of the 6 clinical characteristics of malnutrition:  Energy Intake:  Mild decrease in energy intake (Comment)  Weight Loss:  Greater than 7.5% over 3 months       Nutrition Assessment:    Follow up: Pt continues on tube feeds of Glucerna 1.5 at goal rate of 60 mL/hr. Pt with multiple BMs yesterday, on bowel regimen. Weights stable since admission to ARU. Will trial bolus feeds and monitor tolerance. Discussed with pt family and RN. Will monitor BG on new formula and need for alternative if BG increases. Will continue to monitor.    Nutrition Related Findings:    Na 146. Active BS. +2 pitting BLE edema. +Bowel regimen. Multiple BMs on 1/2. Wound Type: Open Wounds, Deep Tissue Injury       Current Nutrition Intake & Therapies:    Average Meal Intake: NPO  Average Supplements Intake: NPO  Diet NPO  ADULT TUBE FEEDING; PEG; Diabetic; Continuous; 25; Yes; 25; Q 4 hours; 60; 80; Q 4 hours  Current Tube Feeding (TF) Orders:  Feeding Route: PEG  Goal TF & Flush Orders Provides: Bolus feeds of Jevity 1.5 x5 cans daily to provide 1185 mL TV, 1775 kcal, 76 g protein, and 900 mL TV. +80 mL free water flush before and after administration    Anthropometric Measures:  Height: 170.2 cm (5' 7\")  Ideal Body Weight  (IBW): 148 lbs (67 kg)    Admission Body Weight: 96.6 kg (213 lb) (bed scale)  Current Body Weight: 96.6 kg (213 lb),   IBW. Weight Source: Bed Scale  Current BMI (kg/m2): 33.4  Usual Body Weight: 105.3 kg (232 lb 2.3 oz) (9/22)  % Weight Change (Calculated): -8  Weight Adjustment For: No Adjustment                 BMI Categories: Obese Class 1 (BMI 30.0-34.9)    Estimated Daily Nutrient Needs:  Energy Requirements Based On: Kcal/kg (25-30)  Weight Used for Energy Requirements: Ideal  Energy (kcal/day): 6632-7534 kcals  Weight Used for Protein Requirements: Ideal (1-1.5)  Protein (g/day):  g  Method Used for Fluid Requirements: 1 ml/kcal  Fluid (ml/day): 7290-2861 ml    Nutrition Diagnosis:   Inadequate oral intake related to inadequate protein-energy intake, swallowing difficulty as evidenced by nutrition support - enteral nutrition, NPO or clear liquid status due to medical condition, swallow study results, weight loss 7.5% in 3 months    Nutrition Interventions:   Food and/or Nutrient Delivery: Modify Tube Feeding  Nutrition Education/Counseling: No recommendation at this time  Coordination of Nutrition Care: Continue to monitor while inpatient, Interdisciplinary Rounds       Goals:  Previous Goal Met: Progressing toward Goal(s)  Goals: Tolerate nutrition support at goal rate, prior to discharge       Nutrition Monitoring and Evaluation:   Behavioral-Environmental Outcomes: None Identified  Food/Nutrient Intake Outcomes: Enteral Nutrition Intake/Tolerance  Physical Signs/Symptoms Outcomes: Biochemical Data, GI Status, Diarrhea, Nutrition Focused Physical Findings, Weight    Discharge Planning:    Too soon to determine     Linda Heredia MS, RD, LD  Contact: [phone]

## 2024-01-03 NOTE — PROGRESS NOTES
[facility] Inpatient Rehabilitation Progress Note    Isac Lemus  1/3/2024  [id number]    Chief Complaint: SDH (subdural hematoma) (HCC)    Subjective:   No acute events overnight. Patient evaluated by ID and abx stopped. He has not had further fevers. Today Jack is seen with his wife present. He appears more alert than prior. He follows commend to squeeze hands bilaterally. He also shakes his head yes and no.     ROS: unable to reliably obtain    Objective:  Patient Vitals for the past 24 hrs:   BP Temp Temp src Pulse Resp SpO2 Weight   01/03/24 1000 123/62 -- -- 98 -- -- --   01/03/24 0715 112/65 98.7 °F (37.1 °C) Axillary 82 18 -- --   01/03/24 0530 -- -- -- -- -- -- 96.9 kg (213 lb 10 oz)   01/03/24 0430 106/71 -- -- 76 -- -- --   01/03/24 0321 -- -- -- (!) 101 29 94 % --   01/03/24 0014 -- -- -- -- 29 94 % --   01/02/24 2134 -- -- -- (!) 115 28 94 % --   01/02/24 2049 (!) 99/54 99 °F (37.2 °C) Oral (!) 116 -- 96 % --   01/02/24 1700 125/74 99.9 °F (37.7 °C) Oral 98 -- 96 % --   01/02/24 1450 131/78 -- -- 89 -- 97 % --   01/02/24 1341 131/73 -- -- 86 -- 96 % --     Gen: No distress  HEENT: Normocephalic, atraumatic.   CV: irregularly irregular rhythm  Resp: No respiratory distress. Lungs CTAB  Abd: Soft, nondistended  Ext: No edema.   Neuro: awake    Wt Readings from Last 3 Encounters:   01/03/24 96.9 kg (213 lb 10 oz)   04/06/16 115.7 kg (255 lb)   03/16/16 115.7 kg (255 lb)       Laboratory data:   Lab Results   Component Value Date    WBC 5.5 01/03/2024    HGB 9.6 (L) 01/03/2024    HCT 30.7 (L) 01/03/2024    .7 (H) 01/03/2024     01/03/2024       Lab Results   Component Value Date/Time     01/03/2024 06:48 AM     01/03/2024 06:48 AM    K 4.2 01/03/2024 06:48 AM    K 4.4 01/03/2024 06:48 AM     01/03/2024 06:48 AM     01/03/2024 06:48 AM    CO2 35 01/03/2024 06:48 AM    CO2 35 01/03/2024 06:48 AM    BUN 24 01/03/2024 06:48 AM    BUN 24 01/03/2024 06:48 AM     as OSH unremarkable  - recurrence of fevers  - CT chest, abdomen, pelvis; UA negative  - started on empiric abx 1/1, ID consulted. 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     Dysphagia  - NPO  - s/p PEG 12/16  - on continuous tube feeds  - ST and Dietician following   -Tube feeds adjusted per dietician recs     Atrial fibrillation  - with RVR during acute stay  - digoxin, metoprolol  - AC with Eliquis     Right Peroneal DVT  RLL segmental PE  - AC with Eliquis     Thrombocytopenia, resolved  HIT  - no heparin products  - on Eliquis     Colonic pseudoobstruction, resolved  - continue bowel regimen     DM2  - metformin  - SSI     History of HTN  - home lisinopril, losartan held  - metoprolol     Bowels: adjust medications as needed for regular bowel movements     Bladder: Check PVR x 3.  IMC if PVR > 200ml or if any volume is > 500 ml.      Sleep: melatonin provided nightly     PPX  DVT: on AC  GI: not indicated     Follow up appointments: PCP, Neurosurgery    Therapy progress: started family training  Services: SNF  EDOD: 1/14    Interdisciplinary team conference was held today with entire rehab treatment team including PT, OT, SLP (if applicable), Dietician, RN, and SW. Discussion focused on progress toward rehab goals and discharge planning. Making progress. Barriers include level of assistance, level of alertness, endurance, comorbidities. We as a medical team, and I as the physician , made a plan to work on these barriers to facilitate safe discharge. Plan will be presented to patient/family (if available).     Nati Mcelroy MD   1/3/2024, 12:49 PM

## 2024-01-03 NOTE — PROGRESS NOTES
Physical Therapy  Facility/Department: [facility]  Rehabilitation Physical Therapy Treatment Note    NAME: Isac Lemus  : 1950 (73 y.o.)  MRN: [id number]  CODE STATUS: Full Code    Date of Service: 1/3/24       Restrictions:  Restrictions/Precautions: Seizure, Fall Risk, Up as Tolerated, NPO, Aspiration Risk, General Precautions  Position Activity Restriction  Other position/activity restrictions: PEG, tube feed     SUBJECTIVE  Subjective  Subjective: pt found in bed  Pain: no pain indicated and pt shaking head no at end of session when asked       OBJECTIVE  Cognition  Overall Cognitive Status: Exceptions  Arousal/Alertness: Delayed responses to stimuli;Unresponsive to stimuli  Following Commands: Does not follow commands;Inconsistently follows commands  Attention Span: Unable to maintain attention  Safety Judgement: Decreased awareness of need for assistance;Decreased awareness of need for safety  Problem Solving: Decreased awareness of errors  Insights: Not aware of deficits  Initiation: Requires cues for all  Sequencing: Requires cues for all  Cognition Comment: miniaml voluntary movements, [place] for all PROM/AROM  Orientation  Overall Orientation Status: Impaired  Orientation Level: Unable to assess    Functional Mobility  Roll Left  Assistance Level: Dependent;Requires x 2 assistance  Skilled Clinical Factors: multiple bouts of rolling L and R with Td to complete pericare 2* 4 bouts of incontinence  Roll Right  Assistance Level: Dependent;Requires x 2 assistance  Skilled Clinical Factors: multiple bouts of rolling L and R with Td to complete pericare 2* 4 bouts of incontinence  Sit to Supine  Skilled Clinical Factors: unable to assess 2* multiple bouts of incontinence  Supine to Sit  Skilled Clinical Factors: unable to assess 2* multiple bouts of incontinence      ASSESSMENT/PROGRESS TOWARDS GOALS  Vital Signs  Pulse: 98  Heart Rate Source: Monitor  BP: 123/62  BP Location: Left upper arm  MAP  (Calculated): 82  SpO2: 94 %  O2 Device: Nasal cannula  Comment: 2.5Lo2    Assessment  Assessment: pt seen as co treat with OT for increased safety progressing functional mobility. session significantly limited by 4 bouts of diahhrea. pt requires TD for bed mobility (rolling), TD for pericare. attempted after each bout of pericare to get up (supine to sit or with cathryn) however each time pt incontinent. at this time , rec SNF upon dc due to current level of assist and burden of care. DME: defer  Activity Tolerance: Patient limited by fatigue;Patient limited by endurance;Treatment limited secondary to decreased cognition  Discharge Recommendations: Continue to assess pending progress  PT Equipment Recommendations  Equipment Needed: Yes    Goals  Patient Goals   Patient Goals : pt unable to verbalize  Short Term Goals  Time Frame for Short Term Goals: 10 days (1/1/24)  Short Term Goal 1: Pt to perform bed mobility with mod A x 2.- GOAL NOT MET, max A of 2  Short Term Goal 2: Pt to tolerate sitting EOB x 3 min with mod A x 1 for balance support.- GOAL NOT MET, tolerates 5-10 sec with min A of 2, otherwise reuqiers TD  Short Term Goal 3: Pt to transfer bed <> chair with LRAD with mod A x 2.- GOAL NOT MET, TD via cathryn  Short Term Goal 4: Pt to perform 12-15 reps of LE HEP to target strength/ROM.- GOAL NOT MET, pt unable to participate 2* decreased levels of alertness  Short Term Goal 5: Pt to perform sit to/from stand transfer at LRAD with mod Ax 2.- GOAL NOT MET, unsafe to trial  Long Term Goals  Time Frame for Long Term Goals : 21 days (1/12/24)  Long Term Goal 1: Pt to perform bed mobility with min Ax 1.  Long Term Goal 2: Pt to transfer bed <> chair with LRAD with min Ax 1.  Long Term Goal 3: Pt to ambulate 25 ft with LRAD with min A x1.  Long Term Goal 4: Pt to manage 2 steps with rail (for home entry) with LRAD with min A x1.  Long Term Goal 5: Pt to perform sit to/from stand transfer at LRAD with min A x  1.    PLAN OF CARE/SAFETY  Physical Therapy Plan  General Plan: 5-7 times per week  Current Treatment Recommendations: Strengthening;ROM;Balance training;Functional mobility training;Transfer training;Endurance training;Neuromuscular re-education;Gait training;Stair training;Home exercise program;Safety education & training;Patient/Caregiver education & training;Equipment evaluation, education, & procurement;Therapeutic activities;Cognitive/Perceptual training;Co-Treatment  Safety Devices  Type of Devices: All fall risk precautions in place;All harry prominences offloaded;Chair alarm in place;Call light within reach;Patient at risk for falls;Left in chair;Nurse notified    EDUCATION  Education  Education Given To: Patient;Family  Education Provided: Role of Therapy;Plan of Care;Precautions;Mobility Training;Transfer Training;Safety;Equipment  Education Provided Comments: bed mobility, body mechanics, use of lift equipment upo d/c.  Education Method: Verbal  Education Outcome: Continued education needed;Unable to verbalize;Unable to demonstrate understanding        Therapy Time   Individual Concurrent Group Co-treatment   Time In       1300   Time Out       1400   Minutes       60     Timed Code Treatment Minutes: 60 Minutes       Bell Johnson PT, 01/03/24 at 3:19 PM

## 2024-01-03 NOTE — CARE COORDINATION
CM tried to call Eunice (daughter) and Nancy (spouse) no answer and could not leave a VM. Dian Mckee RN

## 2024-01-04 LAB
EKG ATRIAL RATE: 300 BPM
EKG DIAGNOSIS: NORMAL
EKG Q-T INTERVAL: 370 MS
EKG QRS DURATION: 142 MS
EKG QTC CALCULATION (BAZETT): 527 MS
EKG R AXIS: -50 DEGREES
EKG T AXIS: -4 DEGREES
EKG VENTRICULAR RATE: 122 BPM
GLUCOSE BLD-MCNC: 112 MG/DL (ref 70–99)
GLUCOSE BLD-MCNC: 129 MG/DL (ref 70–99)
GLUCOSE BLD-MCNC: 135 MG/DL (ref 70–99)
GLUCOSE BLD-MCNC: 149 MG/DL (ref 70–99)
PERFORMED ON: ABNORMAL

## 2024-01-04 PROCEDURE — 6370000000 HC RX 637 (ALT 250 FOR IP): Performed by: INTERNAL MEDICINE

## 2024-01-04 PROCEDURE — 92526 ORAL FUNCTION THERAPY: CPT

## 2024-01-04 PROCEDURE — 87635 SARS-COV-2 COVID-19 AMP PRB: CPT

## 2024-01-04 PROCEDURE — 6360000002 HC RX W HCPCS: Performed by: INTERNAL MEDICINE

## 2024-01-04 PROCEDURE — 93005 ELECTROCARDIOGRAM TRACING: CPT | Performed by: STUDENT IN AN ORGANIZED HEALTH CARE EDUCATION/TRAINING PROGRAM

## 2024-01-04 PROCEDURE — 97112 NEUROMUSCULAR REEDUCATION: CPT

## 2024-01-04 PROCEDURE — 94761 N-INVAS EAR/PLS OXIMETRY MLT: CPT

## 2024-01-04 PROCEDURE — C9113 INJ PANTOPRAZOLE SODIUM, VIA: HCPCS | Performed by: INTERNAL MEDICINE

## 2024-01-04 PROCEDURE — 2700000000 HC OXYGEN THERAPY PER DAY

## 2024-01-04 PROCEDURE — 97530 THERAPEUTIC ACTIVITIES: CPT

## 2024-01-04 PROCEDURE — 97110 THERAPEUTIC EXERCISES: CPT

## 2024-01-04 PROCEDURE — 1280000000 HC REHAB R&B

## 2024-01-04 PROCEDURE — 99223 1ST HOSP IP/OBS HIGH 75: CPT | Performed by: INTERNAL MEDICINE

## 2024-01-04 PROCEDURE — 6370000000 HC RX 637 (ALT 250 FOR IP): Performed by: STUDENT IN AN ORGANIZED HEALTH CARE EDUCATION/TRAINING PROGRAM

## 2024-01-04 PROCEDURE — 93010 ELECTROCARDIOGRAM REPORT: CPT | Performed by: INTERNAL MEDICINE

## 2024-01-04 PROCEDURE — 94660 CPAP INITIATION&MGMT: CPT

## 2024-01-04 PROCEDURE — 97535 SELF CARE MNGMENT TRAINING: CPT

## 2024-01-04 PROCEDURE — 92507 TX SP LANG VOICE COMM INDIV: CPT

## 2024-01-04 RX ORDER — DILTIAZEM HYDROCHLORIDE 120 MG/1
120 CAPSULE, COATED, EXTENDED RELEASE ORAL 2 TIMES DAILY
Status: DISCONTINUED | OUTPATIENT
Start: 2024-01-04 | End: 2024-01-05

## 2024-01-04 RX ORDER — METOPROLOL TARTRATE 50 MG/1
100 TABLET, FILM COATED ORAL 2 TIMES DAILY
Status: DISCONTINUED | OUTPATIENT
Start: 2024-01-05 | End: 2024-01-14 | Stop reason: HOSPADM

## 2024-01-04 RX ADMIN — METOPROLOL TARTRATE 100 MG: 50 TABLET, FILM COATED ORAL at 12:25

## 2024-01-04 RX ADMIN — METOPROLOL TARTRATE 100 MG: 50 TABLET, FILM COATED ORAL at 17:23

## 2024-01-04 RX ADMIN — LEVETIRACETAM 750 MG: 100 SOLUTION ORAL at 12:30

## 2024-01-04 RX ADMIN — MODAFINIL 300 MG: 200 TABLET ORAL at 08:41

## 2024-01-04 RX ADMIN — ACETAMINOPHEN 1000 MG: 500 TABLET ORAL at 18:26

## 2024-01-04 RX ADMIN — LEVETIRACETAM 750 MG: 100 SOLUTION ORAL at 22:16

## 2024-01-04 RX ADMIN — ATORVASTATIN CALCIUM 40 MG: 40 TABLET, FILM COATED ORAL at 19:38

## 2024-01-04 RX ADMIN — GUAIFENESIN 600 MG: 600 TABLET ORAL at 08:42

## 2024-01-04 RX ADMIN — ACETAMINOPHEN 1000 MG: 500 TABLET ORAL at 05:07

## 2024-01-04 RX ADMIN — DIGOXIN 125 MCG: 125 TABLET ORAL at 08:41

## 2024-01-04 RX ADMIN — METFORMIN HYDROCHLORIDE 500 MG: 500 TABLET ORAL at 19:38

## 2024-01-04 RX ADMIN — METOPROLOL TARTRATE 100 MG: 50 TABLET, FILM COATED ORAL at 04:53

## 2024-01-04 RX ADMIN — CASTOR OIL AND BALSAM, PERU: 788; 87 OINTMENT TOPICAL at 08:43

## 2024-01-04 RX ADMIN — PANTOPRAZOLE SODIUM 40 MG: 40 INJECTION, POWDER, FOR SOLUTION INTRAVENOUS at 08:42

## 2024-01-04 RX ADMIN — APIXABAN 5 MG: 5 TABLET, FILM COATED ORAL at 19:38

## 2024-01-04 RX ADMIN — DONEPEZIL HYDROCHLORIDE 5 MG: 5 TABLET, FILM COATED ORAL at 08:42

## 2024-01-04 RX ADMIN — APIXABAN 5 MG: 5 TABLET, FILM COATED ORAL at 08:42

## 2024-01-04 RX ADMIN — METFORMIN HYDROCHLORIDE 500 MG: 500 TABLET ORAL at 08:42

## 2024-01-04 RX ADMIN — Medication 5 MG: at 19:38

## 2024-01-04 ASSESSMENT — PAIN SCALES - WONG BAKER: WONGBAKER_NUMERICALRESPONSE: 0

## 2024-01-04 ASSESSMENT — PAIN SCALES - GENERAL: PAINLEVEL_OUTOF10: 0

## 2024-01-04 NOTE — PROGRESS NOTES
01/03/24 2343   NIV Type   NIV Started/Stopped On   Equipment Type v60   Mode Bilevel   Mask Type Full face mask   Mask Size Large   Assessment   Respirations 20   Settings/Measurements   IPAP 12 cmH20   CPAP/EPAP 8 cmH2O   Vt (Measured) 461 mL   Rate Ordered 12   FiO2  30 %   Mask Leak (lpm) 112 lpm   Patient's Home Machine No   Alarm Settings   Alarms On Y   Low Pressure (cmH2O) 6 cmH2O   High Pressure (cmH2O) 30 cmH2O

## 2024-01-04 NOTE — PROGRESS NOTES
perform bed mobility with mod A x 2.- GOAL NOT MET, max A of 2  Short Term Goal 2: Pt to tolerate sitting EOB x 3 min with mod A x 1 for balance support.- GOAL NOT MET, tolerates 5-10 sec with min A of 2, otherwise reuqiers TD  Short Term Goal 3: Pt to transfer bed <> chair with LRAD with mod A x 2.- GOAL NOT MET, TD via cathryn  Short Term Goal 4: Pt to perform 12-15 reps of LE HEP to target strength/ROM.- GOAL NOT MET, pt unable to participate 2* decreased levels of alertness  Short Term Goal 5: Pt to perform sit to/from stand transfer at LRAD with mod Ax 2.- GOAL NOT MET, unsafe to trial  Long Term Goals  Time Frame for Long Term Goals : 21 days (1/12/24)  Long Term Goal 1: Pt to perform bed mobility with min Ax 1.  Long Term Goal 2: Pt to transfer bed <> chair with LRAD with min Ax 1.  Long Term Goal 3: Pt to ambulate 25 ft with LRAD with min A x1.  Long Term Goal 4: Pt to manage 2 steps with rail (for home entry) with LRAD with min A x1.  Long Term Goal 5: Pt to perform sit to/from stand transfer at LRAD with min A x 1.    PLAN OF CARE/SAFETY  Physical Therapy Plan  General Plan: 5-7 times per week  Current Treatment Recommendations: Strengthening;ROM;Balance training;Functional mobility training;Transfer training;Endurance training;Neuromuscular re-education;Gait training;Stair training;Home exercise program;Safety education & training;Patient/Caregiver education & training;Equipment evaluation, education, & procurement;Therapeutic activities;Cognitive/Perceptual training;Co-Treatment  Safety Devices  Type of Devices: All fall risk precautions in place;All harry prominences offloaded;Chair alarm in place;Call light within reach;Patient at risk for falls;Left in chair;Nurse notified    EDUCATION  Education  Education Given To: Patient;Family  Education Provided: Role of Therapy;Plan of Care;Precautions;Mobility Training;Transfer Training;Safety;Equipment  Education Provided Comments: bed mobility, body mechanics,

## 2024-01-04 NOTE — PROGRESS NOTES
Occupational Therapy  Facility/Department: [facility]  Rehabilitation Occupational Therapy Daily Treatment Note    Date: 24  Patient Name: Isac Lemus       Room: 0156/0156-01  MRN: [id number]  Account: [id number]   : 1950  (73 y.o.) Gender: male     Past Medical History:  has a past medical history of Acid reflux, CAD (coronary artery disease), Diabetes mellitus (HCC), Heartburn, Hiatal hernia, Hypertension, and Seasonal allergies.  Past Surgical History:   has a past surgical history that includes Coronary artery bypass graft (10/2014); Colonoscopy (); and Colonoscopy (3/16/16).    Restrictions  Restrictions/Precautions: Seizure, Fall Risk, Up as Tolerated, NPO, Aspiration Risk, General Precautions  Other position/activity restrictions: PEG, tube feed    Subjective  Subjective: Pt supine in bed upon arrival, initially w/ B eyes closed but w/ inc alertness following bed mobility, family present t/o session and agreeable to OT / PT cotx. BP 97/57, , O2 98% sitting in w/c -- vitals stabilized after x3 mins /67, HR 87, O2 97%).  Restrictions/Precautions: Seizure;Fall Risk;Up as Tolerated;NPO;Aspiration Risk;General Precautions     Objective     Cognition  Overall Cognitive Status: Exceptions  Arousal/Alertness: Delayed responses to stimuli;Unresponsive to stimuli  Following Commands: Does not follow commands;Inconsistently follows commands  Attention Span: Unable to maintain attention  Memory:  (GIA)  Safety Judgement: Decreased awareness of need for assistance;Decreased awareness of need for safety  Problem Solving: Decreased awareness of errors  Insights: Not aware of deficits  Initiation: Requires cues for all  Sequencing: Requires cues for all  Orientation  Overall Orientation Status: Impaired  Orientation Level: Unable to assess      ADL  Feeding  Assistance Level: Dependent  Skilled Clinical Factors: PEG  Lower Extremity Dressing  Assistance Level: Dependent  Skilled Clinical  respond  Short Term Goals  Time Frame for Short Term Goals: by 1/04/23 unless otherwise noted  Short Term Goal 1: Pt will tolerate dynamic sitting balance ax with CGA  in prep for ADLs  Short Term Goal 2: Pt will follow commands to sequence 2 step task with only 1 tactile/visual cue  Short Term Goal 3: Pt will perform sit pivot transfer to toilet with mod Ax2  Short Term Goal 4: Pt will perform grooming tasks with mod A and 2-3 VC for sequencing  Short Term Goal 5: Pt will tolerate x15 BUE ther ex  Long Term Goals  Time Frame for Long Term Goals : to be met by 1/11/23 unless otherwise noted  Long Term Goal 1: Pt will perform TB dressing with min A and LRAD  Long Term Goal 2: Pt will perform TB bathing with min A and LRAD  Long Term Goal 3: Pt will perform toileting tasks with min A and LRAD  Long Term Goal 4: Pt will grooming tasks with min A      Therapy Time   Individual Concurrent Group Co-treatment   Time In       1230   Time Out       1330   Minutes       60   Timed Code Treatment Minutes: 60 Minutes       Zachary Gomes, OT

## 2024-01-04 NOTE — PROGRESS NOTES
Patient alert, nonverbal, follows most commands. VSS. Neuro checks unchanged. Cyclic TF infusing, pt tolerating well. Patient and family instructed to call out for needs. Fall precautions in place.

## 2024-01-04 NOTE — PROGRESS NOTES
[facility] Inpatient Rehabilitation Progress Note    Isac Lemus  1/4/2024  [id number]    Chief Complaint: SDH (subdural hematoma) (HCC)    Subjective:   No acute events overnight. Today Jack is seen in his room with his wife and daughter present. He has his eyes open the duration of the encounter. History limited by patient. He appears comfortable and does not demonstrate pain behaviors. Discussed anticipated dc date and dispo with family.     ROS: unable to reliably obtain    Objective:  Patient Vitals for the past 24 hrs:   BP Temp Temp src Pulse Resp SpO2   01/04/24 0833 108/69 98.2 °F (36.8 °C) Axillary 99 20 95 %   01/04/24 0520 111/61 -- -- 94 -- 96 %   01/04/24 0515 -- -- -- (!) 122 -- --   01/04/24 0445 -- -- -- (!) 139 21 --   01/04/24 0444 110/73 98.6 °F (37 °C) Axillary -- -- --   01/04/24 0333 -- -- -- -- (!) 34 --   01/03/24 2343 -- -- -- -- 20 --   01/03/24 2100 118/62 99 °F (37.2 °C) Axillary 97 20 95 %   01/03/24 1655 124/64 -- -- (!) 113 -- --   01/03/24 1512 123/62 -- -- 98 -- 94 %     Gen: No distress  HEENT: Normocephalic, atraumatic.   CV: irregularly irregular rhythm, tachycardic  Resp: No respiratory distress. Lungs CTAB  Abd: Soft, nondistended  Ext: No edema.   Neuro: awake, squeezes hands bilaterally    Wt Readings from Last 3 Encounters:   01/03/24 96.9 kg (213 lb 10 oz)   04/06/16 115.7 kg (255 lb)   03/16/16 115.7 kg (255 lb)       Laboratory data:   Lab Results   Component Value Date    WBC 5.5 01/03/2024    HGB 9.6 (L) 01/03/2024    HCT 30.7 (L) 01/03/2024    .7 (H) 01/03/2024     01/03/2024       Lab Results   Component Value Date/Time     01/03/2024 06:48 AM     01/03/2024 06:48 AM    K 4.2 01/03/2024 06:48 AM    K 4.4 01/03/2024 06:48 AM     01/03/2024 06:48 AM     01/03/2024 06:48 AM    CO2 35 01/03/2024 06:48 AM    CO2 35 01/03/2024 06:48 AM    BUN 24 01/03/2024 06:48 AM    BUN 24 01/03/2024 06:48 AM    CREATININE <0.5 01/03/2024 06:48  AM    CREATININE <0.5 01/03/2024 06:48 AM    GLUCOSE 146 01/03/2024 06:48 AM    GLUCOSE 144 01/03/2024 06:48 AM    CALCIUM 8.5 01/03/2024 06:48 AM    CALCIUM 8.5 01/03/2024 06:48 AM        Therapy progress:    PT    Supine to Sit:    Sit to Supine:     Sit to Stand:    Chair/Bed to Chair Transfer:    Car Transfer:    Ambulation 10 ft:    Ambulation 50 ft:    Ambulation 150 ft:    Stairs - 1 Step:    Stairs - 4 Step:    Stairs - 12 Step:      OT    Eating:    Oral Hygiene: Dependent  Bathing: Dependent  Upper Body Dressing: Dependent  Lower Body Dressing: Dependent  Toilet Transfer:    Toilet Hygiene: Dependent      Speech therapy:    Diet NPO  ADULT TUBE FEEDING; PEG; Standard with Fiber; Bolus; 5 Times Daily; 237; 80; Before and after each bolus    Body mass index is 33.46 kg/m².    Assessment and Plan:  Isac Lemus is a 73 year old male with a past medical history significant for afib on Eliquis, CAD s/p CABG (2014), HLD, DM2, HTN, and GERD who presented to [facility] on 11/[facility] as a transfer from [facility] after a fall with head trauma while on anticoagulation, found to have bilateral SDH, SAH, and skull fracture. He was admitted to [facility] on 12/22 due to functional deficits below his baseline.     Traumatic Brain Injury  - with bilateral SDH, SAH, skull base fracture, 4 mm MLS, right traverse/sigmoid sinus thrombosis   - no surgical intervention  - serial scans during acute stay stable  - Keppra 750 mg BID, EEG showing rate epileptiform discharges during acute stay. OSH recommending 30 days of ppx. Consulted Neurology, due to abnormal findings on OSH EEG will continue Keppra for now.   - Aricept and provigil  - PT, OT, ST    Pulmonary insufficiency  - wean oxygen for goal SpO2>88%  - ABG showing CO2 retention  - XR chest negative for acute process  - Pulmonology following, appreciate assistance, very likely undiagnosed sleep apnea, BiPAP intermittently     Intermittent Fevers  - infectious workup as OSH

## 2024-01-04 NOTE — PLAN OF CARE
Problem: Discharge Planning  Goal: Discharge to home or other facility with appropriate resources  Outcome: Progressing     Problem: Skin/Tissue Integrity  Goal: Absence of new skin breakdown  Description: 1.  Monitor for areas of redness and/or skin breakdown  2.  Assess vascular access sites hourly  3.  Every 4-6 hours minimum:  Change oxygen saturation probe site  4.  Every 4-6 hours:  If on nasal continuous positive airway pressure, respiratory therapy assess nares and determine need for appliance change or resting period.  1/4/2024 1254 by Marita Koenig RN  Outcome: Progressing     Problem: Confusion  Goal: Confusion, delirium, dementia, or psychosis is improved or at baseline  Description: INTERVENTIONS:  1. Assess for possible contributors to thought disturbance, including medications, impaired vision or hearing, underlying metabolic abnormalities, dehydration, psychiatric diagnoses, and notify attending LIP  2. [place] high risk fall precautions, as indicated  3. Provide frequent short contacts to provide reality reorientation, refocusing and direction  4. Decrease environmental stimuli, including noise as appropriate  5. Monitor and intervene to maintain adequate nutrition, hydration, elimination, sleep and activity  6. If unable to ensure safety without constant attention obtain sitter and review sitter guidelines with assigned personnel  7. Initiate Psychosocial CNS and Spiritual Care consult, as indicated  1/4/2024 1254 by Marita Koenig, RN  Outcome: Progressing

## 2024-01-04 NOTE — PROGRESS NOTES
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; independent with household tasks and medication/financial management; family nearby   Dx: [x] Aphasia  [x] Dysarthria  [] Apraxia   [x] Oropharyngeal dysphagia [x] Cognitive Impairment  [] Other:   Date of Admit: 2023  Room #: 0156/0156-01    Current functional status (updated daily):       +    Pt being seen for : [x] Speech/Language Treatment  [x] Dysphagia Treatment [] Cognitive Treatment  [] Other:  Communication: []WFL  [x] Aphasia  [x] Dysarthria  [] Apraxia  [] Pragmatic Impairment [] Non-verbal  [] Hearing Loss  [] Other:   Cognition: [] WFL  [] Mild  [] Moderate  [] Severe [x] Unable to Assess  [x] Other: suspect deficit  Memory: [] WFL  [] Mild  [] Moderate  [] Severe [x] Unable to Assess  [] Other:  Behavior: [] Alert  [] Cooperative  [x]  Pleasant  [] Confused  [] Agitated  [] Uncooperative  [x] Distractible [] Motivated  [] Self-Limiting [] Anxious  [] Other:  Endurance:  [] Adequate for participation in SLP sessions  [x] Reduced overall  [x] Lethargic  [] Other:  Safety: [] No concerns at this time  [] Reduced insight into deficits  []  Reduced safety awareness [] Not following call light procedures  [x] Unable to Assess  [] Other:    Current Diet Order:Diet NPO  ADULT TUBE FEEDING; PEG; Standard with Fiber; Bolus; 5 Times Daily; 237; 80; Before and after each bolus  Swallowing Precautions: Reflux and aspiration precautions; Frequent oral hygiene; allow small volume ice chips with SLP/RN only, hold PO and contact SLP if s/s aspiration or worsening respiratory status develop        Date: 2024      Tx session 1  9175 - 6473  improved response to both yes and no with corresponding head nod / shake  -pt providing a response that appeared to be appropriate <50% of opportunities    Automatics   -SLP attempted counting 1-5 with visual and verbal cues - pt with no attempts at vocalizations      Yes/No Responses  -attempted to promote with visual aid- did not appear to benefit pt  -responded via nod head/shake to common knowledge questions (e.g., is your name Don?) on 50% opp given max cues      Other areas targeted: N/A   Pt with improved command following with opening mouth for SLP to check oral cavity - pt did not require cues for this session. Pt also with improved command following to keep mouth open during oral care.   Education:   SLP edu pt re: importance of oral care, progress towards goals  SLP edu pt family re: oral care importance, automatics    Safety Devices: [x] Call light within reach  [] Chair alarm activated  [x] Bed alarm activated  [x] Other: wife and daughter at bedside [x] Call light within reach  [x] Chair alarm activated  [] Bed alarm activated  [x] Other: wife and daughter at bedside   Assessment: Tx session 1: Pt upright in bed with wife and daughter at bedside. Thorough oral care complete prior to PO trials. Noted with continued dry lingual surface with difficulty clearing dried secretions- ST reiterated importance of oral care to pts family, verbal return given. On ice chips, delayed throat clear x1 observed. On 1/2 tsp sips thin, ongoing delayed cough demonstrated with further trials not given secondary to aspiration concerns. ST reiterated to pts family pts high risk of aspiration, including silent aspiration, d/t suspected disuse atrophy. Verbal return given. Throughout session, pt would nod/shake head in response to yes/no questions with responses appropriate <50% of the time.     Tx session 2: Pt upright in wheelchair with wife and daughter at bedside. Oral care complete prior to PO trials- only ice chips given

## 2024-01-04 NOTE — PROGRESS NOTES
01/04/24 0333   NIV Type   $NIV $Daily Charge   NIV Started/Stopped On   Equipment Type v60   Mode Bilevel   Mask Type Full face mask   Mask Size Large   Assessment   Respirations (!) 34   Settings/Measurements   IPAP 12 cmH20   CPAP/EPAP 8 cmH2O   Vt (Measured) 423 mL   Rate Ordered 12   FiO2  30 %   Mask Leak (lpm) 1 lpm   Patient's Home Machine No   Alarm Settings   Alarms On Y   Low Pressure (cmH2O) 6 cmH2O   High Pressure (cmH2O) 30 cmH2O

## 2024-01-04 NOTE — PROGRESS NOTES
0530:  0445: Patient was having tachycardia in the 140s to 150.   Dr Mcelroy notified, st EKG ordered, same showed A fluter at a rate of 122. He has had his metoprolol before the EKG. MD states to continue monitoring  as the metoprolol usually brings the heat rate down

## 2024-01-04 NOTE — PLAN OF CARE
Problem: Skin/Tissue Integrity  Goal: Absence of new skin breakdown  Description: 1.  Monitor for areas of redness and/or skin breakdown  2.  Assess vascular access sites hourly  3.  Every 4-6 hours minimum:  Change oxygen saturation probe site  4.  Every 4-6 hours:  If on nasal continuous positive airway pressure, respiratory therapy assess nares and determine need for appliance change or resting period.  Outcome: Progressing     Problem: Pain  Goal: Verbalizes/displays adequate comfort level or baseline comfort level  1/4/2024 0012 by Johanny Vail RN  Outcome: Progressing  1/3/2024 1214 by Kierra Fitzgerald RN  Outcome: Progressing     Problem: Safety - Adult  Goal: Free from fall injury  1/4/2024 0012 by Johanny Vail RN  Outcome: Progressing     Problem: ABCDS Injury Assessment  Goal: Absence of physical injury  Outcome: Progressing     Problem: Confusion  Goal: Confusion, delirium, dementia, or psychosis is improved or at baseline  Description: INTERVENTIONS:  1. Assess for possible contributors to thought disturbance, including medications, impaired vision or hearing, underlying metabolic abnormalities, dehydration, psychiatric diagnoses, and notify attending LIP  2. [place] high risk fall precautions, as indicated  3. Provide frequent short contacts to provide reality reorientation, refocusing and direction  4. Decrease environmental stimuli, including noise as appropriate  5. Monitor and intervene to maintain adequate nutrition, hydration, elimination, sleep and activity  6. If unable to ensure safety without constant attention obtain sitter and review sitter guidelines with assigned personnel  7. Initiate Psychosocial CNS and Spiritual Care consult, as indicated  Outcome: Progressing

## 2024-01-04 NOTE — CONSULTS
Electrophysiology Consultation   Date: 1/4/2024  Admit Date:  12/22/2023  Reason for Consultation: Paroxysmal atrial fibrillation/flutter  Consult Requesting Physician: Nati Mcelroy MD     No chief complaint on file.    HPI:   Mr. Lemus is a pleasant 73 year old male with a medical history significant for ischemic cardiomyopathy status post CABG (2014 - [facility]), paroxysmal atrial fibrillation/flutter, hypertension, hyperlipidemia, diabetes mellitus type II, and anemia who presents from  after traumatic fall resulting in subdural hematoma and subarachnoid hemorrhage with skill fractures.  According to patient's family prior to presenting to [facility] and [facility] patient's wife had recently had knee surgery and he was taking a lot of the ADLs around the house.  He had walked downstairs to do some laundry on his way back up, with a laundry basket, he fell backwards as far as family can tell and landed on his head.  He initially presented to [facility] or [facility] stating that he was in the ICU and hospital for several weeks and later was transferred to [facility] for further evaluation and care.  Of note, he is clinical course has been complicated by respiratory failure, HIT, intermittent fevers, DVT, pneumonia, colonic pseudoobstruction, and dysphagia.  He is now status post PEG tube.  He presented to [facility]'s ARU for further physical therapy and care.    Patient became tachycardic yesterday and EKG confirmed atrial fibrillation/flutter.  Electrophysiology was consulted.    Past Medical History:   Diagnosis Date    Acid reflux     CAD (coronary artery disease)     Diabetes mellitus (HCC)     Heartburn     Hiatal hernia     Hypertension     Seasonal allergies         Past Surgical History:   Procedure Laterality Date    COLONOSCOPY  2012    POLYPS    COLONOSCOPY  3/16/16    polyps    CORONARY ARTERY BYPASS GRAFT  10/2014    X5       Allergies   Allergen  (rDNA), dextrose, prochlorperazine     Assessment:   Patient Active Problem List    Diagnosis Date Noted    Chest pain 10/16/2014    Hypernatremia 01/01/2024    Elevated BUN 01/01/2024    Uncontrolled type 2 diabetes mellitus with hyperglycemia (HCC) 01/01/2024    Macrocytic anemia 01/01/2024    Chronic respiratory failure with hypercapnia (HCC) 12/29/2023    SDH (subdural hematoma) (HCC) 12/22/2023    DM (diabetes mellitus) (HCC) 10/16/2014    HTN (hypertension) 10/16/2014    Obesity 10/16/2014      Active Hospital Problems    Diagnosis Date Noted    Hypernatremia [E87.0] 01/01/2024    Elevated BUN [R79.9] 01/01/2024    Uncontrolled type 2 diabetes mellitus with hyperglycemia (HCC) [E11.65] 01/01/2024    Macrocytic anemia [D53.9] 01/01/2024    Chronic respiratory failure with hypercapnia (HCC) [J96.12] 12/29/2023    SDH (subdural hematoma) (HCC) [S06.5XAA] 12/22/2023     Mr. Lemus is a pleasant 73 year old male with a medical history significant for ischemic cardiomyopathy status post CABG (2014 - [facility]), paroxysmal atrial fibrillation/flutter, hypertension, hyperlipidemia, diabetes mellitus type II, and anemia who presents from  after traumatic fall resulting in subdural hematoma and subarachnoid hemorrhage with skill fractures.      Problem List:  1. Paroxysmal atrial fibrillation/flutter ([id number] score 5).  2. Ischemic cardiomyopathy status post CABG (2014).  3. Traumatic brain injury.  4. Diarrhea.    Assessment and Plan:  1. Paroxysmal atrial fibrillation/flutter ([id number] score 5)  Patient is an unfortunate 73-year-old male with a complicated medical history significant for asymptomatic paroxysmal atrial fibrillation/flutter (per family report), ischemic cardiomyopathy status post CABG (2014), hypertension, hyperlipidemia, and diabetes mellitus type 2 who presents from  post traumatic brain injury.  Patient is in the ARU currently getting rehabilitation.  He is back in an atrial arrhythmia, most

## 2024-01-05 ENCOUNTER — TELEPHONE (OUTPATIENT)
Dept: CARDIOLOGY | Age: 74
End: 2024-01-05

## 2024-01-05 PROBLEM — I49.8 ATRIAL ARRHYTHMIA: Status: ACTIVE | Noted: 2024-01-05

## 2024-01-05 LAB
ANION GAP SERPL CALCULATED.3IONS-SCNC: 7 MMOL/L (ref 3–16)
BACTERIA BLD CULT ORG #2: NORMAL
BASOPHILS # BLD: 0.1 K/UL (ref 0–0.2)
BASOPHILS NFR BLD: 0.9 %
BUN SERPL-MCNC: 14 MG/DL (ref 7–20)
CALCIUM SERPL-MCNC: 8.5 MG/DL (ref 8.3–10.6)
CHLORIDE SERPL-SCNC: 103 MMOL/L (ref 99–110)
CO2 SERPL-SCNC: 34 MMOL/L (ref 21–32)
CREAT SERPL-MCNC: <0.5 MG/DL (ref 0.8–1.3)
DEPRECATED RDW RBC AUTO: 16.4 % (ref 12.4–15.4)
EOSINOPHIL # BLD: 0 K/UL (ref 0–0.6)
EOSINOPHIL NFR BLD: 0.7 %
GFR SERPLBLD CREATININE-BSD FMLA CKD-EPI: >60 ML/MIN/{1.73_M2}
GLUCOSE BLD-MCNC: 104 MG/DL (ref 70–99)
GLUCOSE BLD-MCNC: 116 MG/DL (ref 70–99)
GLUCOSE BLD-MCNC: 170 MG/DL (ref 70–99)
GLUCOSE SERPL-MCNC: 116 MG/DL (ref 70–99)
HCT VFR BLD AUTO: 31.9 % (ref 40.5–52.5)
HGB BLD-MCNC: 10.3 G/DL (ref 13.5–17.5)
LYMPHOCYTES # BLD: 1.2 K/UL (ref 1–5.1)
LYMPHOCYTES NFR BLD: 19.9 %
MCH RBC QN AUTO: 33 PG (ref 26–34)
MCHC RBC AUTO-ENTMCNC: 32.2 G/DL (ref 31–36)
MCV RBC AUTO: 102.4 FL (ref 80–100)
MONOCYTES # BLD: 0.5 K/UL (ref 0–1.3)
MONOCYTES NFR BLD: 9.1 %
NEUTROPHILS # BLD: 4.1 K/UL (ref 1.7–7.7)
NEUTROPHILS NFR BLD: 69.4 %
PERFORMED ON: ABNORMAL
PHOSPHATE SERPL-MCNC: 2.9 MG/DL (ref 2.5–4.9)
PLATELET # BLD AUTO: 226 K/UL (ref 135–450)
PMV BLD AUTO: 8.9 FL (ref 5–10.5)
POTASSIUM SERPL-SCNC: 4.3 MMOL/L (ref 3.5–5.1)
RBC # BLD AUTO: 3.11 M/UL (ref 4.2–5.9)
SARS-COV-2 RDRP RESP QL NAA+PROBE: NOT DETECTED
SODIUM SERPL-SCNC: 144 MMOL/L (ref 136–145)
WBC # BLD AUTO: 5.9 K/UL (ref 4–11)

## 2024-01-05 PROCEDURE — 6370000000 HC RX 637 (ALT 250 FOR IP): Performed by: NURSE PRACTITIONER

## 2024-01-05 PROCEDURE — C9113 INJ PANTOPRAZOLE SODIUM, VIA: HCPCS | Performed by: INTERNAL MEDICINE

## 2024-01-05 PROCEDURE — 6360000002 HC RX W HCPCS: Performed by: INTERNAL MEDICINE

## 2024-01-05 PROCEDURE — 92526 ORAL FUNCTION THERAPY: CPT

## 2024-01-05 PROCEDURE — 99232 SBSQ HOSP IP/OBS MODERATE 35: CPT | Performed by: NURSE PRACTITIONER

## 2024-01-05 PROCEDURE — 97530 THERAPEUTIC ACTIVITIES: CPT

## 2024-01-05 PROCEDURE — 85025 COMPLETE CBC W/AUTO DIFF WBC: CPT

## 2024-01-05 PROCEDURE — 80048 BASIC METABOLIC PNL TOTAL CA: CPT

## 2024-01-05 PROCEDURE — 97535 SELF CARE MNGMENT TRAINING: CPT

## 2024-01-05 PROCEDURE — 36415 COLL VENOUS BLD VENIPUNCTURE: CPT

## 2024-01-05 PROCEDURE — 94660 CPAP INITIATION&MGMT: CPT

## 2024-01-05 PROCEDURE — 6370000000 HC RX 637 (ALT 250 FOR IP): Performed by: INTERNAL MEDICINE

## 2024-01-05 PROCEDURE — 84100 ASSAY OF PHOSPHORUS: CPT

## 2024-01-05 PROCEDURE — 6370000000 HC RX 637 (ALT 250 FOR IP): Performed by: STUDENT IN AN ORGANIZED HEALTH CARE EDUCATION/TRAINING PROGRAM

## 2024-01-05 PROCEDURE — 92507 TX SP LANG VOICE COMM INDIV: CPT

## 2024-01-05 PROCEDURE — 1280000000 HC REHAB R&B

## 2024-01-05 RX ORDER — DILTIAZEM HYDROCHLORIDE 60 MG/1
30 TABLET, FILM COATED ORAL EVERY 6 HOURS SCHEDULED
Status: DISCONTINUED | OUTPATIENT
Start: 2024-01-06 | End: 2024-01-11

## 2024-01-05 RX ADMIN — METFORMIN HYDROCHLORIDE 500 MG: 500 TABLET ORAL at 09:28

## 2024-01-05 RX ADMIN — DILTIAZEM HYDROCHLORIDE 120 MG: 120 CAPSULE, EXTENDED RELEASE ORAL at 09:27

## 2024-01-05 RX ADMIN — MODAFINIL 300 MG: 200 TABLET ORAL at 09:28

## 2024-01-05 RX ADMIN — METOPROLOL TARTRATE 100 MG: 50 TABLET, FILM COATED ORAL at 22:16

## 2024-01-05 RX ADMIN — DIGOXIN 125 MCG: 125 TABLET ORAL at 09:28

## 2024-01-05 RX ADMIN — LEVETIRACETAM 750 MG: 100 SOLUTION ORAL at 12:22

## 2024-01-05 RX ADMIN — CASTOR OIL AND BALSAM, PERU: 788; 87 OINTMENT TOPICAL at 09:27

## 2024-01-05 RX ADMIN — NYSTATIN 500000 UNITS: 100000 SUSPENSION ORAL at 17:33

## 2024-01-05 RX ADMIN — NYSTATIN 500000 UNITS: 100000 SUSPENSION ORAL at 22:16

## 2024-01-05 RX ADMIN — CASTOR OIL AND BALSAM, PERU: 788; 87 OINTMENT TOPICAL at 22:15

## 2024-01-05 RX ADMIN — ACETAMINOPHEN 1000 MG: 500 TABLET ORAL at 22:16

## 2024-01-05 RX ADMIN — GUAIFENESIN 600 MG: 600 TABLET ORAL at 22:16

## 2024-01-05 RX ADMIN — LEVETIRACETAM 750 MG: 100 SOLUTION ORAL at 22:18

## 2024-01-05 RX ADMIN — APIXABAN 5 MG: 5 TABLET, FILM COATED ORAL at 22:16

## 2024-01-05 RX ADMIN — METFORMIN HYDROCHLORIDE 500 MG: 500 TABLET ORAL at 22:16

## 2024-01-05 RX ADMIN — NYSTATIN 500000 UNITS: 100000 SUSPENSION ORAL at 09:27

## 2024-01-05 RX ADMIN — Medication 5 MG: at 22:16

## 2024-01-05 RX ADMIN — METOPROLOL TARTRATE 100 MG: 50 TABLET, FILM COATED ORAL at 09:28

## 2024-01-05 RX ADMIN — GUAIFENESIN 600 MG: 600 TABLET ORAL at 09:27

## 2024-01-05 RX ADMIN — APIXABAN 5 MG: 5 TABLET, FILM COATED ORAL at 09:28

## 2024-01-05 RX ADMIN — PANTOPRAZOLE SODIUM 40 MG: 40 INJECTION, POWDER, FOR SOLUTION INTRAVENOUS at 09:28

## 2024-01-05 RX ADMIN — ATORVASTATIN CALCIUM 40 MG: 40 TABLET, FILM COATED ORAL at 22:16

## 2024-01-05 RX ADMIN — DONEPEZIL HYDROCHLORIDE 5 MG: 5 TABLET, FILM COATED ORAL at 09:28

## 2024-01-05 RX ADMIN — NYSTATIN 500000 UNITS: 100000 SUSPENSION ORAL at 13:33

## 2024-01-05 RX ADMIN — ACETAMINOPHEN 1000 MG: 500 TABLET ORAL at 13:32

## 2024-01-05 RX ADMIN — DILTIAZEM HYDROCHLORIDE 30 MG: 60 TABLET ORAL at 22:17

## 2024-01-05 ASSESSMENT — PAIN SCALES - WONG BAKER
WONGBAKER_NUMERICALRESPONSE: 2
WONGBAKER_NUMERICALRESPONSE: 0

## 2024-01-05 NOTE — PROGRESS NOTES
01/05/24 0005   NIV Type   NIV Started/Stopped On   Equipment Type V60   Mode Bilevel   Mask Type Full face mask   Mask Size Large   Assessment   Respirations (!) 32   SpO2 95 %   Comfort Level Good   Using Accessory Muscles No   Mask Compliance Good   Skin Assessment Clean, dry, & intact   Skin Protection for O2 Device Yes   Location Nose   Settings/Measurements   IPAP 12 cmH20   CPAP/EPAP 8 cmH2O   Vt (Measured) 386 mL   Rate Ordered 12   I Time/ I Time % 0 s   Minute Volume (L/min) 11.7 Liters   Mask Leak (lpm) 10 lpm   Patient's Home Machine No   Alarm Settings   Alarms On Y

## 2024-01-05 NOTE — PROGRESS NOTES
Occupational Therapy  Facility/Department: [facility]  Rehabilitation Occupational Therapy Daily Treatment Note    Date: 24  Patient Name: Isac Lemus       Room: 0156/0156-01  MRN: [id number]  Account: [id number]   : 1950  (73 y.o.) Gender: male                    Past Medical History:  has a past medical history of Acid reflux, CAD (coronary artery disease), Diabetes mellitus (HCC), Heartburn, Hiatal hernia, Hypertension, and Seasonal allergies.  Past Surgical History:   has a past surgical history that includes Coronary artery bypass graft (10/2014); Colonoscopy (); and Colonoscopy (3/16/16).    Restrictions  Restrictions/Precautions: Seizure, Fall Risk, Up as Tolerated, NPO, Aspiration Risk, General Precautions  Other position/activity restrictions: PEG, tube feed    Subjective  Subjective: Pt in bed, somewhat alert, no s/s of pain, receiving tube feed bolus from RN, family present and agreeable to OT/PT session and shower, /69, HR 99, O2 sats 98% on 2L O2.  Restrictions/Precautions: Seizure;Fall Risk;Up as Tolerated;NPO;Aspiration Risk;General Precautions             Objective     Cognition  Overall Cognitive Status: Exceptions  Arousal/Alertness: Delayed responses to stimuli;Unresponsive to stimuli  Following Commands: Does not follow commands;Inconsistently follows commands  Attention Span: Unable to maintain attention  Memory:  (GIA)  Safety Judgement: Decreased awareness of need for assistance;Decreased awareness of need for safety  Problem Solving: Decreased awareness of errors  Insights: Not aware of deficits  Initiation: Requires cues for all  Sequencing: Requires cues for all  Cognition Comment: miniaml voluntary movements, [place] for all functional tasks  Orientation  Overall Orientation Status: Impaired  Orientation Level: Unable to assess         ADL  Grooming/Oral Hygiene  Assistance Level: Dependent  Skilled Clinical Factors: to wash hair, minimal finger movements when hands  forward while bathing and rinsing back, pt able to hold trunk/head up for 5-10 seconds when instructed to lean forward with support at UEs and pt able to partially pull self forward     Assessment  Assessment  Assessment: Pt not resistant to therapy with improved alertness once in shower. Pt demonstrated ability to verbalize minimally when in shower and in MaxiMove, mumbling of yes/no at times. Pt demonstrated ability to use LUE minimally to assist with bathing and attempt to wash hair, chest, and thigh with LUE when LUE placed on part of body, but poor sustained attention or action. Pt demonstrated ability to pull self partially forward on shower chair when instructed while holding chest/head up for 5-10 seconds x2. Pt tolerated ADLs and transfers well but still poor ability to assist with bed mobility, adjusting hips in tilt-in-space shower chair, or ADL completion. Pt remains total assist for all ADLs. Pt requires co-treat for PT to assist with bed mobility and posture while OT assisted with ADLs. Pt's family educated on wound care management and importance of turning/off weighting buttocks/wound. Pt's family verbalized understanding. Continue POC.  Activity Tolerance: Patient limited by fatigue;Patient limited by endurance;Treatment limited secondary to decreased cognition  Discharge Recommendations: Subacute/Skilled Nursing Facility  OT Equipment Recommendations  Other: defer to next level of care  Safety Devices  Safety Devices in place: Yes  Type of devices: All fall risk precautions in place;Nurse notified;Call light within reach;Patient at risk for falls;Bed alarm in place;Left in bed    Patient Education  Education  Education Given To: Patient;Family  Education Provided: Role of Therapy;Plan of Care;Safety;ADL Function;Mobility Training;Transfer Training;Cognition;Family Education;Equipment;DME/Home Modifications;Fall Prevention Strategies;Precautions  Education Provided Comments: role of OT, ADL  sequencing, bed mobility/ cathryn pad placement, wound care management, bed positioning  Education Method: Demonstration;Verbal  Barriers to Learning: Cognition  Education Outcome: Unable to verbalize;Unable to demonstrate understanding;Continued education needed  Skilled Clinical Factors: Pt unable to demonstrate/verbalize understanding. Family verbalized understanding of education but would benefit from reinforcement.    Plan  Occupational Therapy Plan  Times Per Week: 5-7x/wk  Current Treatment Recommendations: Strengthening;ROM;Balance training;Functional mobility training;Endurance training;Neuromuscular re-education;Cognitive reorientation;Pain management;Safety education & training;Patient/Caregiver education & training;Equipment evaluation, education, & procurement;Positioning;Self-Care / ADL;Coordination training;Gait training    Goals  Short Term Goals  Time Frame for Short Term Goals: by 1/04/23 unless otherwise noted  Short Term Goal 1: Pt will tolerate dynamic sitting balance ax with CGA  in prep for ADLs, progressing 1/5 total assist  Short Term Goal 2: Pt will follow commands to sequence 2 step task with only 1 tactile/visual cue, progressing 1/5 occasional 1 step command  Short Term Goal 3: Pt will perform sit pivot transfer to toilet with mod Ax2. progressing 1/5 MaxiMove for all transfers  Short Term Goal 4: Pt will perform grooming tasks with mod A and 2-3 VC for sequencing, progressing 1/5, total assist for all ADLs  Short Term Goal 5: Pt will tolerate x15 BUE ther ex. progressing 1/5. PROM for all movements  Long Term Goals  Time Frame for Long Term Goals : to be met by 1/11/23 unless otherwise noted  Long Term Goal 1: Pt will perform TB dressing with min A and LRAD  Long Term Goal 2: Pt will perform TB bathing with min A and LRAD  Long Term Goal 3: Pt will perform toileting tasks with min A and LRAD  Long Term Goal 4: Pt will grooming tasks with min A    Therapy Time   Individual Concurrent Group

## 2024-01-05 NOTE — PLAN OF CARE
Problem: Discharge Planning  Goal: Discharge to home or other facility with appropriate resources  1/4/2024 1254 by Marita Koenig RN  Outcome: Progressing

## 2024-01-05 NOTE — PROGRESS NOTES
Physical Therapy  Facility/Department: [facility]  Rehabilitation Physical Therapy Treatment Note    NAME: Isac Lemus  : 1950 (73 y.o.)  MRN: [id number]  CODE STATUS: Full Code    Date of Service: 24       Restrictions:  Restrictions/Precautions: Seizure, Fall Risk, Up as Tolerated, NPO, Aspiration Risk, General Precautions  Position Activity Restriction  Other position/activity restrictions: PEG, tube feed     SUBJECTIVE  Subjective  Subjective: pt found in bed, incontinent of bowel and bladder  Pain: some pain indicated with rolling however unable to rate        OBJECTIVE  Cognition  Overall Cognitive Status: Exceptions  Arousal/Alertness: Delayed responses to stimuli;Unresponsive to stimuli  Following Commands: Does not follow commands;Inconsistently follows commands  Attention Span: Unable to maintain attention  Safety Judgement: Decreased awareness of need for assistance;Decreased awareness of need for safety  Problem Solving: Decreased awareness of errors  Insights: Not aware of deficits  Initiation: Requires cues for all  Sequencing: Requires cues for all  Cognition Comment: miniaml voluntary movements, [place] for all PROM/AROM  Orientation  Overall Orientation Status: Impaired  Orientation Level: Unable to assess    Functional Mobility  Roll Left  Assistance Level: Dependent;Requires x 2 assistance  Roll Right  Assistance Level: Dependent;Requires x 2 assistance  Balance  Sitting Balance: Dependent/Total  Bed To/From Chair  Assistance Level: Dependent;Requires x 2 assistance  Skilled Clinical Factors: from bed <> shower chair    Neuromuscular Education  NDT Treatment: Sitting  Neuromuscular Comments: pt completed sitting task while completing ADL. pt is grossly TD for static/dyn sitting balace with support from shower chair and PT. pt wtih minimal particiaption in ADL. pt requires max a to bring trunk foward to upright sitting and requires min-mod a while in tripod position without trunk support ,  Term Goals  Time Frame for Long Term Goals : 21 days (1/12/24)  Long Term Goal 1: Pt to perform bed mobility with min Ax 1.  Long Term Goal 2: Pt to transfer bed <> chair with LRAD with min Ax 1.  Long Term Goal 3: Pt to ambulate 25 ft with LRAD with min A x1.  Long Term Goal 4: Pt to manage 2 steps with rail (for home entry) with LRAD with min A x1.  Long Term Goal 5: Pt to perform sit to/from stand transfer at LRAD with min A x 1.    PLAN OF CARE/SAFETY  Physical Therapy Plan  General Plan: 5-7 times per week  Current Treatment Recommendations: Strengthening;ROM;Balance training;Functional mobility training;Transfer training;Endurance training;Neuromuscular re-education;Gait training;Stair training;Home exercise program;Safety education & training;Patient/Caregiver education & training;Equipment evaluation, education, & procurement;Therapeutic activities;Cognitive/Perceptual training;Co-Treatment  Safety Devices  Type of Devices: All fall risk precautions in place;All harry prominences offloaded;Chair alarm in place;Call light within reach;Patient at risk for falls;Left in chair;Nurse notified    EDUCATION  Education  Education Given To: Patient;Family  Education Provided: Role of Therapy;Plan of Care;Precautions;Mobility Training;Transfer Training;Safety;Equipment  Education Provided Comments: bed mobility, body mechanics, use of lift equipment upo d/c.  Education Method: Verbal  Barriers to Learning: Cognition;Hearing  Education Outcome: Continued education needed;Unable to verbalize;Unable to demonstrate understanding        Therapy Time   Individual Concurrent Group Co-treatment   Time In       0930   Time Out       1030   Minutes       60     Timed Code Treatment Minutes: 60 Minutes       Bell Johnson PT, 01/05/24 at 11:39 AM

## 2024-01-05 NOTE — CARE COORDINATION
CM spoke with spouse and daughter face to face about team conference DCP inclusing Dc date, therapy recommendations of SNF and DME. CM provided SNF list for them to review. Daughter stated that they were interested in [facility] [phone]. CM acknowledged and will call too find out if this is another IPR level of care and will update writer.    Dian Mckee, RN

## 2024-01-05 NOTE — PROGRESS NOTES
[facility] Inpatient Rehabilitation Progress Note    Isac Lemus  1/5/2024  [id number]    Chief Complaint: SDH (subdural hematoma) (HCC)    Subjective:   No acute events overnight. Today Jack is seen in his room with his wife and daughter present. He appears comfortable and does not demonstrate any pain behaviors. Family reports he was more engaged with therapy yesterday. Discussed patient disposition.     ROS: unable to reliably obtain    Objective:  Patient Vitals for the past 24 hrs:   BP Temp Temp src Pulse Resp SpO2 Weight   01/05/24 1134 117/69 -- -- 99 -- 98 % --   01/05/24 0924 117/69 97.8 °F (36.6 °C) Oral 99 24 98 % --   01/05/24 0515 -- -- -- -- -- -- 93.7 kg (206 lb 9.1 oz)   01/05/24 0416 -- -- -- -- 26 96 % --   01/05/24 0005 -- -- -- -- (!) 32 95 % --   01/04/24 1937 (!) 97/53 98.7 °F (37.1 °C) Axillary 77 22 93 % --   01/04/24 1722 120/61 -- -- (!) 121 -- 94 % --   01/04/24 1516 107/67 -- -- 87 -- 97 % --   01/04/24 1513 107/67 -- -- 87 -- 97 % --   01/04/24 1505 -- -- -- 98 -- -- --     Gen: No distress  HEENT: Normocephalic, atraumatic.   CV: irregularly irregular rhythm  Resp: No respiratory distress. Lungs CTAB  Abd: Soft, nondistended  Ext: No edema.   Neuro: asleep in bed, opens eyes spontaneously    Wt Readings from Last 3 Encounters:   01/05/24 93.7 kg (206 lb 9.1 oz)   04/06/16 115.7 kg (255 lb)   03/16/16 115.7 kg (255 lb)       Laboratory data:   Lab Results   Component Value Date    WBC 5.9 01/05/2024    HGB 10.3 (L) 01/05/2024    HCT 31.9 (L) 01/05/2024    .4 (H) 01/05/2024     01/05/2024       Lab Results   Component Value Date/Time     01/05/2024 07:19 AM    K 4.3 01/05/2024 07:19 AM     01/05/2024 07:19 AM    CO2 34 01/05/2024 07:19 AM    BUN 14 01/05/2024 07:19 AM    CREATININE <0.5 01/05/2024 07:19 AM    GLUCOSE 116 01/05/2024 07:19 AM    CALCIUM 8.5 01/05/2024 07:19 AM        Therapy progress:    PT    Supine to Sit:    Sit to Supine:     Sit to

## 2024-01-05 NOTE — PLAN OF CARE
Problem: Pain  Goal: Verbalizes/displays adequate comfort level or baseline comfort level  1/5/2024 1024 by Cadence Barr RN  Outcome: Progressing  1/4/2024 2111 by KIYA KIM  Outcome: Progressing

## 2024-01-05 NOTE — PROGRESS NOTES
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; independent with household tasks and medication/financial management; family nearby   Dx: [x] Aphasia  [x] Dysarthria  [] Apraxia   [x] Oropharyngeal dysphagia [x] Cognitive Impairment  [] Other:   Date of Admit: 2023  Room #: 0156/0156-01    Current functional status (updated daily):       +    Pt being seen for : [x] Speech/Language Treatment  [x] Dysphagia Treatment [] Cognitive Treatment  [] Other:  Communication: []WFL  [x] Aphasia  [x] Dysarthria  [] Apraxia  [] Pragmatic Impairment [] Non-verbal  [] Hearing Loss  [] Other:   Cognition: [] WFL  [] Mild  [] Moderate  [] Severe [x] Unable to Assess  [x] Other: suspect deficit  Memory: [] WFL  [] Mild  [] Moderate  [] Severe [x] Unable to Assess  [] Other:  Behavior: [] Alert  [] Cooperative  [x]  Pleasant  [] Confused  [] Agitated  [] Uncooperative  [x] Distractible [] Motivated  [] Self-Limiting [] Anxious  [] Other:  Endurance:  [] Adequate for participation in SLP sessions  [x] Reduced overall  [x] Lethargic  [] Other:  Safety: [] No concerns at this time  [] Reduced insight into deficits  []  Reduced safety awareness [] Not following call light procedures  [x] Unable to Assess  [] Other:    Current Diet Order:Diet NPO  ADULT TUBE FEEDING; PEG; Standard with Fiber; Bolus; 5 Times Daily; 237; 80; Before and after each bolus  Swallowing Precautions: Reflux and aspiration precautions; Frequent oral hygiene; allow small volume ice chips with SLP/RN only, hold PO and contact SLP if s/s aspiration or worsening respiratory status develop        Date: 2024      Tx session 1  0800 - 0830  Continue goals above.    Plan: Continue as per plan of care.      Additional Information:     Barriers toward progress: Pain, Confusion, Communication deficit, Decreased endurance, and Medical complications  Discharge recommendations:  [] Home independently  [] Home with assistance [x]  24 hour supervision  [] ECF [] Other:  Continued Tx Upon Discharge: ? [x] Yes [] No [] TBD based on progress while on ARU [] Vital Stim indicated [] Other:   Estimated discharge date: 01/14/24    Interventions used this date:  [x] Speech/Language Treatment  [] Instruction in HEP [] Group [x] Dysphagia Treatment [] Cognitive Treatment   [] Other:      Total Time Breakdown / Charges    Time in Time out Total Time / units   Cognitive Tx      Speech Tx 0820  1120 0830  1139 10 min / 1 unit  10 min / 1 unit   Dysphagia Tx 0800  1100 0820  1120 20 min / 1 unit   20 min / 1 unit        Electronically Signed by     Tennille Pagan MA CCC-SLP #[id number]  Speech Language Pathologist

## 2024-01-05 NOTE — PROGRESS NOTES
Comprehensive Nutrition Assessment    Type and Reason for Visit:  Reassess    Nutrition Recommendations/Plan:   Continue Jevity 1.5 bolus feeds x5 daily via PEG tube   Continue free water flush of 80 mL before and after administration, monitor need for adjustments   Monitor TF tolerance (abdominal pain, bloating, distention, diarrhea)  Monitor nutrition adequacy, pertinent labs, bowel habits, wt changes, and clinical progress     Malnutrition Assessment:  Malnutrition Status:  At risk for malnutrition (Comment) (12/28/23 1220)    Context:  Acute Illness     Findings of the 6 clinical characteristics of malnutrition:  Energy Intake:  Mild decrease in energy intake (Comment)  Weight Loss:  Greater than 7.5% over 3 months       Nutrition Assessment:    Follow up: Pt now on bolus feeds of Jevity 1.5 x5 cans daily. Pt tolerating bolus feeds well, no issues with entire can (237 mL) at a time. Blood sugars remain stable following modification to tube feed regimen. Sodium labs improving. Weights trending down, will monitor need for increased TF regimen if weight continue to trend down this ARU stay. Will monitor.    Nutrition Related Findings:    Na 144. Active BS. BM on 1/4. BG stable. Wound Type: Open Wounds, Deep Tissue Injury       Current Nutrition Intake & Therapies:    Average Meal Intake: NPO  Average Supplements Intake: NPO  Diet NPO  ADULT TUBE FEEDING; PEG; Standard with Fiber; Bolus; 5 Times Daily; 237; 80; Before and after each bolus  Current Tube Feeding (TF) Orders:  Feeding Route: PEG  Formula: Diabetic  Schedule: Continuous  Goal TF & Flush Orders Provides: Bolus feeds of Jevity 1.5 x5 cans daily to provide 1185 mL TV, 1775 kcal, 76 g protein, and 900 mL TV. +80 mL free water flush before and after administration    Anthropometric Measures:  Height: 170.2 cm (5' 7\")  Ideal Body Weight (IBW): 148 lbs (67 kg)    Admission Body Weight: 96.6 kg (213 lb) (bed scale)  Current Body Weight: 93.4 kg (206 lb),   IBW.  Weight Source: Bed Scale  Current BMI (kg/m2): 32.3  Usual Body Weight: 105.3 kg (232 lb 2.3 oz) (9/22)  % Weight Change (Calculated): -8  Weight Adjustment For: No Adjustment                 BMI Categories: Obese Class 1 (BMI 30.0-34.9)    Estimated Daily Nutrient Needs:  Energy Requirements Based On: Kcal/kg (25-30)  Weight Used for Energy Requirements: Ideal  Energy (kcal/day): 5006-7739 kcals  Weight Used for Protein Requirements: Ideal (1-1.5)  Protein (g/day):  g  Method Used for Fluid Requirements: 1 ml/kcal  Fluid (ml/day): 0782-8462 ml    Nutrition Diagnosis:   Inadequate oral intake related to inadequate protein-energy intake, swallowing difficulty as evidenced by nutrition support - enteral nutrition, NPO or clear liquid status due to medical condition, swallow study results, weight loss 7.5% in 3 months    Nutrition Interventions:   Food and/or Nutrient Delivery: Continue Current Tube Feeding  Nutrition Education/Counseling: No recommendation at this time  Coordination of Nutrition Care: Continue to monitor while inpatient, Interdisciplinary Rounds       Goals:  Previous Goal Met: Progressing toward Goal(s)  Goals: Tolerate nutrition support at goal rate, prior to discharge       Nutrition Monitoring and Evaluation:   Behavioral-Environmental Outcomes: None Identified  Food/Nutrient Intake Outcomes: Enteral Nutrition Intake/Tolerance  Physical Signs/Symptoms Outcomes: Biochemical Data, GI Status, Diarrhea, Nutrition Focused Physical Findings, Weight    Discharge Planning:    Too soon to determine     Linda Heredia MS, RD, LD  Contact: [phone]

## 2024-01-05 NOTE — TELEPHONE ENCOUNTER
Monitor company [employer]  Length of monitor 14 days  Monitor ordered by Linda Srinivasan CNP   Patch ID [id number]  Device number [id number]  Monitor given to Kaylen PIERRE.   Nurse to apply at the time of discharge.

## 2024-01-05 NOTE — PROGRESS NOTES
01/05/24 0416   NIV Type   NIV Started/Stopped On   Equipment Type V60   Mode Bilevel   Mask Type Full face mask   Mask Size Large   Assessment   Respirations 26   SpO2 96 %   Comfort Level Good   Using Accessory Muscles No   Skin Assessment Clean, dry, & intact   Skin Protection for O2 Device Yes   Location Nose   Settings/Measurements   IPAP 12 cmH20   CPAP/EPAP 8 cmH2O   Vt (Measured) 469 mL   FiO2  30 %   Minute Volume (L/min) 12.3 Liters   Mask Leak (lpm) 8 lpm   Patient's Home Machine No   Alarm Settings   Alarms On Y

## 2024-01-05 NOTE — PROGRESS NOTES
[facility]     Electrophysiology                                     Progress Note    Admission date:  2023    Reason for follow up visit: AF    HPI/CC: Isac Lemus was admitted on 2023 to the ARU from  after traumatic fall resulting in skull fracture and SAH/SDH. Complicated hospital course. EP consulted when EKG showed rapid AFL. He is not on telemetry.     Subjective: Unable to assess due to mental status. Daughter and wife at bedside.      Vitals:  Blood pressure 117/69, pulse 99, temperature 97.8 °F (36.6 °C), temperature source Oral, resp. rate 24, height 1.702 m (5' 7\"), weight 93.7 kg (206 lb 9.1 oz), SpO2 98 %.  Temp  Av.3 °F (36.8 °C)  Min: 97.8 °F (36.6 °C)  Max: 98.7 °F (37.1 °C)  Pulse  Av.4  Min: 77  Max: 127  BP  Min: 97/53  Max: 120/61  SpO2  Av %  Min: 93 %  Max: 98 %  FiO2   Av %  Min: 30 %  Max: 30 %    24 hour I/O    Intake/Output Summary (Last 24 hours) at 2024 1156  Last data filed at 2024 0926  Gross per 24 hour   Intake 1711 ml   Output 200 ml   Net 1511 ml     Current Facility-Administered Medications   Medication Dose Route Frequency Provider Last Rate Last Admin    nystatin (MYCOSTATIN) 317547 UNIT/ML suspension 500,000 Units  5 mL Oral 4x Daily Nati Mcelroy MD   500,000 Units at 24 0927    metoprolol tartrate (LOPRESSOR) tablet 100 mg  100 mg Per G Tube BID MARY Hernandez Jr., MD   100 mg at 24 0928    dilTIAZem (CARDIZEM CD) extended release capsule 120 mg  120 mg Oral BID MARY Hernandez Jr., MD   120 mg at 24 09    balsum peru-castor oil (VENELEX) ointment   Topical BID Nati Mcelroy MD   Given at 24 09    pantoprazole (PROTONIX) injection 40 mg  40 mg IntraVENous Daily Darek Lemos MD   40 mg at 24 0928    ipratropium 0.5 mg-albuterol 2.5 mg (DUONEB) nebulizer solution 1 Dose  1 Dose Inhalation Q4H PRN Nati Mcelroy MD guaiFENesin (MUCINEX) extended   Given at 12/25/23 2203    atorvastatin (LIPITOR) tablet 40 mg  40 mg Per G Tube Nightly Nati Mcelroy MD   40 mg at 01/04/24 1938    apixaban (ELIQUIS) tablet 5 mg  5 mg Per G Tube BID Nati Mcelroy MD   5 mg at 01/05/24 0928    metFORMIN (GLUCOPHAGE) tablet 500 mg  500 mg Per G Tube BID Nati Mcelroy MD   500 mg at 01/05/24 0928    glucose chewable tablet 16 g  4 tablet Oral PRN Nati Mcelroy MD        dextrose bolus 10% 125 mL  125 mL IntraVENous PRN Nati Mcelroy MD        Or    dextrose bolus 10% 250 mL  250 mL IntraVENous PRN Nati Mcelroy MD        glucagon injection 1 mg  1 mg SubCUTAneous PRN Nati Mcelroy MD        dextrose 10 % infusion   IntraVENous Continuous PRN Nati Mcelroy MD        insulin lispro (HUMALOG) injection vial 0-8 Units  0-8 Units SubCUTAneous Q6H Nati Mcelroy MD   2 Units at 12/24/23 1236    prochlorperazine (COMPAZINE) tablet 5 mg  5 mg Per G Tube Q6H PRN Nati Mcelroy MD           Objective:     Telemetry monitor: N/A    Physical Exam:  Constitutional and general appearance: cooperative, no distress, slowed mentation, and appears stated age  HEENT: PERRL, no cervical lymphadenopathy. No masses palpable. Normal oral mucosa  Respiratory:  Normal excursion and expansion without use of accessory muscles  Resp auscultation: Normal breath sounds without wheezing, rhonchi, and rales  Cardiovascular:  The apical impulse is not displaced  Heart tones are crisp and normal. regular S1 and S2.  Jugular venous pulsation Normal  The carotid upstroke is normal in amplitude and contour without delay or bruit  Peripheral pulses are symmetrical and full   Abdomen:  No masses or tenderness  Bowel sounds present  Extremities:   No cyanosis or clubbing   No lower extremity edema   Skin: warm and dry  Neurological:  Alert and oriented  Moves all extremities well  No abnormalities of mood, affect, memory, mentation, or behavior

## 2024-01-06 LAB
GLUCOSE BLD-MCNC: 115 MG/DL (ref 70–99)
GLUCOSE BLD-MCNC: 151 MG/DL (ref 70–99)
GLUCOSE BLD-MCNC: 160 MG/DL (ref 70–99)
PERFORMED ON: ABNORMAL

## 2024-01-06 PROCEDURE — 6370000000 HC RX 637 (ALT 250 FOR IP): Performed by: INTERNAL MEDICINE

## 2024-01-06 PROCEDURE — 6370000000 HC RX 637 (ALT 250 FOR IP): Performed by: NURSE PRACTITIONER

## 2024-01-06 PROCEDURE — C9113 INJ PANTOPRAZOLE SODIUM, VIA: HCPCS | Performed by: INTERNAL MEDICINE

## 2024-01-06 PROCEDURE — 6360000002 HC RX W HCPCS: Performed by: INTERNAL MEDICINE

## 2024-01-06 PROCEDURE — 94660 CPAP INITIATION&MGMT: CPT

## 2024-01-06 PROCEDURE — 6370000000 HC RX 637 (ALT 250 FOR IP): Performed by: STUDENT IN AN ORGANIZED HEALTH CARE EDUCATION/TRAINING PROGRAM

## 2024-01-06 PROCEDURE — 1280000000 HC REHAB R&B

## 2024-01-06 RX ADMIN — LEVETIRACETAM 750 MG: 100 SOLUTION ORAL at 22:08

## 2024-01-06 RX ADMIN — METFORMIN HYDROCHLORIDE 500 MG: 500 TABLET ORAL at 09:14

## 2024-01-06 RX ADMIN — PANTOPRAZOLE SODIUM 40 MG: 40 INJECTION, POWDER, FOR SOLUTION INTRAVENOUS at 09:13

## 2024-01-06 RX ADMIN — NYSTATIN 500000 UNITS: 100000 SUSPENSION ORAL at 09:13

## 2024-01-06 RX ADMIN — LEVETIRACETAM 750 MG: 100 SOLUTION ORAL at 11:45

## 2024-01-06 RX ADMIN — CASTOR OIL AND BALSAM, PERU: 788; 87 OINTMENT TOPICAL at 22:08

## 2024-01-06 RX ADMIN — APIXABAN 5 MG: 5 TABLET, FILM COATED ORAL at 09:14

## 2024-01-06 RX ADMIN — CASTOR OIL AND BALSAM, PERU: 788; 87 OINTMENT TOPICAL at 09:14

## 2024-01-06 RX ADMIN — DILTIAZEM HYDROCHLORIDE 30 MG: 60 TABLET ORAL at 17:25

## 2024-01-06 RX ADMIN — ACETAMINOPHEN 1000 MG: 500 TABLET ORAL at 15:17

## 2024-01-06 RX ADMIN — METFORMIN HYDROCHLORIDE 500 MG: 500 TABLET ORAL at 22:09

## 2024-01-06 RX ADMIN — ATORVASTATIN CALCIUM 40 MG: 40 TABLET, FILM COATED ORAL at 22:09

## 2024-01-06 RX ADMIN — ACETAMINOPHEN 1000 MG: 500 TABLET ORAL at 05:37

## 2024-01-06 RX ADMIN — ACETAMINOPHEN 1000 MG: 500 TABLET ORAL at 22:09

## 2024-01-06 RX ADMIN — METOPROLOL TARTRATE 100 MG: 50 TABLET, FILM COATED ORAL at 09:14

## 2024-01-06 RX ADMIN — NYSTATIN 500000 UNITS: 100000 SUSPENSION ORAL at 17:26

## 2024-01-06 RX ADMIN — GUAIFENESIN 600 MG: 600 TABLET ORAL at 22:09

## 2024-01-06 RX ADMIN — DILTIAZEM HYDROCHLORIDE 30 MG: 60 TABLET ORAL at 05:37

## 2024-01-06 RX ADMIN — DILTIAZEM HYDROCHLORIDE 30 MG: 60 TABLET ORAL at 11:47

## 2024-01-06 RX ADMIN — Medication 5 MG: at 22:09

## 2024-01-06 RX ADMIN — DILTIAZEM HYDROCHLORIDE 30 MG: 60 TABLET ORAL at 23:59

## 2024-01-06 RX ADMIN — GUAIFENESIN 600 MG: 600 TABLET ORAL at 09:14

## 2024-01-06 RX ADMIN — DIGOXIN 125 MCG: 125 TABLET ORAL at 09:14

## 2024-01-06 RX ADMIN — NYSTATIN 500000 UNITS: 100000 SUSPENSION ORAL at 21:58

## 2024-01-06 RX ADMIN — DONEPEZIL HYDROCHLORIDE 5 MG: 5 TABLET, FILM COATED ORAL at 09:14

## 2024-01-06 RX ADMIN — APIXABAN 5 MG: 5 TABLET, FILM COATED ORAL at 22:09

## 2024-01-06 RX ADMIN — MODAFINIL 300 MG: 200 TABLET ORAL at 09:14

## 2024-01-06 RX ADMIN — METOPROLOL TARTRATE 100 MG: 50 TABLET, FILM COATED ORAL at 22:09

## 2024-01-06 RX ADMIN — NYSTATIN 500000 UNITS: 100000 SUSPENSION ORAL at 15:00

## 2024-01-06 ASSESSMENT — PAIN SCALES - WONG BAKER
WONGBAKER_NUMERICALRESPONSE: 0
WONGBAKER_NUMERICALRESPONSE: 2
WONGBAKER_NUMERICALRESPONSE: 0
WONGBAKER_NUMERICALRESPONSE: 2
WONGBAKER_NUMERICALRESPONSE: 0

## 2024-01-06 ASSESSMENT — PAIN SCALES - GENERAL: PAINLEVEL_OUTOF10: 2

## 2024-01-06 NOTE — PROGRESS NOTES
01/05/24 2330   NIV Type   NIV Started/Stopped On   Equipment Type V60   Mode Bilevel   Mask Type Full face mask   Mask Size Large   Assessment   Respirations (!) 35   SpO2 92 %   Comfort Level Good   Using Accessory Muscles No   Mask Compliance Good   Skin Assessment Clean, dry, & intact   Skin Protection for O2 Device Yes   Location Nose   Settings/Measurements   IPAP 12 cmH20   CPAP/EPAP 8 cmH2O   Vt (Measured) 354 mL   Rate Ordered 12   FiO2  30 %   Minute Volume (L/min) 7.7 Liters   Mask Leak (lpm) 8 lpm   Patient's Home Machine No   Alarm Settings   Alarms On Y

## 2024-01-06 NOTE — PLAN OF CARE
Problem: Pain  Goal: Verbalizes/displays adequate comfort level or baseline comfort level  Outcome: Progressing  Flowsheets (Taken 12/29/2023 1015 by Regine Srinivasan, RN)  Verbalizes/displays adequate comfort level or baseline comfort level: Encourage patient to monitor pain and request assistance     Problem: Skin/Tissue Integrity  Goal: Absence of new skin breakdown  Description: 1.  Monitor for areas of redness and/or skin breakdown  2.  Assess vascular access sites hourly  3.  Every 4-6 hours minimum:  Change oxygen saturation probe site  4.  Every 4-6 hours:  If on nasal continuous positive airway pressure, respiratory therapy assess nares and determine need for appliance change or resting period.  Outcome: Progressing     Problem: Safety - Adult  Goal: Free from fall injury  Outcome: Progressing  Flowsheets (Taken 12/31/2023 0031 by Lilliam Castaneda, RN)  Free From Fall Injury: Instruct family/caregiver on patient safety     Problem: ABCDS Injury Assessment  Goal: Absence of physical injury  Outcome: Progressing  Flowsheets (Taken 12/31/2023 0031 by Lilliam Castaneda, RN)  Absence of Physical Injury: Implement safety measures based on patient assessment     Problem: Nutrition Deficit:  Goal: Optimize nutritional status  Outcome: Progressing  Flowsheets (Taken 1/5/2024 1311 by Linda Heredia, MS, RD, LD)  Nutrient intake appropriate for improving, restoring, or maintaining nutritional needs:   Assess nutritional status and recommend course of action   Recommend, monitor, and adjust tube feedings and TPN/PPN based on assessed needs     Problem: Chronic Conditions and Co-morbidities  Goal: Patient's chronic conditions and co-morbidity symptoms are monitored and maintained or improved  Outcome: Progressing

## 2024-01-06 NOTE — PLAN OF CARE
Problem: Safety - Adult  Goal: Free from fall injury  Outcome: Progressing  Flowsheets (Taken 1/6/2024 1637)  Free From Fall Injury:   Instruct family/caregiver on patient safety   Based on caregiver fall risk screen, instruct family/caregiver to ask for assistance with transferring infant if caregiver noted to have fall risk factors     Problem: Skin/Tissue Integrity  Goal: Absence of new skin breakdown  Description: 1.  Monitor for areas of redness and/or skin breakdown  2.  Assess vascular access sites hourly  3.  Every 4-6 hours minimum:  Change oxygen saturation probe site  4.  Every 4-6 hours:  If on nasal continuous positive airway pressure, respiratory therapy assess nares and determine need for appliance change or resting period.  Outcome: Progressing     Problem: Pain  Goal: Verbalizes/displays adequate comfort level or baseline comfort level  Outcome: Progressing  Flowsheets (Taken 1/6/2024 1637)  Verbalizes/displays adequate comfort level or baseline comfort level:   Encourage patient to monitor pain and request assistance   Administer analgesics based on type and severity of pain and evaluate response   Assess pain using appropriate pain scale   Implement non-pharmacological measures as appropriate and evaluate response

## 2024-01-06 NOTE — PROGRESS NOTES
01/06/24 0347   NIV Type   NIV Started/Stopped On   Equipment Type V60   Mode Bilevel   Mask Type Full face mask   Mask Size Large   Assessment   Respirations 20   SpO2 95 %   Comfort Level Good   Using Accessory Muscles No   Mask Compliance Good   Skin Assessment Clean, dry, & intact   Skin Protection for O2 Device Yes   Location Nose   Settings/Measurements   IPAP 12 cmH20   CPAP/EPAP 8 cmH2O   Vt (Measured) 508 mL   Rate Ordered 12   FiO2  30 %   Minute Volume (L/min) 10.2 Liters   Mask Leak (lpm) 6 lpm   Patient's Home Machine No   Alarm Settings   Alarms On Y

## 2024-01-06 NOTE — PROGRESS NOTES
Received patient asleep on bed. Vitals are stable. Cardiac monitor placed with good body position.    Checked and changed. Wears BIPAP at night. Maintained a calm and comfortable environment. Shift assessment updated and documented.

## 2024-01-07 VITALS
HEIGHT: 67 IN | RESPIRATION RATE: 18 BRPM | TEMPERATURE: 101.2 F | HEART RATE: 148 BPM | BODY MASS INDEX: 32.18 KG/M2 | WEIGHT: 205 LBS | OXYGEN SATURATION: 94 % | DIASTOLIC BLOOD PRESSURE: 64 MMHG | SYSTOLIC BLOOD PRESSURE: 110 MMHG

## 2024-01-07 LAB
GLUCOSE BLD-MCNC: 108 MG/DL (ref 70–99)
GLUCOSE BLD-MCNC: 131 MG/DL (ref 70–99)
GLUCOSE BLD-MCNC: 144 MG/DL (ref 70–99)
GLUCOSE BLD-MCNC: 157 MG/DL (ref 70–99)
PERFORMED ON: ABNORMAL

## 2024-01-07 PROCEDURE — 6370000000 HC RX 637 (ALT 250 FOR IP): Performed by: STUDENT IN AN ORGANIZED HEALTH CARE EDUCATION/TRAINING PROGRAM

## 2024-01-07 PROCEDURE — 6370000000 HC RX 637 (ALT 250 FOR IP): Performed by: INTERNAL MEDICINE

## 2024-01-07 PROCEDURE — 6370000000 HC RX 637 (ALT 250 FOR IP): Performed by: NURSE PRACTITIONER

## 2024-01-07 PROCEDURE — 6360000002 HC RX W HCPCS: Performed by: INTERNAL MEDICINE

## 2024-01-07 PROCEDURE — 1280000000 HC REHAB R&B

## 2024-01-07 PROCEDURE — 2700000000 HC OXYGEN THERAPY PER DAY

## 2024-01-07 PROCEDURE — C9113 INJ PANTOPRAZOLE SODIUM, VIA: HCPCS | Performed by: INTERNAL MEDICINE

## 2024-01-07 PROCEDURE — 94761 N-INVAS EAR/PLS OXIMETRY MLT: CPT

## 2024-01-07 RX ADMIN — APIXABAN 5 MG: 5 TABLET, FILM COATED ORAL at 09:30

## 2024-01-07 RX ADMIN — ACETAMINOPHEN 650 MG: 325 TABLET, FILM COATED ORAL at 19:21

## 2024-01-07 RX ADMIN — CASTOR OIL AND BALSAM, PERU: 788; 87 OINTMENT TOPICAL at 10:18

## 2024-01-07 RX ADMIN — GUAIFENESIN 600 MG: 600 TABLET ORAL at 10:19

## 2024-01-07 RX ADMIN — METFORMIN HYDROCHLORIDE 500 MG: 500 TABLET ORAL at 10:17

## 2024-01-07 RX ADMIN — APIXABAN 5 MG: 5 TABLET, FILM COATED ORAL at 20:59

## 2024-01-07 RX ADMIN — NYSTATIN 500000 UNITS: 100000 SUSPENSION ORAL at 17:33

## 2024-01-07 RX ADMIN — PANTOPRAZOLE SODIUM 40 MG: 40 INJECTION, POWDER, FOR SOLUTION INTRAVENOUS at 09:00

## 2024-01-07 RX ADMIN — DIGOXIN 125 MCG: 125 TABLET ORAL at 10:19

## 2024-01-07 RX ADMIN — DILTIAZEM HYDROCHLORIDE 30 MG: 60 TABLET ORAL at 10:17

## 2024-01-07 RX ADMIN — GUAIFENESIN 600 MG: 600 TABLET ORAL at 20:58

## 2024-01-07 RX ADMIN — ACETAMINOPHEN 1000 MG: 500 TABLET ORAL at 22:25

## 2024-01-07 RX ADMIN — METOPROLOL TARTRATE 100 MG: 50 TABLET, FILM COATED ORAL at 10:16

## 2024-01-07 RX ADMIN — ACETAMINOPHEN 1000 MG: 500 TABLET ORAL at 06:07

## 2024-01-07 RX ADMIN — METOPROLOL TARTRATE 100 MG: 50 TABLET, FILM COATED ORAL at 20:58

## 2024-01-07 RX ADMIN — NYSTATIN 500000 UNITS: 100000 SUSPENSION ORAL at 20:58

## 2024-01-07 RX ADMIN — NYSTATIN 500000 UNITS: 100000 SUSPENSION ORAL at 13:30

## 2024-01-07 RX ADMIN — DILTIAZEM HYDROCHLORIDE 30 MG: 60 TABLET ORAL at 06:07

## 2024-01-07 RX ADMIN — DONEPEZIL HYDROCHLORIDE 5 MG: 5 TABLET, FILM COATED ORAL at 10:17

## 2024-01-07 RX ADMIN — ACETAMINOPHEN 1000 MG: 500 TABLET ORAL at 14:33

## 2024-01-07 RX ADMIN — NYSTATIN 500000 UNITS: 100000 SUSPENSION ORAL at 10:17

## 2024-01-07 RX ADMIN — MODAFINIL 300 MG: 200 TABLET ORAL at 10:17

## 2024-01-07 RX ADMIN — CASTOR OIL AND BALSAM, PERU: 788; 87 OINTMENT TOPICAL at 22:25

## 2024-01-07 RX ADMIN — LEVETIRACETAM 750 MG: 100 SOLUTION ORAL at 22:25

## 2024-01-07 RX ADMIN — ATORVASTATIN CALCIUM 40 MG: 40 TABLET, FILM COATED ORAL at 20:57

## 2024-01-07 RX ADMIN — LEVETIRACETAM 750 MG: 100 SOLUTION ORAL at 10:31

## 2024-01-07 RX ADMIN — METFORMIN HYDROCHLORIDE 500 MG: 500 TABLET ORAL at 20:59

## 2024-01-07 RX ADMIN — DILTIAZEM HYDROCHLORIDE 30 MG: 60 TABLET ORAL at 17:00

## 2024-01-07 RX ADMIN — Medication 5 MG: at 20:57

## 2024-01-07 ASSESSMENT — PAIN SCALES - GENERAL
PAINLEVEL_OUTOF10: 0
PAINLEVEL_OUTOF10: 0
PAINLEVEL_OUTOF10: 1

## 2024-01-07 ASSESSMENT — PAIN SCALES - WONG BAKER
WONGBAKER_NUMERICALRESPONSE: 0

## 2024-01-07 ASSESSMENT — PAIN - FUNCTIONAL ASSESSMENT: PAIN_FUNCTIONAL_ASSESSMENT: ACTIVITIES ARE NOT PREVENTED

## 2024-01-07 NOTE — PROGRESS NOTES
01/06/24 9120   NIV Type   Equipment Type V60   Mode Bilevel   Mask Type Full face mask   Mask Size Large   Assessment   Respirations 24   SpO2 95 %   Comfort Level Good   Using Accessory Muscles No   Mask Compliance Good   Skin Assessment Clean, dry, & intact   Skin Protection for O2 Device Yes   Location Nose   Settings/Measurements   PIP Observed 13 cm H20   IPAP 12 cmH20   CPAP/EPAP 8 cmH2O   Vt (Measured) 406 mL   FiO2  30 %   Minute Volume (L/min) 14 Liters   Mask Leak (lpm) 15 lpm   Patient's Home Machine No   Alarm Settings   Alarms On Y

## 2024-01-07 NOTE — PLAN OF CARE
Problem: Pain  Goal: Verbalizes/displays adequate comfort level or baseline comfort level  1/6/2024 2251 by Fanny Hutchins RN  Flowsheets (Taken 1/6/2024 2251)  Verbalizes/displays adequate comfort level or baseline comfort level:   Encourage patient to monitor pain and request assistance   Assess pain using appropriate pain scale  1/6/2024 1637 by Delisa Olivares RN  Outcome: Progressing  Flowsheets (Taken 1/6/2024 1637)  Verbalizes/displays adequate comfort level or baseline comfort level:   Encourage patient to monitor pain and request assistance   Administer analgesics based on type and severity of pain and evaluate response   Assess pain using appropriate pain scale   Implement non-pharmacological measures as appropriate and evaluate response     Problem: Skin/Tissue Integrity  Goal: Absence of new skin breakdown  Description: 1.  Monitor for areas of redness and/or skin breakdown  2.  Assess vascular access sites hourly  3.  Every 4-6 hours minimum:  Change oxygen saturation probe site  4.  Every 4-6 hours:  If on nasal continuous positive airway pressure, respiratory therapy assess nares and determine need for appliance change or resting period.  1/6/2024 1637 by Delisa Olivares RN  Outcome: Progressing     Problem: Skin/Tissue Integrity  Goal: Absence of new skin breakdown  Description: 1.  Monitor for areas of redness and/or skin breakdown  2.  Assess vascular access sites hourly  3.  Every 4-6 hours minimum:  Change oxygen saturation probe site  4.  Every 4-6 hours:  If on nasal continuous positive airway pressure, respiratory therapy assess nares and determine need for appliance change or resting period.  1/6/2024 1637 by Delisa Olivares RN  Outcome: Progressing     Problem: Safety - Adult  Goal: Free from fall injury  1/6/2024 1637 by Delisa Olivares RN  Outcome: Progressing  Flowsheets (Taken 1/6/2024 1637)  Free From Fall Injury:   Instruct family/caregiver on patient safety   Based on  caregiver fall risk screen, instruct family/caregiver to ask for assistance with transferring infant if caregiver noted to have fall risk factors     Problem: Nutrition Deficit:  Goal: Optimize nutritional status  Flowsheets (Taken 1/6/2024 6191)  Nutrient intake appropriate for improving, restoring, or maintaining nutritional needs:   Assess nutritional status and recommend course of action   Recommend appropriate diets, oral nutritional supplements, and vitamin/mineral supplements

## 2024-01-08 ENCOUNTER — TELEPHONE (OUTPATIENT)
Dept: CARDIOLOGY CLINIC | Age: 74
End: 2024-01-08

## 2024-01-08 LAB
ANION GAP SERPL CALCULATED.3IONS-SCNC: 7 MMOL/L (ref 3–16)
BASOPHILS # BLD: 0.1 K/UL (ref 0–0.2)
BASOPHILS NFR BLD: 0.7 %
BUN SERPL-MCNC: 13 MG/DL (ref 7–20)
CALCIUM SERPL-MCNC: 8.7 MG/DL (ref 8.3–10.6)
CHLORIDE SERPL-SCNC: 101 MMOL/L (ref 99–110)
CO2 SERPL-SCNC: 32 MMOL/L (ref 21–32)
CREAT SERPL-MCNC: <0.5 MG/DL (ref 0.8–1.3)
DEPRECATED RDW RBC AUTO: 17.1 % (ref 12.4–15.4)
EOSINOPHIL # BLD: 0.1 K/UL (ref 0–0.6)
EOSINOPHIL NFR BLD: 0.9 %
GFR SERPLBLD CREATININE-BSD FMLA CKD-EPI: >60 ML/MIN/{1.73_M2}
GLUCOSE BLD-MCNC: 107 MG/DL (ref 70–99)
GLUCOSE BLD-MCNC: 114 MG/DL (ref 70–99)
GLUCOSE BLD-MCNC: 123 MG/DL (ref 70–99)
GLUCOSE BLD-MCNC: 140 MG/DL (ref 70–99)
GLUCOSE BLD-MCNC: 143 MG/DL (ref 70–99)
GLUCOSE BLD-MCNC: 167 MG/DL (ref 70–99)
GLUCOSE BLD-MCNC: 170 MG/DL (ref 70–99)
GLUCOSE SERPL-MCNC: 108 MG/DL (ref 70–99)
HCT VFR BLD AUTO: 31.5 % (ref 40.5–52.5)
HGB BLD-MCNC: 10.1 G/DL (ref 13.5–17.5)
LYMPHOCYTES # BLD: 1.9 K/UL (ref 1–5.1)
LYMPHOCYTES NFR BLD: 21.2 %
MCH RBC QN AUTO: 32.6 PG (ref 26–34)
MCHC RBC AUTO-ENTMCNC: 32 G/DL (ref 31–36)
MCV RBC AUTO: 101.8 FL (ref 80–100)
MONOCYTES # BLD: 0.8 K/UL (ref 0–1.3)
MONOCYTES NFR BLD: 9.7 %
NEUTROPHILS # BLD: 5.9 K/UL (ref 1.7–7.7)
NEUTROPHILS NFR BLD: 67.5 %
PERFORMED ON: ABNORMAL
PHOSPHATE SERPL-MCNC: 3.7 MG/DL (ref 2.5–4.9)
PLATELET # BLD AUTO: 227 K/UL (ref 135–450)
PMV BLD AUTO: 9 FL (ref 5–10.5)
POTASSIUM SERPL-SCNC: 4.5 MMOL/L (ref 3.5–5.1)
RBC # BLD AUTO: 3.1 M/UL (ref 4.2–5.9)
SODIUM SERPL-SCNC: 140 MMOL/L (ref 136–145)
WBC # BLD AUTO: 8.7 K/UL (ref 4–11)

## 2024-01-08 PROCEDURE — 6360000002 HC RX W HCPCS: Performed by: INTERNAL MEDICINE

## 2024-01-08 PROCEDURE — 97530 THERAPEUTIC ACTIVITIES: CPT

## 2024-01-08 PROCEDURE — 94660 CPAP INITIATION&MGMT: CPT

## 2024-01-08 PROCEDURE — 84100 ASSAY OF PHOSPHORUS: CPT

## 2024-01-08 PROCEDURE — C9113 INJ PANTOPRAZOLE SODIUM, VIA: HCPCS | Performed by: INTERNAL MEDICINE

## 2024-01-08 PROCEDURE — 92507 TX SP LANG VOICE COMM INDIV: CPT

## 2024-01-08 PROCEDURE — 2700000000 HC OXYGEN THERAPY PER DAY

## 2024-01-08 PROCEDURE — 36415 COLL VENOUS BLD VENIPUNCTURE: CPT

## 2024-01-08 PROCEDURE — 94761 N-INVAS EAR/PLS OXIMETRY MLT: CPT

## 2024-01-08 PROCEDURE — 92526 ORAL FUNCTION THERAPY: CPT

## 2024-01-08 PROCEDURE — 1280000000 HC REHAB R&B

## 2024-01-08 PROCEDURE — 6370000000 HC RX 637 (ALT 250 FOR IP): Performed by: INTERNAL MEDICINE

## 2024-01-08 PROCEDURE — 6370000000 HC RX 637 (ALT 250 FOR IP): Performed by: NURSE PRACTITIONER

## 2024-01-08 PROCEDURE — 6370000000 HC RX 637 (ALT 250 FOR IP): Performed by: STUDENT IN AN ORGANIZED HEALTH CARE EDUCATION/TRAINING PROGRAM

## 2024-01-08 PROCEDURE — 97535 SELF CARE MNGMENT TRAINING: CPT

## 2024-01-08 PROCEDURE — 85025 COMPLETE CBC W/AUTO DIFF WBC: CPT

## 2024-01-08 PROCEDURE — 99232 SBSQ HOSP IP/OBS MODERATE 35: CPT | Performed by: INTERNAL MEDICINE

## 2024-01-08 PROCEDURE — 80048 BASIC METABOLIC PNL TOTAL CA: CPT

## 2024-01-08 RX ADMIN — ACETAMINOPHEN 1000 MG: 500 TABLET ORAL at 15:09

## 2024-01-08 RX ADMIN — DILTIAZEM HYDROCHLORIDE 30 MG: 60 TABLET ORAL at 11:36

## 2024-01-08 RX ADMIN — GUAIFENESIN 600 MG: 600 TABLET ORAL at 21:53

## 2024-01-08 RX ADMIN — METFORMIN HYDROCHLORIDE 500 MG: 500 TABLET ORAL at 08:51

## 2024-01-08 RX ADMIN — CASTOR OIL AND BALSAM, PERU: 788; 87 OINTMENT TOPICAL at 08:52

## 2024-01-08 RX ADMIN — APIXABAN 5 MG: 5 TABLET, FILM COATED ORAL at 21:52

## 2024-01-08 RX ADMIN — NYSTATIN 500000 UNITS: 100000 SUSPENSION ORAL at 21:53

## 2024-01-08 RX ADMIN — METOPROLOL TARTRATE 100 MG: 50 TABLET, FILM COATED ORAL at 21:52

## 2024-01-08 RX ADMIN — NYSTATIN 500000 UNITS: 100000 SUSPENSION ORAL at 08:50

## 2024-01-08 RX ADMIN — NYSTATIN 500000 UNITS: 100000 SUSPENSION ORAL at 15:09

## 2024-01-08 RX ADMIN — Medication 5 MG: at 21:52

## 2024-01-08 RX ADMIN — PANTOPRAZOLE SODIUM 40 MG: 40 INJECTION, POWDER, FOR SOLUTION INTRAVENOUS at 08:50

## 2024-01-08 RX ADMIN — ACETAMINOPHEN 650 MG: 325 TABLET, FILM COATED ORAL at 21:52

## 2024-01-08 RX ADMIN — MODAFINIL 300 MG: 200 TABLET ORAL at 08:51

## 2024-01-08 RX ADMIN — LEVETIRACETAM 750 MG: 100 SOLUTION ORAL at 11:37

## 2024-01-08 RX ADMIN — DILTIAZEM HYDROCHLORIDE 30 MG: 60 TABLET ORAL at 01:16

## 2024-01-08 RX ADMIN — DILTIAZEM HYDROCHLORIDE 30 MG: 60 TABLET ORAL at 18:14

## 2024-01-08 RX ADMIN — DILTIAZEM HYDROCHLORIDE 30 MG: 60 TABLET ORAL at 23:37

## 2024-01-08 RX ADMIN — DILTIAZEM HYDROCHLORIDE 30 MG: 60 TABLET ORAL at 06:18

## 2024-01-08 RX ADMIN — LEVETIRACETAM 750 MG: 100 SOLUTION ORAL at 23:37

## 2024-01-08 RX ADMIN — METFORMIN HYDROCHLORIDE 500 MG: 500 TABLET ORAL at 21:52

## 2024-01-08 RX ADMIN — NYSTATIN 500000 UNITS: 100000 SUSPENSION ORAL at 18:17

## 2024-01-08 RX ADMIN — ATORVASTATIN CALCIUM 40 MG: 40 TABLET, FILM COATED ORAL at 21:52

## 2024-01-08 RX ADMIN — GUAIFENESIN 600 MG: 600 TABLET ORAL at 08:51

## 2024-01-08 RX ADMIN — DONEPEZIL HYDROCHLORIDE 5 MG: 5 TABLET, FILM COATED ORAL at 08:51

## 2024-01-08 RX ADMIN — APIXABAN 5 MG: 5 TABLET, FILM COATED ORAL at 08:51

## 2024-01-08 RX ADMIN — CASTOR OIL AND BALSAM, PERU: 788; 87 OINTMENT TOPICAL at 22:53

## 2024-01-08 RX ADMIN — ACETAMINOPHEN 1000 MG: 500 TABLET ORAL at 06:18

## 2024-01-08 RX ADMIN — DIGOXIN 125 MCG: 125 TABLET ORAL at 08:51

## 2024-01-08 RX ADMIN — METOPROLOL TARTRATE 100 MG: 50 TABLET, FILM COATED ORAL at 08:51

## 2024-01-08 ASSESSMENT — PAIN SCALES - GENERAL
PAINLEVEL_OUTOF10: 0

## 2024-01-08 ASSESSMENT — PAIN SCALES - WONG BAKER: WONGBAKER_NUMERICALRESPONSE: 0

## 2024-01-08 NOTE — PROGRESS NOTES
01/08/24 0406   NIV Type   NIV Started/Stopped On   Equipment Type v60   Mode Bilevel   Mask Type Full face mask   Mask Size Large   Assessment   Respirations 28   Settings/Measurements   IPAP 12 cmH20   CPAP/EPAP 8 cmH2O   Vt (Measured) 425 mL   Rate Ordered 12   FiO2  30 %   Mask Leak (lpm) 9 lpm   Patient's Home Machine No   Alarm Settings   Alarms On Y   Low Pressure (cmH2O) 6 cmH2O   High Pressure (cmH2O) 30 cmH2O

## 2024-01-08 NOTE — CARE COORDINATION
CM spoke with admission liaison w/[facility] (P: [phone]) (F: [phone]). They explained that they are the same level of care and it would only have to be a unilateral transfer to their facility. Liaison stated that he may not get approved d/t [facility] MEDICARE, she provided contact incase family wants the transfer.     4863 CM spoke with spouse face to face in patients room about [facility]. CM discussed that it is the same level of care and the only way is to do a unilateral transfer but the discharge date will remain the same through insurance. Spouse stated that the daughter will be touring facilities today. CM acknowledged and awaits daughters preferences.    1430 CM spoke with daughter and spouse face to face in patients room. Daughter has toured [facility] and would like a referral to them. CM acknowledged and will send referral.    Dian Mckee RN

## 2024-01-08 NOTE — PROGRESS NOTES
session, but overall able to maintain adequate alertness. SLP completed oral care - pt with thrush present on posterior lingual surface. Pt tolerated ice chips with no overt s/s of aspiration. Pt with post-swallow coughing with thin tsp - cough extremely weak. SLP did trial puree for first time this date due to overall improved alertness, oral phase of swallow, and progress. Pt with min residue along lingual surface and benefited from ice chip to improve clearance. Pt overall tolerated, but swallow appeared more effortful. Will continue to trial in upcoming sessions. Pt with improved repetition of his name, and counted 1-3 with cues from SLP. Continue goals above.     Tx session 2: Pt pleasant and cooperative, agreeable to participate. Pt alert and oriented to self and situation. Pt able to answer yes/no questions via head nodding with 100% acc, and able to verbalize \"yeah\" and \"no\" x1 throughout tx session. Pt tolerated all ice chip trials without any overt s/s of aspiration, therefore allowing pt to have ice chips with implementation of FFWP (requested order from MD and sign placed in patients room for reference). Wife and RN educated on strict oral care, safe swallow strategies, and ice chips only. Pt did present with delayed throat clearing on both thin liquid via teaspoon and puree PO trials. Will plan to complete repeat FEES instrumental this Thursday 1/11. Pt's wife and pt in agreement.    Plan: Continue as per plan of care.      Additional Information:     Barriers toward progress: Pain, Confusion, Communication deficit, Decreased endurance, and Medical complications  Discharge recommendations:  [] Home independently  [] Home with assistance [x]  24 hour supervision  [] ECF [] Other:  Continued Tx Upon Discharge: ? [x] Yes [] No [] TBD based on progress while on ARU [] Vital Stim indicated [] Other:   Estimated discharge date: 01/14/24    Interventions used this date:  [x] Speech/Language Treatment  []  Instruction in HEP [] Group [x] Dysphagia Treatment [] Cognitive Treatment   [] Other:      Total Time Breakdown / Charges    Time in Time out Total Time / units   Cognitive Tx      Speech Tx 1035  0900 1045  0910 10 mins/ 1 unit   10 mins/ 1 unit    Dysphagia Tx 1015  0910 1035  0930 20 mins/ 1 unit  20 mins/ 1 unit         Electronically Signed by     Tx session 1:  Tennille Pagan MA CCC-SLP #[id number]  Speech Language Pathologist      Tx session 2:   Alejandrina Jovel M.A CCC-SLP #[id number]  Speech-Language Pathologist

## 2024-01-08 NOTE — PROGRESS NOTES
01/08/24 0022   NIV Type   $NIV $Daily Charge   NIV Started/Stopped On   Equipment Type v60   Mode Bilevel   Mask Type Full face mask   Mask Size Large   Assessment   Respirations 21   Settings/Measurements   IPAP 12 cmH20   CPAP/EPAP 8 cmH2O   Vt (Measured) 432 mL   Rate Ordered 12   FiO2  30 %   Mask Leak (lpm) 12 lpm   Patient's Home Machine No   Alarm Settings   Alarms On Y   Low Pressure (cmH2O) 6 cmH2O   High Pressure (cmH2O) 30 cmH2O

## 2024-01-08 NOTE — PROGRESS NOTES
[facility] Inpatient Rehabilitation Progress Note    Isac Lemus  1/8/2024  [id number]    Chief Complaint: SDH (subdural hematoma) (HCC)    Subjective:   Seen in his room this morning. No new complaints overnight. He was able to sleep well last night and has no new concerns. Working hard in therapy at this time. Currently resting in bed this morning without issue.    ROS: unable to reliably obtain    Objective:  Patient Vitals for the past 24 hrs:   BP Temp Temp src Pulse Resp SpO2   01/08/24 1136 -- -- -- 73 -- --   01/08/24 1130 (!) 107/57 -- -- -- -- --   01/08/24 0845 102/60 98.6 °F (37 °C) Axillary (!) 127 20 92 %   01/08/24 0600 130/68 99.2 °F (37.3 °C) Axillary (!) 136 -- --   01/08/24 0445 -- 98.8 °F (37.1 °C) Axillary -- -- --   01/08/24 0406 -- -- -- -- 28 --   01/08/24 0100 105/65 99.7 °F (37.6 °C) Axillary (!) 143 -- --   01/08/24 0022 -- -- -- -- 21 --   01/08/24 0015 -- 100.2 °F (37.9 °C) Axillary -- -- --   01/07/24 2100 -- (!) 101.2 °F (38.4 °C) Axillary (!) 148 18 94 %   01/07/24 1902 -- (!) 102.4 °F (39.1 °C) Axillary -- -- --       Gen: No distress  HEENT: Normocephalic, atraumatic.   CV: irregularly irregular rhythm  Resp: No respiratory distress. Lungs CTAB  Abd: Soft, nondistended  Ext: No edema.   Neuro: asleep in bed, opens eyes spontaneously    Wt Readings from Last 3 Encounters:   01/07/24 93 kg (205 lb)   04/06/16 115.7 kg (255 lb)   03/16/16 115.7 kg (255 lb)       Laboratory data:   Lab Results   Component Value Date    WBC 8.7 01/08/2024    HGB 10.1 (L) 01/08/2024    HCT 31.5 (L) 01/08/2024    .8 (H) 01/08/2024     01/08/2024       Lab Results   Component Value Date/Time     01/08/2024 05:49 AM    K 4.5 01/08/2024 05:49 AM     01/08/2024 05:49 AM    CO2 32 01/08/2024 05:49 AM    BUN 13 01/08/2024 05:49 AM    CREATININE <0.5 01/08/2024 05:49 AM    GLUCOSE 108 01/08/2024 05:49 AM    CALCIUM 8.7 01/08/2024 05:49 AM        Therapy progress:    PT    Supine to  D5  - increased free water flushes    Sleep wake Cycle Disturbance  -Note 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     Dysphagia  - NPO  - s/p PEG 12/16  - tube feeds per dietician  - ST and Dietician following     Atrial fibrillation  - with RVR during acute stay  - AC with Eliquis  - Cardiology consulted due to more frequent episodes of tachycardia, appreciate assistance. Digoxin, cardizem, metoprolol per Cards    Thrush  - nystatin     Right Peroneal DVT  RLL segmental PE  - AC with Eliquis     Thrombocytopenia, resolved  HIT  - no heparin products  - on Eliquis     Colonic pseudoobstruction, resolved  - continue bowel regimen     DM2  - metformin  - SSI     History of HTN  - home lisinopril, losartan held  - metoprolol, cardizem     Bowels: adjust medications as needed for regular bowel movements     Bladder: Check PVR x 3.  IMC if PVR > 200ml or if any volume is > 500 ml.      Sleep: melatonin provided nightly     PPX  DVT: on AC  GI: not indicated     Follow up appointments: PCP, Neurosurgery, Cardiology    Therapy progress: requires max A to TD from PT to maintain sitting position   Services: SNF  EDOD: 1/14    Jono Arboleda, DO   1/8/2024, 1:56 PM

## 2024-01-08 NOTE — PROGRESS NOTES
Physical Therapy  Facility/Department: [facility]  Rehabilitation Physical Therapy Treatment Note    NAME: Isac Lemus  : 1950 (73 y.o.)  MRN: [id number]  CODE STATUS: Full Code    Date of Service: 24       Restrictions:  Restrictions/Precautions: Seizure, Fall Risk, Up as Tolerated, NPO, Aspiration Risk, General Precautions  Position Activity Restriction  Other position/activity restrictions: PEG, tube feed     SUBJECTIVE  Subjective  Subjective: pt in bed, asleep upon arrival, family present  Pain: pt appears to experience some pain with rolling but unable to state or describe        OBJECTIVE  130/76, 92%, 88 bpm    Cognition  Overall Cognitive Status: Exceptions  Arousal/Alertness: Delayed responses to stimuli;Unresponsive to stimuli  Following Commands: Does not follow commands;Inconsistently follows commands  Attention Span: Unable to maintain attention  Memory:  (GIA)  Safety Judgement: Decreased awareness of need for assistance;Decreased awareness of need for safety  Problem Solving: Decreased awareness of errors  Insights: Not aware of deficits  Initiation: Requires cues for all  Sequencing: Requires cues for all  Cognition Comment: minimal voluntary movements, [place] for all functional tasks  Orientation  Overall Orientation Status: Impaired  Orientation Level: Unable to assess    Functional Mobility  Roll Left  Assistance Level: Dependent;Requires x 2 assistance  Skilled Clinical Factors: total A x2 multiple times for pericare, brief change, and placing cathryn pad  Roll Right  Assistance Level: Dependent;Requires x 2 assistance  Skilled Clinical Factors: total A x2 multiple times for pericare, brief change, and placing cathryn pad  Balance  Sitting Balance: Dependent/Total  Standing Balance  Activity: Pt sat EOM x15 mins with total A x1 while completing anterior/posterior leaning with total A x2. Pt unable to maintain upright posture and demos difficulty holding head up and looking up.  Bed To/From  with min Ax 1.  Long Term Goal 2: Pt to transfer bed <> chair with LRAD with min Ax 1.  Long Term Goal 3: Pt to ambulate 25 ft with LRAD with min A x1.  Long Term Goal 4: Pt to manage 2 steps with rail (for home entry) with LRAD with min A x1.  Long Term Goal 5: Pt to perform sit to/from stand transfer at LRAD with min A x 1.    PLAN OF CARE/SAFETY  Physical Therapy Plan  General Plan: 5-7 times per week  Therapy Duration: 3 Weeks  Current Treatment Recommendations: Strengthening;ROM;Balance training;Functional mobility training;Transfer training;Endurance training;Neuromuscular re-education;Gait training;Stair training;Home exercise program;Safety education & training;Patient/Caregiver education & training;Equipment evaluation, education, & procurement;Therapeutic activities;Cognitive/Perceptual training;Co-Treatment  Safety Devices  Type of Devices: All fall risk precautions in place;All harry prominences offloaded;Chair alarm in place;Call light within reach;Patient at risk for falls;Left in chair;Nurse notified;Gait belt;Heels elevated for pressure relief  Restraints  Restraints Initially in Place: No    EDUCATION  Education  Education Given To: Patient;Family  Education Provided: Role of Therapy;Plan of Care;Precautions;Mobility Training;Transfer Training;Safety;Equipment;Family Education;DME/Home Modifications  Education Provided Comments: bed mobility, body mechanics, use of lift equipment upo d/c.  Education Method: Verbal  Barriers to Learning: Cognition;Hearing  Education Outcome: Continued education needed;Unable to verbalize;Unable to demonstrate understanding        Therapy Time   Individual Concurrent Group Co-treatment   Time In       1400   Time Out       1500   Minutes       60     Timed Code Treatment Minutes: 60 Minutes       Jen Simon PT, 01/08/24 at 3:29 PM

## 2024-01-08 NOTE — PROGRESS NOTES
Infectious Disease Follow up Notes    CC :  FUO, lethargy     Antibiotics:  none     Admit Date:   12/22/2023  Hospital Day: 18    Subjective:   Escalating fever through the weekend, as high as 102.4 last evening around 7pm.  Fever despite continued scheduled APAP.   Wife at bedside says he \"had a good weekend.\"    Doing well this morning.  No new concerns  WBC remains wnl today.   On 2.5L NC    Objective:     Patient Vitals for the past 8 hrs:   BP Temp Temp src Pulse Resp SpO2   01/08/24 1136 -- -- -- 73 -- --   01/08/24 1130 (!) 107/57 -- -- -- -- --   01/08/24 0845 102/60 98.6 °F (37 °C) Axillary (!) 127 20 92 %         EXAM:  Resting comfortably  No rash exposed skin             Scheduled Meds:   nystatin  5 mL Oral 4x Daily    dilTIAZem  30 mg Oral 4 times per day    metoprolol tartrate  100 mg Per G Tube BID    balsum peru-castor oil   Topical BID    pantoprazole  40 mg IntraVENous Daily    guaiFENesin  600 mg Oral BID    levETIRAcetam  750 mg PEG Tube BID    acetaminophen  1,000 mg Per G Tube 3 times per day    digoxin  125 mcg Per G Tube Daily    donepezil  5 mg Oral Daily    melatonin  5 mg Per G Tube Nightly    modafinil  300 mg Per G Tube Daily    [Held by provider] polyethylene glycol  17 g Per G Tube Daily    [Held by provider] sennosides-docusate sodium  2 tablet Per G Tube BID    atorvastatin  40 mg Per G Tube Nightly    apixaban  5 mg Per G Tube BID    metFORMIN  500 mg Per G Tube BID    insulin lispro  0-8 Units SubCUTAneous Q6H       Continuous Infusions:   dextrose            Data Review:    Lab Results   Component Value Date    WBC 8.7 01/08/2024    HGB 10.1 (L) 01/08/2024    HCT 31.5 (L) 01/08/2024    .8 (H) 01/08/2024     01/08/2024     Lab Results   Component Value Date    CREATININE <0.5 (L) 01/08/2024    BUN 13 01/08/2024     01/08/2024    K 4.5 01/08/2024     01/08/2024    CO2 32

## 2024-01-08 NOTE — TELEPHONE ENCOUNTER
Received event strip from . Strip scanned into pt chart under media. Attempted to contact pt, so see how pt is feeling and to see what they were doing at the time of the event. LMOVM to call back

## 2024-01-08 NOTE — PROGRESS NOTES
Occupational Therapy  Facility/Department: [facility]  Rehabilitation Occupational Therapy Daily Treatment Note    Date: 24  Patient Name: Isac Lemus       Room: 0156/0156-01  MRN: [id number]  Account: [id number]   : 1950  (73 y.o.) Gender: male          Past Medical History:  has a past medical history of Acid reflux, CAD (coronary artery disease), Diabetes mellitus (HCC), Heartburn, Hiatal hernia, Hypertension, and Seasonal allergies.  Past Surgical History:   has a past surgical history that includes Coronary artery bypass graft (10/2014); Colonoscopy (); and Colonoscopy (3/16/16).    Restrictions  Restrictions/Precautions: Seizure, Fall Risk, Up as Tolerated, NPO, Aspiration Risk, General Precautions  Other position/activity restrictions: PEG, tube feed  Required Braces or Orthoses?: No    Subjective  Subjective: Pt in bed, no alert to tactile or verbal cues upon arrival. Family present and agreeable to OT/PT session. /76, , SPO2 %  Restrictions/Precautions: Seizure;Fall Risk;Up as Tolerated;NPO;Aspiration Risk;General Precautions     Objective     Cognition  Overall Cognitive Status: Exceptions  Arousal/Alertness: Delayed responses to stimuli;Unresponsive to stimuli  Following Commands: Does not follow commands;Inconsistently follows commands  Attention Span: Unable to maintain attention  Memory:  (GIA)  Safety Judgement: Decreased awareness of need for assistance;Decreased awareness of need for safety  Problem Solving: Decreased awareness of errors  Insights: Not aware of deficits  Initiation: Requires cues for all  Sequencing: Requires cues for all  Cognition Comment: minimal voluntary movements, [place] for all functional tasks       ADL  Upper Extremity Dressing  Assistance Level: Dependent  Skilled Clinical Factors: to doff/don gown  Lower Extremity Dressing  Assistance Level: Dependent  Skilled Clinical Factors: to change brief while rolling in bed  Toileting  Assistance Level:

## 2024-01-09 LAB
GLUCOSE BLD-MCNC: 110 MG/DL (ref 70–99)
GLUCOSE BLD-MCNC: 128 MG/DL (ref 70–99)
GLUCOSE BLD-MCNC: 154 MG/DL (ref 70–99)
PERFORMED ON: ABNORMAL

## 2024-01-09 PROCEDURE — 92526 ORAL FUNCTION THERAPY: CPT

## 2024-01-09 PROCEDURE — 97112 NEUROMUSCULAR REEDUCATION: CPT

## 2024-01-09 PROCEDURE — 6370000000 HC RX 637 (ALT 250 FOR IP): Performed by: INTERNAL MEDICINE

## 2024-01-09 PROCEDURE — 92507 TX SP LANG VOICE COMM INDIV: CPT

## 2024-01-09 PROCEDURE — 2580000003 HC RX 258: Performed by: INTERNAL MEDICINE

## 2024-01-09 PROCEDURE — 6360000002 HC RX W HCPCS: Performed by: INTERNAL MEDICINE

## 2024-01-09 PROCEDURE — 94761 N-INVAS EAR/PLS OXIMETRY MLT: CPT

## 2024-01-09 PROCEDURE — 6370000000 HC RX 637 (ALT 250 FOR IP): Performed by: NURSE PRACTITIONER

## 2024-01-09 PROCEDURE — C9113 INJ PANTOPRAZOLE SODIUM, VIA: HCPCS | Performed by: INTERNAL MEDICINE

## 2024-01-09 PROCEDURE — 6370000000 HC RX 637 (ALT 250 FOR IP): Performed by: STUDENT IN AN ORGANIZED HEALTH CARE EDUCATION/TRAINING PROGRAM

## 2024-01-09 PROCEDURE — 1280000000 HC REHAB R&B

## 2024-01-09 PROCEDURE — 97530 THERAPEUTIC ACTIVITIES: CPT

## 2024-01-09 PROCEDURE — 2700000000 HC OXYGEN THERAPY PER DAY

## 2024-01-09 PROCEDURE — 94660 CPAP INITIATION&MGMT: CPT

## 2024-01-09 PROCEDURE — 97535 SELF CARE MNGMENT TRAINING: CPT

## 2024-01-09 RX ADMIN — PIPERACILLIN AND TAZOBACTAM 4500 MG: 4; .5 INJECTION, POWDER, LYOPHILIZED, FOR SOLUTION INTRAVENOUS at 16:58

## 2024-01-09 RX ADMIN — NYSTATIN 500000 UNITS: 100000 SUSPENSION ORAL at 22:52

## 2024-01-09 RX ADMIN — DIGOXIN 125 MCG: 125 TABLET ORAL at 09:10

## 2024-01-09 RX ADMIN — METFORMIN HYDROCHLORIDE 500 MG: 500 TABLET ORAL at 09:10

## 2024-01-09 RX ADMIN — METOPROLOL TARTRATE 100 MG: 50 TABLET, FILM COATED ORAL at 09:09

## 2024-01-09 RX ADMIN — LEVETIRACETAM 750 MG: 100 SOLUTION ORAL at 22:06

## 2024-01-09 RX ADMIN — CASTOR OIL AND BALSAM, PERU: 788; 87 OINTMENT TOPICAL at 22:35

## 2024-01-09 RX ADMIN — METFORMIN HYDROCHLORIDE 500 MG: 500 TABLET ORAL at 22:05

## 2024-01-09 RX ADMIN — DILTIAZEM HYDROCHLORIDE 30 MG: 60 TABLET ORAL at 18:10

## 2024-01-09 RX ADMIN — APIXABAN 5 MG: 5 TABLET, FILM COATED ORAL at 09:10

## 2024-01-09 RX ADMIN — ATORVASTATIN CALCIUM 40 MG: 40 TABLET, FILM COATED ORAL at 22:05

## 2024-01-09 RX ADMIN — DONEPEZIL HYDROCHLORIDE 5 MG: 5 TABLET, FILM COATED ORAL at 09:09

## 2024-01-09 RX ADMIN — MODAFINIL 300 MG: 200 TABLET ORAL at 09:09

## 2024-01-09 RX ADMIN — LEVETIRACETAM 750 MG: 100 SOLUTION ORAL at 10:36

## 2024-01-09 RX ADMIN — CASTOR OIL AND BALSAM, PERU: 788; 87 OINTMENT TOPICAL at 09:10

## 2024-01-09 RX ADMIN — METOPROLOL TARTRATE 100 MG: 50 TABLET, FILM COATED ORAL at 22:06

## 2024-01-09 RX ADMIN — NYSTATIN 500000 UNITS: 100000 SUSPENSION ORAL at 14:48

## 2024-01-09 RX ADMIN — PANTOPRAZOLE SODIUM 40 MG: 40 INJECTION, POWDER, FOR SOLUTION INTRAVENOUS at 09:10

## 2024-01-09 RX ADMIN — PIPERACILLIN AND TAZOBACTAM 3375 MG: 3; .375 INJECTION, POWDER, FOR SOLUTION INTRAVENOUS at 22:34

## 2024-01-09 RX ADMIN — GUAIFENESIN 600 MG: 600 TABLET ORAL at 22:06

## 2024-01-09 RX ADMIN — NYSTATIN 500000 UNITS: 100000 SUSPENSION ORAL at 09:09

## 2024-01-09 RX ADMIN — NYSTATIN 500000 UNITS: 100000 SUSPENSION ORAL at 16:56

## 2024-01-09 RX ADMIN — DILTIAZEM HYDROCHLORIDE 30 MG: 60 TABLET ORAL at 06:32

## 2024-01-09 RX ADMIN — DILTIAZEM HYDROCHLORIDE 30 MG: 60 TABLET ORAL at 12:09

## 2024-01-09 RX ADMIN — ACETAMINOPHEN 650 MG: 325 TABLET, FILM COATED ORAL at 22:05

## 2024-01-09 RX ADMIN — APIXABAN 5 MG: 5 TABLET, FILM COATED ORAL at 22:05

## 2024-01-09 RX ADMIN — Medication 5 MG: at 22:05

## 2024-01-09 ASSESSMENT — PAIN SCALES - GENERAL
PAINLEVEL_OUTOF10: 0
PAINLEVEL_OUTOF10: 0

## 2024-01-09 ASSESSMENT — PAIN SCALES - WONG BAKER: WONGBAKER_NUMERICALRESPONSE: 0

## 2024-01-09 NOTE — PROGRESS NOTES
01/09/24 0337   NIV Type   $NIV $Daily Charge   NIV Started/Stopped On   Mode Bilevel   Mask Type Full face mask   Mask Size Large   Assessment   Respirations 29   SpO2 96 %   Skin Protection for O2 Device Yes   Settings/Measurements   PIP Observed 12 cm H20   IPAP 12 cmH20   CPAP/EPAP 8 cmH2O   Vt (Measured) 524 mL   Rate Ordered 12   Insp Rise Time (%) 1 %   FiO2  30 %   I Time/ I Time % 1 s   Minute Volume (L/min) 13.8 Liters   Mask Leak (lpm) 58 lpm   Patient's Home Machine No   Alarm Settings   Alarms On Y   Low Pressure (cmH2O) 6 cmH2O   High Pressure (cmH2O) 30 cmH2O   Apnea (secs) 20 secs   RR Low (bpm) 6   RR High (bpm) 45 br/min   Oxygen Therapy/Pulse Ox   O2 Therapy Oxygen   O2 Device PAP (positive airway pressure)

## 2024-01-09 NOTE — PROGRESS NOTES
ID     Continued intermittent fever   WBC wnl     Was notified about progression of sacral wound with more necrotic tissue and odor per WON      1/9 1/2 12/22/23      Will update CRP, ask Gen Sgy to evaluate, start Zosyn    CARINE ARIZMENDI MD

## 2024-01-09 NOTE — PROGRESS NOTES
Patient noted with increase fever and heart rate. Localized redness to left pectoral where cardiac event monitor located; monitor removed. Tylenol administered per MD orders. Infectious disease notified via secure message.   Update; patient noted with improved fever and decreased heart rate at this time.

## 2024-01-09 NOTE — PATIENT CARE CONFERENCE
[facility]  Inpatient Rehabilitation  Weekly Team Conference Note    Date: 1/10/2024  Patient Name: Isac Lemus        MRN: [id number]    : 1950  (73 y.o.)  Gender: male   Referring Practitioner: Nati Mcelroy MD         Interventions to be utilized toward barriers to discharge, per discipline:  NURSING  Nursing observed barriers to dc: Cognitive deficit, Upper extremity weakness, Lower extremity weakness, Long standing deficits, Incontinence of bowel, Incontinence of bladder, Medical complications, Skin Care, Wound Care, and Medication managment  Nursing interventions: Assist with ADLs, diabetic management, tube feed bolus, oxygen use, wound treatments  Family Education: Yes  Fall Risk:  Yes    Stay with me?: No    PHYSICAL THERAPY  Physical therapy observed barriers to dc:      Baseline: lives with wife in 1 level home with 2 MARGUERITE. Ind with no AD for mobility               Current level: TD bed mobility, TD via cathryn for transfers, TD sitting balance with trunk support              Barriers to DC: level of assist/alertness, limited assist upon d/c, participation/cog              Needs in order to achieve dc home/next level of care: anticipate pt will require some level of physical assist upon dc. Rec family training and DME TBD pending progress. at this time, due to burden of care, rec SNF upon d/c.          Physical therapy interventions:   Current Treatment Recommendations: Strengthening, ROM, Balance training, Functional mobility training, Transfer training, Endurance training, Neuromuscular re-education, Gait training, Stair training, Home exercise program, Safety education & training, Patient/Caregiver education & training, Equipment evaluation, education, & procurement, Therapeutic activities, Cognitive/Perceptual training, Co-Treatment    PT Goals:            Short Term Goals  Time Frame for Short Term Goals: 10 days (24)  Short Term Goal 1: Pt to perform bed  time      CASE MANAGEMENT  Assessment: 73 yr old male. Dx:SDH (subdural hematoma).Pulm, Neuro and ID following. Patient is independent PLOF. Lives w/spouse in a one level home with 2 step sto enter. Home has walk-n-shower. Patient owns standard walker, rolling walker and cane. Prior services [facility]. Wife transports patient and can provide 24/7. Therapy recommendations are pending. Following for tube feeds. [facility] and [facility] following. SNF list provided and family preference is [facility]>can not take patient on an NIV. Nurse reached out Pulmonology about changing this to a C-pap or Bi-pap. CM spoke with lexii at [facility] she is checking with director if they can still accommodate the change.        Interdisciplinary Goals:   1.) pt will sit EOM x 30 sec with min A.   2.) Pt will implement compensatory strategies to promote communication effectiveness across disciplines     3.) Pt will complete oral hygiene with max A.  4.)  5.)      []  Family Training discussed at conference and to be scheduled.      Discharge Plan   Estimated discharge date: 1/14/24  Destination: skilled nursing facility  Pass:No  Services at Discharge: SNF Physical Therapy, Occupational Therapy, Speech Therapy, and Nursing   Equipment at Discharge: Defer to SNF    Team Members Present at Conference:  : Dian Mckee RN    Occupational Therapist: Marito Florian, OTR/L  Physical Therapist: Bell Johnson PT, DPT   Speech Therapist: Tennille Pagan MA CCC-SLP  Nurse: Jennifer Valdovinos RN  Dietician: Linda Heredia RDN, LD  : Bobo Pedersen, OTR/L  Psychiatry: N/A    Family members present at conference: No      I led this team conference and I approve the established interdisciplinary plan of care as documented within the medical record of Isac Muse MD: Electronically signed by Nati Mcelroy MD on 1/10/2024 at 12:20 PM

## 2024-01-09 NOTE — PROGRESS NOTES
Occupational Therapy  Facility/Department: [facility]  Rehabilitation Occupational Therapy Daily Treatment Note    Date: 24  Patient Name: Isac Lemus       Room: 0156/0156-01  MRN: [id number]  Account: [id number]   : 1950  (73 y.o.) Gender: male                    Past Medical History:  has a past medical history of Acid reflux, CAD (coronary artery disease), Diabetes mellitus (HCC), Heartburn, Hiatal hernia, Hypertension, and Seasonal allergies.  Past Surgical History:   has a past surgical history that includes Coronary artery bypass graft (10/2014); Colonoscopy (); and Colonoscopy (3/16/16).    Restrictions  Restrictions/Precautions: Seizure, Fall Risk, Up as Tolerated, NPO, Aspiration Risk, General Precautions  Other position/activity restrictions: PEG, tube feed  Required Braces or Orthoses?: No    Subjective  Subjective: Pt in recliner, no s/s of pain, family present and agreeable to OT/PT session, /59, , O2 sats 97% on 2.5L O2.  Restrictions/Precautions: Seizure;Fall Risk;Up as Tolerated;NPO;Aspiration Risk;General Precautions             Objective     Cognition  Overall Cognitive Status: Exceptions  Arousal/Alertness: Delayed responses to stimuli;Unresponsive to stimuli  Following Commands: Does not follow commands;Inconsistently follows commands  Attention Span: Unable to maintain attention;Difficulty dividing attention;Difficulty attending to directions  Memory:  (GIA)  Safety Judgement: Decreased awareness of need for assistance;Decreased awareness of need for safety  Problem Solving: Decreased awareness of errors  Insights: Not aware of deficits  Initiation: Requires cues for all  Sequencing: Requires cues for all  Orientation  Overall Orientation Status: Impaired  Orientation Level: Unable to assess         ADL  Upper Extremity Dressing  Assistance Level: Dependent  Skilled Clinical Factors: to doff/don gown  Lower Extremity Dressing  Assistance Level: Dependent  Skilled  training;Gait training    Goals  Short Term Goals  Time Frame for Short Term Goals: by 1/04/23 unless otherwise noted  Short Term Goal 1: Pt will tolerate dynamic sitting balance ax with CGA  in prep for ADLs, progressing 1/09 total assist  Short Term Goal 2: Pt will follow commands to sequence 2 step task with only 1 tactile/visual cue, progressing 1/09 occasional 1 step command  Short Term Goal 3: Pt will perform sit pivot transfer to toilet with mod Ax2. progressing 1/09 MaxiMove for all transfers  Short Term Goal 4: Pt will perform grooming tasks with mod A and 2-3 VC for sequencing, progressing 1/09, total assist for all ADLs  Short Term Goal 5: Pt will tolerate x15 BUE ther ex. progressing 1/09. PROM for all movements  Long Term Goals  Time Frame for Long Term Goals : to be met by 1/11/23 unless otherwise noted-progressing 1/09  Long Term Goal 1: Pt will perform TB dressing with min A and LRAD  Long Term Goal 2: Pt will perform TB bathing with min A and LRAD  Long Term Goal 3: Pt will perform toileting tasks with min A and LRAD  Long Term Goal 4: Pt will grooming tasks with min A    Therapy Time   Individual Concurrent Group Co-treatment   Time In       1330   Time Out       1430   Minutes       60   Timed Code Treatment Minutes: 60 Minutes       Marito Florian, OT

## 2024-01-09 NOTE — PROGRESS NOTES
[facility] Inpatient Rehabilitation Progress Note    Isac Lemus  1/9/2024  [id number]    Chief Complaint: SDH (subdural hematoma) (HCC)    Subjective:   No acute events overnight. Today Jack is seen with his wife and daughter and family present. He appears more alert than prior. He did have another fever last night. Discussed with ID. Also has been more tachycardic. Nursing to reach out to Cardiology. Family reports that patient developed an allergic reaction to the cardiac monitor and it has been removed.     ROS: unable to reliably obtain    Objective:  Patient Vitals for the past 24 hrs:   BP Temp Temp src Pulse Resp SpO2   01/09/24 1206 123/66 -- -- (!) 112 -- --   01/09/24 0900 125/69 98.7 °F (37.1 °C) Axillary (!) 141 22 95 %   01/09/24 0630 116/60 97.9 °F (36.6 °C) Axillary (!) 119 30 95 %   01/09/24 0337 -- -- -- -- 29 96 %   01/08/24 2342 -- -- -- (!) 122 -- 95 %   01/08/24 2334 126/66 98.3 °F (36.8 °C) Axillary (!) 120 18 95 %   01/08/24 2145 (!) 112/58 (!) 102.8 °F (39.3 °C) Axillary (!) 150 18 97 %   01/08/24 1800 105/65 -- -- (!) 153 -- --   01/08/24 1622 -- (!) 100.8 °F (38.2 °C) Axillary -- -- --     Gen: No distress  HEENT: Normocephalic, atraumatic.   CV: extremities well perfused  Resp: No respiratory distress. On room air  Abd: Soft, nondistended  Ext: No edema.   Neuro: awake, mouths name    Wt Readings from Last 3 Encounters:   01/07/24 93 kg (205 lb)   04/06/16 115.7 kg (255 lb)   03/16/16 115.7 kg (255 lb)       Laboratory data:   Lab Results   Component Value Date    WBC 8.7 01/08/2024    HGB 10.1 (L) 01/08/2024    HCT 31.5 (L) 01/08/2024    .8 (H) 01/08/2024     01/08/2024       Lab Results   Component Value Date/Time     01/08/2024 05:49 AM    K 4.5 01/08/2024 05:49 AM     01/08/2024 05:49 AM    CO2 32 01/08/2024 05:49 AM    BUN 13 01/08/2024 05:49 AM    CREATININE <0.5 01/08/2024 05:49 AM    GLUCOSE 108 01/08/2024 05:49 AM    CALCIUM 8.7 01/08/2024 05:49 AM

## 2024-01-09 NOTE — CARE COORDINATION
CM spoke to Jane medina/Chelsey Segura they have declined patient d/t the NIV and they can not meet patients needs for this. CM stated that writer spoke to patient's nurse Yolie RN to reach out to pulmonology since they haven't seen patient since 01/02/24 if he still needs the NIV. CM awaiting conversation between nurse and pulm.     [id number] CM spoke to patients nurse Yolie, touching base if she had spoken to Dr. Lemos. Yolie, RN stated that she did and he will review patients chart. CM will await pulmonology note.    Dian Mckee, RN

## 2024-01-09 NOTE — PROGRESS NOTES
Pharmacy Note - Extended Infusion Beta-Lactam Adjustment    Piperacillin/Tazobactam 3375mg Q8h for treatment of Skin and soft tissue infection. Per [facility] Extended Infusion Beta-Lactam Policy, piperacillin/tazobactam will be changed to 4500mg loading dose followed by 3375mg Q8h extended infusion    Estimated Creatinine Clearance: Estimated Creatinine Clearance: 139 mL/min (based on SCr of 0.5 mg/dL).    BMI: Body mass index is 32.11 kg/m².    Please call with any questions.    Thank you,    Lashaun Glass, [facility]

## 2024-01-09 NOTE — PLAN OF CARE
Problem: Skin/Tissue Integrity  Goal: Absence of new skin breakdown  Description: 1.  Monitor for areas of redness and/or skin breakdown  2.  Assess vascular access sites hourly  3.  Every 4-6 hours minimum:  Change oxygen saturation probe site  4.  Every 4-6 hours:  If on nasal continuous positive airway pressure, respiratory therapy assess nares and determine need for appliance change or resting period.  Outcome: Progressing     Problem: Safety - Adult  Goal: Free from fall injury  Outcome: Progressing

## 2024-01-09 NOTE — PROGRESS NOTES
Comprehensive Nutrition Assessment    Type and Reason for Visit:  Reassess    Nutrition Recommendations/Plan:   Continue Jevity 1.5 bolus feeds x5 daily via PEG tube   Continue free water flush of 80 mL before and after administration, monitor need for adjustments   Monitor TF tolerance (abdominal pain, bloating, distention, diarrhea)  Monitor nutrition adequacy, pertinent labs, bowel habits, wt changes, and clinical progress     Malnutrition Assessment:  Malnutrition Status:  At risk for malnutrition (Comment) (12/28/23 1220)    Context:  Acute Illness     Findings of the 6 clinical characteristics of malnutrition:  Energy Intake:  Mild decrease in energy intake (Comment)  Weight Loss:  Greater than 7.5% over 3 months       Nutrition Assessment:    Follow up: Pt remains on bolus feeds of Jevity 1.5 x5 cans daily. Sleeping soundly at time of visit. Family at bedside, denies any questions at this time. Weights labile this admission, will continue to monitor need for additional can of TF daily. Blood sugars remain stable. Plans for FEES on 1/11 with SLP. Will monitor recommendations. Will continue to monitor further nutritional needs.    Nutrition Related Findings:    Na 140. Active BS. BM on 1/8. Trace generalized and +2 BLE edema. BG stable. Wound Type: Open Wounds, Deep Tissue Injury       Current Nutrition Intake & Therapies:    Average Meal Intake: NPO  Average Supplements Intake: NPO  Diet NPO  ADULT TUBE FEEDING; PEG; Standard with Fiber; Bolus; 5 Times Daily; 237; 80; Before and after each bolus  Current Tube Feeding (TF) Orders:  Feeding Route: PEG  Formula: Diabetic  Schedule: Continuous  Goal TF & Flush Orders Provides: Bolus feeds of Jevity 1.5 x5 cans daily to provide 1185 mL TV, 1775 kcal, 76 g protein, and 900 mL TV. +80 mL free water flush before and after administration    Anthropometric Measures:  Height: 170.2 cm (5' 7\")  Ideal Body Weight (IBW): 148 lbs (67 kg)    Admission Body Weight: 96.6 kg

## 2024-01-09 NOTE — PROGRESS NOTES
Consult called to General Surgery at [facility] at 1646 on 1/9/24 for progressive sacral pressure wound, WON was concerned for infection, +odor.  having intermittent fever for weeks that was thought to be central fever.

## 2024-01-09 NOTE — PROGRESS NOTES
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; independent with household tasks and medication/financial management; family nearby   Dx: [x] Aphasia  [x] Dysarthria  [] Apraxia   [x] Oropharyngeal dysphagia [x] Cognitive Impairment  [] Other:   Date of Admit: 2023  Room #: 0156/0156-01    Current functional status (updated daily):       +    Pt being seen for : [x] Speech/Language Treatment  [x] Dysphagia Treatment [] Cognitive Treatment  [] Other:  Communication: []WFL  [x] Aphasia  [x] Dysarthria  [] Apraxia  [] Pragmatic Impairment [] Non-verbal  [] Hearing Loss  [] Other:   Cognition: [] WFL  [] Mild  [] Moderate  [] Severe [x] Unable to Assess  [x] Other: suspect deficit  Memory: [] WFL  [] Mild  [] Moderate  [] Severe [x] Unable to Assess  [] Other:  Behavior: [] Alert  [] Cooperative  [x]  Pleasant  [] Confused  [] Agitated  [] Uncooperative  [x] Distractible [] Motivated  [] Self-Limiting [] Anxious  [] Other:  Endurance:  [] Adequate for participation in SLP sessions  [x] Reduced overall  [x] Lethargic  [] Other:  Safety: [] No concerns at this time  [] Reduced insight into deficits  []  Reduced safety awareness [] Not following call light procedures  [x] Unable to Assess  [] Other:    Current Diet Order:Diet NPO  ADULT TUBE FEEDING; PEG; Standard with Fiber; Bolus; 5 Times Daily; 237; 80; Before and after each bolus  Swallowing Precautions: Reflux and aspiration precautions; Frequent oral hygiene; allow small volume ice chips with SLP/RN only, hold PO and contact SLP if s/s aspiration or worsening respiratory status develop        Date: 2024      Tx session  with SLP left of midline.    Goal 2: Pt will communicate basic wants and needs via verbal speech, yes/no, etc. with 80% accuracy given mod cues   Yes/No Responses  -pt providing a head nod / shake to indicate yes/no on 0% opp given MAX multimodal cues     Pt with no attempts for verbalizations this session   Pt was able to answer yes/no questions with use of head nodding / shaking - 100% of the time; did appear to be accurate responses     SLP encouraged pt to answer with verbalizations of yes/no, pt with no vocalizations / verbalizations    Repetition:  -name (Don): x2 out of 5 trials  -daughter's name (Eunice): x0 out of 5 trials    Automatics: counting 1-5  -no attempts at vocalizations / verbalizations despite max cues from SLP   Other areas targeted: N/A   N/A   Education:   SLP edu pt re: importance of oral care, progress towards goals, trials  Edu provided re: oral care, ice chips/FFWP, plan for repeat instrumental, progress towards goals    Safety Devices: [x] Call light within reach  [] Chair alarm activated  [x] Bed alarm activated  [x] Other: wife at bedside [x] Call light within reach  [] Chair alarm activated  [x] Bed alarm activated  [x] Other: wife and dtr at bedside   Assessment: Tx session 1: pt lethargic at beginning of session- although he maintained adequate alertness throughout session, he was minimally responsive. SLP completed oral care prior to all PO and at end of session - pt with thrush present on posterior lingual surface. Pt observed with increased WOB with ice chips- 02 sats stable but elevated HR noted. RN notified. Wife further reports pt with fever overnight- ST to continue to monitor for potential ongoing s/s aspiration. ST reiterated importance of thorough oral care to wife- verbal return given. Thin trials not given d/t increased WOB with ice chips. Pt unresponsive to all yes/no questions given MAX multimodal cues. Pt with no attempts to respond to ST verbally or nonverbally.

## 2024-01-09 NOTE — PROGRESS NOTES
[facility] Nurse  Follow-up Progress Note       NAME:  Isac Lemus  MEDICAL RECORD NUMBER:  [id number]  AGE:  73 y.o.   GENDER:  male  :  1950  TODAY'S DATE:  2024    Subjective: Patient only moans.    Spouse and daughter at bedside.   Wound Identification:  Wound Type: pressure  Contributing Factors: chronic pressure, decreased mobility, shear force, and obesity        Patient Goal of Care:  [x] Wound Healing  [x] Odor Control  [] Palliative Care  [x] Pain Control   [] Other:     Objective:Sitting up in lounge chair.    /66   Pulse 94   Temp 98.7 °F (37.1 °C) (Axillary)   Resp 22   Ht 1.702 m (5' 7\")   Wt 93 kg (205 lb)   SpO2 93%   BMI 32.11 kg/m²   Devon Risk Score: Devon Scale Score: 13  Assessment: coccyx/sacrum moist eschar with extended slough/red areas.   Measurements:  Wound 23 Buttocks DTI to bilateral buttocks (Active)   Wound Image   24 1617   Wound Etiology Deep tissue/Injury 24 1617   Dressing Status New dressing applied 24 1617   Wound Cleansed Cleansed with saline;Vashe 24 1617   Dressing/Treatment Other (comment) 24 1617   Offloading for Diabetic Foot Ulcers Offloading ordered 24 1617   Dressing Change Due 01/10/24 01/09/24 1617   Wound Length (cm) 9.5 cm 24 1617   Wound Width (cm) 7.5 cm 24 1617   Wound Depth (cm) 0 cm 24 1617   Wound Surface Area (cm^2) 71.25 cm^2 24 1617   Change in Wound Size % (l*w) -27.23 24 1617   Wound Volume (cm^3) 0 cm^3 24 1617   Distance Tunneling (cm) 0 cm 24 1617   Tunneling Position ___ O'Clock 0 24 1617   Undermining Starts ___ O'Clock 0 24 1617   Undermining Ends___ O'Clock 0 24 1617   Undermining Maxium Distance (cm) 0 24 1617   Wound Assessment Eschar moist;Pink/red 24 1617   Drainage Amount Moderate (25-50%) 24 161   Drainage Description Serous 24   Odor

## 2024-01-09 NOTE — PROGRESS NOTES
01/08/24 2342   NIV Type   NIV Started/Stopped On   Equipment Type v60   Mode Bilevel   Mask Type Full face mask   Mask Size Large   Assessment   Pulse (!) 122   SpO2 95 %   Settings/Measurements   PIP Observed 13 cm H20   IPAP 9 cmH20   CPAP/EPAP 8 cmH2O   Vt (Measured) 532 mL   Rate Ordered 12   Insp Rise Time (%) 1 %   FiO2  30 %   I Time/ I Time % 1 s   Minute Volume (L/min) 14.3 Liters   Mask Leak (lpm) 17 lpm   Patient's Home Machine No   Alarm Settings   Alarms On Y   Low Pressure (cmH2O) 6 cmH2O   High Pressure (cmH2O) 30 cmH2O   Apnea (secs) 20 secs   RR Low (bpm) 6   RR High (bpm) 45 br/min   Oxygen Therapy/Pulse Ox   O2 Therapy Oxygen   O2 Device PAP (positive airway pressure)

## 2024-01-09 NOTE — PROGRESS NOTES
Physical Therapy  Facility/Department: [facility]  Rehabilitation Physical Therapy Treatment Note    NAME: Isac Lemus  : 1950 (73 y.o.)  MRN: [id number]  CODE STATUS: Full Code    Date of Service: 24       Restrictions:  Restrictions/Precautions: Seizure, Fall Risk, Up as Tolerated, NPO, Aspiration Risk, General Precautions  Position Activity Restriction  Other position/activity restrictions: PEG, tube feed     SUBJECTIVE  Subjective  Subjective: pt found in recliner  Pain: pt appears to experience some pain with rolling but unable to state or describe          OBJECTIVE  Cognition  Overall Cognitive Status: Exceptions  Arousal/Alertness: Delayed responses to stimuli;Unresponsive to stimuli  Following Commands: Does not follow commands;Inconsistently follows commands  Attention Span: Unable to maintain attention;Difficulty dividing attention;Difficulty attending to directions  Memory:  (GIA)  Safety Judgement: Decreased awareness of need for assistance;Decreased awareness of need for safety  Problem Solving: Decreased awareness of errors  Insights: Not aware of deficits  Initiation: Requires cues for all  Sequencing: Requires cues for all  Cognition Comment: minimal voluntary movements, [place] for all functional tasks  Orientation  Overall Orientation Status: Impaired  Orientation Level: Unable to assess    Functional Mobility  Roll Left  Assistance Level: Dependent;Requires x 2 assistance  Skilled Clinical Factors: total A x2 multiple times for pericare, brief change, and placing cathryn pad  Roll Right  Assistance Level: Dependent;Requires x 2 assistance  Skilled Clinical Factors: total A x2 multiple times for pericare, brief change, and placing cathryn pad  Balance  Sitting Balance: Dependent/Total  Sit to Stand  Assistance Level: Dependent;Requires x 2 assistance  Skilled Clinical Factors: from raised EOM to STEDY with max a of 2 and reliance of STEDY to block BLEs  Stand to Sit  Assistance Level:  Requires x 2 assistance;Dependent  Skilled Clinical Factors: lacks eccentric control  Bed To/From Chair  Assistance Level: Dependent;Requires x 2 assistance  Skilled Clinical Factors: total A x2 with cathryn bed>tilt in space W/C<>EOM>recliner        Neuromuscular Education  NDT Treatment: Sitting  Neuromuscular Comments: modified sitting/standing in STEDY paddles with BUE support on cross bar for 2 min 25 sec and 1 min with pt requiring TD to maintain trunk extension (otherwise pt with extreme difficulty maintaining midline without anterior LOB)      PT Exercises  Dynamic Sitting Balance Exercises: elbow prop to R and L x 2 sets each direction with TD to obtain position and return to midline  Static Standing Balance Exercises: pt standing x 2 reps in STEDY lasting 25 sec + 8 sec with TD (max A of 2 and BLEs blocked for increased stability) with BUE support. PT posterior to pt facilitating hip extension, OT anterior to pt facilitating trunk extension. family present encouraging upright posture for pt.    Pt in recliner at end of session with call light / needs within reach and alarm donned.   ASSESSMENT/PROGRESS TOWARDS GOALS  Vital Signs  Pulse: 94  Heart Rate Source: Monitor  BP: 133/66  BP Location: Left upper arm  MAP (Calculated): 88  SpO2: 93 %  O2 Device: Nasal cannula  Comment: 2.5-3Lo2    Assessment  Assessment: pt seen as co treat with OT for increased safety progressing functional mobility. pt tolerated 2 standing trials this date in STEDY with max A of 2 and TD (relies on STEDY to block LEs). pt also completed 2 bouts of modified standing while on STEYD paddles, sitting upright wiht max a of 1 for approx 20-30 sec before requiring 2 person assist to maintain trunk extension. due to burden of care, rec SNF upon d/c. DME: defer  Activity Tolerance: Patient limited by fatigue;Patient limited by endurance;Treatment limited secondary to decreased cognition  Discharge Recommendations: Subacute/Skilled Nursing

## 2024-01-10 LAB
ANION GAP SERPL CALCULATED.3IONS-SCNC: 9 MMOL/L (ref 3–16)
BASE EXCESS BLDA CALC-SCNC: 4.8 MMOL/L (ref -3–3)
BASOPHILS # BLD: 0 K/UL (ref 0–0.2)
BASOPHILS NFR BLD: 0.4 %
BUN SERPL-MCNC: 13 MG/DL (ref 7–20)
CALCIUM SERPL-MCNC: 8.4 MG/DL (ref 8.3–10.6)
CHLORIDE SERPL-SCNC: 97 MMOL/L (ref 99–110)
CO2 BLDA-SCNC: 32.6 MMOL/L
CO2 SERPL-SCNC: 29 MMOL/L (ref 21–32)
COHGB MFR BLDA: 1.4 % (ref 0–1.5)
CREAT SERPL-MCNC: <0.5 MG/DL (ref 0.8–1.3)
CRP SERPL-MCNC: 140.7 MG/L (ref 0–5.1)
DEPRECATED RDW RBC AUTO: 17.2 % (ref 12.4–15.4)
EOSINOPHIL # BLD: 0.1 K/UL (ref 0–0.6)
EOSINOPHIL NFR BLD: 0.8 %
GFR SERPLBLD CREATININE-BSD FMLA CKD-EPI: >60 ML/MIN/{1.73_M2}
GLUCOSE BLD-MCNC: 112 MG/DL (ref 70–99)
GLUCOSE BLD-MCNC: 132 MG/DL (ref 70–99)
GLUCOSE BLD-MCNC: 145 MG/DL (ref 70–99)
GLUCOSE BLD-MCNC: 170 MG/DL (ref 70–99)
GLUCOSE BLD-MCNC: 175 MG/DL (ref 70–99)
GLUCOSE SERPL-MCNC: 155 MG/DL (ref 70–99)
HCO3 BLDA-SCNC: 31 MMOL/L (ref 21–29)
HCT VFR BLD AUTO: 28.3 % (ref 40.5–52.5)
HGB BLD-MCNC: 9.3 G/DL (ref 13.5–17.5)
HGB BLDA-MCNC: 14 G/DL (ref 13.5–17.5)
LYMPHOCYTES # BLD: 1.1 K/UL (ref 1–5.1)
LYMPHOCYTES NFR BLD: 13.1 %
MCH RBC QN AUTO: 33 PG (ref 26–34)
MCHC RBC AUTO-ENTMCNC: 33 G/DL (ref 31–36)
MCV RBC AUTO: 100 FL (ref 80–100)
METHGB MFR BLDA: 0.4 %
MONOCYTES # BLD: 0.6 K/UL (ref 0–1.3)
MONOCYTES NFR BLD: 7.5 %
NEUTROPHILS # BLD: 6.6 K/UL (ref 1.7–7.7)
NEUTROPHILS NFR BLD: 78.2 %
O2 THERAPY: ABNORMAL
PCO2 BLDA: 51.9 MMHG (ref 35–45)
PERFORMED ON: ABNORMAL
PH BLDA: 7.39 [PH] (ref 7.35–7.45)
PHOSPHATE SERPL-MCNC: 3.5 MG/DL (ref 2.5–4.9)
PLATELET # BLD AUTO: 246 K/UL (ref 135–450)
PMV BLD AUTO: 8.7 FL (ref 5–10.5)
PO2 BLDA: 72.8 MMHG (ref 75–108)
POTASSIUM SERPL-SCNC: 4 MMOL/L (ref 3.5–5.1)
RBC # BLD AUTO: 2.83 M/UL (ref 4.2–5.9)
SAO2 % BLDA: 93.9 %
SODIUM SERPL-SCNC: 135 MMOL/L (ref 136–145)
WBC # BLD AUTO: 8.4 K/UL (ref 4–11)

## 2024-01-10 PROCEDURE — 92507 TX SP LANG VOICE COMM INDIV: CPT

## 2024-01-10 PROCEDURE — 97530 THERAPEUTIC ACTIVITIES: CPT

## 2024-01-10 PROCEDURE — 86140 C-REACTIVE PROTEIN: CPT

## 2024-01-10 PROCEDURE — 97110 THERAPEUTIC EXERCISES: CPT

## 2024-01-10 PROCEDURE — 82803 BLOOD GASES ANY COMBINATION: CPT

## 2024-01-10 PROCEDURE — 6360000002 HC RX W HCPCS: Performed by: INTERNAL MEDICINE

## 2024-01-10 PROCEDURE — 6370000000 HC RX 637 (ALT 250 FOR IP): Performed by: NURSE PRACTITIONER

## 2024-01-10 PROCEDURE — 6370000000 HC RX 637 (ALT 250 FOR IP): Performed by: STUDENT IN AN ORGANIZED HEALTH CARE EDUCATION/TRAINING PROGRAM

## 2024-01-10 PROCEDURE — 2700000000 HC OXYGEN THERAPY PER DAY

## 2024-01-10 PROCEDURE — 94660 CPAP INITIATION&MGMT: CPT

## 2024-01-10 PROCEDURE — 6370000000 HC RX 637 (ALT 250 FOR IP): Performed by: INTERNAL MEDICINE

## 2024-01-10 PROCEDURE — 36600 WITHDRAWAL OF ARTERIAL BLOOD: CPT

## 2024-01-10 PROCEDURE — 85025 COMPLETE CBC W/AUTO DIFF WBC: CPT

## 2024-01-10 PROCEDURE — 1280000000 HC REHAB R&B

## 2024-01-10 PROCEDURE — 36415 COLL VENOUS BLD VENIPUNCTURE: CPT

## 2024-01-10 PROCEDURE — 80048 BASIC METABOLIC PNL TOTAL CA: CPT

## 2024-01-10 PROCEDURE — 94761 N-INVAS EAR/PLS OXIMETRY MLT: CPT

## 2024-01-10 PROCEDURE — 99232 SBSQ HOSP IP/OBS MODERATE 35: CPT | Performed by: INTERNAL MEDICINE

## 2024-01-10 PROCEDURE — 97112 NEUROMUSCULAR REEDUCATION: CPT

## 2024-01-10 PROCEDURE — 84100 ASSAY OF PHOSPHORUS: CPT

## 2024-01-10 PROCEDURE — C9113 INJ PANTOPRAZOLE SODIUM, VIA: HCPCS | Performed by: INTERNAL MEDICINE

## 2024-01-10 PROCEDURE — 97535 SELF CARE MNGMENT TRAINING: CPT

## 2024-01-10 PROCEDURE — 92526 ORAL FUNCTION THERAPY: CPT

## 2024-01-10 PROCEDURE — 2580000003 HC RX 258: Performed by: INTERNAL MEDICINE

## 2024-01-10 RX ORDER — SODIUM CHLOR/HYPOCHLOROUS ACID 0.033 %
SOLUTION, IRRIGATION IRRIGATION DAILY
Status: DISCONTINUED | OUTPATIENT
Start: 2024-01-10 | End: 2024-01-10

## 2024-01-10 RX ORDER — SODIUM CHLOR/HYPOCHLOROUS ACID 0.033 %
1 SOLUTION, IRRIGATION IRRIGATION DAILY
Status: DISCONTINUED | OUTPATIENT
Start: 2024-01-10 | End: 2024-01-14 | Stop reason: HOSPADM

## 2024-01-10 RX ORDER — MODAFINIL 200 MG/1
300 TABLET ORAL DAILY
Status: DISCONTINUED | OUTPATIENT
Start: 2024-01-10 | End: 2024-01-14 | Stop reason: HOSPADM

## 2024-01-10 RX ADMIN — CASTOR OIL AND BALSAM, PERU: 788; 87 OINTMENT TOPICAL at 09:32

## 2024-01-10 RX ADMIN — GUAIFENESIN 600 MG: 600 TABLET ORAL at 22:39

## 2024-01-10 RX ADMIN — NYSTATIN 500000 UNITS: 100000 SUSPENSION ORAL at 14:22

## 2024-01-10 RX ADMIN — METOPROLOL TARTRATE 100 MG: 50 TABLET, FILM COATED ORAL at 22:39

## 2024-01-10 RX ADMIN — CASTOR OIL AND BALSAM, PERU: 788; 87 OINTMENT TOPICAL at 22:40

## 2024-01-10 RX ADMIN — DILTIAZEM HYDROCHLORIDE 30 MG: 60 TABLET ORAL at 22:39

## 2024-01-10 RX ADMIN — LEVETIRACETAM 750 MG: 100 SOLUTION ORAL at 11:28

## 2024-01-10 RX ADMIN — PIPERACILLIN AND TAZOBACTAM 3375 MG: 3; .375 INJECTION, POWDER, FOR SOLUTION INTRAVENOUS at 23:14

## 2024-01-10 RX ADMIN — APIXABAN 5 MG: 5 TABLET, FILM COATED ORAL at 09:32

## 2024-01-10 RX ADMIN — METFORMIN HYDROCHLORIDE 500 MG: 500 TABLET ORAL at 09:32

## 2024-01-10 RX ADMIN — DILTIAZEM HYDROCHLORIDE 30 MG: 60 TABLET ORAL at 17:08

## 2024-01-10 RX ADMIN — PANTOPRAZOLE SODIUM 40 MG: 40 INJECTION, POWDER, FOR SOLUTION INTRAVENOUS at 09:33

## 2024-01-10 RX ADMIN — METFORMIN HYDROCHLORIDE 500 MG: 500 TABLET ORAL at 22:39

## 2024-01-10 RX ADMIN — Medication 5 MG: at 22:39

## 2024-01-10 RX ADMIN — DONEPEZIL HYDROCHLORIDE 5 MG: 5 TABLET, FILM COATED ORAL at 09:32

## 2024-01-10 RX ADMIN — METOPROLOL TARTRATE 100 MG: 50 TABLET, FILM COATED ORAL at 09:31

## 2024-01-10 RX ADMIN — DIGOXIN 125 MCG: 125 TABLET ORAL at 09:32

## 2024-01-10 RX ADMIN — PIPERACILLIN AND TAZOBACTAM 3375 MG: 3; .375 INJECTION, POWDER, FOR SOLUTION INTRAVENOUS at 06:42

## 2024-01-10 RX ADMIN — NYSTATIN 500000 UNITS: 100000 SUSPENSION ORAL at 17:09

## 2024-01-10 RX ADMIN — ACETAMINOPHEN 650 MG: 325 TABLET, FILM COATED ORAL at 06:41

## 2024-01-10 RX ADMIN — Medication 1 APPLICATION: at 17:10

## 2024-01-10 RX ADMIN — LEVETIRACETAM 750 MG: 100 SOLUTION ORAL at 22:42

## 2024-01-10 RX ADMIN — DILTIAZEM HYDROCHLORIDE 30 MG: 60 TABLET ORAL at 00:13

## 2024-01-10 RX ADMIN — PIPERACILLIN AND TAZOBACTAM 3375 MG: 3; .375 INJECTION, POWDER, FOR SOLUTION INTRAVENOUS at 14:26

## 2024-01-10 RX ADMIN — MODAFINIL 300 MG: 200 TABLET ORAL at 14:21

## 2024-01-10 RX ADMIN — NYSTATIN 500000 UNITS: 100000 SUSPENSION ORAL at 22:39

## 2024-01-10 RX ADMIN — ATORVASTATIN CALCIUM 40 MG: 40 TABLET, FILM COATED ORAL at 22:39

## 2024-01-10 RX ADMIN — GUAIFENESIN 600 MG: 600 TABLET ORAL at 09:32

## 2024-01-10 RX ADMIN — NYSTATIN 500000 UNITS: 100000 SUSPENSION ORAL at 09:33

## 2024-01-10 RX ADMIN — APIXABAN 5 MG: 5 TABLET, FILM COATED ORAL at 22:39

## 2024-01-10 RX ADMIN — DILTIAZEM HYDROCHLORIDE 30 MG: 60 TABLET ORAL at 06:41

## 2024-01-10 RX ADMIN — DILTIAZEM HYDROCHLORIDE 30 MG: 60 TABLET ORAL at 14:07

## 2024-01-10 RX ADMIN — ACETAMINOPHEN 650 MG: 325 TABLET, FILM COATED ORAL at 14:06

## 2024-01-10 ASSESSMENT — PAIN DESCRIPTION - LOCATION
LOCATION: GENERALIZED
LOCATION: GENERALIZED

## 2024-01-10 ASSESSMENT — PAIN DESCRIPTION - DESCRIPTORS: DESCRIPTORS: ACHING

## 2024-01-10 ASSESSMENT — PAIN - FUNCTIONAL ASSESSMENT: PAIN_FUNCTIONAL_ASSESSMENT: PREVENTS OR INTERFERES SOME ACTIVE ACTIVITIES AND ADLS

## 2024-01-10 ASSESSMENT — PAIN SCALES - WONG BAKER: WONGBAKER_NUMERICALRESPONSE: 2

## 2024-01-10 ASSESSMENT — PAIN SCALES - GENERAL: PAINLEVEL_OUTOF10: 2

## 2024-01-10 NOTE — PROGRESS NOTES
Occupational Therapy  Facility/Department: [facility]  Rehabilitation Occupational Therapy Daily Treatment Note    Date: 1/10/24  Patient Name: Isac Lemus       Room: 0156/0156-01  MRN: [id number]  Account: [id number]   : 1950  (73 y.o.) Gender: male                    Past Medical History:  has a past medical history of Acid reflux, CAD (coronary artery disease), Diabetes mellitus (HCC), Heartburn, Hiatal hernia, Hypertension, and Seasonal allergies.  Past Surgical History:   has a past surgical history that includes Coronary artery bypass graft (10/2014); Colonoscopy (); and Colonoscopy (3/16/16).    Restrictions  Restrictions/Precautions: Seizure, Fall Risk, Up as Tolerated, NPO, Aspiration Risk, General Precautions  Other position/activity restrictions: PEG, tube feed  Required Braces or Orthoses?: No    Subjective  Subjective: Pt in w/c, resting, eyes closed, no s/s of pain, appears cold, family present and agreeable to OT/PT session and Shower, /56, HR 95, O2 sats 96% on 3L O2.  Restrictions/Precautions: Seizure;Fall Risk;Up as Tolerated;NPO;Aspiration Risk;General Precautions             Objective     Cognition  Overall Cognitive Status: Exceptions  Arousal/Alertness: Delayed responses to stimuli;Unresponsive to stimuli  Following Commands: Does not follow commands;Inconsistently follows commands  Attention Span: Unable to maintain attention;Difficulty dividing attention;Difficulty attending to directions  Memory:  (GIA)  Safety Judgement: Decreased awareness of need for assistance;Decreased awareness of need for safety  Problem Solving: Decreased awareness of errors  Insights: Not aware of deficits  Initiation: Requires cues for all  Sequencing: Requires cues for all  Cognition Comment: minimal voluntary movements, [place] for all functional tasks  Orientation  Overall Orientation Status: Impaired  Orientation Level: Unable to assess         ADL  Grooming/Oral Hygiene  Assistance Level:  Co-treatment   Time In       1300   Time Out       1400   Minutes       60   Timed Code Treatment Minutes: 60 Minutes       Marito Florian, OT

## 2024-01-10 NOTE — CARE COORDINATION
CM spoke with Dr. Lemos Pulmonology regarding either a bi-pap or C-pap. CM will reach out to [facility] to see if they can accommodate and update MD.    Dian Mckee RN

## 2024-01-10 NOTE — PROGRESS NOTES
Patient noted with low grade fever, heart rate tachy but in recent baseline. PRN tylenol administered per order. Additional labs ordered and new consults pending.

## 2024-01-10 NOTE — PROGRESS NOTES
01/09/24 2331   NIV Type   NIV Started/Stopped On   Equipment Type v60   Mode Bilevel   Mask Type Full face mask   Mask Size Large   Assessment   Respirations 30   SpO2 93 %   Comfort Level Good   Using Accessory Muscles No   Mask Compliance Good   Skin Protection for O2 Device Yes   Orientation Middle   Location Nose   Intervention(s) Other (Comment)  (new piece of mepilex placed on nose. mask loosened at bridge of nose.)   Settings/Measurements   PIP Observed 13 cm H20   IPAP 12 cmH20   CPAP/EPAP 8 cmH2O   Vt (Measured) 423 mL   Rate Ordered 12   FiO2  30 %   I Time/ I Time % 1 s   Minute Volume (L/min) 13.3 Liters   Mask Leak (lpm) 12 lpm   Patient's Home Machine No   Alarm Settings   Alarms On Y   Low Pressure (cmH2O) 6 cmH2O   High Pressure (cmH2O) 30 cmH2O   Apnea (secs) 20 secs   RR Low (bpm) 6   RR High (bpm) 40 br/min

## 2024-01-10 NOTE — PROGRESS NOTES
PULMONARY AND CRITICAL CARE INPATIENT NOTE        Isac Lemus   : 1950  MRN: [id number]     Admitting Physician: Nati Mcelroy MD  Attending Physician: Nati Mcelroy MD  PCP: Emilie Cleary APRN - CNP    Admission: 2023   Date of Service: 1/10/2024      Reason for consult-Hypercapnia          ASSESSMENT & PLAN       73 y.o. pleasant  male patient with:    Hospital Problems             Last Modified POA    * (Principal) SDH (subdural hematoma) ([facility]) 2023 Yes    Chronic respiratory failure with hypercapnia ([facility]) 2023 Yes    Hypernatremia 2024 Yes    Elevated BUN 2024 Yes    Uncontrolled type 2 diabetes mellitus with hyperglycemia ([facility]) 2024 Yes    Macrocytic anemia 2024 Yes    Atrial arrhythmia 2024 Yes      # Mild hypercapnia.  Seems subacute to chronic and compensated on blood gas.    # Mild hypoxemia.  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  Loud snoring with persistent daytime fatigue and unrefreshing sleep.  No previous sleep studies  #Hypernatremia  #Uncontrolled DM with hyperglycemia    # Hypertension, diabetes type 2, CAD, atrial fibrillation on anticoagulation  Oxygen supplementation to keep saturation 90 to 94% only  Please titrate the oxygen as per the above parameters  Patient was on 2 L of nasal cannula oxygen with saturation of 93%,  Pulmonary toilet  Patient is spiking fever and has been started on Zosyn by ID  Patient's acid-base balance was reviewed  BiPAP changed to CPAP on intermittent basis  Bronchodilators  Management of diabetes mellitus as per primary  10/26/14 11/5/14  Isac Troy MD   furosemide (LASIX) 40 MG tablet Take 1 tablet by mouth daily for 10 days. 10/26/14 11/5/14  Isac Troy MD   Glucosamine-Chondroit-Vit C-Mn (GLUCOSAMINE CHONDR 1500 COMPLX) CAPS Take  by mouth daily.      ProviderJanessa MD   metformin (GLUCOPHAGE) 500 MG tablet Take 2 tablets by mouth 2 times daily (with meals)    ProviderJanessa MD       Scheduled Meds:   piperacillin-tazobactam  3,375 mg IntraVENous Q8H    nystatin  5 mL Oral 4x Daily    dilTIAZem  30 mg Oral 4 times per day    metoprolol tartrate  100 mg Per G Tube BID    balsum peru-castor oil   Topical BID    pantoprazole  40 mg IntraVENous Daily    guaiFENesin  600 mg Oral BID    levETIRAcetam  750 mg PEG Tube BID    digoxin  125 mcg Per G Tube Daily    donepezil  5 mg Oral Daily    melatonin  5 mg Per G Tube Nightly    modafinil  300 mg Per G Tube Daily    [Held by provider] polyethylene glycol  17 g Per G Tube Daily    [Held by provider] sennosides-docusate sodium  2 tablet Per G Tube BID    atorvastatin  40 mg Per G Tube Nightly    apixaban  5 mg Per G Tube BID    metFORMIN  500 mg Per G Tube BID    insulin lispro  0-8 Units SubCUTAneous Q6H       Continuous Infusions:   dextrose         PRN Meds:  ipratropium 0.5 mg-albuterol 2.5 mg, ipratropium 0.5 mg-albuterol 2.5 mg, acetaminophen, bisacodyl, magnesium hydroxide, oxyCODONE, QUEtiapine, simethicone, zinc oxide, glucose, dextrose bolus **OR** dextrose bolus, glucagon (rDNA), dextrose, prochlorperazine      Objective    Test Results:  Imaging:  I have reviewed radiology images personally.    XR CHEST PORTABLE    Result Date: 12/29/2023  No acute cardiopulmonary findings      CT chest December 29, 2023  Dependent atelectasis with mild effusions      PFTS:  NA      Cardiology:  No results found for: \"LVEF\", \"LVEFMODE\"      Sleep:  NA    Labs:   No results for input(s): \"PHART\", \"UBZ8HNX\", \"PO2ART\" in the last 72 hours.      Recent Labs

## 2024-01-10 NOTE — PROGRESS NOTES
01/10/24 0319   NIV Type   $NIV $Daily Charge   Equipment Type v60   Mode Bilevel   Mask Type Full face mask   Mask Size Large   Assessment   Pulse 94   Respirations 27   SpO2 94 %   Comfort Level Good   Using Accessory Muscles No   Mask Compliance Good   Skin Protection for O2 Device Yes   Orientation Middle   Location Nose   Settings/Measurements   PIP Observed 13 cm H20   IPAP 12 cmH20   CPAP/EPAP 8 cmH2O   Vt (Measured) 413 mL   FiO2  35 %   I Time/ I Time % 1 s   Minute Volume (L/min) 11.1 Liters   Mask Leak (lpm) 32 lpm   Patient's Home Machine No   Alarm Settings   Alarms On Y   Low Pressure (cmH2O) 6 cmH2O   High Pressure (cmH2O) 30 cmH2O   Apnea (secs) 20 secs   RR Low (bpm) 6   RR High (bpm) 40 br/min

## 2024-01-10 NOTE — PROGRESS NOTES
[facility] Inpatient Rehabilitation Progress Note    Isac Lemus  1/10/2024  [id number]    Chief Complaint: SDH (subdural hematoma) (HCC)    Subjective:   No acute events overnight. Today Jack is seen with his wife, Speech, and nursing present. He appears comfortable and does not demonstrate any pain behaviors. ID has started Zosyn due to concern for infection in his sacral wound and Gen Surg is consulted.     ROS: unable to reliably obtain    Objective:  Patient Vitals for the past 24 hrs:   BP Temp Temp src Pulse Resp SpO2   01/10/24 1406 119/61 -- -- 90 -- --   01/10/24 0830 113/65 98.2 °F (36.8 °C) Axillary (!) 113 22 93 %   01/10/24 0638 126/65 100.1 °F (37.8 °C) Axillary (!) 140 -- 97 %   01/10/24 0319 -- -- -- 94 27 94 %   01/10/24 0012 (!) 115/59 99.3 °F (37.4 °C) Axillary 94 -- 97 %   01/09/24 2331 -- -- -- -- 30 93 %   01/09/24 2202 133/61 (!) 100.8 °F (38.2 °C) Axillary (!) 133 20 97 %   01/09/24 1800 135/61 -- -- (!) 139 -- --   01/09/24 1511 133/66 -- -- 94 -- 93 %     Gen: No distress, seated up in bed  HEENT: Normocephalic, atraumatic.   CV: irregularly irregular rhythm  Resp: No respiratory distress. On room air  Abd: Soft, nondistended  Ext: No edema.   Neuro: awake    Wt Readings from Last 3 Encounters:   01/07/24 93 kg (205 lb)   04/06/16 115.7 kg (255 lb)   03/16/16 115.7 kg (255 lb)       Laboratory data:   Lab Results   Component Value Date    WBC 8.4 01/10/2024    HGB 9.3 (L) 01/10/2024    HCT 28.3 (L) 01/10/2024    .0 01/10/2024     01/10/2024       Lab Results   Component Value Date/Time     01/10/2024 07:44 AM    K 4.0 01/10/2024 07:44 AM    CL 97 01/10/2024 07:44 AM    CO2 29 01/10/2024 07:44 AM    BUN 13 01/10/2024 07:44 AM    CREATININE <0.5 01/10/2024 07:44 AM    GLUCOSE 155 01/10/2024 07:44 AM    CALCIUM 8.4 01/10/2024 07:44 AM        Therapy progress:    PT    Supine to Sit:    Sit to Supine:     Sit to Stand:    Chair/Bed to Chair Transfer:    Car Transfer:

## 2024-01-10 NOTE — PROGRESS NOTES
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; independent with household tasks and medication/financial management; family nearby   Dx: [x] Aphasia  [x] Dysarthria  [] Apraxia   [x] Oropharyngeal dysphagia [x] Cognitive Impairment  [] Other:   Date of Admit: 2023  Room #: 0156/0156-01    Current functional status (updated daily):       +    Pt being seen for : [x] Speech/Language Treatment  [x] Dysphagia Treatment [] Cognitive Treatment  [] Other:  Communication: []WFL  [x] Aphasia  [x] Dysarthria  [] Apraxia  [] Pragmatic Impairment [] Non-verbal  [] Hearing Loss  [] Other:   Cognition: [] WFL  [] Mild  [] Moderate  [] Severe [x] Unable to Assess  [x] Other: suspect deficit  Memory: [] WFL  [] Mild  [] Moderate  [] Severe [x] Unable to Assess  [] Other:  Behavior: [] Alert  [] Cooperative  [x]  Pleasant  [] Confused  [] Agitated  [] Uncooperative  [x] Distractible [] Motivated  [] Self-Limiting [] Anxious  [] Other:  Endurance:  [] Adequate for participation in SLP sessions  [x] Reduced overall  [x] Lethargic  [] Other:  Safety: [] No concerns at this time  [] Reduced insight into deficits  []  Reduced safety awareness [] Not following call light procedures  [x] Unable to Assess  [] Other:    Current Diet Order:Diet NPO  ADULT TUBE FEEDING; PEG; Standard with Fiber; Bolus; 5 Times Daily; 237; 80; Before and after each bolus  Swallowing Precautions: Reflux and aspiration precautions; Frequent oral hygiene; allow small volume ice chips with SLP/RN only, hold PO and contact SLP if s/s aspiration or worsening respiratory status develop        Date: 1/10/2024      Tx session 1  0900 -  vitals taken with 02 sats 88-89% with 02 increased to 3L 02 by RN- 02 sats increased to 93%. Pt positioned upright with assistance from RN for session. Pt with reduced responsiveness as session progressed, likely secondary to lethargy. Pt observed with increased WOB and audible respirations following minimal 1/2 tsp sips thin; thus, only 3 sips given. On ice chips, pt with weak throat clear x2- noted to be on ice chips given following thin trials. Pt only responsive to yes/no questions nonverbally on 40% of attempts this session given MAX cues- reduced responsiveness noted as session progressed, likely secondary to lethargy. Continue goals above.    Plan: Continue as per plan of care.      Additional Information:     Barriers toward progress: Pain, Confusion, Communication deficit, Decreased endurance, and Medical complications  Discharge recommendations:  [] Home independently  [] Home with assistance [x]  24 hour supervision  [] ECF [] Other:  Continued Tx Upon Discharge: ? [x] Yes [] No [] TBD based on progress while on ARU [] Vital Stim indicated [] Other:   Estimated discharge date: 01/14/24    Interventions used this date:  [x] Speech/Language Treatment  [] Instruction in HEP [] Group [x] Dysphagia Treatment [] Cognitive Treatment   [] Other:      Total Time Breakdown / Charges    Time in Time out Total Time / units   Cognitive Tx      Speech Tx 0920  1420 0930  1430 10 min/ 1 unit  10 min/ 1 unit   Dysphagia Tx 0900  1400 0920  1420 20 min/ 1 unit   20 min/ 1 unit        Electronically Signed by     Tx session 1:   Tennille Pagan MA CCC-SLP #[id number]  Speech Language Pathologist    Tx session 2:  Lindsay Mcknight M.A. CCC-SLP   Speech-Language Pathologist

## 2024-01-10 NOTE — PROGRESS NOTES
Verbal  Barriers to Learning: Cognition;Hearing  Education Outcome: Continued education needed;Unable to verbalize;Unable to demonstrate understanding        Therapy Time   Individual Concurrent Group Co-treatment   Time In       1300   Time Out       1400   Minutes       60     Timed Code Treatment Minutes: 60 Minutes       Bell Johnson, PT, 01/10/24 at 3:23 PM

## 2024-01-10 NOTE — PROGRESS NOTES
01/10/24 0912   Oxygen Therapy/Pulse Ox   O2 Therapy Oxygen   O2 Device Nasal cannula   O2 Flow Rate (L/min) 2 L/min   Blood Gas  Performed? Yes   $ABG $Arterial Puncture   Rafi's Test #1 Pos   Site #1 Right Radial   Site Prepped #1 Yes   Number of Attempts #1 1   Pressure Held #1 Yes   Complications #1 None   Post-procedure #1 Standard   Specimen Status #1 To lab   How Tolerated? Tolerated well

## 2024-01-11 ENCOUNTER — TELEPHONE (OUTPATIENT)
Dept: CARDIOLOGY CLINIC | Age: 74
End: 2024-01-11

## 2024-01-11 PROBLEM — I48.91 ATRIAL FIBRILLATION AND FLUTTER (HCC): Status: ACTIVE | Noted: 2024-01-11

## 2024-01-11 PROBLEM — I48.92 ATRIAL FIBRILLATION AND FLUTTER (HCC): Status: ACTIVE | Noted: 2024-01-11

## 2024-01-11 PROBLEM — R50.9 FUO (FEVER OF UNKNOWN ORIGIN): Status: ACTIVE | Noted: 2024-01-11

## 2024-01-11 PROBLEM — S31.000A SACRAL WOUND: Status: ACTIVE | Noted: 2024-01-11

## 2024-01-11 LAB
GLUCOSE BLD-MCNC: 121 MG/DL (ref 70–99)
GLUCOSE BLD-MCNC: 147 MG/DL (ref 70–99)
GLUCOSE BLD-MCNC: 178 MG/DL (ref 70–99)
PERFORMED ON: ABNORMAL

## 2024-01-11 PROCEDURE — 99232 SBSQ HOSP IP/OBS MODERATE 35: CPT | Performed by: INTERNAL MEDICINE

## 2024-01-11 PROCEDURE — 99233 SBSQ HOSP IP/OBS HIGH 50: CPT | Performed by: NURSE PRACTITIONER

## 2024-01-11 PROCEDURE — 97535 SELF CARE MNGMENT TRAINING: CPT

## 2024-01-11 PROCEDURE — 1280000000 HC REHAB R&B

## 2024-01-11 PROCEDURE — 6360000002 HC RX W HCPCS: Performed by: INTERNAL MEDICINE

## 2024-01-11 PROCEDURE — C9113 INJ PANTOPRAZOLE SODIUM, VIA: HCPCS | Performed by: INTERNAL MEDICINE

## 2024-01-11 PROCEDURE — 97530 THERAPEUTIC ACTIVITIES: CPT

## 2024-01-11 PROCEDURE — 2700000000 HC OXYGEN THERAPY PER DAY

## 2024-01-11 PROCEDURE — 97112 NEUROMUSCULAR REEDUCATION: CPT

## 2024-01-11 PROCEDURE — 92612 ENDOSCOPY SWALLOW (FEES) VID: CPT

## 2024-01-11 PROCEDURE — 94660 CPAP INITIATION&MGMT: CPT

## 2024-01-11 PROCEDURE — 6370000000 HC RX 637 (ALT 250 FOR IP): Performed by: NURSE PRACTITIONER

## 2024-01-11 PROCEDURE — 6370000000 HC RX 637 (ALT 250 FOR IP): Performed by: STUDENT IN AN ORGANIZED HEALTH CARE EDUCATION/TRAINING PROGRAM

## 2024-01-11 PROCEDURE — 92526 ORAL FUNCTION THERAPY: CPT

## 2024-01-11 PROCEDURE — 94761 N-INVAS EAR/PLS OXIMETRY MLT: CPT

## 2024-01-11 PROCEDURE — 6370000000 HC RX 637 (ALT 250 FOR IP): Performed by: INTERNAL MEDICINE

## 2024-01-11 PROCEDURE — 2580000003 HC RX 258: Performed by: INTERNAL MEDICINE

## 2024-01-11 PROCEDURE — 97110 THERAPEUTIC EXERCISES: CPT

## 2024-01-11 RX ORDER — DILTIAZEM HYDROCHLORIDE 60 MG/1
60 TABLET, FILM COATED ORAL EVERY 6 HOURS SCHEDULED
Status: DISCONTINUED | OUTPATIENT
Start: 2024-01-11 | End: 2024-01-14 | Stop reason: HOSPADM

## 2024-01-11 RX ORDER — AMIODARONE HYDROCHLORIDE 200 MG/1
400 TABLET ORAL 2 TIMES DAILY
Status: DISCONTINUED | OUTPATIENT
Start: 2024-01-11 | End: 2024-01-14 | Stop reason: HOSPADM

## 2024-01-11 RX ADMIN — NYSTATIN 500000 UNITS: 100000 SUSPENSION ORAL at 18:31

## 2024-01-11 RX ADMIN — DILTIAZEM HYDROCHLORIDE 60 MG: 60 TABLET ORAL at 18:30

## 2024-01-11 RX ADMIN — GUAIFENESIN 600 MG: 600 TABLET ORAL at 08:30

## 2024-01-11 RX ADMIN — MODAFINIL 300 MG: 200 TABLET ORAL at 08:29

## 2024-01-11 RX ADMIN — APIXABAN 5 MG: 5 TABLET, FILM COATED ORAL at 08:29

## 2024-01-11 RX ADMIN — METFORMIN HYDROCHLORIDE 500 MG: 500 TABLET ORAL at 08:29

## 2024-01-11 RX ADMIN — Medication 5 MG: at 21:54

## 2024-01-11 RX ADMIN — Medication 1 APPLICATION: at 12:17

## 2024-01-11 RX ADMIN — DILTIAZEM HYDROCHLORIDE 30 MG: 60 TABLET ORAL at 05:29

## 2024-01-11 RX ADMIN — ATORVASTATIN CALCIUM 40 MG: 40 TABLET, FILM COATED ORAL at 21:53

## 2024-01-11 RX ADMIN — CASTOR OIL AND BALSAM, PERU: 788; 87 OINTMENT TOPICAL at 23:21

## 2024-01-11 RX ADMIN — METOPROLOL TARTRATE 100 MG: 50 TABLET, FILM COATED ORAL at 21:51

## 2024-01-11 RX ADMIN — LEVETIRACETAM 750 MG: 100 SOLUTION ORAL at 11:22

## 2024-01-11 RX ADMIN — GUAIFENESIN 600 MG: 600 TABLET ORAL at 21:54

## 2024-01-11 RX ADMIN — NYSTATIN 500000 UNITS: 100000 SUSPENSION ORAL at 15:02

## 2024-01-11 RX ADMIN — AMIODARONE HYDROCHLORIDE 400 MG: 200 TABLET ORAL at 21:53

## 2024-01-11 RX ADMIN — PIPERACILLIN AND TAZOBACTAM 3375 MG: 3; .375 INJECTION, POWDER, FOR SOLUTION INTRAVENOUS at 05:52

## 2024-01-11 RX ADMIN — NYSTATIN 500000 UNITS: 100000 SUSPENSION ORAL at 08:28

## 2024-01-11 RX ADMIN — PIPERACILLIN AND TAZOBACTAM 3375 MG: 3; .375 INJECTION, POWDER, FOR SOLUTION INTRAVENOUS at 14:05

## 2024-01-11 RX ADMIN — PANTOPRAZOLE SODIUM 40 MG: 40 INJECTION, POWDER, FOR SOLUTION INTRAVENOUS at 08:29

## 2024-01-11 RX ADMIN — METFORMIN HYDROCHLORIDE 500 MG: 500 TABLET ORAL at 21:54

## 2024-01-11 RX ADMIN — ACETAMINOPHEN 650 MG: 325 TABLET, FILM COATED ORAL at 14:42

## 2024-01-11 RX ADMIN — APIXABAN 5 MG: 5 TABLET, FILM COATED ORAL at 21:53

## 2024-01-11 RX ADMIN — AMIODARONE HYDROCHLORIDE 400 MG: 200 TABLET ORAL at 14:43

## 2024-01-11 RX ADMIN — DONEPEZIL HYDROCHLORIDE 5 MG: 5 TABLET, FILM COATED ORAL at 08:30

## 2024-01-11 RX ADMIN — PIPERACILLIN AND TAZOBACTAM 3375 MG: 3; .375 INJECTION, POWDER, FOR SOLUTION INTRAVENOUS at 23:22

## 2024-01-11 RX ADMIN — DILTIAZEM HYDROCHLORIDE 30 MG: 60 TABLET ORAL at 14:06

## 2024-01-11 RX ADMIN — DIGOXIN 125 MCG: 125 TABLET ORAL at 08:30

## 2024-01-11 RX ADMIN — METOPROLOL TARTRATE 100 MG: 50 TABLET, FILM COATED ORAL at 08:29

## 2024-01-11 RX ADMIN — NYSTATIN 500000 UNITS: 100000 SUSPENSION ORAL at 21:38

## 2024-01-11 ASSESSMENT — PAIN SCALES - WONG BAKER
WONGBAKER_NUMERICALRESPONSE: 0

## 2024-01-11 ASSESSMENT — PAIN SCALES - GENERAL
PAINLEVEL_OUTOF10: 2
PAINLEVEL_OUTOF10: 0

## 2024-01-11 ASSESSMENT — PAIN - FUNCTIONAL ASSESSMENT: PAIN_FUNCTIONAL_ASSESSMENT: PREVENTS OR INTERFERES SOME ACTIVE ACTIVITIES AND ADLS

## 2024-01-11 ASSESSMENT — PAIN DESCRIPTION - LOCATION
LOCATION: COCCYX
LOCATION: COCCYX

## 2024-01-11 ASSESSMENT — PAIN DESCRIPTION - DESCRIPTORS: DESCRIPTORS: ACHING

## 2024-01-11 NOTE — PLAN OF CARE
Problem: Skin/Tissue Integrity  Goal: Absence of new skin breakdown  Description: 1.  Monitor for areas of redness and/or skin breakdown  2.  Assess vascular access sites hourly  3.  Every 4-6 hours minimum:  Change oxygen saturation probe site  4.  Every 4-6 hours:  If on nasal continuous positive airway pressure, respiratory therapy assess nares and determine need for appliance change or resting period.  1/11/2024 1101 by MIRELLA PRAJAPATI  Outcome: Progressing  1/11/2024 0053 by Camille Simms RN  Outcome: Progressing

## 2024-01-11 NOTE — PROGRESS NOTES
[facility] Inpatient Rehabilitation Progress Note    Isac Lemus  1/11/2024  [id number]    Chief Complaint: SDH (subdural hematoma) (HCC)    Subjective:   No acute events overnight. Today Jack is seen with his wife and daughter present. He is asleep, resting in bed. Pending Gen Surg consult for wound. ID is following. Pulmonology determining whether patient needs BiPAP/CPAP. Discussed case with Cardiology, following for afib/flutter. Conversation with case management and family about dispo.     ROS: unable to reliably obtain    Objective:  Patient Vitals for the past 24 hrs:   BP Temp Temp src Pulse Resp SpO2   01/11/24 0815 (!) 116/57 98.2 °F (36.8 °C) Oral (!) 122 22 93 %   01/11/24 0529 117/70 -- -- (!) 118 -- --   01/11/24 0505 -- -- -- -- 24 94 %   01/10/24 2341 -- -- -- -- (!) 33 95 %   01/10/24 2230 (!) 113/57 99.7 °F (37.6 °C) Oral (!) 116 22 94 %   01/10/24 1700 122/67 -- -- 94 20 94 %   01/10/24 1518 (!) 115/56 -- -- 95 -- 94 %   01/10/24 1406 119/61 -- -- 90 -- --     Gen: No distress, supine in bed  HEENT: Normocephalic, atraumatic.   CV: extremities well perfused  Resp: No respiratory distress. On room air  Abd: Soft, nondistended  Ext: No edema.   Neuro: asleep in bed    Wt Readings from Last 3 Encounters:   01/07/24 93 kg (205 lb)   04/06/16 115.7 kg (255 lb)   03/16/16 115.7 kg (255 lb)       Laboratory data:   Lab Results   Component Value Date    WBC 8.4 01/10/2024    HGB 9.3 (L) 01/10/2024    HCT 28.3 (L) 01/10/2024    .0 01/10/2024     01/10/2024       Lab Results   Component Value Date/Time     01/10/2024 07:44 AM    K 4.0 01/10/2024 07:44 AM    CL 97 01/10/2024 07:44 AM    CO2 29 01/10/2024 07:44 AM    BUN 13 01/10/2024 07:44 AM    CREATININE <0.5 01/10/2024 07:44 AM    GLUCOSE 155 01/10/2024 07:44 AM    CALCIUM 8.4 01/10/2024 07:44 AM        Therapy progress:    PT    Supine to Sit:    Sit to Supine:     Sit to Stand:    Chair/Bed to Chair Transfer:    Car Transfer:     Ambulation 10 ft:    Ambulation 50 ft:    Ambulation 150 ft:    Stairs - 1 Step:    Stairs - 4 Step:    Stairs - 12 Step:      OT    Eating:    Oral Hygiene: Dependent  Bathing: Dependent  Upper Body Dressing: Dependent  Lower Body Dressing: Dependent  Toilet Transfer:    Toilet Hygiene: Dependent      Speech therapy:    ADULT TUBE FEEDING; PEG; Standard with Fiber; Bolus; 5 Times Daily; 237; 80; Before and after each bolus  ADULT DIET; Dysphagia - Pureed    Body mass index is 32.11 kg/m².    Assessment and Plan:  Isac Lemus is a 73 year old male with a past medical history significant for afib on Eliquis, CAD s/p CABG (2014), HLD, DM2, HTN, and GERD who presented to [facility] on 11/28/23 as a transfer from [facility] after a fall with head trauma while on anticoagulation, found to have bilateral SDH, SAH, and skull fracture. He was admitted to [facility] on 12/22 due to functional deficits below his baseline.     Traumatic Brain Injury  - with bilateral SDH, SAH, skull base fracture, 4 mm MLS, right traverse/sigmoid sinus thrombosis   - no surgical intervention  - serial scans during acute stay stable  - Keppra 750 mg BID, EEG showing rate epileptiform discharges during acute stay. OSH recommending 30 days of ppx. Consulted Neurology, due to abnormal findings on OSH EEG will continue Keppra for now.   - Aricept and provigil  - PT, OT, ST    Pulmonary insufficiency  - wean oxygen for goal SpO2>88%  - ABG showing CO2 retention  - XR chest negative for acute process  - Pulmonology following, suspected undiagnosed sleep apnea. Was on BiPAP intermittently, now on CPAP.     Intermittent Fevers  - infectious workup as OSH unremarkable  - prior infectious workups negative  - now with recurrence of fevers  - ID following, concern for infection of sacral wound. Started on Zosyn. General Surgery consulted. Wound care following    Atrial fibrillation  - with RVR during acute stay  - AC with Eliquis  - Cardiology consulted due to

## 2024-01-11 NOTE — PROGRESS NOTES
Physical Therapy  Facility/Department: [facility]  Rehabilitation Physical Therapy Treatment Note    NAME: Isac Lemus  : 1950 (73 y.o.)  MRN: [id number]  CODE STATUS: Full Code    Date of Service: 24       Restrictions:  Restrictions/Precautions: Seizure, Fall Risk, Up as Tolerated, Aspiration Risk, General Precautions, Modified Diet  Position Activity Restriction  Other position/activity restrictions: PEG, tube feed, Puree     SUBJECTIVE  Subjective  Subjective: pt found in bed, increased time to increase alertness. family present  Pain: some pain with rolling/ sitting upright but unable to rate       OBJECTIVE  Cognition  Overall Cognitive Status: Exceptions  Arousal/Alertness: Delayed responses to stimuli;Unresponsive to stimuli  Following Commands: Does not follow commands;Inconsistently follows commands  Attention Span: Unable to maintain attention;Difficulty dividing attention;Difficulty attending to directions  Memory:  (GIA)  Safety Judgement: Decreased awareness of need for assistance;Decreased awareness of need for safety  Problem Solving: Decreased awareness of errors  Insights: Not aware of deficits  Initiation: Requires cues for all  Sequencing: Requires cues for all  Cognition Comment: very difficult to arouse, once awake, scanning environment and looking at visual stimuli, grasping and puckering for drink  Orientation  Overall Orientation Status: Impaired  Orientation Level: Unable to assess    Functional Mobility  Roll Left  Assistance Level: Dependent;Requires x 2 assistance  Roll Right  Assistance Level: Dependent;Requires x 2 assistance  Scooting  Assistance Level: Requires x 2 assistance;Maximum assistance;Dependent  Balance  Sitting Balance: Dependent/Total  Bed To/From Chair  Assistance Level: Dependent;Requires x 2 assistance  Skilled Clinical Factors: from bed <> wc and wc <> low mat with maxi move      Neuromuscular Education  NDT Treatment: Sitting  Neuromuscular Comments: pt  bed <> chair with LRAD with mod A x 2.- GOAL NOT MET, TD via cathryn  Short Term Goal 4: Pt to perform 12-15 reps of LE HEP to target strength/ROM.- GOAL NOT MET, pt unable to participate 2* decreased levels of alertness  Short Term Goal 5: Pt to perform sit to/from stand transfer at LRAD with mod Ax 2.- GOAL NOT MET, unsafe to trial  Long Term Goals  Time Frame for Long Term Goals : 21 days (1/12/24)- extended to 1/18 per new dc date  Long Term Goal 1: Pt to perform bed mobility with min Ax 1.  Long Term Goal 2: Pt to transfer bed <> chair with LRAD with min Ax 1.  Long Term Goal 3: Pt to ambulate 25 ft with LRAD with min A x1.  Long Term Goal 4: Pt to manage 2 steps with rail (for home entry) with LRAD with min A x1.  Long Term Goal 5: Pt to perform sit to/from stand transfer at LRAD with min A x 1.    PLAN OF CARE/SAFETY  Physical Therapy Plan  General Plan: 5-7 times per week  Current Treatment Recommendations: Strengthening;ROM;Balance training;Functional mobility training;Transfer training;Endurance training;Neuromuscular re-education;Gait training;Stair training;Home exercise program;Safety education & training;Patient/Caregiver education & training;Equipment evaluation, education, & procurement;Therapeutic activities;Cognitive/Perceptual training;Co-Treatment  Safety Devices  Type of Devices: All fall risk precautions in place;All harry prominences offloaded;Chair alarm in place;Call light within reach;Patient at risk for falls;Left in chair;Nurse notified;Gait belt;Heels elevated for pressure relief    EDUCATION  Education  Education Given To: Patient;Family  Education Provided: Role of Therapy;Plan of Care;Precautions;Mobility Training;Transfer Training;Safety;Equipment;Family Education;DME/Home Modifications  Education Provided Comments: bed mobility, body mechanics, use of lift equipment upo d/c.  Education Method: Verbal  Barriers to Learning: Cognition;Hearing  Education Outcome: Continued education

## 2024-01-11 NOTE — PROGRESS NOTES
01/11/24 0505   NIV Type   NIV Started/Stopped On   Equipment Type V60   Mode Bilevel   Mask Type Full face mask   Mask Size Large   Assessment   Respirations 24   SpO2 94 %   Comfort Level Good   Using Accessory Muscles No   Mask Compliance Good   Skin Assessment Clean, dry, & intact   Skin Protection for O2 Device Yes   Location Nose   Settings/Measurements   IPAP 12 cmH20   CPAP/EPAP 8 cmH2O   Vt (Measured) 405 mL   FiO2  35 %   Minute Volume (L/min) 9.8 Liters   Mask Leak (lpm) 7 lpm   Patient's Home Machine No   Alarm Settings   Alarms On Y

## 2024-01-11 NOTE — TELEPHONE ENCOUNTER
NPAM asking about data we have for patient, so far. Checking in VC there is no data since 1/8 at approximately 9:30 PM. I tried calling the patient, n/a no VM. I was able to leave a message on the spouses VM to call us back.

## 2024-01-11 NOTE — FLOWSHEET NOTE
01/10/24 2230   Vital Signs   Temp 99.7 °F (37.6 °C)   Temp Source Oral   Pulse (!) 116   Heart Rate Source Monitor   Respirations 22   BP (!) 113/57   MAP (Calculated) 76   BP Location Left upper arm   BP Method Automatic   Patient Position Semi fowlers   Oxygen Therapy   SpO2 94 %   O2 Device Nasal cannula   O2 Flow Rate (L/min) 3 L/min     PM assessment complete. Pt alert at times, awoke during rn assessment and tracking with eyes. Not following commands for hands/feet movement wise, but did respond by nodding appropriately at times. Nonverbal. Daughter at bedside. PIV to right hand flushed, infusing. Peg tube flushed, bolus of feed given by gravity with water bolus per order. Medications via PEG. Peg tube site. Clean dry intact no redness. Pt changed for incontinence of urine. Stage 3 to bottom, cleansed, venelex cream applied. Low air loss bed in place. Pt turned using assist of wedge pillow. Heels elevated off bed with pillow and protectors in place for blanchable pink bilateral heels. Redness,with yellow center circular pressure stage 1 on nose likely from PAP at night, will place wound consult. Tongue mucosa lips dry, mouth swabs with water, glycerin mouth swabs and nystatin via MAR used with oral swabs. Vital signs stable. Plan of care reviewed. No other needs identified at this time.

## 2024-01-11 NOTE — PROGRESS NOTES
Occupational Therapy  Facility/Department: [facility]  Rehabilitation Occupational Therapy Daily Treatment Note    Date: 24  Patient Name: Isac Lemus       Room: 0156/0156-01  MRN: [id number]  Account: [id number]   : 1950  (73 y.o.) Gender: male                    Past Medical History:  has a past medical history of Acid reflux, CAD (coronary artery disease), Diabetes mellitus (HCC), Heartburn, Hiatal hernia, Hypertension, and Seasonal allergies.  Past Surgical History:   has a past surgical history that includes Coronary artery bypass graft (10/2014); Colonoscopy (); and Colonoscopy (3/16/16).    Restrictions  Restrictions/Precautions: Seizure, Fall Risk, Up as Tolerated, Aspiration Risk, General Precautions, Modified Diet  Other position/activity restrictions: PEG, tube feed, Puree  Required Braces or Orthoses?: No    Subjective  Subjective: Pt in bed, resting, difficult to arouse, no s/so of pain, family present and agreeable to OT/PT session, /65, HR 93, O2 sats 95% on 3L O2.  Restrictions/Precautions: Seizure;Fall Risk;Up as Tolerated;NPO;Aspiration Risk;General Precautions             Objective     Cognition  Overall Cognitive Status: Exceptions  Arousal/Alertness: Delayed responses to stimuli;Unresponsive to stimuli  Following Commands: Does not follow commands;Inconsistently follows commands  Attention Span: Unable to maintain attention;Difficulty dividing attention;Difficulty attending to directions  Memory:  (GIA)  Safety Judgement: Decreased awareness of need for assistance;Decreased awareness of need for safety  Problem Solving: Decreased awareness of errors  Insights: Not aware of deficits  Initiation: Requires cues for all  Sequencing: Requires cues for all  Cognition Comment: very difficult to arouse, once awake, scanning environment and looking at visual stimuli, grasping and puckering for drink  Orientation  Overall Orientation Status: Impaired  Orientation Level: Unable  to shoulders, cool washcloth applied to head, and stimulation to mouth/face applied. Pt alert for remainder of session and somewhat engaged during weight shifting and weight bearing of UEs. Pt able to push/pull on dowel albert with [place] to hold onto dowel albert with 25-50% ability to engage during 5 reps. Pt required max/total assist of 2-3 to weight shift forward to begin pre-standing exercises. PT assisting with trunk support and weight shift at hips, OT assisting with trunk support, blocking knee, and weight shift at hips, while third person assisted with blocking at knee and trunk support as well. Pt engaged in task visually but minimal hip/LE muscle contraction noted. Pt incontinent of bowel and changed in bed with assist of RN to redress buttock wound at end of session. Continue POC.  Activity Tolerance: Patient limited by fatigue;Patient limited by endurance;Treatment limited secondary to decreased cognition  Discharge Recommendations: Subacute/Skilled Nursing Facility  OT Equipment Recommendations  Other: defer to next level of care  Safety Devices  Safety Devices in place: Yes  Type of devices: All fall risk precautions in place;Nurse notified;Call light within reach;Patient at risk for falls;Left in bed;Bed alarm in place    Patient Education  Education  Education Given To: Patient;Family  Education Provided: Role of Therapy;Plan of Care;Safety;Mobility Training;Transfer Training;Cognition;Family Education;Equipment;DME/Home Modifications;Fall Prevention Strategies;Precautions;ADL Function  Education Provided Comments: role of OT, bed mobility/cathryn pad placement, purpose of sitting balance EOM, weight shifting, ADL completion, positioning in bed to assist with wound healing  Education Method: Demonstration;Verbal  Barriers to Learning: Cognition  Education Outcome: Unable to verbalize;Unable to demonstrate understanding;Continued education needed    Plan  Occupational Therapy Plan  Times Per Week:

## 2024-01-11 NOTE — CARE COORDINATION
SHARDA and Dr. Mcelroy spoke to spouse and daughter at bedside. Md explaining plan of care. CM explained [facility] denied d/t Cpap or Bi-pap and if they had additional preferences. Daughter tearful and stated \"No, I will talk to [facility]\". CM acknowledged. We still need consulting MD recs on plan of care to move forward.      Dian Mckee RN

## 2024-01-11 NOTE — PROGRESS NOTES
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; independent with household tasks and medication/financial management; family nearby   Dx: [x] Aphasia  [x] Dysarthria  [] Apraxia   [x] Oropharyngeal dysphagia [x] Cognitive Impairment  [] Other:   Date of Admit: 2023  Room #: 0156/0156-01    Current functional status (updated daily):       +    Pt being seen for : [x] Speech/Language Treatment  [x] Dysphagia Treatment [] Cognitive Treatment  [] Other:  Communication: []WFL  [x] Aphasia  [x] Dysarthria  [] Apraxia  [] Pragmatic Impairment [] Non-verbal  [] Hearing Loss  [] Other:   Cognition: [] WFL  [] Mild  [] Moderate  [] Severe [x] Unable to Assess  [x] Other: suspect deficit  Memory: [] WFL  [] Mild  [] Moderate  [] Severe [x] Unable to Assess  [] Other:  Behavior: [] Alert  [] Cooperative  [x]  Pleasant  [] Confused  [] Agitated  [] Uncooperative  [x] Distractible [] Motivated  [] Self-Limiting [] Anxious  [] Other:  Endurance:  [] Adequate for participation in SLP sessions  [x] Reduced overall  [x] Lethargic  [] Other:  Safety: [] No concerns at this time  [] Reduced insight into deficits  []  Reduced safety awareness [] Not following call light procedures  [x] Unable to Assess  [] Other:    Current Diet Order:ADULT TUBE FEEDING; PEG; Standard with Fiber; Bolus; 5 Times Daily; 237; 80; Before and after each bolus  ADULT DIET; Dysphagia - Pureed  Swallowing Precautions: Reflux and aspiration precautions; Frequent oral hygiene; allow small volume ice chips with SLP/RN only, hold PO and contact SLP if s/s aspiration or worsening respiratory status develop        Date: 2024      Tx

## 2024-01-11 NOTE — PROGRESS NOTES
Infectious Disease Follow up Notes    CC :  FUO, lethargy     Antibiotics:  Zosyn 3.375 q8 s 1/9/24    Admit Date:   12/22/2023  Hospital Day: 21    Subjective:   Low grade fever in the last 48 hours   Incontinent of urine wearing brief and sometimes male purewick.   Wife and daughter at bedside.    Objective:     Patient Vitals for the past 8 hrs:   BP Pulse SpO2   01/11/24 1515 117/72 94 95 %   01/11/24 1406 117/72 94 --   01/11/24 1354 104/65 93 95 %         EXAM:  Resting comfortably  No rash exposed skin       1/9 1/2 12/22                Scheduled Meds:   dilTIAZem  60 mg Oral 4 times per day    amiodarone  400 mg Oral BID    modafinil  300 mg Per G Tube Daily    Vashe Wound  1 Application Topical Daily    piperacillin-tazobactam  3,375 mg IntraVENous Q8H    nystatin  5 mL Oral 4x Daily    metoprolol tartrate  100 mg Per G Tube BID    balsum peru-castor oil   Topical BID    pantoprazole  40 mg IntraVENous Daily    guaiFENesin  600 mg Oral BID    levETIRAcetam  750 mg PEG Tube BID    donepezil  5 mg Oral Daily    melatonin  5 mg Per G Tube Nightly    [Held by provider] polyethylene glycol  17 g Per G Tube Daily    [Held by provider] sennosides-docusate sodium  2 tablet Per G Tube BID    atorvastatin  40 mg Per G Tube Nightly    apixaban  5 mg Per G Tube BID    metFORMIN  500 mg Per G Tube BID    insulin lispro  0-8 Units SubCUTAneous Q6H       Continuous Infusions:   dextrose            Data Review:    Lab Results   Component Value Date    WBC 8.4 01/10/2024    HGB 9.3 (L) 01/10/2024    HCT 28.3 (L) 01/10/2024    .0 01/10/2024     01/10/2024     Lab Results   Component Value Date    CREATININE <0.5 (L) 01/10/2024    BUN 13 01/10/2024     (L) 01/10/2024    K 4.0 01/10/2024    CL 97 (L) 01/10/2024    CO2 29 01/10/2024       Hepatic Function Panel:   Lab Results   Component Value Date/Time    ALKPHOS 85  01/03/2024 06:48 AM    ALT 26 01/03/2024 06:48 AM    AST 27 01/03/2024 06:48 AM    PROT 6.2 01/03/2024 06:48 AM    BILITOT <0.2 01/03/2024 06:48 AM    BILIDIR 0.2 10/22/2014 04:20 AM    IBILI 0.4 10/22/2014 04:20 AM    LABALBU 2.6 01/03/2024 06:48 AM       Cultures:   12/29   BC x2 neg  1/1       COVID NAAT neg               BC x1 NGTD              UA neg   1/4 COVID NAAT neg         Radiology Review:  All pertinent images / reports were reviewed as a part of this visit.      CT ap 1/1/24  IMPRESSION:  1.  Irregular wall thickening involving the distal sigmoid and proximal  rectum, while this may be due to an inflammatory process, underlying  neoplastic disease can have a similar appearance.   2.  Similar appearance of basilar airspace disease favoring subsegmental  atelectasis.   3.  Gastrostomy tube in place.     CT chest 12/29/23 - atelectasis     Assessment:     Patient Active Problem List    Diagnosis Date Noted    Chest pain 10/16/2014     Priority: High    Atrial fibrillation and flutter (HCC) 01/11/2024    Atrial arrhythmia 01/05/2024    Hypernatremia 01/01/2024    Elevated BUN 01/01/2024    Uncontrolled type 2 diabetes mellitus with hyperglycemia (HCC) 01/01/2024    Macrocytic anemia 01/01/2024    Chronic respiratory failure with hypercapnia (HCC) 12/29/2023    SDH (subdural hematoma) ([facility]) 12/22/2023    DM (diabetes mellitus) ([facility]) 10/16/2014    HTN (hypertension) 10/16/2014    Obesity 10/16/2014       Background of DM, HTN     Admitted to [facility] 11/28/23 with traumatic SDH and SAH and basilar skull fracture   Course complicated by   -DVT/SVT/HIT  -constipation / ogilve   -aspiration pna  -dysphagia s/p PEG      Transferred to ARU 12/18/23     Intermittent fever so ~1 week prior to admission at  as well as through the bulk of that admission there per family and ongoing since admission to ARU   He was getting scheduled APAP from 12/22-1/8  Fevers persist    Sacral pressure wound with necrotic tissue,

## 2024-01-11 NOTE — PROCEDURES
[facility]  SPEECH THERAPY  Fiberoptic Endoscopic Evaluation of Swallowing  (FEES)    IDDSI Level 4 puree solids, IDDSI Level 0 thin liquids via provale cup only, meds crushed with puree or via PEG. Per dietician, continue TF if pt eats >50% of meals. Strict oral care and strict use of safe swallow strategies during all meals. RN aware to continue to monitor respiratory function. If respiratory status worsens, downgrade to NTL.     Pt will benefit from aggressive dysphagia treatment and completion of pharyngeal/laryngeal strengthening exercises with vital stim/neuromuscular electrical stimulation for improved swallow function.       NAME: Isac Lemus  YOB: 1950  GENDER: male  MRN: [id number]  ACCOUNT #: [id number]  REFERRING PHYSICIAN: Dr. Mcelroy   DIAGNOSIS: SDH (subdural hematoma)  Onset Date: 11/28/23  PAIN: Denies    EXAM DATE: 01/11/24  EXAM LOCATION: Pt's room- wife and daughter present at bedside    CASE HISTORY:  Per MD H&P on 12/22/23: \"Isac Lemus is a 73 year old male with a past medical history significant for afib on Eliquis, CAD s/p CABG (2014), HLD, DM2, HTN, and GERD who presented to [facility] on 11/28/23 as a transfer from [facility] after a fall with head trauma while on anticoagulation. CT head revealed bilateral SDH, diffuse traumatic SAH, and skull base fractures through the sella turcica and right lateral wall of the sphenoid sinus. He was given kcentra, keppra, 1 unit platelets, and FFP. His hospital course was notable for pulmonary insufficiency requiring high flow oxygen, atrial fibrillation with RVR, HIT, intermittent fevers, right peroneal DVT, RLL segmental PE, pulmonary edema, colonic pseudoobstruction, aspiration pneumonia, and dysphagia. EEG was negative for seizures. On 12/16 he underwent PEG placement. He remains NPO with tube feeds. He was admitted to [facility] on 12/22 due to functional deficits below his baseline. Today he is seen with his wife and

## 2024-01-11 NOTE — PLAN OF CARE
Problem: Discharge Planning  Goal: Discharge to home or other facility with appropriate resources  1/11/2024 0053 by Camille Simms RN  Outcome: Progressing  1/10/2024 1927 by MIRELLA PRAJAPATI  Outcome: Progressing     Problem: Pain  Goal: Verbalizes/displays adequate comfort level or baseline comfort level  1/11/2024 0053 by Camille Simms RN  Outcome: Progressing  1/10/2024 1927 by MIRELLA PRAJAPATI  Outcome: Progressing     Problem: Skin/Tissue Integrity  Goal: Absence of new skin breakdown  Description: 1.  Monitor for areas of redness and/or skin breakdown  2.  Assess vascular access sites hourly  3.  Every 4-6 hours minimum:  Change oxygen saturation probe site  4.  Every 4-6 hours:  If on nasal continuous positive airway pressure, respiratory therapy assess nares and determine need for appliance change or resting period.  1/11/2024 0053 by Camille Simms RN  Outcome: Progressing  1/10/2024 1927 by MIRELLA PRAJAPATI  Outcome: Progressing     Problem: Safety - Adult  Goal: Free from fall injury  1/11/2024 0053 by Camille Simms RN  Outcome: Progressing  1/10/2024 1927 by MIRELLA PRAJAPATI  Outcome: Progressing     Problem: ABCDS Injury Assessment  Goal: Absence of physical injury  1/11/2024 0053 by Camille Simms RN  Outcome: Progressing  1/10/2024 1927 by MIRELLA PRAJAPATI  Outcome: Progressing

## 2024-01-11 NOTE — PROGRESS NOTES
01/10/24 2341   NIV Type   NIV Started/Stopped On   Equipment Type V60   Mode Bilevel   Mask Type Full face mask   Mask Size Large   Assessment   Respirations (!) 33   SpO2 95 %   Comfort Level Good   Using Accessory Muscles No   Mask Compliance Good   Skin Assessment Clean, dry, & intact   Skin Protection for O2 Device Yes   Orientation Middle   Location Nose   Intervention(s) Skin Barrier   Settings/Measurements   IPAP 12 cmH20   CPAP/EPAP 8 cmH2O   Vt (Measured) 417 mL   Rate Ordered 12   FiO2  35 %   Minute Volume (L/min) 15 Liters   Mask Leak (lpm) 4 lpm   Patient's Home Machine No   Alarm Settings   Alarms On Y

## 2024-01-11 NOTE — PROGRESS NOTES
[facility]     Electrophysiology                                     Progress Note    Admission date:  2023    Reason for follow up visit: AF    HPI/CC: Isac Lemus was admitted on 2023 to the ARU from  after traumatic fall resulting in skull fracture and SAH/SDH. Complicated hospital course. EP consulted when EKG showed rapid AFL. An cardiac event monitor was placed on 2024 but this was removed 2024 at the family's request due to skin irritation. He is not on telemetry but has had some documented high heart rates.     Subjective: Unable to assess due to mental status. Daughter and wife at bedside.      Vitals:  Blood pressure (!) 116/57, pulse (!) 122, temperature 98.2 °F (36.8 °C), temperature source Oral, resp. rate 22, height 1.702 m (5' 7\"), weight 93 kg (205 lb), SpO2 93 %.  Temp  Av °F (37.2 °C)  Min: 98.2 °F (36.8 °C)  Max: 99.7 °F (37.6 °C)  Pulse  Av.8  Min: 90  Max: 122  BP  Min: 113/57  Max: 122/67  SpO2  Av %  Min: 93 %  Max: 95 %  FiO2   Av %  Min: 35 %  Max: 35 %    24 hour I/O    Intake/Output Summary (Last 24 hours) at 2024 1227  Last data filed at 2024 0840  Gross per 24 hour   Intake 980 ml   Output 0 ml   Net 980 ml       Current Facility-Administered Medications   Medication Dose Route Frequency Provider Last Rate Last Admin    modafinil (PROVIGIL) tablet 300 mg  300 mg Per G Tube Daily Nati Mcelroy MD   300 mg at 24 0829    Vashe Wound 0.033 % SOLN 1 Application  1 Application Topical Daily Nati Mcelroy MD   1 Application at 24 1217    piperacillin-tazobactam (ZOSYN) 3,375 mg in sodium chloride 0.9 % 50 mL IVPB (mini-bag)  3,375 mg IntraVENous Q8H Marichuy Rinaldi MD   Stopped at 24 1122    nystatin (MYCOSTATIN) 022941 UNIT/ML suspension 500,000 Units  5 mL Oral 4x Daily Nati Mcelroy MD   500,000 Units at 24 0828    dilTIAZem (CARDIZEM) tablet 30 mg  30 mg Oral 4 times per

## 2024-01-11 NOTE — PROGRESS NOTES
Comprehensive Nutrition Assessment    Type and Reason for Visit:  Reassess    Nutrition Recommendations/Plan:   Continue pureed diet - textures and consistencies per SLP   Add Magic Cups with meals   Encourage PO intakes as tolerated   If pt consumes less than 50% of meal, provide bolus of Jevity 1.5 of 237 mL up to 3 cans daily. 60 mL free water flush before and after administration   Provide 1 Packet of Prosource daily via PEG tube. 30 mL free water flush before and after administration   Monitor nutrition adequacy, pertinent labs, bowel habits, wt changes, and clinical progress     Malnutrition Assessment:  Malnutrition Status:  At risk for malnutrition (Comment) (12/28/23 1220)    Context:  Acute Illness     Findings of the 6 clinical characteristics of malnutrition:  Energy Intake:  Mild decrease in energy intake (Comment)  Weight Loss:  Greater than 7.5% over 3 months       Nutrition Assessment:    Follow up: S/p FEES today, diet upgraded to pureed diet with thin liquids. Pt able to take bites of lunch this afternoon, total feed assist. RD assisted with ordering dinner for this evening. RD to add ONS to promote PO intakes. Recommend modifying TF regimen to if pt consumes less than 50%, provide bolus feed. Attempts to allow pt to consume more PO. Pt with worsening wounds. General surgery and ID consulted. Recommend adding protein supplement to assist in healing of wound. Will monitor.    Nutrition Related Findings:    +1 BLE edema. BG stable. Na 135. Active BS. BM on 1/10. Wound Type: Open Wounds, Deep Tissue Injury       Current Nutrition Intake & Therapies:    Average Meal Intake: Unable to assess  Average Supplements Intake: None Ordered  ADULT TUBE FEEDING; PEG; Standard with Fiber; Bolus; 5 Times Daily; 237; 80; Before and after each bolus  ADULT DIET; Dysphagia - Pureed  Current Tube Feeding (TF) Orders:  Feeding Route: PEG  Formula: Standard with Fiber  Schedule: Bolus  Goal TF & Flush Orders Provides:  If pt consumes less than 50% of meal, provide bolus feed of Jevity 1.5 (237 mL can) up to 3 cans daily. If 3 cans provided, regimen provides 711 mL TV, 1065 kcal, 45 g protein, and 540 mL free water. +60 mL free water flush before and after administration. +1 Packet of Prosource daily for additional 20 g protein and 80 kcal. +30 mL free water flush before and after administration    Anthropometric Measures:  Height: 170.2 cm (5' 7\")  Ideal Body Weight (IBW): 148 lbs (67 kg)    Admission Body Weight: 96.6 kg (213 lb) (bed scale)  Current Body Weight: 93 kg (205 lb),   IBW. Weight Source: Bed Scale  Current BMI (kg/m2): 32.1  Usual Body Weight: 105.3 kg (232 lb 2.3 oz) (9/22)  % Weight Change (Calculated): -8  Weight Adjustment For: No Adjustment                 BMI Categories: Obese Class 1 (BMI 30.0-34.9)    Estimated Daily Nutrient Needs:  Energy Requirements Based On: Kcal/kg (25-30)  Weight Used for Energy Requirements: Ideal  Energy (kcal/day): 0640-8726 kcals  Weight Used for Protein Requirements: Ideal (1-1.5)  Protein (g/day):  g  Method Used for Fluid Requirements: 1 ml/kcal  Fluid (ml/day): 3372-9504 ml    Nutrition Diagnosis:   Inadequate oral intake related to inadequate protein-energy intake, swallowing difficulty as evidenced by nutrition support - enteral nutrition, poor intake prior to admission, swallow study results, weight loss 7.5% in 3 months    Nutrition Interventions:   Food and/or Nutrient Delivery: Continue Current Diet, Start Oral Nutrition Supplement, Modify Tube Feeding  Nutrition Education/Counseling: No recommendation at this time  Coordination of Nutrition Care: Continue to monitor while inpatient, Interdisciplinary Rounds, Speech Therapy       Goals:  Previous Goal Met: Progressing toward Goal(s)  Goals: Meet at least 75% of estimated needs, prior to discharge       Nutrition Monitoring and Evaluation:   Behavioral-Environmental Outcomes: None Identified  Food/Nutrient Intake

## 2024-01-12 LAB
ANION GAP SERPL CALCULATED.3IONS-SCNC: 5 MMOL/L (ref 3–16)
BASOPHILS # BLD: 0 K/UL (ref 0–0.2)
BASOPHILS NFR BLD: 0.3 %
BUN SERPL-MCNC: 15 MG/DL (ref 7–20)
CALCIUM SERPL-MCNC: 8.4 MG/DL (ref 8.3–10.6)
CHLORIDE SERPL-SCNC: 96 MMOL/L (ref 99–110)
CO2 SERPL-SCNC: 32 MMOL/L (ref 21–32)
CREAT SERPL-MCNC: <0.5 MG/DL (ref 0.8–1.3)
DEPRECATED RDW RBC AUTO: 16.6 % (ref 12.4–15.4)
EOSINOPHIL # BLD: 0 K/UL (ref 0–0.6)
EOSINOPHIL NFR BLD: 0.6 %
GFR SERPLBLD CREATININE-BSD FMLA CKD-EPI: >60 ML/MIN/{1.73_M2}
GLUCOSE BLD-MCNC: 127 MG/DL (ref 70–99)
GLUCOSE BLD-MCNC: 130 MG/DL (ref 70–99)
GLUCOSE BLD-MCNC: 131 MG/DL (ref 70–99)
GLUCOSE BLD-MCNC: 166 MG/DL (ref 70–99)
GLUCOSE BLD-MCNC: 196 MG/DL (ref 70–99)
GLUCOSE SERPL-MCNC: 172 MG/DL (ref 70–99)
HCT VFR BLD AUTO: 28.3 % (ref 40.5–52.5)
HGB BLD-MCNC: 9.1 G/DL (ref 13.5–17.5)
LYMPHOCYTES # BLD: 1.1 K/UL (ref 1–5.1)
LYMPHOCYTES NFR BLD: 12.7 %
MCH RBC QN AUTO: 32.1 PG (ref 26–34)
MCHC RBC AUTO-ENTMCNC: 32.1 G/DL (ref 31–36)
MCV RBC AUTO: 99.9 FL (ref 80–100)
MONOCYTES # BLD: 0.8 K/UL (ref 0–1.3)
MONOCYTES NFR BLD: 8.9 %
NEUTROPHILS # BLD: 6.8 K/UL (ref 1.7–7.7)
NEUTROPHILS NFR BLD: 77.5 %
PERFORMED ON: ABNORMAL
PHOSPHATE SERPL-MCNC: 3 MG/DL (ref 2.5–4.9)
PLATELET # BLD AUTO: 319 K/UL (ref 135–450)
PMV BLD AUTO: 8.5 FL (ref 5–10.5)
POTASSIUM SERPL-SCNC: 4 MMOL/L (ref 3.5–5.1)
RBC # BLD AUTO: 2.83 M/UL (ref 4.2–5.9)
SODIUM SERPL-SCNC: 133 MMOL/L (ref 136–145)
WBC # BLD AUTO: 8.8 K/UL (ref 4–11)

## 2024-01-12 PROCEDURE — 6370000000 HC RX 637 (ALT 250 FOR IP): Performed by: NURSE PRACTITIONER

## 2024-01-12 PROCEDURE — 97530 THERAPEUTIC ACTIVITIES: CPT

## 2024-01-12 PROCEDURE — 80048 BASIC METABOLIC PNL TOTAL CA: CPT

## 2024-01-12 PROCEDURE — 6370000000 HC RX 637 (ALT 250 FOR IP): Performed by: INTERNAL MEDICINE

## 2024-01-12 PROCEDURE — 99221 1ST HOSP IP/OBS SF/LOW 40: CPT | Performed by: SURGERY

## 2024-01-12 PROCEDURE — 94761 N-INVAS EAR/PLS OXIMETRY MLT: CPT

## 2024-01-12 PROCEDURE — 92526 ORAL FUNCTION THERAPY: CPT

## 2024-01-12 PROCEDURE — 6360000002 HC RX W HCPCS: Performed by: INTERNAL MEDICINE

## 2024-01-12 PROCEDURE — 2700000000 HC OXYGEN THERAPY PER DAY

## 2024-01-12 PROCEDURE — 94660 CPAP INITIATION&MGMT: CPT

## 2024-01-12 PROCEDURE — 97112 NEUROMUSCULAR REEDUCATION: CPT

## 2024-01-12 PROCEDURE — 1280000000 HC REHAB R&B

## 2024-01-12 PROCEDURE — 36415 COLL VENOUS BLD VENIPUNCTURE: CPT

## 2024-01-12 PROCEDURE — C9113 INJ PANTOPRAZOLE SODIUM, VIA: HCPCS | Performed by: INTERNAL MEDICINE

## 2024-01-12 PROCEDURE — 92507 TX SP LANG VOICE COMM INDIV: CPT

## 2024-01-12 PROCEDURE — 84100 ASSAY OF PHOSPHORUS: CPT

## 2024-01-12 PROCEDURE — 2580000003 HC RX 258: Performed by: INTERNAL MEDICINE

## 2024-01-12 PROCEDURE — 6370000000 HC RX 637 (ALT 250 FOR IP): Performed by: STUDENT IN AN ORGANIZED HEALTH CARE EDUCATION/TRAINING PROGRAM

## 2024-01-12 PROCEDURE — 85025 COMPLETE CBC W/AUTO DIFF WBC: CPT

## 2024-01-12 PROCEDURE — 97535 SELF CARE MNGMENT TRAINING: CPT

## 2024-01-12 RX ADMIN — LEVETIRACETAM 750 MG: 100 SOLUTION ORAL at 23:34

## 2024-01-12 RX ADMIN — AMIODARONE HYDROCHLORIDE 400 MG: 200 TABLET ORAL at 07:37

## 2024-01-12 RX ADMIN — GUAIFENESIN 600 MG: 600 TABLET ORAL at 07:37

## 2024-01-12 RX ADMIN — NYSTATIN 500000 UNITS: 100000 SUSPENSION ORAL at 21:40

## 2024-01-12 RX ADMIN — NYSTATIN 500000 UNITS: 100000 SUSPENSION ORAL at 07:44

## 2024-01-12 RX ADMIN — Medication 1 APPLICATION: at 09:40

## 2024-01-12 RX ADMIN — DILTIAZEM HYDROCHLORIDE 60 MG: 60 TABLET ORAL at 05:39

## 2024-01-12 RX ADMIN — METFORMIN HYDROCHLORIDE 500 MG: 500 TABLET ORAL at 21:39

## 2024-01-12 RX ADMIN — LEVETIRACETAM 750 MG: 100 SOLUTION ORAL at 13:28

## 2024-01-12 RX ADMIN — LEVETIRACETAM 750 MG: 100 SOLUTION ORAL at 01:31

## 2024-01-12 RX ADMIN — METOPROLOL TARTRATE 100 MG: 50 TABLET, FILM COATED ORAL at 07:37

## 2024-01-12 RX ADMIN — METOPROLOL TARTRATE 100 MG: 50 TABLET, FILM COATED ORAL at 21:39

## 2024-01-12 RX ADMIN — Medication 5 MG: at 21:39

## 2024-01-12 RX ADMIN — METFORMIN HYDROCHLORIDE 500 MG: 500 TABLET ORAL at 07:38

## 2024-01-12 RX ADMIN — GUAIFENESIN 600 MG: 600 TABLET ORAL at 21:39

## 2024-01-12 RX ADMIN — DILTIAZEM HYDROCHLORIDE 60 MG: 60 TABLET ORAL at 01:31

## 2024-01-12 RX ADMIN — DONEPEZIL HYDROCHLORIDE 5 MG: 5 TABLET, FILM COATED ORAL at 07:38

## 2024-01-12 RX ADMIN — PIPERACILLIN AND TAZOBACTAM 3375 MG: 3; .375 INJECTION, POWDER, FOR SOLUTION INTRAVENOUS at 16:20

## 2024-01-12 RX ADMIN — PIPERACILLIN AND TAZOBACTAM 3375 MG: 3; .375 INJECTION, POWDER, FOR SOLUTION INTRAVENOUS at 22:05

## 2024-01-12 RX ADMIN — DILTIAZEM HYDROCHLORIDE 60 MG: 60 TABLET ORAL at 23:34

## 2024-01-12 RX ADMIN — PANTOPRAZOLE SODIUM 40 MG: 40 INJECTION, POWDER, FOR SOLUTION INTRAVENOUS at 07:41

## 2024-01-12 RX ADMIN — NYSTATIN 500000 UNITS: 100000 SUSPENSION ORAL at 18:30

## 2024-01-12 RX ADMIN — ATORVASTATIN CALCIUM 40 MG: 40 TABLET, FILM COATED ORAL at 21:44

## 2024-01-12 RX ADMIN — APIXABAN 5 MG: 5 TABLET, FILM COATED ORAL at 07:38

## 2024-01-12 RX ADMIN — PIPERACILLIN AND TAZOBACTAM 3375 MG: 3; .375 INJECTION, POWDER, FOR SOLUTION INTRAVENOUS at 05:56

## 2024-01-12 RX ADMIN — NYSTATIN 500000 UNITS: 100000 SUSPENSION ORAL at 13:29

## 2024-01-12 RX ADMIN — ACETAMINOPHEN 650 MG: 325 TABLET, FILM COATED ORAL at 16:22

## 2024-01-12 RX ADMIN — MODAFINIL 300 MG: 200 TABLET ORAL at 07:36

## 2024-01-12 RX ADMIN — DILTIAZEM HYDROCHLORIDE 60 MG: 60 TABLET ORAL at 18:55

## 2024-01-12 RX ADMIN — DILTIAZEM HYDROCHLORIDE 60 MG: 60 TABLET ORAL at 13:32

## 2024-01-12 ASSESSMENT — PAIN DESCRIPTION - DESCRIPTORS: DESCRIPTORS: OTHER (COMMENT)

## 2024-01-12 ASSESSMENT — PAIN DESCRIPTION - LOCATION
LOCATION: COCCYX
LOCATION: COCCYX

## 2024-01-12 ASSESSMENT — PAIN - FUNCTIONAL ASSESSMENT: PAIN_FUNCTIONAL_ASSESSMENT: PREVENTS OR INTERFERES SOME ACTIVE ACTIVITIES AND ADLS

## 2024-01-12 ASSESSMENT — PAIN SCALES - GENERAL: PAINLEVEL_OUTOF10: 0

## 2024-01-12 NOTE — PROGRESS NOTES
[facility] Inpatient Rehabilitation Progress Note    Isac Lemus  1/12/2024  [id number]    Chief Complaint: SDH (subdural hematoma) (HCC)    Subjective:   No acute events overnight. Today Jack is seen in his room with wife and daughter present. He appears comfortable and does not demonstrate any pain behaviors. He was seen by General Surgery this morning and they are planning on taking him to the OR on Monday for debridement of sacral wound. Will consult Medicine to have patient transferred back to acute care prior to surgery on Monday. Updated family.     ROS: unable to reliably obtain    Objective:  Patient Vitals for the past 24 hrs:   BP Temp Temp src Pulse Resp SpO2   01/12/24 0730 118/68 98.7 °F (37.1 °C) Axillary 91 20 97 %   01/12/24 0539 110/63 -- -- (!) 122 -- --   01/12/24 0358 -- -- -- -- 28 95 %   01/12/24 0131 136/79 -- -- (!) 110 -- --   01/12/24 0033 -- -- -- 93 -- 96 %   01/11/24 2237 -- -- -- -- (!) 35 95 %   01/11/24 2151 (!) 149/64 98.1 °F (36.7 °C) Axillary 92 22 91 %   01/11/24 1830 116/60 -- -- (!) 111 -- --   01/11/24 1515 117/72 -- -- 94 -- 95 %   01/11/24 1406 117/72 -- -- 94 -- --   01/11/24 1354 104/65 -- -- 93 -- 95 %     Gen: No distress, supine in bed  HEENT: Normocephalic, atraumatic.   CV: extremities well perfused  Resp: No respiratory distress. On oxygen via nasal cannula  Abd: Soft, nondistended  Ext: No edema.   Neuro: asleep in bed    Wt Readings from Last 3 Encounters:   01/07/24 93 kg (205 lb)   04/06/16 115.7 kg (255 lb)   03/16/16 115.7 kg (255 lb)       Laboratory data:   Lab Results   Component Value Date    WBC 8.8 01/12/2024    HGB 9.1 (L) 01/12/2024    HCT 28.3 (L) 01/12/2024    MCV 99.9 01/12/2024     01/12/2024       Lab Results   Component Value Date/Time     01/12/2024 08:09 AM    K 4.0 01/12/2024 08:09 AM    CL 96 01/12/2024 08:09 AM    CO2 32 01/12/2024 08:09 AM    BUN 15 01/12/2024 08:09 AM    CREATININE <0.5 01/12/2024 08:09 AM    GLUCOSE 172  intermittently, now on CPAP.     Intermittent Fevers  - infectious workup as OSH unremarkable  - prior infectious workups negative  - now with recurrence of fevers  - ID following, concern for infection of sacral wound. Started on Zosyn. Wound care following. General Surgery consulted, appreciate assistance, planning on debridement of sacral wound on Monday. Medicine consulted to have patient transfer back to acute care prior to OR on Monday.     Atrial fibrillation  - with RVR during acute stay  - AC with Eliquis  - Cardiology following, appreciate assistance. 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     Dysphagia  - s/p PEG 12/16  - tube feeds per dietician  - ST and Dietician following  - FEES 1/11 and upgraded to pureed    Thrush  - nystatin     Right Peroneal DVT  RLL segmental PE  - AC with Eliquis     Thrombocytopenia, resolved  HIT  - no heparin products  - on Eliquis     Colonic pseudoobstruction, resolved  - continue bowel regimen     DM2  - metformin  - SSI     History of HTN  - home lisinopril, losartan held  - metoprolol, cardizem     Bowels: adjust medications as needed for regular bowel movements     Bladder: Check PVR x 3.  IMC if PVR > 200ml or if any volume is > 500 ml.      Sleep: melatonin provided nightly     PPX  DVT: on AC  GI: not indicated     Follow up appointments: PCP, Neurosurgery, Cardiology    Therapy progress: progressing to answering some questions  Services: SNF  EDOD: 1/17    Nati Mcelroy MD   1/12/2024, 12:15 PM

## 2024-01-12 NOTE — PROGRESS NOTES
01/12/24 0358   NIV Type   NIV Started/Stopped On   Equipment Type V60   Mode Bilevel   Mask Type Full face mask   Assessment   Respirations 28   SpO2 95 %   Comfort Level Good   Using Accessory Muscles No   Mask Compliance Good   Skin Assessment Clean, dry, & intact   Skin Protection for O2 Device Yes   Location Nose   Intervention(s) Skin Barrier   Settings/Measurements   CPAP/EPAP 12 cmH2O   Vt (Measured) 342 mL   FiO2  35 %   Minute Volume (L/min) 9.4 Liters   Mask Leak (lpm) 20 lpm   Patient's Home Machine No   Alarm Settings   Alarms On Y

## 2024-01-12 NOTE — PROGRESS NOTES
ID    Chart reviewed  Fever curve is improved  Note plan for debridement of sacral wound on Mon    Continue Zosyn  ID will follow-up Monday     Reach on call ID via PS over the weekend  CARINE ARIZMENDI MD

## 2024-01-12 NOTE — PROGRESS NOTES
01/11/24 2237   NIV Type   NIV Started/Stopped On   Equipment Type V60   Mode Bilevel   Mask Type Full face mask   Mask Size Large   Assessment   Respirations (!) 35   SpO2 95 %   Comfort Level Good   Using Accessory Muscles No   Mask Compliance Good   Skin Assessment Clean, dry, & intact   Skin Protection for O2 Device Yes   Location Nose   Intervention(s) Skin Barrier   Settings/Measurements   CPAP/EPAP (S)  12 cmH2O   Vt (Measured) 347 mL   FiO2  35 %   Minute Volume (L/min) 9 Liters   Mask Leak (lpm) 13 lpm   Patient's Home Machine No   Alarm Settings   Alarms On Y

## 2024-01-12 NOTE — PROGRESS NOTES
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; independent with household tasks and medication/financial management; family nearby   Dx: [x] Aphasia  [x] Dysarthria  [] Apraxia   [x] Oropharyngeal dysphagia [x] Cognitive Impairment  [] Other:   Date of Admit: 2023  Room #: 0156/0156-01    Current functional status (updated daily):       +    Pt being seen for : [x] Speech/Language Treatment  [x] Dysphagia Treatment [] Cognitive Treatment  [] Other:  Communication: []WFL  [x] Aphasia  [x] Dysarthria  [] Apraxia  [] Pragmatic Impairment [] Non-verbal  [] Hearing Loss  [] Other:   Cognition: [] WFL  [] Mild  [] Moderate  [] Severe [x] Unable to Assess  [x] Other: suspect deficit  Memory: [] WFL  [] Mild  [] Moderate  [] Severe [x] Unable to Assess  [] Other:  Behavior: [] Alert  [] Cooperative  [x]  Pleasant  [] Confused  [] Agitated  [] Uncooperative  [x] Distractible [] Motivated  [] Self-Limiting [] Anxious  [] Other:  Endurance:  [] Adequate for participation in SLP sessions  [x] Reduced overall  [x] Lethargic  [] Other:  Safety: [] No concerns at this time  [] Reduced insight into deficits  []  Reduced safety awareness [] Not following call light procedures  [x] Unable to Assess  [] Other:    Current Diet Order:ADULT DIET; Dysphagia - Pureed  Diet NPO  ADULT ORAL NUTRITION SUPPLEMENT; Breakfast, Lunch, Dinner; Frozen Oral Supplement  ADULT TUBE FEEDING; PEG; Standard with Fiber; Bolus; 3 Times Daily; 237; 60; Before and after each bolus; Protein; 1 packet of Prosource daily, 30 mL free water flush before and after administration  Swallowing Precautions: Reflux and aspiration

## 2024-01-12 NOTE — CONSULTS
tablet by mouth daily. 10/26/14   Isac Troy MD   atorvastatin (LIPITOR) 40 MG tablet Take 1 tablet by mouth nightly. 10/26/14   Isac Troy MD   lisinopril (PRINIVIL;ZESTRIL) 2.5 MG tablet Take 1 tablet by mouth daily.  Patient taking differently: Take 4 tablets by mouth daily 10/26/14   Isac Troy MD   metoprolol (LOPRESSOR) 25 MG tablet Take 1 tablet by mouth 2 times daily. 10/26/14   Isac Troy MD   potassium chloride (K-DUR) 10 MEQ tablet Take 1 tablet by mouth 3 times daily for 10 days. 10/26/14 11/5/14  Isac Troy MD   furosemide (LASIX) 40 MG tablet Take 1 tablet by mouth daily for 10 days. 10/26/14 11/5/14  Isac Troy MD   Glucosamine-Chondroit-Vit C-Mn (GLUCOSAMINE CHONDR 1500 COMPLX) CAPS Take  by mouth daily.      Provider, MD Janessa   metformin (GLUCOPHAGE) 500 MG tablet Take 2 tablets by mouth 2 times daily (with meals)    Provider, MD Janessa        Allergies:  Heparin    Social History:   TOBACCO:   reports that he has never smoked. He does not have any smokeless tobacco history on file.  ETOH:   reports no history of alcohol use.  DRUGS:   reports no history of drug use.  OCCUPATION:  Retired  Patient currently lives with family      Family History:        Problem Relation Age of Onset    Cancer Mother         BREAST    Heart Disease Father     Cancer Father         PROSTATE       REVIEW OF SYSTEMS:  Patient unable to provide info for ROS    PHYSICAL EXAM:  VITALS:  /68   Pulse 91   Temp 98.7 °F (37.1 °C) (Axillary)   Resp 20   Ht 1.702 m (5' 7\")   Wt 93 kg (205 lb)   SpO2 97%   BMI 32.11 kg/m²   24HR INTAKE/OUTPUT:    I/O last 3 completed shifts:  In: 1600 [P.O.:20; I.V.:200; NG/GT:1380]  Out: 0   No intake/output data recorded.      CONSTITUTIONAL:  somnolent, no apparent distress and mildly obese  EYES:  PERRL, sclera clear  ENT:  Normocephalic,atraumatic, without obvious abnormality  NECK:  supple, symmetrical, trachea  midline  LUNGS: Resp effort easy and unlabored,   CARDIOVASCULAR:  NO JVD,   and    MUSCULOSKELETAL: No clubbing or cyanosis, 1+ pitting edema lower extremities  NEUROLOGIC:  Mental Status Exam:  Level of Alertness:   lethargic  PSYCHIATRIC:      SKIN:  sacral wound images reviewed (from Wound Care team), consistent with Stage IV decub w/ devitalized, non-viable tissue     DATA:    CBC:   Recent Labs     01/10/24  0744   WBC 8.4   HGB 9.3*   HCT 28.3*        BMP:    Recent Labs     01/10/24  0744   *   K 4.0   CL 97*   CO2 29   BUN 13   CREATININE <0.5*   GLUCOSE 155*     Hepatic: No results for input(s): \"AST\", \"ALT\", \"ALB\", \"BILITOT\", \"ALKPHOS\" in the last 72 hours.  Mag:    No results for input(s): \"MG\" in the last 72 hours.   Phos:     Recent Labs     01/10/24  0744   PHOS 3.5      INR: No results for input(s): \"INR\" in the last 72 hours.    Radiology Review: Images personally reviewed by me.          IMPRESSION/RECOMMENDATIONS:    Sacral decubitus, Stage IV, agree it would benefit from surgical debridement   - will need to hold Eliquis x48 hr   - will make npo, hold tube feeds on Sun night in preparation for I&D on Mon w/ Dr Stephenson    Discussed the plan with patient's wife, who appears to understand, asks appropriate questions, and agrees with the plan.      Electronically signed by GEORGINA WOLF MD     [facility]  [id number]

## 2024-01-12 NOTE — PROGRESS NOTES
Physical Therapy  Facility/Department: [facility]  Rehabilitation Physical Therapy Treatment Note    NAME: Isac Lemus  : 1950 (73 y.o.)  MRN: [id number]  CODE STATUS: Full Code    Date of Service: 24       Restrictions:  Restrictions/Precautions: Seizure, Fall Risk, Up as Tolerated, Aspiration Risk, General Precautions, Modified Diet  Position Activity Restriction  Other position/activity restrictions: PEG, tube feed, Puree     SUBJECTIVE  Subjective  Subjective: pt found in bed, family present.  Pain: some pain indicated with partial LE WBing activities, unable to rate        OBJECTIVE  Cognition  Overall Cognitive Status: Exceptions  Arousal/Alertness: Delayed responses to stimuli;Unresponsive to stimuli  Following Commands: Does not follow commands;Inconsistently follows commands  Attention Span: Unable to maintain attention;Difficulty dividing attention;Difficulty attending to directions  Memory:  (GIA)  Safety Judgement: Decreased awareness of need for assistance;Decreased awareness of need for safety  Problem Solving: Decreased awareness of errors  Insights: Not aware of deficits  Initiation: Requires cues for all  Sequencing: Requires cues for all  Cognition Comment: more awake and observing environment, still minimal following of commands, reported pain in LEs during session when asked  Orientation  Overall Orientation Status: Impaired  Orientation Level: Unable to assess    Functional Mobility  Roll Left  Assistance Level: Dependent;Requires x 2 assistance  Skilled Clinical Factors: to don pants and place maxi move pad  Roll Right  Assistance Level: Dependent;Requires x 2 assistance  Scooting  Assistance Level: Requires x 2 assistance;Maximum assistance;Dependent  Bed To/From Chair  Assistance Level: Dependent;Requires x 2 assistance  Skilled Clinical Factors: from bed <> wc and wc <> low mat with maxi move        Neuromuscular Education  NDT Treatment: Sitting  Neuromuscular Comments: pt  <> chair with LRAD with mod A x 2.- GOAL NOT MET, TD via cathryn  Short Term Goal 4: Pt to perform 12-15 reps of LE HEP to target strength/ROM.- GOAL NOT MET, pt unable to participate 2* decreased levels of alertness  Short Term Goal 5: Pt to perform sit to/from stand transfer at LRAD with mod Ax 2.- GOAL NOT MET, unsafe to trial  Long Term Goals  Time Frame for Long Term Goals : 21 days (1/12/24)- extended to 1/18 per new dc date  Long Term Goal 1: Pt to perform bed mobility with min Ax 1.  Long Term Goal 2: Pt to transfer bed <> chair with LRAD with min Ax 1.  Long Term Goal 3: Pt to ambulate 25 ft with LRAD with min A x1.  Long Term Goal 4: Pt to manage 2 steps with rail (for home entry) with LRAD with min A x1.  Long Term Goal 5: Pt to perform sit to/from stand transfer at LRAD with min A x 1.    PLAN OF CARE/SAFETY  Physical Therapy Plan  General Plan: 5-7 times per week  Current Treatment Recommendations: Strengthening;ROM;Balance training;Functional mobility training;Transfer training;Endurance training;Neuromuscular re-education;Gait training;Stair training;Home exercise program;Safety education & training;Patient/Caregiver education & training;Equipment evaluation, education, & procurement;Therapeutic activities;Cognitive/Perceptual training;Co-Treatment  Safety Devices  Type of Devices: All fall risk precautions in place;All ahrry prominences offloaded;Chair alarm in place;Call light within reach;Patient at risk for falls;Left in chair;Nurse notified;Gait belt;Heels elevated for pressure relief    EDUCATION  Education  Education Given To: Patient;Family  Education Provided: Role of Therapy;Plan of Care;Precautions;Mobility Training;Transfer Training;Safety;Equipment;Family Education;DME/Home Modifications  Education Provided Comments: bed mobility, body mechanics, use of lift equipment upo d/c.  Education Method: Verbal  Barriers to Learning: Cognition;Hearing  Education Outcome: Continued education

## 2024-01-12 NOTE — PROGRESS NOTES
Left message with dietician office about patient having pleasure foods and doesn't truly eat enough. Wanting to know if they can change current order of 3 cans to 5 cans of supplement per g-tube.

## 2024-01-12 NOTE — PROGRESS NOTES
01/12/24 0033   NIV Type   $NIV $Daily Charge   NIV Started/Stopped On   Equipment Type v60   Mode Bilevel   Mask Type Full face mask   Mask Size Large   Assessment   Pulse 93   Heart Rate Source Monitor   SpO2 96 %   Comfort Level Good   Skin Protection for O2 Device Yes   Location Nose   Intervention(s) Skin Barrier   Settings/Measurements   PIP Observed 14 cm H20   CPAP/EPAP 12 cmH2O   Vt (Measured) 487 mL   Insp Rise Time (%) 1 %   FiO2  35 %   I Time/ I Time % 1 s   Minute Volume (L/min) 17 Liters   Mask Leak (lpm) 17 lpm   Patient's Home Machine No   Alarm Settings   Alarms On Y   Low Pressure (cmH2O) 6 cmH2O   High Pressure (cmH2O) 30 cmH2O   Apnea (secs) 20 secs   RR Low (bpm) 6   RR High (bpm) 40 br/min   Oxygen Therapy/Pulse Ox   O2 Therapy Oxygen   O2 Device PAP (positive airway pressure)

## 2024-01-12 NOTE — PLAN OF CARE
At risk for skin breakdown, see Devon scale. Unable to repositin self in bed. Turn  and reposition every 2 hours. Heels elevated off bed. Protective barrier placed as needed. Patient kept clean and dry. Pillows used for positioning. Will continue to monitor for skin breakdown.

## 2024-01-12 NOTE — PROGRESS NOTES
Occupational Therapy  Facility/Department: [facility]  Rehabilitation Occupational Therapy Daily Treatment Note    Date: 24  Patient Name: Isac Lemus       Room: 0156/0156-01  MRN: [id number]  Account: [id number]   : 1950  (73 y.o.) Gender: male                    Past Medical History:  has a past medical history of Acid reflux, CAD (coronary artery disease), Diabetes mellitus (HCC), Heartburn, Hiatal hernia, Hypertension, and Seasonal allergies.  Past Surgical History:   has a past surgical history that includes Coronary artery bypass graft (10/2014); Colonoscopy (); and Colonoscopy (3/16/16).    Restrictions  Restrictions/Precautions: Seizure, Fall Risk, Up as Tolerated, Aspiration Risk, General Precautions, Modified Diet  Other position/activity restrictions: PEG, tube feed, Puree  Required Braces or Orthoses?: No    Subjective  Subjective: Pt in bed, awake, no s/s of pain, family present and agreeable to OT/PT session, /71, HR 94, O2 sats 93% on 3L O2.  Restrictions/Precautions: Seizure;Fall Risk;Up as Tolerated;Aspiration Risk;General Precautions;Modified Diet             Objective     Cognition  Overall Cognitive Status: Exceptions  Arousal/Alertness: Delayed responses to stimuli;Unresponsive to stimuli  Following Commands: Does not follow commands;Inconsistently follows commands  Attention Span: Unable to maintain attention;Difficulty dividing attention;Difficulty attending to directions  Memory:  (GIA)  Safety Judgement: Decreased awareness of need for assistance;Decreased awareness of need for safety  Problem Solving: Decreased awareness of errors  Insights: Not aware of deficits  Initiation: Requires cues for all  Sequencing: Requires cues for all  Cognition Comment: more awake and observing environment, still minimal following of commands, reported pain in LEs during session when asked  Orientation  Overall Orientation Status: Impaired  Orientation Level: Unable to assess      training;Patient/Caregiver education & training;Equipment evaluation, education, & procurement;Positioning;Self-Care / ADL;Coordination training;Gait training    Goals  Short Term Goals  Time Frame for Short Term Goals: by 1/04/23 unless otherwise noted  Short Term Goal 1: Pt will tolerate dynamic sitting balance ax with CGA  in prep for ADLs, progressing 1/12 total assist  Short Term Goal 2: Pt will follow commands to sequence 2 step task with only 1 tactile/visual cue, progressing 1/12 occasional 1 step command  Short Term Goal 3: Pt will perform sit pivot transfer to toilet with mod Ax2. progressing 1/12 MaxiMove for all transfers  Short Term Goal 4: Pt will perform grooming tasks with mod A and 2-3 VC for sequencing, progressing 1/12, total assist for all ADLs  Short Term Goal 5: Pt will tolerate x15 BUE ther ex. progressing 1/12. PROM for all movements  Long Term Goals  Time Frame for Long Term Goals : to be met by 1/11/23 unless otherwise noted-progressing 1/12  Long Term Goal 1: Pt will perform TB dressing with min A and LRAD  Long Term Goal 2: Pt will perform TB bathing with min A and LRAD  Long Term Goal 3: Pt will perform toileting tasks with min A and LRAD  Long Term Goal 4: Pt will grooming tasks with min A    Therapy Time   Individual Concurrent Group Co-treatment   Time In       1330   Time Out       1430   Minutes       60   Timed Code Treatment Minutes: 60 Minutes       Marito Florian, OT

## 2024-01-13 ENCOUNTER — APPOINTMENT (OUTPATIENT)
Dept: GENERAL RADIOLOGY | Age: 74
DRG: 949 | End: 2024-01-13
Attending: STUDENT IN AN ORGANIZED HEALTH CARE EDUCATION/TRAINING PROGRAM
Payer: MEDICARE

## 2024-01-13 LAB
GLUCOSE BLD-MCNC: 140 MG/DL (ref 70–99)
GLUCOSE BLD-MCNC: 141 MG/DL (ref 70–99)
GLUCOSE BLD-MCNC: 145 MG/DL (ref 70–99)
GLUCOSE BLD-MCNC: 177 MG/DL (ref 70–99)
PERFORMED ON: ABNORMAL

## 2024-01-13 PROCEDURE — 2700000000 HC OXYGEN THERAPY PER DAY

## 2024-01-13 PROCEDURE — 6370000000 HC RX 637 (ALT 250 FOR IP): Performed by: INTERNAL MEDICINE

## 2024-01-13 PROCEDURE — 6370000000 HC RX 637 (ALT 250 FOR IP): Performed by: NURSE PRACTITIONER

## 2024-01-13 PROCEDURE — 6370000000 HC RX 637 (ALT 250 FOR IP): Performed by: STUDENT IN AN ORGANIZED HEALTH CARE EDUCATION/TRAINING PROGRAM

## 2024-01-13 PROCEDURE — 74018 RADEX ABDOMEN 1 VIEW: CPT

## 2024-01-13 PROCEDURE — 2580000003 HC RX 258: Performed by: INTERNAL MEDICINE

## 2024-01-13 PROCEDURE — 6360000002 HC RX W HCPCS: Performed by: INTERNAL MEDICINE

## 2024-01-13 PROCEDURE — 94660 CPAP INITIATION&MGMT: CPT

## 2024-01-13 PROCEDURE — 94761 N-INVAS EAR/PLS OXIMETRY MLT: CPT

## 2024-01-13 PROCEDURE — C9113 INJ PANTOPRAZOLE SODIUM, VIA: HCPCS | Performed by: INTERNAL MEDICINE

## 2024-01-13 PROCEDURE — 2500000003 HC RX 250 WO HCPCS: Performed by: STUDENT IN AN ORGANIZED HEALTH CARE EDUCATION/TRAINING PROGRAM

## 2024-01-13 PROCEDURE — 1280000000 HC REHAB R&B

## 2024-01-13 PROCEDURE — 6360000004 HC RX CONTRAST MEDICATION: Performed by: STUDENT IN AN ORGANIZED HEALTH CARE EDUCATION/TRAINING PROGRAM

## 2024-01-13 RX ORDER — INSULIN LISPRO 100 [IU]/ML
0-8 INJECTION, SOLUTION INTRAVENOUS; SUBCUTANEOUS
Status: DISCONTINUED | OUTPATIENT
Start: 2024-01-13 | End: 2024-01-14 | Stop reason: HOSPADM

## 2024-01-13 RX ORDER — GUAIFENESIN 200 MG/10ML
400 LIQUID ORAL 2 TIMES DAILY
Status: DISCONTINUED | OUTPATIENT
Start: 2024-01-13 | End: 2024-01-14 | Stop reason: HOSPADM

## 2024-01-13 RX ADMIN — GUAIFENESIN 400 MG: 200 SOLUTION ORAL at 15:03

## 2024-01-13 RX ADMIN — METOPROLOL TARTRATE 100 MG: 50 TABLET, FILM COATED ORAL at 21:23

## 2024-01-13 RX ADMIN — DIATRIZOATE MEGLUMINE AND DIATRIZOATE SODIUM 30 ML: 660; 100 LIQUID ORAL; RECTAL at 06:42

## 2024-01-13 RX ADMIN — METOPROLOL TARTRATE 100 MG: 50 TABLET, FILM COATED ORAL at 10:05

## 2024-01-13 RX ADMIN — DONEPEZIL HYDROCHLORIDE 5 MG: 5 TABLET, FILM COATED ORAL at 10:05

## 2024-01-13 RX ADMIN — Medication 1 APPLICATION: at 11:00

## 2024-01-13 RX ADMIN — PIPERACILLIN AND TAZOBACTAM 3375 MG: 3; .375 INJECTION, POWDER, FOR SOLUTION INTRAVENOUS at 22:30

## 2024-01-13 RX ADMIN — Medication 5 MG: at 21:23

## 2024-01-13 RX ADMIN — GUAIFENESIN 400 MG: 200 SOLUTION ORAL at 21:23

## 2024-01-13 RX ADMIN — NYSTATIN 500000 UNITS: 100000 SUSPENSION ORAL at 21:23

## 2024-01-13 RX ADMIN — MODAFINIL 300 MG: 200 TABLET ORAL at 10:04

## 2024-01-13 RX ADMIN — NYSTATIN 500000 UNITS: 100000 SUSPENSION ORAL at 17:50

## 2024-01-13 RX ADMIN — DILTIAZEM HYDROCHLORIDE 60 MG: 60 TABLET ORAL at 12:49

## 2024-01-13 RX ADMIN — ATORVASTATIN CALCIUM 40 MG: 40 TABLET, FILM COATED ORAL at 21:23

## 2024-01-13 RX ADMIN — PIPERACILLIN AND TAZOBACTAM 3375 MG: 3; .375 INJECTION, POWDER, FOR SOLUTION INTRAVENOUS at 06:14

## 2024-01-13 RX ADMIN — METFORMIN HYDROCHLORIDE 500 MG: 500 TABLET ORAL at 10:06

## 2024-01-13 RX ADMIN — NYSTATIN 500000 UNITS: 100000 SUSPENSION ORAL at 10:05

## 2024-01-13 RX ADMIN — DILTIAZEM HYDROCHLORIDE 60 MG: 60 TABLET ORAL at 17:50

## 2024-01-13 RX ADMIN — DILTIAZEM HYDROCHLORIDE 60 MG: 60 TABLET ORAL at 05:42

## 2024-01-13 RX ADMIN — LEVETIRACETAM 750 MG: 100 SOLUTION ORAL at 10:05

## 2024-01-13 RX ADMIN — PANTOPRAZOLE SODIUM 40 MG: 40 INJECTION, POWDER, FOR SOLUTION INTRAVENOUS at 10:05

## 2024-01-13 RX ADMIN — METFORMIN HYDROCHLORIDE 500 MG: 500 TABLET ORAL at 21:23

## 2024-01-13 RX ADMIN — PIPERACILLIN AND TAZOBACTAM 3375 MG: 3; .375 INJECTION, POWDER, FOR SOLUTION INTRAVENOUS at 14:56

## 2024-01-13 ASSESSMENT — PAIN SCALES - GENERAL: PAINLEVEL_OUTOF10: 0

## 2024-01-13 NOTE — PROGRESS NOTES
[facility] Inpatient Rehabilitation Progress Note    Isac Lemus  1/13/2024  [id number]    Chief Complaint: SDH (subdural hematoma) (HCC)    Subjective:   No acute events overnight. Today Jack is seen with his wife and nursing present. He is seated up in bed and awake. He appears comfortable and does not demonstrate any pain behaviors. His wife reports that her mouthed \"I love you\" last night.     ROS: unable to reliably obtain    Objective:  Patient Vitals for the past 24 hrs:   BP Temp Temp src Pulse Resp SpO2   01/13/24 1005 110/63 -- -- (!) 106 -- --   01/13/24 0830 110/63 98 °F (36.7 °C) Oral (!) 106 20 94 %   01/13/24 0542 113/65 -- -- (!) 102 -- --   01/13/24 0411 -- -- -- -- 23 97 %   01/12/24 2326 -- -- -- -- 29 95 %   01/12/24 2144 122/74 98.7 °F (37.1 °C) Axillary 86 24 94 %   01/12/24 1845 115/61 -- -- 91 20 --   01/12/24 1510 125/71 -- -- 91 -- 97 %   01/12/24 1332 125/71 -- -- 91 -- --     Gen: No distress, seated up in bed  HEENT: Normocephalic, atraumatic.   CV: extremities well perfused  Resp: No respiratory distress. On oxygen via nasal cannula  Abd: Soft, nondistended  Ext: No edema.   Neuro: alert    Wt Readings from Last 3 Encounters:   01/07/24 93 kg (205 lb)   04/06/16 115.7 kg (255 lb)   03/16/16 115.7 kg (255 lb)       Laboratory data:   Lab Results   Component Value Date    WBC 8.8 01/12/2024    HGB 9.1 (L) 01/12/2024    HCT 28.3 (L) 01/12/2024    MCV 99.9 01/12/2024     01/12/2024       Lab Results   Component Value Date/Time     01/12/2024 08:09 AM    K 4.0 01/12/2024 08:09 AM    CL 96 01/12/2024 08:09 AM    CO2 32 01/12/2024 08:09 AM    BUN 15 01/12/2024 08:09 AM    CREATININE <0.5 01/12/2024 08:09 AM    GLUCOSE 172 01/12/2024 08:09 AM    CALCIUM 8.4 01/12/2024 08:09 AM        Therapy progress:    PT    Supine to Sit:    Sit to Supine:     Sit to Stand:    Chair/Bed to Chair Transfer:    Car Transfer:    Ambulation 10 ft:    Ambulation 50 ft:    Ambulation 150 ft:

## 2024-01-13 NOTE — PROGRESS NOTES
01/13/24 0411   NIV Type   $NIV $Daily Charge   Equipment Type V60   Mode CPAP   Mask Type Full face mask   Mask Size Large   Assessment   Respirations 23   SpO2 97 %   Comfort Level Good   Using Accessory Muscles No   Mask Compliance Good   Skin Assessment Clean, dry, & intact   Skin Protection for O2 Device Yes   Location Nose   Settings/Measurements   PIP Observed 12 cm H20   CPAP/EPAP 12 cmH2O   Vt (Measured) 250 mL   FiO2  35 %   Minute Volume (L/min) 7 Liters   Mask Leak (lpm) 28 lpm   Patient's Home Machine No   Alarm Settings   Alarms On Y

## 2024-01-13 NOTE — PROGRESS NOTES
Brief Nutrition Assessment    Per nurse, pt is only consuming none or a few bites of meals. Family concerned pt is not receiving adequate nutrition. Per EMR, recent po intakes 0-25%. RD to modify bolus feeds back to 5 cans per day to better meet nutritional needs and assess pt ability for adequate po intake. Updated recommendations provided. Will continue to monitor.     Nutrition Recommendations/Plan:   Continue pureed diet - textures and consistencies per SLP   Continue Magic Cups with meals - monitor for acceptance  Encourage PO intakes as tolerated   Modify TF to Jevity 1.5 bolus feeds x5 daily via PEG tube. 30 mL before and after administration, monitor need for adjustments   Provide 1 Packet of Prosource daily via PEG tube. 30 mL free water flush before and after administration.  Monitor TF tolerance (abdominal pain, bloating, distention, diarrhea)  Monitor nutrition adequacy, pertinent labs, bowel habits, wt changes, and clinical progress     Current Nutrition Intake & Therapies:    Average Meal Intake: Unable to assess  Average Supplements Intake: None Ordered  ADULT DIET; Dysphagia - Pureed  Diet NPO  ADULT ORAL NUTRITION SUPPLEMENT; Breakfast, Lunch, Dinner; Frozen Oral Supplement  ADULT TUBE FEEDING; PEG; Standard with Fiber; Bolus; 3 Times Daily; 237; 60; Before and after each bolus; Protein; 1 packet of Prosource daily, 30 mL free water flush before and after administration  Diet NPO  Current Tube Feeding (TF) Orders:  Feeding Route: PEG  Formula: Standard with Fiber  Schedule: Bolus  Goal TF & Flush Orders Provides: Bolus feeds of Jevity 1.5 x5 cans daily to provide 1185 mL TV, 1775 kcal, 76 g protein, and 900 mL TV. +30 mL free water flush before and after administration. +1 Packet of Prosource daily to provide additional 20 g protein and 80 kcals. Flush with 30 mL water before and after administration.    Anthropometric Measures:  Height: 170.2 cm (5' 7\")  Ideal Body Weight (IBW): 148 lbs (67 kg)

## 2024-01-13 NOTE — PROGRESS NOTES
01/12/24 2326   NIV Type   Equipment Type V60   Mode CPAP   Mask Type Full face mask   Mask Size Large   Assessment   Respirations 29   SpO2 95 %   Comfort Level Good   Using Accessory Muscles No   Mask Compliance Good   Skin Assessment Clean, dry, & intact   Skin Protection for O2 Device Yes   Location Nose   Settings/Measurements   PIP Observed 13 cm H20   CPAP/EPAP 12 cmH2O   Vt (Measured) 330 mL   FiO2  35 %   Minute Volume (L/min) 9 Liters   Mask Leak (lpm) 22 lpm   Patient's Home Machine No   Alarm Settings   Alarms On Y

## 2024-01-14 ENCOUNTER — HOSPITAL ENCOUNTER (INPATIENT)
Age: 74
LOS: 18 days | Discharge: NURSING, REHAB OR LONG-TERM CARE | End: 2024-02-01
Attending: INTERNAL MEDICINE | Admitting: INTERNAL MEDICINE
Payer: MEDICARE

## 2024-01-14 VITALS
RESPIRATION RATE: 20 BRPM | HEART RATE: 76 BPM | BODY MASS INDEX: 32.73 KG/M2 | HEIGHT: 67 IN | TEMPERATURE: 97.8 F | DIASTOLIC BLOOD PRESSURE: 55 MMHG | WEIGHT: 208.56 LBS | SYSTOLIC BLOOD PRESSURE: 116 MMHG | OXYGEN SATURATION: 92 %

## 2024-01-14 DIAGNOSIS — S31.000A WOUND OF SACRAL REGION, INITIAL ENCOUNTER: ICD-10-CM

## 2024-01-14 PROBLEM — L89.90 DECUBITUS SKIN ULCER: Status: ACTIVE | Noted: 2024-01-14

## 2024-01-14 LAB
GLUCOSE BLD-MCNC: 117 MG/DL (ref 70–99)
GLUCOSE BLD-MCNC: 126 MG/DL (ref 70–99)
GLUCOSE BLD-MCNC: 131 MG/DL (ref 70–99)
PERFORMED ON: ABNORMAL

## 2024-01-14 PROCEDURE — 6370000000 HC RX 637 (ALT 250 FOR IP): Performed by: STUDENT IN AN ORGANIZED HEALTH CARE EDUCATION/TRAINING PROGRAM

## 2024-01-14 PROCEDURE — 6370000000 HC RX 637 (ALT 250 FOR IP): Performed by: NURSE PRACTITIONER

## 2024-01-14 PROCEDURE — 94660 CPAP INITIATION&MGMT: CPT

## 2024-01-14 PROCEDURE — 2500000003 HC RX 250 WO HCPCS: Performed by: STUDENT IN AN ORGANIZED HEALTH CARE EDUCATION/TRAINING PROGRAM

## 2024-01-14 PROCEDURE — 2700000000 HC OXYGEN THERAPY PER DAY

## 2024-01-14 PROCEDURE — 94761 N-INVAS EAR/PLS OXIMETRY MLT: CPT

## 2024-01-14 PROCEDURE — 6360000002 HC RX W HCPCS: Performed by: INTERNAL MEDICINE

## 2024-01-14 PROCEDURE — 2580000003 HC RX 258: Performed by: STUDENT IN AN ORGANIZED HEALTH CARE EDUCATION/TRAINING PROGRAM

## 2024-01-14 PROCEDURE — 5A09557 ASSISTANCE WITH RESPIRATORY VENTILATION, GREATER THAN 96 CONSECUTIVE HOURS, CONTINUOUS POSITIVE AIRWAY PRESSURE: ICD-10-PCS | Performed by: SURGERY

## 2024-01-14 PROCEDURE — 2580000003 HC RX 258: Performed by: INTERNAL MEDICINE

## 2024-01-14 PROCEDURE — C9113 INJ PANTOPRAZOLE SODIUM, VIA: HCPCS | Performed by: INTERNAL MEDICINE

## 2024-01-14 PROCEDURE — 6370000000 HC RX 637 (ALT 250 FOR IP): Performed by: INTERNAL MEDICINE

## 2024-01-14 PROCEDURE — 1200000000 HC SEMI PRIVATE

## 2024-01-14 RX ORDER — ACETAMINOPHEN 325 MG/1
650 TABLET ORAL EVERY 6 HOURS PRN
Status: DISCONTINUED | OUTPATIENT
Start: 2024-01-14 | End: 2024-02-01 | Stop reason: HOSPADM

## 2024-01-14 RX ORDER — ONDANSETRON 2 MG/ML
4 INJECTION INTRAMUSCULAR; INTRAVENOUS EVERY 6 HOURS PRN
Status: DISCONTINUED | OUTPATIENT
Start: 2024-01-14 | End: 2024-01-15

## 2024-01-14 RX ORDER — SODIUM CHLORIDE 0.9 % (FLUSH) 0.9 %
5-40 SYRINGE (ML) INJECTION PRN
Status: DISCONTINUED | OUTPATIENT
Start: 2024-01-14 | End: 2024-01-31

## 2024-01-14 RX ORDER — MAGNESIUM SULFATE IN WATER 40 MG/ML
2000 INJECTION, SOLUTION INTRAVENOUS PRN
Status: DISCONTINUED | OUTPATIENT
Start: 2024-01-14 | End: 2024-01-17

## 2024-01-14 RX ORDER — SODIUM CHLORIDE 9 MG/ML
INJECTION, SOLUTION INTRAVENOUS PRN
Status: DISCONTINUED | OUTPATIENT
Start: 2024-01-14 | End: 2024-02-01 | Stop reason: HOSPADM

## 2024-01-14 RX ORDER — POLYETHYLENE GLYCOL 3350 17 G/17G
17 POWDER, FOR SOLUTION ORAL DAILY PRN
Status: DISCONTINUED | OUTPATIENT
Start: 2024-01-14 | End: 2024-02-01 | Stop reason: HOSPADM

## 2024-01-14 RX ORDER — LISINOPRIL 2.5 MG/1
2.5 TABLET ORAL DAILY
Status: DISCONTINUED | OUTPATIENT
Start: 2024-01-14 | End: 2024-01-24

## 2024-01-14 RX ORDER — ATORVASTATIN CALCIUM 40 MG/1
40 TABLET, FILM COATED ORAL NIGHTLY
Status: DISCONTINUED | OUTPATIENT
Start: 2024-01-14 | End: 2024-01-24

## 2024-01-14 RX ORDER — POTASSIUM CHLORIDE 20 MEQ/1
40 TABLET, EXTENDED RELEASE ORAL PRN
Status: DISCONTINUED | OUTPATIENT
Start: 2024-01-14 | End: 2024-01-17

## 2024-01-14 RX ORDER — ACETAMINOPHEN 650 MG/1
650 SUPPOSITORY RECTAL EVERY 6 HOURS PRN
Status: DISCONTINUED | OUTPATIENT
Start: 2024-01-14 | End: 2024-02-01 | Stop reason: HOSPADM

## 2024-01-14 RX ORDER — ASPIRIN 81 MG/1
81 TABLET ORAL DAILY
Status: DISCONTINUED | OUTPATIENT
Start: 2024-01-14 | End: 2024-01-24

## 2024-01-14 RX ORDER — ONDANSETRON 4 MG/1
4 TABLET, ORALLY DISINTEGRATING ORAL EVERY 8 HOURS PRN
Status: DISCONTINUED | OUTPATIENT
Start: 2024-01-14 | End: 2024-01-15

## 2024-01-14 RX ORDER — LEVETIRACETAM 100 MG/ML
750 SOLUTION ORAL 2 TIMES DAILY
Status: DISCONTINUED | OUTPATIENT
Start: 2024-01-14 | End: 2024-01-31

## 2024-01-14 RX ORDER — POTASSIUM CHLORIDE 7.45 MG/ML
10 INJECTION INTRAVENOUS PRN
Status: DISCONTINUED | OUTPATIENT
Start: 2024-01-14 | End: 2024-01-17

## 2024-01-14 RX ORDER — SODIUM CHLORIDE 0.9 % (FLUSH) 0.9 %
5-40 SYRINGE (ML) INJECTION EVERY 12 HOURS SCHEDULED
Status: DISCONTINUED | OUTPATIENT
Start: 2024-01-14 | End: 2024-01-31

## 2024-01-14 RX ADMIN — DILTIAZEM HYDROCHLORIDE 60 MG: 60 TABLET ORAL at 12:53

## 2024-01-14 RX ADMIN — NYSTATIN 500000 UNITS: 100000 SUSPENSION ORAL at 09:49

## 2024-01-14 RX ADMIN — LEVETIRACETAM 750 MG: 100 SOLUTION ORAL at 10:01

## 2024-01-14 RX ADMIN — PIPERACILLIN AND TAZOBACTAM 3375 MG: 3; .375 INJECTION, POWDER, FOR SOLUTION INTRAVENOUS at 15:09

## 2024-01-14 RX ADMIN — GUAIFENESIN 400 MG: 200 SOLUTION ORAL at 09:48

## 2024-01-14 RX ADMIN — PIPERACILLIN AND TAZOBACTAM 3375 MG: 3; .375 INJECTION, POWDER, FOR SOLUTION INTRAVENOUS at 05:18

## 2024-01-14 RX ADMIN — Medication 1 APPLICATION: at 12:54

## 2024-01-14 RX ADMIN — LEVETIRACETAM 750 MG: 100 SOLUTION ORAL at 22:52

## 2024-01-14 RX ADMIN — METOPROLOL TARTRATE 100 MG: 50 TABLET, FILM COATED ORAL at 09:48

## 2024-01-14 RX ADMIN — PANTOPRAZOLE SODIUM 40 MG: 40 INJECTION, POWDER, FOR SOLUTION INTRAVENOUS at 09:49

## 2024-01-14 RX ADMIN — DONEPEZIL HYDROCHLORIDE 5 MG: 5 TABLET, FILM COATED ORAL at 09:48

## 2024-01-14 RX ADMIN — METFORMIN HYDROCHLORIDE 500 MG: 500 TABLET ORAL at 09:48

## 2024-01-14 RX ADMIN — SODIUM CHLORIDE, PRESERVATIVE FREE 10 ML: 5 INJECTION INTRAVENOUS at 21:18

## 2024-01-14 RX ADMIN — ATORVASTATIN CALCIUM 40 MG: 40 TABLET, FILM COATED ORAL at 21:05

## 2024-01-14 RX ADMIN — MODAFINIL 300 MG: 200 TABLET ORAL at 09:42

## 2024-01-14 RX ADMIN — NYSTATIN 500000 UNITS: 100000 SUSPENSION ORAL at 15:13

## 2024-01-14 RX ADMIN — METOPROLOL TARTRATE 25 MG: 25 TABLET, FILM COATED ORAL at 21:05

## 2024-01-14 RX ADMIN — DILTIAZEM HYDROCHLORIDE 60 MG: 60 TABLET ORAL at 05:19

## 2024-01-14 RX ADMIN — LEVETIRACETAM 750 MG: 100 SOLUTION ORAL at 00:05

## 2024-01-14 RX ADMIN — DILTIAZEM HYDROCHLORIDE 60 MG: 60 TABLET ORAL at 00:05

## 2024-01-14 ASSESSMENT — PAIN SCALES - WONG BAKER: WONGBAKER_NUMERICALRESPONSE: 0

## 2024-01-14 NOTE — PLAN OF CARE
Discussed case w/ ARU MD and Clinical.  Transfer to inpatient for surgical debridement in AM 15 Arturo - OK to A1, awaiting bed.     Still awaiting bed.  When arranged patient will need general admission orders placed for inpatient and add Dr. Stephenson to tx team.  NPO after Mn, including tube feeds.  Continue to hold Eliquis for surgical debridement in AM.

## 2024-01-14 NOTE — DISCHARGE SUMMARY
Physical Medicine & Rehabilitation  Discharge Summary     Patient Identification:  Isac Lemus  : 1950  Admit date: 2023  Discharge date:  24  Attending provider: Nati Mcelroy MD        Primary care provider: Emilie Cleary APRN - CNP     Discharge Diagnoses:   Active Hospital Problems    Diagnosis     Atrial fibrillation and flutter (HCC) [I48.91, I48.92]     FUO (fever of unknown origin) [R50.9]     Sacral wound [S31.000A]     Atrial arrhythmia [I49.8]     Hypernatremia [E87.0]     Elevated BUN [R79.9]     Uncontrolled type 2 diabetes mellitus with hyperglycemia (HCC) [E11.65]     Macrocytic anemia [D53.9]     Chronic respiratory failure with hypercapnia (HCC) [J96.12]     SDH (subdural hematoma) (HCC) [S06.5XAA]        History of Present Illness/Acute Hospital Course:  Isac Lemus is a 73 year old male with a past medical history significant for afib on Eliquis, CAD s/p CABG (), HLD, DM2, HTN, and GERD who presented to [facility] on 23 as a transfer from [facility] after a fall with head trauma while on anticoagulation. CT head revealed bilateral SDH, diffuse traumatic SAH, and skull base fractures through the sella turcica and right lateral wall of the sphenoid sinus. He was given kcentra, keppra, 1 unit platelets, and FFP. His hospital course was notable for pulmonary insufficiency requiring high flow oxygen, atrial fibrillation with RVR, HIT, intermittent fevers, right peroneal DVT, RLL segmental PE, pulmonary edema, colonic pseudoobstruction, aspiration pneumonia, and dysphagia. EEG was negative for seizures. On  he underwent PEG placement. He remains NPO with tube feeds. He was admitted to [facility] on  due to functional deficits below his baseline. Today he is seen with his wife and daughter present. History is limited from the patient. His family reports that he has been more engaged with his environment lately. He has been holding their hands and smiling at

## 2024-01-14 NOTE — PROGRESS NOTES
ACUTE REHAB UNIT: DISCHARGE  [facility]    Patient:Isac Lemus     Rehab Dx/Hx: SDH (subdural hematoma) (HCC) [S06.5XAA]   :1950  MRN:[id number]  Date of Admit: 2023   Date of Discharge: 2024    Subjective:   Order for patient discharge. Pt in stable condition.     Reconciled Medication List - Patient:   Medications were reviewed by RN at time of discharge with patient and/or family:  [x]  Via EMR   []  Via health information exchange  []  Verbal (e.g. in person, telephone, video conferencing)  []  Paper-based (e.g. fax, copies, printouts)   []  Other Methods (e.g. texting, email, CDs)    Reconciled Medication List - Subsequent provider:   [] No, current reconciled medication list not provided to the subsequent provider.  [x] Yes, current reconciled medication list provided to the subsequent provider.   [x] Via EMR    [] Via health information exchange  [] Verbal (e.g. in person, telephone, video conferencing)  [] Paper-based (e.g. fax, copies, printouts)   [] Other Methods (e.g. texting, email, CDs)    Discharge disposition:  Pt was discharged via:  []  Wheelchair  []  Stretcher  []  Safe ambulation and use of assistive device  [x]  Other: Bed    Pt was discharged to:  []  Private car  []  Transportation service to next destination  [x]  Other: Unit [address] [facility]    Discharge destination:  []  Home  []  Skilled Nursing Facility  []  Long Term Care  [x]  Other: [address]       Discharge BIMS:  Number of word repeated after first attempt:  [x]  0. None []  1. One []  2. Two []  3. Three    Able to report correct year:  [x]  0. Missed by >5 years, or no answer  []  1. Missed by 2-5 years  []  2. Missed by 1 year  []  3. Correct    Able to report correct month:   [x]  0. Missed by >1 month, or no answer  []  1. Missed by 6 days to 1 month  []  2. Accurate within 5 days    Able to report correct day of the week:  [x]  0. Incorrect or no answer  []  1.

## 2024-01-14 NOTE — PROGRESS NOTES
IRF KEILY Discharge Assessment  [facility] Acute Rehab Unit    Patient:Isac Lemus     Rehab Dx/Hx: SDH (subdural hematoma) (HCC) [S06.5XAA]   :1950  MRN:[id number]  Date of Admit: 2023     Pain Effect on Sleep:  Over the past 5 days, how much of the time has pain made it hard for you to sleep at night?  []  0. Does not apply - I have not had any pain or hurting in the past 5 days  []  1. Rarely or not at all  []  2. Occasionally  []  3. Frequently  []  4. Almost constantly  [x]  8. Unable to answer    Over the past 5 days, how often have you limited your participation in rehabilitation therapy sessions due to pain?  []  0. Does not apply - I have not received rehabilitation therapy in the past 5 days  []  1. Rarely or not at all  []  2. Occasionally  []  3. Frequently  []  4. Almost constantly  [x]  8. Unable to answer    Pain Interference with Day-to-Day Activities:  Over the past 5 days, how often have you limited your day-to-day activities (excluding rehabilitation therapy session)?  []  1. Rarely or not at all  []  2. Occasionally  []  3. Frequently  []  4. Almost constantly  [x]  8. Unable to answer      High-Risk Drug Classes: Use and Indication   Is taking: Check if the pt is taking any medications by pharmacological classification  Indication noted: If column 1 is checked, check if there is an indication noted for all meds in the drug class Is taking  (check all that apply) Indication noted (check all that apply)   Antipsychotic [] []   Anticoagulant [x] [x]   Antibiotic [x] [x]   Opioid [] []   Antiplatelet [] []   Hypoglycemic (including insulin) [x] [x]   None of the above [] []     Special Treatments, Procedures, and Programs   Check all of the following treatments, procedures, and programs that apply on discharge.  On discharge (check all that apply)   Cancer Treatments    A1. Chemotherapy []   A2. IV []   A3. Oral []   A10. Other []   B1. Radiation []   Respiratory Therapies    C1.

## 2024-01-14 NOTE — CARE COORDINATION
Patient in ARU, planning on transfer to IP status for surgical debridement tomorrow. CM will follow progress as IP. Ehsan Oswald RN

## 2024-01-14 NOTE — PROGRESS NOTES
01/14/24 0510   NIV Type   $NIV $Daily Charge   Equipment Type V60   Mode Bilevel   Mask Type Full face mask   Mask Size Large   Assessment   Respirations 27   SpO2 97 %   Comfort Level Good   Using Accessory Muscles No   Mask Compliance Good   Skin Assessment Clean, dry, & intact   Skin Protection for O2 Device Yes   Location Nose   Settings/Measurements   PIP Observed 13 cm H20   CPAP/EPAP 12 cmH2O   Vt (Measured) 515 mL   FiO2  35 %   Minute Volume (L/min) 15 Liters   Mask Leak (lpm) 7 lpm   Patient's Home Machine No   Alarm Settings   Alarms On Y

## 2024-01-14 NOTE — PROGRESS NOTES
01/13/24 2256   NIV Type   Equipment Type V60   Mode Bilevel   Mask Type Full face mask   Mask Size Large   Assessment   Respirations (!) 32   SpO2 94 %   Comfort Level Good   Using Accessory Muscles No   Mask Compliance Good   Skin Assessment Clean, dry, & intact   Skin Protection for O2 Device Yes   Location Nose   Settings/Measurements   PIP Observed 14 cm H20   CPAP/EPAP 12 cmH2O   Vt (Measured) 380 mL   FiO2  35 %   Minute Volume (L/min) 12 Liters   Mask Leak (lpm) 7 lpm   Patient's Home Machine No   Alarm Settings   Alarms On Y

## 2024-01-15 ENCOUNTER — ANESTHESIA (OUTPATIENT)
Dept: OPERATING ROOM | Age: 74
End: 2024-01-15
Payer: MEDICARE

## 2024-01-15 ENCOUNTER — ANESTHESIA EVENT (OUTPATIENT)
Dept: OPERATING ROOM | Age: 74
End: 2024-01-15
Payer: MEDICARE

## 2024-01-15 LAB
ANION GAP SERPL CALCULATED.3IONS-SCNC: 7 MMOL/L (ref 3–16)
BASOPHILS # BLD: 0.1 K/UL (ref 0–0.2)
BASOPHILS NFR BLD: 0.8 %
BUN SERPL-MCNC: 13 MG/DL (ref 7–20)
CALCIUM SERPL-MCNC: 8.9 MG/DL (ref 8.3–10.6)
CHLORIDE SERPL-SCNC: 100 MMOL/L (ref 99–110)
CO2 SERPL-SCNC: 33 MMOL/L (ref 21–32)
CREAT SERPL-MCNC: <0.5 MG/DL (ref 0.8–1.3)
DEPRECATED RDW RBC AUTO: 17.2 % (ref 12.4–15.4)
EOSINOPHIL # BLD: 0.1 K/UL (ref 0–0.6)
EOSINOPHIL NFR BLD: 1.3 %
GFR SERPLBLD CREATININE-BSD FMLA CKD-EPI: >60 ML/MIN/{1.73_M2}
GLUCOSE BLD-MCNC: 132 MG/DL (ref 70–99)
GLUCOSE SERPL-MCNC: 122 MG/DL (ref 70–99)
HCT VFR BLD AUTO: 30.6 % (ref 40.5–52.5)
HGB BLD-MCNC: 9.7 G/DL (ref 13.5–17.5)
LYMPHOCYTES # BLD: 1.2 K/UL (ref 1–5.1)
LYMPHOCYTES NFR BLD: 16.3 %
MCH RBC QN AUTO: 31.8 PG (ref 26–34)
MCHC RBC AUTO-ENTMCNC: 31.9 G/DL (ref 31–36)
MCV RBC AUTO: 99.8 FL (ref 80–100)
MONOCYTES # BLD: 0.7 K/UL (ref 0–1.3)
MONOCYTES NFR BLD: 9.9 %
NEUTROPHILS # BLD: 5.3 K/UL (ref 1.7–7.7)
NEUTROPHILS NFR BLD: 71.7 %
PERFORMED ON: ABNORMAL
PLATELET # BLD AUTO: 374 K/UL (ref 135–450)
PMV BLD AUTO: 8.3 FL (ref 5–10.5)
POTASSIUM SERPL-SCNC: 4.2 MMOL/L (ref 3.5–5.1)
RBC # BLD AUTO: 3.06 M/UL (ref 4.2–5.9)
SODIUM SERPL-SCNC: 140 MMOL/L (ref 136–145)
WBC # BLD AUTO: 7.3 K/UL (ref 4–11)

## 2024-01-15 PROCEDURE — 87070 CULTURE OTHR SPECIMN AEROBIC: CPT

## 2024-01-15 PROCEDURE — 0KBP0ZZ EXCISION OF LEFT HIP MUSCLE, OPEN APPROACH: ICD-10-PCS | Performed by: SURGERY

## 2024-01-15 PROCEDURE — 85025 COMPLETE CBC W/AUTO DIFF WBC: CPT

## 2024-01-15 PROCEDURE — 11043 DBRDMT MUSC&/FSCA 1ST 20/<: CPT | Performed by: SURGERY

## 2024-01-15 PROCEDURE — 94761 N-INVAS EAR/PLS OXIMETRY MLT: CPT

## 2024-01-15 PROCEDURE — 7100000000 HC PACU RECOVERY - FIRST 15 MIN: Performed by: SURGERY

## 2024-01-15 PROCEDURE — 87205 SMEAR GRAM STAIN: CPT

## 2024-01-15 PROCEDURE — 80048 BASIC METABOLIC PNL TOTAL CA: CPT

## 2024-01-15 PROCEDURE — 7100000001 HC PACU RECOVERY - ADDTL 15 MIN: Performed by: SURGERY

## 2024-01-15 PROCEDURE — 2700000000 HC OXYGEN THERAPY PER DAY

## 2024-01-15 PROCEDURE — 2580000003 HC RX 258: Performed by: SURGERY

## 2024-01-15 PROCEDURE — 11046 DBRDMT MUSC&/FSCA EA ADDL: CPT | Performed by: SURGERY

## 2024-01-15 PROCEDURE — 6370000000 HC RX 637 (ALT 250 FOR IP): Performed by: INTERNAL MEDICINE

## 2024-01-15 PROCEDURE — 99232 SBSQ HOSP IP/OBS MODERATE 35: CPT | Performed by: INTERNAL MEDICINE

## 2024-01-15 PROCEDURE — 87186 SC STD MICRODIL/AGAR DIL: CPT

## 2024-01-15 PROCEDURE — 2500000003 HC RX 250 WO HCPCS: Performed by: NURSE ANESTHETIST, CERTIFIED REGISTERED

## 2024-01-15 PROCEDURE — 6370000000 HC RX 637 (ALT 250 FOR IP): Performed by: SURGERY

## 2024-01-15 PROCEDURE — 2709999900 HC NON-CHARGEABLE SUPPLY: Performed by: SURGERY

## 2024-01-15 PROCEDURE — 87076 CULTURE ANAEROBE IDENT EACH: CPT

## 2024-01-15 PROCEDURE — 1200000000 HC SEMI PRIVATE

## 2024-01-15 PROCEDURE — 87185 SC STD ENZYME DETCJ PER NZM: CPT

## 2024-01-15 PROCEDURE — C9113 INJ PANTOPRAZOLE SODIUM, VIA: HCPCS | Performed by: INTERNAL MEDICINE

## 2024-01-15 PROCEDURE — 2580000003 HC RX 258: Performed by: INTERNAL MEDICINE

## 2024-01-15 PROCEDURE — 3700000001 HC ADD 15 MINUTES (ANESTHESIA): Performed by: SURGERY

## 2024-01-15 PROCEDURE — 94660 CPAP INITIATION&MGMT: CPT

## 2024-01-15 PROCEDURE — A4217 STERILE WATER/SALINE, 500 ML: HCPCS | Performed by: SURGERY

## 2024-01-15 PROCEDURE — 0KBN0ZZ EXCISION OF RIGHT HIP MUSCLE, OPEN APPROACH: ICD-10-PCS | Performed by: SURGERY

## 2024-01-15 PROCEDURE — 87077 CULTURE AEROBIC IDENTIFY: CPT

## 2024-01-15 PROCEDURE — 3600000013 HC SURGERY LEVEL 3 ADDTL 15MIN: Performed by: SURGERY

## 2024-01-15 PROCEDURE — 3700000000 HC ANESTHESIA ATTENDED CARE: Performed by: SURGERY

## 2024-01-15 PROCEDURE — 87075 CULTR BACTERIA EXCEPT BLOOD: CPT

## 2024-01-15 PROCEDURE — 6360000002 HC RX W HCPCS: Performed by: INTERNAL MEDICINE

## 2024-01-15 PROCEDURE — 6360000002 HC RX W HCPCS: Performed by: ANESTHESIOLOGY

## 2024-01-15 PROCEDURE — 6360000002 HC RX W HCPCS: Performed by: NURSE ANESTHETIST, CERTIFIED REGISTERED

## 2024-01-15 PROCEDURE — 3600000003 HC SURGERY LEVEL 3 BASE: Performed by: SURGERY

## 2024-01-15 RX ORDER — MEPERIDINE HYDROCHLORIDE 50 MG/ML
12.5 INJECTION INTRAMUSCULAR; INTRAVENOUS; SUBCUTANEOUS EVERY 5 MIN PRN
Status: DISCONTINUED | OUTPATIENT
Start: 2024-01-15 | End: 2024-01-15 | Stop reason: HOSPADM

## 2024-01-15 RX ORDER — TRAMADOL HYDROCHLORIDE 50 MG/1
50 TABLET ORAL EVERY 6 HOURS PRN
Status: DISCONTINUED | OUTPATIENT
Start: 2024-01-15 | End: 2024-01-17

## 2024-01-15 RX ORDER — PROPOFOL 10 MG/ML
INJECTION, EMULSION INTRAVENOUS PRN
Status: DISCONTINUED | OUTPATIENT
Start: 2024-01-15 | End: 2024-01-15 | Stop reason: SDUPTHER

## 2024-01-15 RX ORDER — ONDANSETRON 2 MG/ML
INJECTION INTRAMUSCULAR; INTRAVENOUS PRN
Status: DISCONTINUED | OUTPATIENT
Start: 2024-01-15 | End: 2024-01-15 | Stop reason: SDUPTHER

## 2024-01-15 RX ORDER — MIDAZOLAM HYDROCHLORIDE 1 MG/ML
2 INJECTION INTRAMUSCULAR; INTRAVENOUS
Status: DISCONTINUED | OUTPATIENT
Start: 2024-01-15 | End: 2024-01-15 | Stop reason: HOSPADM

## 2024-01-15 RX ORDER — SODIUM CHLORIDE 0.9 % (FLUSH) 0.9 %
5-40 SYRINGE (ML) INJECTION PRN
Status: DISCONTINUED | OUTPATIENT
Start: 2024-01-15 | End: 2024-01-15 | Stop reason: HOSPADM

## 2024-01-15 RX ORDER — SODIUM CHLORIDE 0.9 % (FLUSH) 0.9 %
5-40 SYRINGE (ML) INJECTION EVERY 12 HOURS SCHEDULED
Status: DISCONTINUED | OUTPATIENT
Start: 2024-01-15 | End: 2024-01-15 | Stop reason: HOSPADM

## 2024-01-15 RX ORDER — OXYCODONE HYDROCHLORIDE 5 MG/1
5 TABLET ORAL PRN
Status: DISCONTINUED | OUTPATIENT
Start: 2024-01-15 | End: 2024-01-15 | Stop reason: HOSPADM

## 2024-01-15 RX ORDER — PANTOPRAZOLE SODIUM 40 MG/10ML
40 INJECTION, POWDER, LYOPHILIZED, FOR SOLUTION INTRAVENOUS DAILY
Status: DISCONTINUED | OUTPATIENT
Start: 2024-01-15 | End: 2024-01-28

## 2024-01-15 RX ORDER — MODAFINIL 200 MG/1
300 TABLET ORAL DAILY
Status: DISCONTINUED | OUTPATIENT
Start: 2024-01-15 | End: 2024-01-24

## 2024-01-15 RX ORDER — ONDANSETRON 2 MG/ML
4 INJECTION INTRAMUSCULAR; INTRAVENOUS ONCE
Status: DISCONTINUED | OUTPATIENT
Start: 2024-01-15 | End: 2024-01-15 | Stop reason: HOSPADM

## 2024-01-15 RX ORDER — OXYCODONE HYDROCHLORIDE 5 MG/1
10 TABLET ORAL PRN
Status: DISCONTINUED | OUTPATIENT
Start: 2024-01-15 | End: 2024-01-15 | Stop reason: HOSPADM

## 2024-01-15 RX ORDER — DILTIAZEM HYDROCHLORIDE 60 MG/1
60 TABLET, FILM COATED ORAL EVERY 6 HOURS SCHEDULED
Status: DISCONTINUED | OUTPATIENT
Start: 2024-01-15 | End: 2024-01-15

## 2024-01-15 RX ORDER — ROCURONIUM BROMIDE 10 MG/ML
INJECTION, SOLUTION INTRAVENOUS PRN
Status: DISCONTINUED | OUTPATIENT
Start: 2024-01-15 | End: 2024-01-15 | Stop reason: SDUPTHER

## 2024-01-15 RX ORDER — LABETALOL HYDROCHLORIDE 5 MG/ML
10 INJECTION, SOLUTION INTRAVENOUS ONCE
Status: COMPLETED | OUTPATIENT
Start: 2024-01-15 | End: 2024-01-15

## 2024-01-15 RX ORDER — DONEPEZIL HYDROCHLORIDE 5 MG/1
5 TABLET, FILM COATED ORAL NIGHTLY
Status: DISCONTINUED | OUTPATIENT
Start: 2024-01-15 | End: 2024-01-24

## 2024-01-15 RX ORDER — AMIODARONE HYDROCHLORIDE 200 MG/1
400 TABLET ORAL 2 TIMES DAILY
Status: DISCONTINUED | OUTPATIENT
Start: 2024-01-15 | End: 2024-01-22

## 2024-01-15 RX ORDER — DIPHENHYDRAMINE HYDROCHLORIDE 50 MG/ML
6.25 INJECTION INTRAMUSCULAR; INTRAVENOUS
Status: DISCONTINUED | OUTPATIENT
Start: 2024-01-15 | End: 2024-01-15 | Stop reason: HOSPADM

## 2024-01-15 RX ORDER — OXYCODONE HYDROCHLORIDE 5 MG/1
5 TABLET ORAL EVERY 4 HOURS PRN
Status: DISCONTINUED | OUTPATIENT
Start: 2024-01-15 | End: 2024-01-18

## 2024-01-15 RX ORDER — LIDOCAINE HYDROCHLORIDE 20 MG/ML
INJECTION, SOLUTION EPIDURAL; INFILTRATION; INTRACAUDAL; PERINEURAL PRN
Status: DISCONTINUED | OUTPATIENT
Start: 2024-01-15 | End: 2024-01-15 | Stop reason: SDUPTHER

## 2024-01-15 RX ORDER — MAGNESIUM HYDROXIDE 1200 MG/15ML
LIQUID ORAL CONTINUOUS PRN
Status: COMPLETED | OUTPATIENT
Start: 2024-01-15 | End: 2024-01-15

## 2024-01-15 RX ORDER — DILTIAZEM HYDROCHLORIDE 60 MG/1
60 TABLET, FILM COATED ORAL EVERY 6 HOURS SCHEDULED
Status: DISCONTINUED | OUTPATIENT
Start: 2024-01-15 | End: 2024-01-24

## 2024-01-15 RX ORDER — SODIUM CHLORIDE 9 MG/ML
INJECTION, SOLUTION INTRAVENOUS PRN
Status: DISCONTINUED | OUTPATIENT
Start: 2024-01-15 | End: 2024-01-15 | Stop reason: HOSPADM

## 2024-01-15 RX ORDER — FENTANYL CITRATE 50 UG/ML
INJECTION, SOLUTION INTRAMUSCULAR; INTRAVENOUS PRN
Status: DISCONTINUED | OUTPATIENT
Start: 2024-01-15 | End: 2024-01-15 | Stop reason: SDUPTHER

## 2024-01-15 RX ADMIN — TRAMADOL HYDROCHLORIDE 50 MG: 50 TABLET ORAL at 13:38

## 2024-01-15 RX ADMIN — DILTIAZEM HYDROCHLORIDE 60 MG: 60 TABLET ORAL at 13:38

## 2024-01-15 RX ADMIN — LIDOCAINE HYDROCHLORIDE 2 ML: 20 INJECTION, SOLUTION EPIDURAL; INFILTRATION; INTRACAUDAL; PERINEURAL at 08:37

## 2024-01-15 RX ADMIN — LEVETIRACETAM 750 MG: 100 SOLUTION ORAL at 20:56

## 2024-01-15 RX ADMIN — ONDANSETRON 4 MG: 2 INJECTION INTRAMUSCULAR; INTRAVENOUS at 08:37

## 2024-01-15 RX ADMIN — METOPROLOL TARTRATE 25 MG: 25 TABLET, FILM COATED ORAL at 20:54

## 2024-01-15 RX ADMIN — AMIODARONE HYDROCHLORIDE 400 MG: 200 TABLET ORAL at 13:39

## 2024-01-15 RX ADMIN — FENTANYL CITRATE 50 MCG: 50 INJECTION, SOLUTION INTRAMUSCULAR; INTRAVENOUS at 08:50

## 2024-01-15 RX ADMIN — SODIUM CHLORIDE: 9 INJECTION, SOLUTION INTRAVENOUS at 13:34

## 2024-01-15 RX ADMIN — DILTIAZEM HYDROCHLORIDE 60 MG: 60 TABLET ORAL at 17:43

## 2024-01-15 RX ADMIN — PIPERACILLIN AND TAZOBACTAM 3375 MG: 3; .375 INJECTION, POWDER, FOR SOLUTION INTRAVENOUS at 21:06

## 2024-01-15 RX ADMIN — SUGAMMADEX 300 MG: 100 INJECTION, SOLUTION INTRAVENOUS at 09:22

## 2024-01-15 RX ADMIN — PANTOPRAZOLE SODIUM 40 MG: 40 INJECTION, POWDER, FOR SOLUTION INTRAVENOUS at 13:38

## 2024-01-15 RX ADMIN — DILTIAZEM HYDROCHLORIDE 60 MG: 60 TABLET ORAL at 23:35

## 2024-01-15 RX ADMIN — AMIODARONE HYDROCHLORIDE 400 MG: 200 TABLET ORAL at 20:54

## 2024-01-15 RX ADMIN — ROCURONIUM BROMIDE 50 MG: 50 INJECTION, SOLUTION INTRAVENOUS at 08:37

## 2024-01-15 RX ADMIN — DONEPEZIL HYDROCHLORIDE 5 MG: 5 TABLET, FILM COATED ORAL at 20:56

## 2024-01-15 RX ADMIN — LABETALOL HYDROCHLORIDE 10 MG: 5 INJECTION INTRAVENOUS at 10:15

## 2024-01-15 RX ADMIN — FENTANYL CITRATE 50 MCG: 50 INJECTION, SOLUTION INTRAMUSCULAR; INTRAVENOUS at 08:37

## 2024-01-15 RX ADMIN — ATORVASTATIN CALCIUM 40 MG: 40 TABLET, FILM COATED ORAL at 20:54

## 2024-01-15 RX ADMIN — SODIUM CHLORIDE, PRESERVATIVE FREE 10 ML: 5 INJECTION INTRAVENOUS at 20:56

## 2024-01-15 RX ADMIN — PROPOFOL 100 MG: 10 INJECTION, EMULSION INTRAVENOUS at 08:37

## 2024-01-15 RX ADMIN — PIPERACILLIN AND TAZOBACTAM 3375 MG: 3; .375 INJECTION, POWDER, FOR SOLUTION INTRAVENOUS at 13:35

## 2024-01-15 RX ADMIN — MODAFINIL 300 MG: 200 TABLET ORAL at 13:38

## 2024-01-15 ASSESSMENT — PAIN SCALES - GENERAL
PAINLEVEL_OUTOF10: 3
PAINLEVEL_OUTOF10: 0

## 2024-01-15 NOTE — PROGRESS NOTES
01/15/24 0329   NIV Type   Equipment Type v60   Mode CPAP   Mask Type Full face mask   Mask Size Large   Assessment   Respirations (!) 31   SpO2 94 %   Skin Protection for O2 Device Yes   Location Nose   Breath Sounds   Breath Sounds Bilateral Diminished   Settings/Measurements   PIP Observed 12 cm H20   CPAP/EPAP 12 cmH2O   Vt (Measured) 456 mL   FiO2  35 %   Minute Volume (L/min) 13.9 Liters   Mask Leak (lpm) 20 lpm   Patient's Home Machine No   Alarm Settings   Alarms On Y   Low Pressure (cmH2O) 6 cmH2O   High Pressure (cmH2O) 30 cmH2O   Delay Alarm 20 sec(s)   RR Low (bpm) 6   RR High (bpm) 40 br/min   Oxygen Therapy/Pulse Ox   O2 Therapy Oxygen   Oxygen Therapy   O2 Device PAP (positive airway pressure)

## 2024-01-15 NOTE — PROGRESS NOTES
01/15/24 1157   NIV Type   NIV Started/Stopped On   Equipment Type v60   Mode CPAP   Mask Type Full face mask   Mask Size Large   Assessment   Pulse (!) 142   SpO2 91 %   Comfort Level Good   Using Accessory Muscles No   Mask Compliance Good   Skin Protection for O2 Device Yes   Orientation Middle   Location Nose   Intervention(s) Skin Barrier   Breath Sounds   Right Upper Lobe Diminished   Right Middle Lobe Diminished   Right Lower Lobe Diminished   Left Upper Lobe Diminished   Left Lower Lobe Diminished   Settings/Measurements   PIP Observed 13 cm H20   CPAP/EPAP 12 cmH2O   Vt (Measured) 420 mL   FiO2  45 %   Minute Volume (L/min) 11.8 Liters   Mask Leak (lpm) 11 lpm   Patient's Home Machine No   Alarm Settings   Alarms On Y   Low Pressure (cmH2O) 6 cmH2O   High Pressure (cmH2O) 30 cmH2O   Delay Alarm 20 sec(s)   Apnea (secs) 20 secs   RR Low (bpm) 6   RR High (bpm) 40 br/min   Patient Observation   Observations mepilex on nose, pt  received from surgery and placed on CPAP   Oxygen Therapy/Pulse Ox   O2 Therapy Oxygen

## 2024-01-15 NOTE — PROGRESS NOTES
01/14/24 2224   NIV Type   NIV Started/Stopped On   Equipment Type v60   Mode CPAP   Mask Type Full face mask   Mask Size Large   Assessment   Respirations 26   SpO2 93 %   Comfort Level Good   Using Accessory Muscles No   Mask Compliance Good   Skin Assessment Clean, dry, & intact   Skin Protection for O2 Device Yes   Orientation Middle   Location Nose   Intervention(s) Skin Barrier   Settings/Measurements   PIP Observed 13 cm H20   CPAP/EPAP 12 cmH2O   Vt (Measured) 405 mL   FiO2  35 %   Minute Volume (L/min) 10.5 Liters   Mask Leak (lpm) 32 lpm   Patient's Home Machine No   Alarm Settings   Alarms On Y   Low Pressure (cmH2O) 6 cmH2O   High Pressure (cmH2O) 30 cmH2O   RR Low (bpm) 6   RR High (bpm) 40 br/min

## 2024-01-15 NOTE — PLAN OF CARE
Problem: Chronic Conditions and Co-morbidities  Goal: Patient's chronic conditions and co-morbidity symptoms are monitored and maintained or improved  1/15/2024 0030 by Joanie Cartwright RN  Outcome: Progressing  Flowsheets (Taken 1/15/2024 0030)  Care Plan - Patient's Chronic Conditions and Co-Morbidity Symptoms are Monitored and Maintained or Improved:   Monitor and assess patient's chronic conditions and comorbid symptoms for stability, deterioration, or improvement   Collaborate with multidisciplinary team to address chronic and comorbid conditions and prevent exacerbation or deterioration   Update acute care plan with appropriate goals if chronic or comorbid symptoms are exacerbated and prevent overall improvement and discharge     Problem: Safety - Adult  Goal: Free from fall injury  1/15/2024 0030 by Joanie Cartwright RN  Outcome: Progressing  Flowsheets (Taken 1/15/2024 0030)  Free From Fall Injury:   Instruct family/caregiver on patient safety   Based on caregiver fall risk screen, instruct family/caregiver to ask for assistance with transferring infant if caregiver noted to have fall risk factors     Problem: ABCDS Injury Assessment  Goal: Absence of physical injury  Outcome: Progressing  Flowsheets (Taken 1/15/2024 0030)  Absence of Physical Injury: Implement safety measures based on patient assessment     Problem: Skin/Tissue Integrity  Goal: Absence of new skin breakdown  Description: 1.  Monitor for areas of redness and/or skin breakdown  2.  Assess vascular access sites hourly  3.  Every 4-6 hours minimum:  Change oxygen saturation probe site  4.  Every 4-6 hours:  If on nasal continuous positive airway pressure, respiratory therapy assess nares and determine need for appliance change or resting period.  Outcome: Progressing

## 2024-01-15 NOTE — PROGRESS NOTES
4 Eyes Skin Assessment     NAME:  Isac Lemus  YOB: 1950  MEDICAL RECORD NUMBER:  [id number]    The patient is being assessed for  Shift Handoff    I agree that at least one RN has performed a thorough Head to Toe Skin Assessment on the patient. ALL assessment sites listed below have been assessed.      Areas assessed by both nurses: Joanie RN and Kati RN    Head, Face, Ears, Shoulders, Back, Chest, Arms, Elbows, Hands, Sacrum. Buttock, Coccyx, Ischium, and Legs. Feet and Heels        Does the Patient have a Wound? Yes wound(s) were present on assessment. LDA wound assessment was Initiated and completed by RN       Devon Prevention initiated by RN: Yes  Wound Care Orders initiated by RN: Yes    Pressure Injury (Stage 3,4, Unstageable, DTI, NWPT, and Complex wounds) if present, place Wound referral order by RN under : Yes    New Ostomies, if present place, Ostomy referral order under : Yes     Nurse 1 eSignature: Electronically signed by Joanie Cartwright RN on 1/15/24 at 5:04 AM EST    **SHARE this note so that the co-signing nurse can place an eSignature**    Nurse 2 eSignature: {Esignature:[id number]}

## 2024-01-15 NOTE — PLAN OF CARE
Problem: Safety - Adult  Goal: Free from fall injury  1/14/2024 2118 by Leatha Rey, RN  Outcome: Progressing  Flowsheets (Taken 1/14/2024 2118)  Free From Fall Injury:   Based on caregiver fall risk screen, instruct family/caregiver to ask for assistance with transferring infant if caregiver noted to have fall risk factors   Instruct family/caregiver on patient safety

## 2024-01-15 NOTE — PROGRESS NOTES
Patient more awake and alert at this time. CPAP machine placed on standby, pt placed on 3L O2 via NC. Current saturation is 97%

## 2024-01-15 NOTE — CARE COORDINATION
Patient went for SACRAL WOUND DEBRIDEMENT w/ Dr. Seema Torres Sx, then to acute care on C3. Patient unable to answer transport and ethnicity questions.        Transportation needs: Patient unable to answer d/t sx and to acute care side on C3   Has lack of transportation kept you from medical appointments, meetings, work, or ADL's? [] Yes, it has kept me from medical appointments or from getting my meds  [] Yes, It has kept me from non-medical meetings, appointments, work, or getting things I need  [] No  [x] Pt unable to respond>d/t sx and to acute care side on C3  [] Pt declines to respond     How often do you need to have someone help you when you read instructions, pamphlets, or other written material from your doctor or pharmacy? [] Never                             [] Always  [] Rarely                            [x] Patient unable to respond>d/t sx and to acute care side on C3  [] Sometimes                     [] Pt declines to respond  [] Often         Ethnicity: Are you of , /a, or Romansh origin?  [] No, not of , /a, or Romansh origin  [] Yes, Russian, Russian American, Chicano/a  [] Yes, Tanzanian  [] Yes, Vincentian  [] Yes, another , , or Romansh origin  [x] Pt unable to respond>d/t sx and to acute care side on C3  [] Pt declines to respond     Race: [] White                                                [] Mauritian              []   [] Black or                 [] Kazakh          [] British or Benito  []  or      [] Belarusian             [] Wallisian  []  Albanian                                      [] Guyanese      [] Other   [] Chinese                                             [] Other        [x] Pt unable to respond>d/t sx and to acute care side on C3                      [] Pt declines to respond                  [] None of the above   Preferred Language: Patient unable to

## 2024-01-15 NOTE — OP NOTE
Date of Surgery: 1/15/24    Preop Dx:  Sacral wound    Postop Dx:  same    Procedure:  Sacral wound debridement    Surgeon:  Seema    Assistant:      Anesthesia:  GETA    EBL:   <50ml    Specimen:  Wound culture    Complications: none    Drains/Lines:  none    Indications:  72 yo with infected necrotic sacral wound    Description:  Patient family was given adequate description of the risks and rewards of the procedure, including bleeding, further infection and freely consented.  He was given appropriate antibiotics and brought to the OR where GETA anesthesia was induced.  He was placed in prone position.  Prepped and draped in usual sterile fashion.     Using sharp dissection all nonviable skin, subcutaneous tissue, and muscle was excised.  Hemostasis achieved with cautery and wound irrigated.  It measured roughly 7x8x3 cm with some areas of undermining.  Betadyne soaked gauze placed in wound.     Outer gauze dressing placed.  All suture, sponge and instrument count correct times two at end of case.  Transferred to PACU in stable condition.    Lewis Stephenson MD

## 2024-01-15 NOTE — PROGRESS NOTES
Received pt from inpatient unit to [facility] room 1 to prep for surgery. Pt is nonverbal; wife and daughter at bedside to answer pre-op questions. Consents reviewed with pt's wife.

## 2024-01-15 NOTE — H&P
I have reviewed the history and physical and examined the patient.  I find no relevant changes.  I have reviewed with the patient and/or family members, during the preoperative office visit the risks, benefits, and alternatives to the procedure.    Casey Stephenson MD

## 2024-01-15 NOTE — PROGRESS NOTES
Jevity 1.5 237mL bolus feed through PEG tube with 60mL flush before and after. Pt tolerated well.

## 2024-01-15 NOTE — PROGRESS NOTES
Infectious Disease Follow up Notes    CC :  FUO, lethargy     Antibiotics:  Zosyn 3.375 q8 s 1/9/24    Admit Date:   1/14/2024  Hospital Day: 2    Subjective:   No fever for several days   S/p debridement of sacral pressure wound this morning - d/w Sgy.  Does not extend to bone.    Wife at bedside.  Patient sleeping soundly     Objective:     Patient Vitals for the past 8 hrs:   BP Temp Temp src Pulse Resp SpO2   01/15/24 1335 -- -- -- (!) 103 -- 97 %   01/15/24 1324 -- -- -- (!) 114 -- 97 %   01/15/24 1322 -- -- -- (!) 117 -- 94 %   01/15/24 1157 -- -- -- (!) 142 -- 91 %   01/15/24 1145 117/81 -- -- (!) 130 20 94 %   01/15/24 1115 123/76 -- -- (!) 138 24 94 %   01/15/24 1100 125/78 -- -- (!) 119 22 94 %   01/15/24 1045 129/73 -- -- 97 22 94 %   01/15/24 1030 115/61 -- -- (!) 107 24 94 %   01/15/24 1025 118/79 -- -- 96 27 97 %   01/15/24 1020 111/66 -- -- (!) 110 21 98 %   01/15/24 1015 (!) 160/86 -- -- (!) 143 22 98 %   01/15/24 1010 (!) 168/91 -- -- (!) 142 22 98 %   01/15/24 1005 (!) 167/94 -- -- (!) 142 22 98 %   01/15/24 1000 (!) 174/88 97.7 °F (36.5 °C) Axillary (!) 142 21 96 %   01/15/24 0955 (!) 175/84 -- -- (!) 143 29 (!) 87 %   01/15/24 0950 (!) 171/93 -- -- (!) 141 24 96 %   01/15/24 0945 (!) 175/96 -- -- (!) 140 25 96 %   01/15/24 0940 (!) 178/97 -- -- (!) 140 24 95 %   01/15/24 0935 (!) 177/101 -- -- (!) 140 24 93 %   01/15/24 0930 -- -- -- (!) 141 27 --   01/15/24 0927 (!) 165/96 97 °F (36.1 °C) Temporal (!) 140 25 91 %   01/15/24 0806 134/82 98.2 °F (36.8 °C) Temporal (!) 145 16 95 %   01/15/24 0753 (!) 143/97 97.6 °F (36.4 °C) Axillary (!) 125 18 97 %         EXAM:  Resting comfortably  Exposed skin without rash   Abd soft, flat, NT   PIV in place           Scheduled Meds:   amiodarone  400 mg Oral BID    pantoprazole  40 mg IntraVENous Daily    piperacillin-tazobactam  3,375 mg IntraVENous Q8H    modafinil  300 mg Oral  and flutter (HCC) 01/11/2024    FUO (fever of unknown origin) 01/11/2024    Sacral wound 01/11/2024    Atrial arrhythmia 01/05/2024    Hypernatremia 01/01/2024    Elevated BUN 01/01/2024    Uncontrolled type 2 diabetes mellitus with hyperglycemia (HCC) 01/01/2024    Macrocytic anemia 01/01/2024    Chronic respiratory failure with hypercapnia ([facility]) 12/29/2023    SDH (subdural hematoma) ([facility]) 12/22/2023    DM (diabetes mellitus) ([facility]) 10/16/2014    HTN (hypertension) 10/16/2014    Obesity 10/16/2014       Background of DM, HTN     Admitted to [facility] 11/28/23 with traumatic SDH and SAH and basilar skull fracture   Course complicated by   -DVT/SVT/HIT  -constipation / ogilve   -aspiration pna  -dysphagia s/p PEG      Transferred to ARU 12/18/23     Intermittent fever so ~1 week prior to admission at  as well as through the bulk of that admission there per family and ongoing since admission to ARU   He was getting scheduled APAP from 12/22-1/8  Presumed to be at least in part from central fever     Necrotic sacral pressure wound   Empiric Zosyn   S/p I&D 1/15/24 - d/w Sgy - extends to muscle but not bone   -prognosis for wound healing poor  -discussed importance of off loading pressure, frequent turns, managing moisture/urine and fecal soiling   -short course abx, plan for change to po once cultures are final   -monitor fever curve and CRP       No abx allergies         Discussed with patient/family, all questions answered        Marichuy Rinaldi MD  Phone: [phone]   Fax : [phone]

## 2024-01-15 NOTE — PROGRESS NOTES
Perfect serve sent to cross cover ALEKSANDER Estrada asking for pt Keppra dosage ordered for the night.\"     Sticky not left for day shift MD regarding other medications that were not ordered.

## 2024-01-15 NOTE — PROGRESS NOTES
CPAP machine placed in standby mode. Pt switched to 4L via NC to be transported to surgery department.

## 2024-01-15 NOTE — ANESTHESIA PRE PROCEDURE
GLUCOSE 122 01/15/2024 06:23 AM    PROT 6.2 01/03/2024 06:48 AM    CALCIUM 8.9 01/15/2024 06:23 AM    BILITOT <0.2 01/03/2024 06:48 AM    ALKPHOS 85 01/03/2024 06:48 AM    AST 27 01/03/2024 06:48 AM    ALT 26 01/03/2024 06:48 AM       POC Tests:   Recent Labs     01/14/24  1613   POCGLU 117*       Coags:   Lab Results   Component Value Date/Time    PROTIME 16.1 10/26/2014 04:38 AM    INR 1.46 10/26/2014 04:38 AM    APTT 35.7 10/20/2014 08:50 PM       HCG (If Applicable): No results found for: \"PREGTESTUR\", \"PREGSERUM\", \"HCG\", \"HCGQUANT\"     ABGs:   Lab Results   Component Value Date/Time    PHART 7.394 01/10/2024 08:30 AM    PO2ART 72.8 01/10/2024 08:30 AM    ODY7NCU 51.9 01/10/2024 08:30 AM    CDN1VHP 31.0 01/10/2024 08:30 AM    BEART 4.8 01/10/2024 08:30 AM    O6GJYJPW 93.9 01/10/2024 08:30 AM        Type & Screen (If Applicable):  No results found for: \"LABABO\", \"LABRH\"    Drug/Infectious Status (If Applicable):  No results found for: \"HIV\", \"HEPCAB\"    COVID-19 Screening (If Applicable):   Lab Results   Component Value Date/Time    COVID19 Not Detected 01/04/2024 11:50 PM           Anesthesia Evaluation  Patient summary reviewed and Nursing notes reviewed   no history of anesthetic complications:   Airway: Mallampati: Unable to assess / NA     Neck ROM: full     Dental:          Pulmonary:                              Cardiovascular:    (+) hypertension:, CAD:                  Neuro/Psych:   (+) neuromuscular disease:            GI/Hepatic/Renal:   (+) hiatal hernia, GERD:         ROS comment: obesity.   Endo/Other:    (+) Diabetes.                 Abdominal:             Vascular:          Other Findings:       Anesthesia Plan      general     ASA 4     (Medications & allergies reviewed  All available lab & EKG data reviewed)  Induction: intravenous.      Anesthetic plan and risks discussed with healthcare power of .      Plan discussed with CRNA.                CLEMENCIA DALE MD

## 2024-01-15 NOTE — PROGRESS NOTES
Pt needing to use CPAP due to post op anesthesia drowsiness. HR in the 140s. Hospitalist informed via perfect serve.

## 2024-01-15 NOTE — PROGRESS NOTES
Pt brought to PACU. Report obtained from OR RN and anesthesia. Pt placed on monitor Sinus Tach and O2 at 91% on 15L NRB.

## 2024-01-15 NOTE — PROGRESS NOTES
4 Eyes Skin Assessment     The patient is being assess for  Admission    I agree that 2 RN's have performed a thorough Head to Toe Skin Assessment on the patient. ALL assessment sites listed below have been assessed.       Areas assessed by both nurses: Leatha RN/ Cindy RN  [x]   Head, Face, and Ears   [x]   Shoulders, Back, and Chest  [x]   Arms, Elbows, and Hands   [x]   Coccyx, Sacrum, and IschIum  [x]   Legs, Feet, and Heels        Does the Patient have Skin Breakdown?  Yes LDA WOUND CARE was Initiated documentation include the Ilda-wound, Wound Assessment, Measurements, Dressing Treatment, Drainage, and Color\",       See LDA  Devon Prevention initiated:  Yes   Wound Care Orders initiated:  Yes      WOC nurse consulted for Pressure Injury (Stage 3,4, Unstageable, DTI, NWPT, and Complex wounds), New and Established Ostomies:  Yes      Nurse 1 eSignature: Electronically signed by Leatha Rey RN on 1/14/24 at 7:57 PM EST    **SHARE this note so that the co-signing nurse is able to place an eSignature**    Nurse 2 eSignature: Electronically signed by Cindy Philippe RN on 1/14/24 at 8:34 PM EST         Sacral/ coccyx wound

## 2024-01-15 NOTE — H&P
Hospital Medicine History & Physical      Date of Admission: 1/14/2024    Date of Service:  Pt seen/examined on 15 Arturo 2024     [x]Admitted to Inpatient with expected LOS greater than two midnights due to medical therapy.  []Placed in Observation status.    Chief Admission Complaint:  Decubitus Ulcer      Presenting Admission History:     73 y.o. male w/ hx TBI w/ bleed who presented to [facility] in transfer from [facility] w/ decubitus ulcer for surgical debridement - done 15 Arturo w/out complications - discussed face to face w/ General Surgery, Dr. Stephenson 15 Arturo.     Patient is non-verbal with family at bedside.       Assessment/Plan:      Current Principal Problem:  Decubitus skin ulcer      Traumatic Brain Injury  - with bilateral SDH, SAH, skull base fracture, 4 mm MLS, right traverse/sigmoid sinus thrombosis   - no surgical intervention  - serial scans during acute stay stable  - Keppra 750 mg BID, EEG showing rate epileptiform discharges during acute stay. OSH recommending 30 days of ppx. Consulted Neurology, due to abnormal findings on OSH EEG will continue Keppra for now.   - Aricept and provigil  - PT, OT, ST     Pulmonary insufficiency  - wean oxygen for goal SpO2>88%  - ABG showing CO2 retention  - XR chest negative for acute process  - Pulmonology following, suspected undiagnosed sleep apnea. Was on BiPAP intermittently, now on CPAP.      Intermittent Fevers  - infectious workup as OSH unremarkable  - prior infectious workups negative  - now with recurrence of fevers  - ID following, concern for infection of sacral wound. Started on Zosyn. Wound care following. General Surgery consulted, appreciate assistance, planning on debridement of sacral wound on Monday. Medicine consulted to have patient transfer back to acute care prior to OR on Monday.      Afib - chronic paroxysmal of unspecified and clinically unable to determine etiology - w/ episodic RVR.  Normally rate controlled on Amio/BBlocker and  mouth daily.      ProviderJanessa MD   metformin (GLUCOPHAGE) 500 MG tablet Take 2 tablets by mouth 2 times daily (with meals)    Provider, MD Janessa       Labs: Personally reviewed and interpreted for clinical significance.   Recent Labs     01/15/24  0623   WBC 7.3   HGB 9.7*   HCT 30.6*        Recent Labs     01/15/24  0623      K 4.2      CO2 33*   BUN 13   CREATININE <0.5*   CALCIUM 8.9     No results for input(s): \"PROBNP\", \"TROPHS\" in the last 72 hours.  No results for input(s): \"LABA1C\" in the last 72 hours.  No results for input(s): \"AST\", \"ALT\", \"BILIDIR\", \"BILITOT\", \"ALKPHOS\" in the last 72 hours.  No results for input(s): \"INR\", \"LACTA\", \"TSH\" in the last 72 hours.     Kenny Ernandez MD

## 2024-01-15 NOTE — PROGRESS NOTES
Occupational Therapy  Discharge Summary     Name:Isac Lemus  MRN:[id number]  :1950          Restrictions/Precautions  Restrictions/Precautions: Seizure, Fall Risk, Up as Tolerated, Aspiration Risk, General Precautions, Modified Diet  Required Braces or Orthoses?: No           Position Activity Restriction  Other position/activity restrictions: PEG, tube feed, Puree     Goals:    Short Term Goals  Time Frame for Short Term Goals: by 23 unless otherwise noted  Short Term Goal 1: Pt will tolerate dynamic sitting balance ax with CGA  in prep for ADLs, progressing  total assist  Short Term Goal 2: Pt will follow commands to sequence 2 step task with only 1 tactile/visual cue, progressing  occasional 1 step command  Short Term Goal 3: Pt will perform sit pivot transfer to toilet with mod Ax2. progressing  MaxiMove for all transfers  Short Term Goal 4: Pt will perform grooming tasks with mod A and 2-3 VC for sequencing, progressing , total assist for all ADLs  Short Term Goal 5: Pt will tolerate x15 BUE ther ex. progressing . PROM for all movements  Long Term Goals  Time Frame for Long Term Goals : to be met by 23 unless otherwise noted-progressing   Long Term Goal 1: Pt will perform TB dressing with min A and LRAD  Long Term Goal 2: Pt will perform TB bathing with min A and LRAD  Long Term Goal 3: Pt will perform toileting tasks with min A and LRAD  Long Term Goal 4: Pt will grooming tasks with min A    Pt. Met 0/5 short term goals and 0/4 long term goals. Pt discharged to acute care hospital due to medical instability and surgery.    ADL:   ADL  Feeding  Assistance Level: Dependent  Skilled Clinical Factors: BUE [place] to hold provale cup to take drink  Lower Extremity Dressing  Assistance Level: Dependent  Skilled Clinical Factors: to change don pants while rolling in bed               Functional Mobility  Device: Wheelchair  Activity: To/From therapy gym  Assistance Level:

## 2024-01-15 NOTE — PROGRESS NOTES
Received patient asleep in bed, noted with eye opening when called.  Vital signs taken and recorded. Daughter at bedside.  IV site patency assessed and without evidence of infiltration.  Currently on 4L via NC.  Abdomen soft  and non distended. Noted with clamped PEG tube - site MALA.  Skin/Wound assessment done. Please see wound LDA. Turning every 2 hours.  On offloading boots; noted with BLE edema; on male wick.  Due medications given as scheduled.  Bed wheels locked; Call light within reached; Raised siderails x2; on Fall precaution. Denies any need at the moment.

## 2024-01-15 NOTE — PROGRESS NOTES
Pt left with transport in stable condition with daughter Eunice and wife Muna to surgery department.

## 2024-01-15 NOTE — ANESTHESIA POSTPROCEDURE EVALUATION
Department of Anesthesiology  Postprocedure Note    Patient: Isac Lemus  MRN: [id number]  YOB: 1950  Date of evaluation: 1/15/2024    Procedure Summary       Date: 01/15/24 Room / Location: [address]    Anesthesia Start: 0830 Anesthesia Stop: 0936    Procedure: SACRAL WOUND DEBRIDEMENT (Back) Diagnosis:       Wound of sacral region, initial encounter      (Wound of sacral region, initial encounter [S31.000A])    Surgeons: Casey Stephenson MD Responsible Provider: Tim Roberts MD    Anesthesia Type: general ASA Status: 3            Anesthesia Type: No value filed.    Donovan Phase I: Donovan Score: 4    Donovan Phase II:      Anesthesia Post Evaluation    Comments: Anes Post-op Note    Name:    Isac Lemus  MRN:      [id number]    Patient Vitals in the past 12 hrs:  01/15/24 1030, BP:115/61, Pulse:(!) 107, Resp:24, SpO2:94 %  01/15/24 1025, BP:118/79, Pulse:96, Resp:27, SpO2:97 %  01/15/24 1020, BP:111/66, Pulse:(!) 110, Resp:21, SpO2:98 %  01/15/24 1015, BP:(!) 160/86, Pulse:(!) 143, Resp:22, SpO2:98 %  01/15/24 1010, BP:(!) 168/91, Pulse:(!) 142, Resp:22, SpO2:98 %  01/15/24 1005, BP:(!) 167/94, Pulse:(!) 142, Resp:22, SpO2:98 %  01/15/24 1000, BP:(!) 174/88, Temp:97.7 °F (36.5 °C), Temp src:Axillary, Pulse:(!) 142, Resp:21, SpO2:96 %  01/15/24 0955, BP:(!) 175/84, Pulse:(!) 143, Resp:29, SpO2:(!) 87 %  01/15/24 0950, BP:(!) 171/93, Pulse:(!) 141, Resp:24, SpO2:96 %  01/15/24 0945, BP:(!) 175/96, Pulse:(!) 140, Resp:25, SpO2:96 %  01/15/24 0940, BP:(!) 178/97, Pulse:(!) 140, Resp:24, SpO2:95 %  01/15/24 0935, BP:(!) 177/101, Pulse:(!) 140, Resp:24, SpO2:93 %  01/15/24 0930, Pulse:(!) 141, Resp:27  01/15/24 0927, BP:(!) 165/96, Temp:97 °F (36.1 °C), Temp src:Temporal, Pulse:(!) 140, Resp:25, SpO2:91 %  01/15/24 0806, BP:134/82, Temp:98.2 °F (36.8 °C), Temp src:Temporal, Pulse:(!) 145, Resp:16, SpO2:95 %  01/15/24 0753, BP:(!) 143/97, Temp:97.6 °F (36.4 °C),

## 2024-01-15 NOTE — CARE COORDINATION
Advance Care Planning     General Advance Care Planning (ACP) Conversation    Date of Conversation: 1/14/2024  Conducted with: Nancy Lemus     Healthcare Decision Maker:    Primary Decision Maker: Nancy Lemus A - Spouse - [phone]    Secondary Decision Maker: Eunice Qureshi - Child - [phone]  Click here to complete Healthcare Decision Makers including selection of the Healthcare Decision Maker Relationship (ie \"Primary\").   Today we documented Decision Maker(s) consistent with Legal Next of Kin hierarchy.    Content/Action Overview:  Has NO ACP documents/care preferences - requested patient complete ACP documents  Reviewed DNR/DNI and patient elects Full Code (Attempt Resuscitation)  ventilation preferences and resuscitation preferences      Length of Voluntary ACP Conversation in minutes:  <16 minutes (Non-Billable)    Dian Mckee RN

## 2024-01-15 NOTE — PROGRESS NOTES
Called Dr. Roberts about consistent hypertension and HR in the 140s. Came to bedside and gave a verbal order for 10mg of IV labetalol.

## 2024-01-15 NOTE — PROGRESS NOTES
Phone report received from Kendal PIERRE in the ARU. Pt transported to room 346 via bed by  and Cindy PIERRE. Pt is alert and nonverbal at this time. Pt lethargic at times. Pt did open eyes during transport to new room and while family was preforming mouth care. Pt VSS. SpO2 89% on 3 L NC. Pt SpO2 increased to 4 L NC. SpO2 91%. Pt appeared comfortable in bed. 4 eye skin assessment completed with Cindy PIERRE. Pt bilateral heels red and blanchable. Preventative mepalex in place on heels. Heels placed in offloading boots. Pt sacral/coccyx wound dressing changed completed per previous wound care instructions noted from ARU. Pt with red spot on bridge of nose. Pt PEG tube clamped at this time. PEG site MALA. Pt with male purwick in place to help with incontinence and to keep scaral/coccyx wound clean and dry. BLE legs swollen. Family at bedside to assist pt with eating. Call light within reach. Bed side table within reach. Wheels locked. Bed in lowest position. Bed check in place. Pt instructed to call out for assistance. Pt expressesed understanding & calls out appropritately. All care per orders. Electronically signed by Leatha Rey RN on 1/14/2024 at 9:46 PM

## 2024-01-15 NOTE — CARE COORDINATION
can you think of anything that we could have done to help you after you left the hospital the first time, so that you might not have needed to return so soon?: Improved written discharge instructions, Identify patient's health literacy needs, Discharge instructions that are concise, clear, and non contradictory     Advance Directives:      Code Status: Full Code   Patient's Primary Decision Maker is: (P) Legal Next of Kin    Primary Decision Maker: AllanNancy A - Spouse - [phone]    Secondary Decision Maker: Eunice Qureshi - Child - [phone]    Discharge Planning:    Patient lives with: (P) Spouse/Significant Other Type of Home: (P) House  Primary Care Giver: (P) Self  Patient Support Systems include: (P) Spouse/Significant Other, Children   Current Financial resources: (P) Medicare  Current community resources: (P) None  Current services prior to admission: (P) Durable Medical Equipment, Home Care            Current DME: (P) Walker, Cane, Bedside Commode (2,4 wheel and standard walker)  Type of Home Care services:  (P) Nursing Services, PT, OT    ADLS  Prior functional level: (P) Independent in ADLs/IADLs  Current functional level: (P) Assistance with the following:, Bathing, Dressing, Toileting, Feeding, Mobility    PT AM-PAC:   /24  OT AM-PAC:   /24    Family can provide assistance at DC: (P) Yes  Would you like Case Management to discuss the discharge plan with any other family members/significant others, and if so, who? (P) Yes (Morris Cruz)  Plans to Return to Present Housing: (P) Unknown at present  Other Identified Issues/Barriers to RETURNING to current housing: wound vac, IV ABX, physical assist from family  Potential Assistance needed at discharge: (P) Home Care, Long Term Acute Care, Skilled Nursing Facility            Potential DME:  unknown  Patient expects to discharge to: (P) Unknown  Plan for transportation at discharge:  ambulance    Financial    Payor: [facility] MEDICARE / Plan: [facility] AARBALDEV  [phone] Fax: [phone]

## 2024-01-15 NOTE — PROGRESS NOTES
Physical Therapy  Discharge Summary    Name:Isac Lemus  MRN:[id number]  :1950          Restrictions/Precautions  Restrictions/Precautions: Seizure, Fall Risk, Up as Tolerated, Aspiration Risk, General Precautions, Modified Diet  Required Braces or Orthoses?: No           Position Activity Restriction  Other position/activity restrictions: PEG, tube feed, Puree     Goals:                  Short Term Goals  Time Frame for Short Term Goals: 10 days (24)  Short Term Goal 1: Pt to perform bed mobility with mod A x 2.- GOAL NOT MET, max A of 2  Short Term Goal 2: Pt to tolerate sitting EOB x 3 min with mod A x 1 for balance support.- GOAL NOT MET, tolerates 5-10 sec with min A of 2, otherwise reuqiers TD  Short Term Goal 3: Pt to transfer bed <> chair with LRAD with mod A x 2.- GOAL NOT MET, TD via cathryn  Short Term Goal 4: Pt to perform 12-15 reps of LE HEP to target strength/ROM.- GOAL NOT MET, pt unable to participate 2* decreased levels of alertness  Short Term Goal 5: Pt to perform sit to/from stand transfer at LRAD with mod Ax 2.- GOAL NOT MET, unsafe to trial            Long Term Goals  Time Frame for Long Term Goals : 21 days (24)- extended to  per new dc date  Long Term Goal 1: Pt to perform bed mobility with min Ax 1.  Long Term Goal 2: Pt to transfer bed <> chair with LRAD with min Ax 1.  Long Term Goal 3: Pt to ambulate 25 ft with LRAD with min A x1.  Long Term Goal 4: Pt to manage 2 steps with rail (for home entry) with LRAD with min A x1.  Long Term Goal 5: Pt to perform sit to/from stand transfer at LRAD with min A x 1.    Pt. Met 0/5 short term goals and LTG not addressed as pt was d/c from unit for medical procedure.     Pt. Is currently non-ambulatory and TD for wc mobiltiy    Unable to complete transfers, requires use of maxi move  Bed mobility with TD  Recommend SNF or LTACH in order to decrease burden of care    Electronically signed by Bell Johnson PT on 1/15/2024 at 8:41

## 2024-01-15 NOTE — PROGRESS NOTES
01/14/24 8715   NIV Type   Equipment Type v60   Mode CPAP   Mask Type Full face mask   Mask Size Large   Assessment   Respirations 28   Comfort Level Good   Using Accessory Muscles No   Mask Compliance Good   Skin Protection for O2 Device Yes   Orientation Middle   Location Nose   Settings/Measurements   CPAP/EPAP 12 cmH2O   Vt (Measured) 321 mL   FiO2  35 %   Minute Volume (L/min) 9.1 Liters   Mask Leak (lpm) 22 lpm   Patient's Home Machine No   Alarm Settings   Alarms On Y   Low Pressure (cmH2O) 6 cmH2O   High Pressure (cmH2O) 30 cmH2O   Delay Alarm 20 sec(s)   RR Low (bpm) 6   RR High (bpm) 40 br/min

## 2024-01-15 NOTE — PROGRESS NOTES
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; independent with household tasks and medication/financial management; family nearby   Dx: [x] Aphasia  [x] Dysarthria  [] Apraxia   [x] Oropharyngeal dysphagia [x] Cognitive Impairment  [] Other:   Date of Admit: 2023  Room #: 0156/0156-01    Current functional status (updated daily):       +    Pt being seen for : [x] Speech/Language Treatment  [x] Dysphagia Treatment [] Cognitive Treatment  [] Other:  Communication: []WFL  [x] Aphasia  [x] Dysarthria  [] Apraxia  [] Pragmatic Impairment [] Non-verbal  [] Hearing Loss  [] Other:   Cognition: [] WFL  [] Mild  [] Moderate  [] Severe [x] Unable to Assess  [x] Other: suspect deficit  Memory: [] WFL  [] Mild  [] Moderate  [] Severe [x] Unable to Assess  [] Other:  Behavior: [] Alert  [] Cooperative  [x]  Pleasant  [] Confused  [] Agitated  [] Uncooperative  [x] Distractible [] Motivated  [] Self-Limiting [] Anxious  [] Other:  Endurance:  [] Adequate for participation in SLP sessions  [x] Reduced overall  [x] Lethargic  [] Other:  Safety: [] No concerns at this time  [] Reduced insight into deficits  []  Reduced safety awareness [] Not following call light procedures  [x] Unable to Assess  [] Other:    Current Diet Order:No diet orders on file  Swallowing Precautions: Reflux and aspiration precautions; Frequent oral hygiene; allow small volume ice chips with SLP/RN only, hold PO and contact SLP if s/s aspiration or worsening respiratory status develop        Date: 1/15/2024      DISCHARGE SUMMARY    Total Timed Code Min 0 0   Total Treatment Minutes 0 0   Individual Treatment Minutes 0 0   Group

## 2024-01-15 NOTE — CONSULTS
Comprehensive Nutrition Assessment    Type and Reason for Visit:  Initial, Consult, Wound    Nutrition Recommendations/Plan:   Modify diet to pureed diet with thin liquid via provale cup only - SLP recommendations   Resume Magic Cups with meals   Resume bolus feeds of Jevity 1.5 x5 cans daily via PEG tube.   60 mL free water flush before and after administration   1 Packet of Prosource daily. 30 mL free water flush before and after administration   Monitor nutrition adequacy, pertinent labs, bowel habits, wt changes, and clinical progress     Malnutrition Assessment:  Malnutrition Status:  At risk for malnutrition (Comment) (01/15/24 1510)    Context:  Acute Illness     Findings of the 6 clinical characteristics of malnutrition:  Energy Intake:  Mild decrease in energy intake (Comment)    Nutrition Assessment:    Consulted for wounds and tube feeding ordering and management. S/p I&D today of sacral wound. Pt on pureed diet with thin liquids via provale cup prior to admission per SLP recommendations following FEES on 1/11/24. Pt has been requiring TFs d/t continued poor PO intakes since diet advancement. Pt family requesting increase to TF over the weekend d/t poor PO intakes. RD modified diet to pureed diet with provale cup and added Magic Cups. Recommend resuming bolus feeds of Jevity 1.5 x5 cans daily as pt with continued poor PO intakes. Will monitor ability to decrease if pt able to consume increased PO. Will monitor.    Nutrition Related Findings:    PEG. Active BS. BM on 1/13. Na 140. BG stable. +2 BLE edema. Wound Type: Pressure Injury, Stage III, Surgical Incision       Current Nutrition Intake & Therapies:    Average Meal Intake: 0%, 1-25%, 51-75%, %  Average Supplements Intake: 1-25%  ADULT DIET; Dysphagia - Pureed  ADULT ORAL NUTRITION SUPPLEMENT; Breakfast, Lunch, Dinner; Frozen Oral Supplement  Current Tube Feeding (TF) Orders:  Goal TF & Flush Orders Provides: Bolus feeds of Jevity 1.5 x5 cans  daily to provide 1185 mL TV, 1775 kcal, 76 g protein, and 900 mL TV. +60 mL free water flush before and after administration. +1 Packet of Prosource daily for additional 80 kcal and 20 g protein.    Anthropometric Measures:  Height: 170.2 cm (5' 7\")  Ideal Body Weight (IBW): 148 lbs (67 kg)       Current Body Weight: 96.2 kg (212 lb), 143.2 % IBW. Weight Source: Bed Scale  Current BMI (kg/m2): 33.2  Usual Body Weight: 96.6 kg (213 lb) (12/22/23 bed scale)  % Weight Change (Calculated): -0.5                    BMI Categories: Obese Class 1 (BMI 30.0-34.9)    Estimated Daily Nutrient Needs:  Energy Requirements Based On: Kcal/kg (25-30)  Weight Used for Energy Requirements: Ideal  Energy (kcal/day): 1453-3342 kcal  Weight Used for Protein Requirements: Ideal (1.2-1.5 g/kg)  Protein (g/day):  g  Method Used for Fluid Requirements: 1 ml/kcal  Fluid (ml/day): 2159-4174 mL    Nutrition Diagnosis:   Inadequate oral intake related to inadequate protein-energy intake, increase demand for energy/nutrients, swallowing difficulty as evidenced by nutrition support - enteral nutrition, poor intake prior to admission, wounds, swallow study results, weight loss    Nutrition Interventions:   Food and/or Nutrient Delivery: Modify Current Diet, Start Oral Nutrition Supplement, Start Tube Feeding  Nutrition Education/Counseling: No recommendation at this time  Coordination of Nutrition Care: Continue to monitor while inpatient, Speech Therapy, Swallow Evaluation       Goals:     Goals: Meet at least 75% of estimated needs, prior to discharge       Nutrition Monitoring and Evaluation:   Behavioral-Environmental Outcomes: None Identified  Food/Nutrient Intake Outcomes: Diet Advancement/Tolerance, Food and Nutrient Intake, Supplement Intake, Enteral Nutrition Intake/Tolerance  Physical Signs/Symptoms Outcomes: Biochemical Data, Chewing or Swallowing, GI Status, Meal Time Behavior, Nutrition Focused Physical Findings, Weight,

## 2024-01-16 LAB
ALBUMIN SERPL-MCNC: 2.5 G/DL (ref 3.4–5)
ANION GAP SERPL CALCULATED.3IONS-SCNC: 6 MMOL/L (ref 3–16)
BUN SERPL-MCNC: 12 MG/DL (ref 7–20)
CALCIUM SERPL-MCNC: 8.8 MG/DL (ref 8.3–10.6)
CHLORIDE SERPL-SCNC: 99 MMOL/L (ref 99–110)
CO2 SERPL-SCNC: 35 MMOL/L (ref 21–32)
CREAT SERPL-MCNC: <0.5 MG/DL (ref 0.8–1.3)
DEPRECATED RDW RBC AUTO: 17.6 % (ref 12.4–15.4)
GFR SERPLBLD CREATININE-BSD FMLA CKD-EPI: >60 ML/MIN/{1.73_M2}
GLUCOSE SERPL-MCNC: 129 MG/DL (ref 70–99)
HCT VFR BLD AUTO: 28.4 % (ref 40.5–52.5)
HGB BLD-MCNC: 9.1 G/DL (ref 13.5–17.5)
MCH RBC QN AUTO: 32.3 PG (ref 26–34)
MCHC RBC AUTO-ENTMCNC: 32.2 G/DL (ref 31–36)
MCV RBC AUTO: 100.3 FL (ref 80–100)
PHOSPHATE SERPL-MCNC: 3.4 MG/DL (ref 2.5–4.9)
PLATELET # BLD AUTO: 380 K/UL (ref 135–450)
PMV BLD AUTO: 7.7 FL (ref 5–10.5)
POTASSIUM SERPL-SCNC: 4.7 MMOL/L (ref 3.5–5.1)
RBC # BLD AUTO: 2.83 M/UL (ref 4.2–5.9)
SODIUM SERPL-SCNC: 140 MMOL/L (ref 136–145)
WBC # BLD AUTO: 6.6 K/UL (ref 4–11)

## 2024-01-16 PROCEDURE — 6370000000 HC RX 637 (ALT 250 FOR IP): Performed by: SURGERY

## 2024-01-16 PROCEDURE — 94660 CPAP INITIATION&MGMT: CPT

## 2024-01-16 PROCEDURE — 6360000002 HC RX W HCPCS: Performed by: INTERNAL MEDICINE

## 2024-01-16 PROCEDURE — 2060000000 HC ICU INTERMEDIATE R&B

## 2024-01-16 PROCEDURE — 94761 N-INVAS EAR/PLS OXIMETRY MLT: CPT

## 2024-01-16 PROCEDURE — 6370000000 HC RX 637 (ALT 250 FOR IP): Performed by: INTERNAL MEDICINE

## 2024-01-16 PROCEDURE — 2580000003 HC RX 258: Performed by: SURGERY

## 2024-01-16 PROCEDURE — 2700000000 HC OXYGEN THERAPY PER DAY

## 2024-01-16 PROCEDURE — 2500000003 HC RX 250 WO HCPCS: Performed by: INTERNAL MEDICINE

## 2024-01-16 PROCEDURE — 85027 COMPLETE CBC AUTOMATED: CPT

## 2024-01-16 PROCEDURE — C9113 INJ PANTOPRAZOLE SODIUM, VIA: HCPCS | Performed by: INTERNAL MEDICINE

## 2024-01-16 PROCEDURE — 99232 SBSQ HOSP IP/OBS MODERATE 35: CPT | Performed by: INTERNAL MEDICINE

## 2024-01-16 PROCEDURE — 2580000003 HC RX 258: Performed by: INTERNAL MEDICINE

## 2024-01-16 PROCEDURE — 36415 COLL VENOUS BLD VENIPUNCTURE: CPT

## 2024-01-16 PROCEDURE — 99024 POSTOP FOLLOW-UP VISIT: CPT | Performed by: SURGERY

## 2024-01-16 PROCEDURE — 80069 RENAL FUNCTION PANEL: CPT

## 2024-01-16 RX ORDER — DILTIAZEM HYDROCHLORIDE 5 MG/ML
10 INJECTION INTRAVENOUS ONCE
Status: COMPLETED | OUTPATIENT
Start: 2024-01-16 | End: 2024-01-16

## 2024-01-16 RX ORDER — DILTIAZEM HYDROCHLORIDE 5 MG/ML
10 INJECTION INTRAVENOUS ONCE
Status: DISCONTINUED | OUTPATIENT
Start: 2024-01-16 | End: 2024-01-16

## 2024-01-16 RX ADMIN — TRAMADOL HYDROCHLORIDE 50 MG: 50 TABLET ORAL at 22:41

## 2024-01-16 RX ADMIN — DILTIAZEM HYDROCHLORIDE 10 MG: 5 INJECTION INTRAVENOUS at 17:39

## 2024-01-16 RX ADMIN — AMIODARONE HYDROCHLORIDE 400 MG: 200 TABLET ORAL at 21:06

## 2024-01-16 RX ADMIN — LISINOPRIL 2.5 MG: 2.5 TABLET ORAL at 10:15

## 2024-01-16 RX ADMIN — ATORVASTATIN CALCIUM 40 MG: 40 TABLET, FILM COATED ORAL at 21:06

## 2024-01-16 RX ADMIN — SODIUM CHLORIDE 5 MG/HR: 900 INJECTION, SOLUTION INTRAVENOUS at 17:54

## 2024-01-16 RX ADMIN — METOPROLOL TARTRATE 25 MG: 25 TABLET, FILM COATED ORAL at 10:15

## 2024-01-16 RX ADMIN — TRAMADOL HYDROCHLORIDE 50 MG: 50 TABLET ORAL at 10:14

## 2024-01-16 RX ADMIN — PIPERACILLIN AND TAZOBACTAM 3375 MG: 3; .375 INJECTION, POWDER, FOR SOLUTION INTRAVENOUS at 14:48

## 2024-01-16 RX ADMIN — DILTIAZEM HYDROCHLORIDE 60 MG: 60 TABLET ORAL at 10:25

## 2024-01-16 RX ADMIN — PIPERACILLIN AND TAZOBACTAM 3375 MG: 3; .375 INJECTION, POWDER, FOR SOLUTION INTRAVENOUS at 05:55

## 2024-01-16 RX ADMIN — DONEPEZIL HYDROCHLORIDE 5 MG: 5 TABLET, FILM COATED ORAL at 21:06

## 2024-01-16 RX ADMIN — PANTOPRAZOLE SODIUM 40 MG: 40 INJECTION, POWDER, FOR SOLUTION INTRAVENOUS at 10:11

## 2024-01-16 RX ADMIN — PIPERACILLIN AND TAZOBACTAM 3375 MG: 3; .375 INJECTION, POWDER, FOR SOLUTION INTRAVENOUS at 21:03

## 2024-01-16 RX ADMIN — METOPROLOL TARTRATE 25 MG: 25 TABLET, FILM COATED ORAL at 21:06

## 2024-01-16 RX ADMIN — DILTIAZEM HYDROCHLORIDE 10 MG: 5 INJECTION INTRAVENOUS at 15:13

## 2024-01-16 RX ADMIN — SODIUM CHLORIDE, PRESERVATIVE FREE 10 ML: 5 INJECTION INTRAVENOUS at 10:13

## 2024-01-16 RX ADMIN — MODAFINIL 300 MG: 200 TABLET ORAL at 10:15

## 2024-01-16 RX ADMIN — AMIODARONE HYDROCHLORIDE 400 MG: 200 TABLET ORAL at 10:24

## 2024-01-16 RX ADMIN — DILTIAZEM HYDROCHLORIDE 60 MG: 60 TABLET ORAL at 05:54

## 2024-01-16 RX ADMIN — LEVETIRACETAM 750 MG: 100 SOLUTION ORAL at 10:41

## 2024-01-16 RX ADMIN — LEVETIRACETAM 750 MG: 100 SOLUTION ORAL at 22:37

## 2024-01-16 RX ADMIN — ASPIRIN 81 MG: 81 TABLET, COATED ORAL at 10:24

## 2024-01-16 ASSESSMENT — PAIN DESCRIPTION - LOCATION
LOCATION: BUTTOCKS

## 2024-01-16 ASSESSMENT — PAIN SCALES - WONG BAKER
WONGBAKER_NUMERICALRESPONSE: 0

## 2024-01-16 ASSESSMENT — PAIN SCALES - GENERAL
PAINLEVEL_OUTOF10: 0
PAINLEVEL_OUTOF10: 0

## 2024-01-16 NOTE — PROGRESS NOTES
Pt turned and repositioned. Call light within reach and family is at bedside. Enc family to call for assistance.Linda Wallace RN

## 2024-01-16 NOTE — PROGRESS NOTES
01/16/24 0436   NIV Type   NIV Started/Stopped On   Equipment Type v60   Mode CPAP   Mask Type Full face mask   Mask Size Large   Assessment   Skin Assessment Clean, dry, & intact   Skin Protection for O2 Device Yes   Breath Sounds   Breath Sounds Bilateral Diminished   Settings/Measurements   PIP Observed 14 cm H20   IPAP 12 cmH20   CPAP/EPAP 5 cmH2O   Vt (Measured) 646 mL   Rate Ordered 4   FiO2  40 %   Minute Volume (L/min) 9.2 Liters   Mask Leak (lpm) 3 lpm   Patient's Home Machine No   Alarm Settings   Alarms On Y   Low Pressure (cmH2O) 6 cmH2O   High Pressure (cmH2O) 30 cmH2O   Delay Alarm 10 sec(s)   Apnea (secs) 10 secs   RR Low (bpm) 6   RR High (bpm) 40 br/min

## 2024-01-16 NOTE — PROGRESS NOTES
01/16/24 0104   NIV Type   $NIV $Daily Charge   NIV Started/Stopped On   Equipment Type v60   Mode CPAP   Mask Type Full face mask   Mask Size Large   Assessment   Skin Assessment Clean, dry, & intact   Skin Protection for O2 Device Yes   Orientation Middle   Location Nose   Intervention(s) Skin Barrier   Breath Sounds   Breath Sounds Bilateral Diminished   Settings/Measurements   PIP Observed 13 cm H20   CPAP/EPAP 12 cmH2O   Vt (Measured) 351 mL   FiO2  45 %   Minute Volume (L/min) 10.8 Liters   Mask Leak (lpm) 12 lpm   Patient's Home Machine No   Alarm Settings   Alarms On Y   Low Pressure (cmH2O) 6 cmH2O   High Pressure (cmH2O) 30 cmH2O   Apnea (secs) 20 secs   RR Low (bpm) 6   RR High (bpm) 40 br/min   Oxygen Therapy/Pulse Ox   O2 Therapy Oxygen   $Pulse Oximeter $Spot check (multiple/continuous)

## 2024-01-16 NOTE — PROGRESS NOTES
Brief Nutrition Assessment    Nutrition Recommendations/Plan:   Continue pureed diet - textures and consistencies per SLP   Add Magic Cups with meals   Encourage PO intakes as tolerated   If pt consumes less than 50% of meal, provide bolus of Jevity 1.5 of 237 mL up to 3 cans daily. 60 mL free water flush before and after administration   Provide 1 Packet of Prosource daily via PEG tube. 30 mL free water flush before and after administration   Monitor nutrition adequacy, pertinent labs, bowel habits, wt changes, and clinical progress     Nutrition Assessment:    Pt with increased PO intakes over past 3 days. Recommend modifying back to TF regimen, if pt eats less than 50%, then provide a bolus feed. Recommend continuing protein shot for wound healing.     Current Nutrition Intake & Therapies:    Average Meal Intake: 0%, 1-25%, 51-75%, %  Average Supplements Intake: 1-25%  ADULT DIET; Dysphagia - Pureed  ADULT ORAL NUTRITION SUPPLEMENT; Breakfast, Lunch, Dinner; Frozen Oral Supplement  ADULT TUBE FEEDING; PEG; Standard with Fiber; Bolus; 3 Times Daily; 237; 60; Before and after each bolus; Protein; 1 packet of Prosource daily. 30 mL free water flush before and after administration  Current Tube Feeding (TF) Orders:  Goal TF & Flush Orders Provides: Bolus feeds of Jevity 1.5 x5 cans daily to provide 1185 mL TV, 1775 kcal, 76 g protein, and 900 mL TV. +60 mL free water flush before and after administration. +1 Packet of Prosource daily for additional 80 kcal and 20 g protein.    Estimated Daily Nutrient Needs:  Energy Requirements Based On: Kcal/kg (25-30)  Weight Used for Energy Requirements: Ideal  Energy (kcal/day): 9253-0863 kcal  Weight Used for Protein Requirements: Ideal (1.2-1.5 g/kg)  Protein (g/day):  g  Method Used for Fluid Requirements: 1 ml/kcal  Fluid (ml/day): 2363-2821 mL    Linda Heredia, MS, RD, LD  Contact: [phone]

## 2024-01-16 NOTE — PROGRESS NOTES
Infectious Disease Follow up Notes    CC :  FUO, lethargy     Antibiotics:  Zosyn 3.375 q8 s 1/9/24    Admit Date:   1/14/2024  Hospital Day: 3    Subjective:   He remains AF   No acute changes     Objective:     Patient Vitals for the past 8 hrs:   BP Temp Temp src Pulse Resp SpO2   01/16/24 1706 121/71 98 °F (36.7 °C) Oral 95 -- 93 %   01/16/24 1517 -- -- -- -- -- (!) 88 %   01/16/24 1515 120/66 98.8 °F (37.1 °C) Oral (!) 105 21 90 %   01/16/24 1300 115/63 98.2 °F (36.8 °C) Axillary 100 20 93 %         EXAM:  Resting comfortably  Exposed skin without rash   Abd soft, flat, NT   PIV in place           Scheduled Meds:   dilTIAZem  10 mg IntraVENous Once    amiodarone  400 mg Oral BID    pantoprazole  40 mg IntraVENous Daily    piperacillin-tazobactam  3,375 mg IntraVENous Q8H    modafinil  300 mg Oral Daily    donepezil  5 mg Oral Nightly    dilTIAZem  60 mg Oral 4 times per day    sodium chloride flush  5-40 mL IntraVENous 2 times per day    aspirin  81 mg Oral Daily    atorvastatin  40 mg Oral Nightly    lisinopril  2.5 mg Oral Daily    metoprolol tartrate  25 mg Oral BID    levETIRAcetam  750 mg PEG Tube BID       Continuous Infusions:   dilTIAZem 100 mg in sodium chloride 0.9 % 100 mL infusion ([facility])      sodium chloride Stopped (01/15/24 1335)          Data Review:    Lab Results   Component Value Date    WBC 6.6 01/16/2024    HGB 9.1 (L) 01/16/2024    HCT 28.4 (L) 01/16/2024    .3 (H) 01/16/2024     01/16/2024     Lab Results   Component Value Date    CREATININE <0.5 (L) 01/16/2024    BUN 12 01/16/2024     01/16/2024    K 4.7 01/16/2024    CL 99 01/16/2024    CO2 35 (H) 01/16/2024       Hepatic Function Panel:   Lab Results   Component Value Date/Time    ALKPHOS 85 01/03/2024 06:48 AM    ALT 26 01/03/2024 06:48 AM    AST 27 01/03/2024 06:48 AM    PROT 6.2 01/03/2024 06:48 AM    BILITOT <0.2 01/03/2024

## 2024-01-16 NOTE — PROGRESS NOTES
4 Eyes Skin Assessment     The patient is being assess for  Shift Handoff    I agree that 2 RN's have performed a thorough Head to Toe Skin Assessment on the patient. ALL assessment sites listed below have been assessed.       Areas assessed by both nurses: FT  [x]   Head, Face, and Ears   [x]   Shoulders, Back, and Chest  [x]   Arms, Elbows, and Hands   [x]   Coccyx, Sacrum, and IschIum  [x]   Legs, Feet, and Heels        Does the Patient have Skin Breakdown?  Yes LDA WOUND CARE was Initiated documentation include the Ilda-wound, Wound Assessment, Measurements, Dressing Treatment, Drainage, and Color\",         Devon Prevention initiated:  Yes   Wound Care Orders initiated:  Yes      WOC nurse consulted for Pressure Injury (Stage 3,4, Unstageable, DTI, NWPT, and Complex wounds), New and Established Ostomies:  Yes      Nurse 1 eSignature: Electronically signed by Eleuterio Shrestha RN on 1/16/24 at 7:51 AM EST    **SHARE this note so that the co-signing nurse is able to place an eSignature**    Nurse 2 eSignature: Electronically signed by Austyn Obregon RN on 1/16/24 at 7:54 AM EST

## 2024-01-16 NOTE — PROGRESS NOTES
.4 Eyes Skin Assessment     NAME:  sIac Lemus  YOB: 1950  MEDICAL RECORD NUMBER:  [id number]    The patient is being assessed for  Shift Handoff    I agree that at least one RN has performed a thorough Head to Toe Skin Assessment on the patient. ALL assessment sites listed below have been assessed.      Areas assessed by both nurses: Rick    Head, Face, Ears, Shoulders, Back, Chest, Arms, Elbows, Hands, Sacrum. Buttock, Coccyx, Ischium, Legs. Feet and Heels, and Under Medical Devices         Does the Patient have a Wound? Yes wound(s) were present on assessment. LDA wound assessment was Initiated and completed by RN       Devon Prevention initiated by RN: Yes  Wound Care Orders initiated by RN: Yes    Pressure Injury (Stage 3,4, Unstageable, DTI, NWPT, and Complex wounds) if present, place Wound referral order by RN under : Yes    New Ostomies, if present place, Ostomy referral order under : NO     Nurse 1 eSignature: Electronically signed by Linda Wallace RN on 1/16/24 at 5:19 PM EST    **SHARE this note so that the co-signing nurse can place an eSignature**    Nurse 2 eSignature: Electronically signed by Colette Mcnair RN on 1/16/24 at 5:55 PM EST

## 2024-01-16 NOTE — PROGRESS NOTES
[facility] GENERAL SURGERY    Surgery Progress Note           POD # 1    PATIENT NAME: Isac Lemus     TODAY'S DATE: 1/16/2024    INTERVAL HISTORY:    Pt  resting comfortably, no issues with sacral wound overnight.     OBJECTIVE:   VITALS:  /65   Pulse 94   Temp 98 °F (36.7 °C) (Oral)   Resp 21   Ht 1.702 m (5' 7\")   Wt 99.3 kg (218 lb 14.7 oz)   SpO2 96%   BMI 34.29 kg/m²     INTAKE/OUTPUT:    I/O last 3 completed shifts:  In: 1719.1 [I.V.:20.1; Other:90; NG/GT:1518; IV Piggyback:91.1]  Out: 2200 [Urine:2200]  I/O this shift:  In: 427 [I.V.:10; NG/GT:417]  Out: -               CONSTITUTIONAL:  fatigued   LUNGS:       INCISION: clean, open, mild/moderate residual fibrinous material across wound base with visible underlying granulation    Data:  CBC:   Recent Labs     01/15/24  0623 01/16/24  0537   WBC 7.3 6.6   HGB 9.7* 9.1*   HCT 30.6* 28.4*    380     BMP:    Recent Labs     01/15/24  0623 01/16/24  0537    140   K 4.2 4.7    99   CO2 33* 35*   BUN 13 12   CREATININE <0.5* <0.5*   GLUCOSE 122* 129*     Hepatic: No results for input(s): \"AST\", \"ALT\", \"ALB\", \"BILITOT\", \"ALKPHOS\" in the last 72 hours.  Mag:    No results for input(s): \"MG\" in the last 72 hours.   Phos:     Recent Labs     01/16/24  0537   PHOS 3.4      INR: No results for input(s): \"INR\" in the last 72 hours.      Radiology Review:       ASSESSMENT AND PLAN:  73 y.o. male status post excisional debridement, sacral decubitus   - will cont wet/dry dressing today; reassess tomorrow - if it looks , will place Vac.  If still w/ devitalized tissue, may need repeat I&D   - reviewed case plan, status w/ patient and family         Electronically signed by GEORGINA WOLF MD

## 2024-01-16 NOTE — PROGRESS NOTES
01/16/24 0423   NIV Type   NIV Started/Stopped On   Equipment Type v60   Mode CPAP   Mask Type Full face mask   Mask Size Large   Assessment   Skin Assessment Clean, dry, & intact   Breath Sounds   Breath Sounds Bilateral Diminished   Right Upper Lobe Diminished   Right Middle Lobe Diminished   Right Lower Lobe Diminished   Left Upper Lobe Diminished   Left Lower Lobe Diminished   Settings/Measurements   PIP Observed 13 cm H20   Vt (Set, mL) 12 mL   Vt (Measured) 337 mL   FiO2  45 %   Minute Volume (L/min) 8.3 Liters   Mask Leak (lpm) 4 lpm   Patient's Home Machine No   Alarm Settings   Alarms On Y   Low Pressure (cmH2O) 6 cmH2O   High Pressure (cmH2O) 30 cmH2O   Apnea (secs) 20 secs   RR Low (bpm) 6   RR High (bpm) 40 br/min

## 2024-01-16 NOTE — PROGRESS NOTES
[facility] Nurse  Follow-up Progress Note       NAME:  Isac Lemus  MEDICAL RECORD NUMBER:  [id number]  AGE:  73 y.o.   GENDER:  male  :  1950  TODAY'S DATE:  2024    Subjective: Family, spouse and daughter at bedside.  Patient; O.K.   Wound Identification:  Wound Type: pressure  Contributing Factors: chronic pressure, decreased mobility, shear force, and obesity        Patient Goal of Care:  [x] Wound Healing  [] Odor Control  [] Palliative Care  [x] Pain Control   [] Other:     Objective:lying toward right wedge under left    /65   Pulse 94   Temp 98 °F (36.7 °C) (Oral)   Resp 21   Ht 1.702 m (5' 7\")   Wt 99.3 kg (218 lb 14.7 oz)   SpO2 96%   BMI 34.29 kg/m²   Devon Risk Score: Devon Scale Score: 9  Assessment: sacrum debrided yesterday, dark eschar moist present.  Anterior nose dark pressure area.   Measurements:  Incision 10/20/14 Sternum Anterior (Active)   Wound 23 Buttocks DTI to bilateral buttocks (Active)   Wound Image   24   Wound Etiology Stage 4 24   Dressing Status New dressing applied 24   Wound Cleansed Cleansed with saline 24   Dressing/Treatment Other (comment) 24   Offloading for Diabetic Foot Ulcers Offloading ordered 24   Dressing Change Due 24   Wound Length (cm) 9.5 cm 24   Wound Width (cm) 5.5 cm 24   Wound Depth (cm) 3.8 cm 24   Wound Surface Area (cm^2) 52.25 cm^2 24   Change in Wound Size % (l*w) 6.7 24   Wound Volume (cm^3) 198.55 cm^3 24 09   Distance Tunneling (cm) 3.2 cm 24 09   Tunneling Position ___ O'Clock right 24   Undermining Starts ___ O'Clock 1:00 24   Undermining Ends___ O'Clock 5:00 24   Undermining Maxium Distance (cm) 0 24 1617   Wound Assessment Exposed structure muscle;Eschar moist 24

## 2024-01-16 NOTE — CARE COORDINATION
Chart reviewed. Spoke with patients wife bedside and dtr Eunice via phone. Discussed current status and plans. Aware plan for wound vac placement tomorrow pending wound status. May need additional debridement. They are anticipating at d/c his return to ARU. Aware would need therapy input to support the need and insurance approval. If cannot meet that level of care, they would want to go to [facility]. Stated is out of network but patients in network and out of network benefits are the same. (Facility will verify) Aware would depend on acceptance, bed availability and insurance approval. Final choice would be to return home with [facility], [facility] was following. Would need DME delivery of hospital bed, and cathryn. Ehsan Oswald RN

## 2024-01-16 NOTE — PROGRESS NOTES
Hospital Medicine Progress Note      Date of Admission: 1/14/2024  Hospital Day: 3    Chief Admission Complaint:  Decubitus ulcer    Subjective:  no new c/o.    Presenting Admission History:         \"73 y.o. male w/ hx TBI w/ bleed who presented [facility] w/ decubitus ulcer for surgical debridement - done 15 Arturo w/out complications - discussed face to face w/ General Surgery, Dr. Stephenson 15 Arturo.      Patient is non-verbal with family at bedside\"        Assessment/Plan:      Current Principal Problem:  Decubitus skin ulcer        Traumatic Brain Injury  - with bilateral SDH, SAH, skull base fracture, 4 mm MLS, right traverse/sigmoid sinus thrombosis   - no surgical intervention  - serial scans during acute stay stable  - Keppra 750 mg BID, EEG showing rate epileptiform discharges during acute stay. OSH recommending 30 days of ppx. Consulted Neurology, due to abnormal findings on OSH EEG will continue Keppra for now.   - Aricept and provigil  - PT, OT, ST     Pulmonary insufficiency  - wean oxygen for goal SpO2>88%  - ABG showing CO2 retention  - XR chest negative for acute process  - Pulmonology following, suspected undiagnosed sleep apnea. Was on BiPAP intermittently, now on CPAP.      Intermittent Fevers  - infectious workup as OSH unremarkable  - prior infectious workups negative  - now with recurrence of fevers  - ID following, concern for infection of sacral wound. Started on Zosyn. Wound care following. General Surgery consulted, appreciate assistance, planning on debridement of sacral wound on Monday. Medicine consulted to have patient transfer back to acute care prior to OR on Monday.      Afib - chronic paroxysmal of unspecified and clinically unable to determine etiology - w/ episodic RVR.  Normally rate controlled on Amio/BBlocker and CCBlocker - continued.  Anticoagulated at baseline on home Eliquis due to secondary hypercoaguable state due to Afib - held - resume when  --  3.4     No results for input(s): \"PROBNP\", \"TROPHS\" in the last 72 hours.  No results for input(s): \"LABA1C\" in the last 72 hours.  No results for input(s): \"AST\", \"ALT\", \"BILIDIR\", \"BILITOT\", \"ALKPHOS\" in the last 72 hours.  No results for input(s): \"INR\", \"LACTA\", \"TSH\" in the last 72 hours.    Urine Cultures: No results found for: \"LABURIN\"  Blood Cultures:   Lab Results   Component Value Date/Time    BC No Growth after 4 days of incubation. 12/29/2023 08:54 AM     Lab Results   Component Value Date/Time    BLOODCULT2 No Growth after 4 days of incubation. 01/01/2024 07:23 PM     Organism: No results found for: \"ORG\"      Kenny Ernandez MD

## 2024-01-16 NOTE — PROGRESS NOTES
Received patient asleep in bed, noted with eye opening when called.  Vital signs taken and recorded. Daughter at bedside.  Reinserted new line on his Rt forearm with intermittent antibiotic infusions.  Currently on 2.5 L via NC. On CPAP at night.  Abdomen soft  and non distended. Noted with clamped PEG tube - site MALA.   With Bolus tube feeding of Jevity and 60 cc flush before /after 5x daily.  S/P surgical debridement on his sacral area. Dressing CDI.  Skin/Wound assessment done. Please see wound LDA. Turning every 2 hours.  On offloading boots; noted with BLE edema; on male wick.  Due medications given as scheduled.  Bed wheels locked; Call light within reached; Raised siderails x2; on Fall precaution. Denies any need at the moment.

## 2024-01-16 NOTE — PROGRESS NOTES
Pt deterioration score is 67 due to elevated heart rate and respirations, and tele monitor saying aflutter. Dr. Washburn informed no new orders

## 2024-01-16 NOTE — PROGRESS NOTES
Pt a/o. VSS. Shift assessment updated and documented. Took over care of patient at 2300 hrs  ,vitals stable , assisted to reposition , hygiene care offered , dressing reinforced , diaper and Malewick changes ,CPAP on  and off loading boots , caregiver at bedside.

## 2024-01-16 NOTE — PLAN OF CARE
Problem: Chronic Conditions and Co-morbidities  Goal: Patient's chronic conditions and co-morbidity symptoms are monitored and maintained or improved  Outcome: Progressing  Flowsheets (Taken 1/15/2024 1951)  Care Plan - Patient's Chronic Conditions and Co-Morbidity Symptoms are Monitored and Maintained or Improved:   Monitor and assess patient's chronic conditions and comorbid symptoms for stability, deterioration, or improvement   Collaborate with multidisciplinary team to address chronic and comorbid conditions and prevent exacerbation or deterioration   Update acute care plan with appropriate goals if chronic or comorbid symptoms are exacerbated and prevent overall improvement and discharge     Problem: Safety - Adult  Goal: Free from fall injury  Outcome: Progressing  Flowsheets (Taken 1/15/2024 1951)  Free From Fall Injury:   Instruct family/caregiver on patient safety   Based on caregiver fall risk screen, instruct family/caregiver to ask for assistance with transferring infant if caregiver noted to have fall risk factors     Problem: Pain  Goal: Verbalizes/displays adequate comfort level or baseline comfort level  Outcome: Progressing  Flowsheets (Taken 1/15/2024 1951)  Verbalizes/displays adequate comfort level or baseline comfort level:   Assess pain using appropriate pain scale   Administer analgesics based on type and severity of pain and evaluate response     Problem: ABCDS Injury Assessment  Goal: Absence of physical injury  Outcome: Progressing  Flowsheets (Taken 1/15/2024 1951)  Absence of Physical Injury: Implement safety measures based on patient assessment     Problem: Skin/Tissue Integrity  Goal: Absence of new skin breakdown  Description: 1.  Monitor for areas of redness and/or skin breakdown  2.  Assess vascular access sites hourly  3.  Every 4-6 hours minimum:  Change oxygen saturation probe site  4.  Every 4-6 hours:  If on nasal continuous positive airway pressure, respiratory therapy

## 2024-01-16 NOTE — PROGRESS NOTES
Physical Therapy  Orders received and chart reviewed. Attempted to see pt for PT evaluation. RN reports pt about to transfer to C4 d/t needing cardizem drip. Will hold PT eval at this time and re-attempt as schedule permits and pt is able to participate.  Thanks,  Roshni Flores PT, DPT

## 2024-01-17 PROBLEM — I48.92 ATRIAL FLUTTER (HCC): Status: ACTIVE | Noted: 2024-01-05

## 2024-01-17 LAB
ALBUMIN SERPL-MCNC: 2.2 G/DL (ref 3.4–5)
ANION GAP SERPL CALCULATED.3IONS-SCNC: 4 MMOL/L (ref 3–16)
BUN SERPL-MCNC: 11 MG/DL (ref 7–20)
CALCIUM SERPL-MCNC: 8.8 MG/DL (ref 8.3–10.6)
CHLORIDE SERPL-SCNC: 101 MMOL/L (ref 99–110)
CO2 SERPL-SCNC: 35 MMOL/L (ref 21–32)
CREAT SERPL-MCNC: <0.5 MG/DL (ref 0.8–1.3)
DEPRECATED RDW RBC AUTO: 17.8 % (ref 12.4–15.4)
EKG ATRIAL RATE: 99 BPM
EKG DIAGNOSIS: NORMAL
EKG P-R INTERVAL: 190 MS
EKG Q-T INTERVAL: 386 MS
EKG QRS DURATION: 154 MS
EKG QTC CALCULATION (BAZETT): 495 MS
EKG R AXIS: -43 DEGREES
EKG T AXIS: -28 DEGREES
EKG VENTRICULAR RATE: 99 BPM
GFR SERPLBLD CREATININE-BSD FMLA CKD-EPI: >60 ML/MIN/{1.73_M2}
GLUCOSE SERPL-MCNC: 154 MG/DL (ref 70–99)
HCT VFR BLD AUTO: 28.8 % (ref 40.5–52.5)
HGB BLD-MCNC: 9 G/DL (ref 13.5–17.5)
MCH RBC QN AUTO: 31.8 PG (ref 26–34)
MCHC RBC AUTO-ENTMCNC: 31.4 G/DL (ref 31–36)
MCV RBC AUTO: 101.2 FL (ref 80–100)
PHOSPHATE SERPL-MCNC: 3.1 MG/DL (ref 2.5–4.9)
PLATELET # BLD AUTO: 332 K/UL (ref 135–450)
PMV BLD AUTO: 7.4 FL (ref 5–10.5)
POTASSIUM SERPL-SCNC: 4.7 MMOL/L (ref 3.5–5.1)
RBC # BLD AUTO: 2.85 M/UL (ref 4.2–5.9)
SODIUM SERPL-SCNC: 140 MMOL/L (ref 136–145)
WBC # BLD AUTO: 6.7 K/UL (ref 4–11)

## 2024-01-17 PROCEDURE — 6370000000 HC RX 637 (ALT 250 FOR IP): Performed by: SURGERY

## 2024-01-17 PROCEDURE — 6360000002 HC RX W HCPCS: Performed by: INTERNAL MEDICINE

## 2024-01-17 PROCEDURE — 2700000000 HC OXYGEN THERAPY PER DAY

## 2024-01-17 PROCEDURE — 94761 N-INVAS EAR/PLS OXIMETRY MLT: CPT

## 2024-01-17 PROCEDURE — 93010 ELECTROCARDIOGRAM REPORT: CPT | Performed by: INTERNAL MEDICINE

## 2024-01-17 PROCEDURE — 99024 POSTOP FOLLOW-UP VISIT: CPT | Performed by: SURGERY

## 2024-01-17 PROCEDURE — 2500000003 HC RX 250 WO HCPCS: Performed by: NURSE PRACTITIONER

## 2024-01-17 PROCEDURE — 2580000003 HC RX 258: Performed by: INTERNAL MEDICINE

## 2024-01-17 PROCEDURE — 80069 RENAL FUNCTION PANEL: CPT

## 2024-01-17 PROCEDURE — 2500000003 HC RX 250 WO HCPCS: Performed by: INTERNAL MEDICINE

## 2024-01-17 PROCEDURE — 6370000000 HC RX 637 (ALT 250 FOR IP): Performed by: NURSE PRACTITIONER

## 2024-01-17 PROCEDURE — C9113 INJ PANTOPRAZOLE SODIUM, VIA: HCPCS | Performed by: INTERNAL MEDICINE

## 2024-01-17 PROCEDURE — 92526 ORAL FUNCTION THERAPY: CPT

## 2024-01-17 PROCEDURE — 36415 COLL VENOUS BLD VENIPUNCTURE: CPT

## 2024-01-17 PROCEDURE — 93005 ELECTROCARDIOGRAM TRACING: CPT | Performed by: NURSE PRACTITIONER

## 2024-01-17 PROCEDURE — 85027 COMPLETE CBC AUTOMATED: CPT

## 2024-01-17 PROCEDURE — 97110 THERAPEUTIC EXERCISES: CPT

## 2024-01-17 PROCEDURE — 2580000003 HC RX 258: Performed by: SURGERY

## 2024-01-17 PROCEDURE — 99232 SBSQ HOSP IP/OBS MODERATE 35: CPT | Performed by: NURSE PRACTITIONER

## 2024-01-17 PROCEDURE — 94660 CPAP INITIATION&MGMT: CPT

## 2024-01-17 PROCEDURE — 92610 EVALUATE SWALLOWING FUNCTION: CPT

## 2024-01-17 PROCEDURE — 99232 SBSQ HOSP IP/OBS MODERATE 35: CPT | Performed by: INTERNAL MEDICINE

## 2024-01-17 PROCEDURE — 97163 PT EVAL HIGH COMPLEX 45 MIN: CPT

## 2024-01-17 PROCEDURE — 6370000000 HC RX 637 (ALT 250 FOR IP): Performed by: INTERNAL MEDICINE

## 2024-01-17 PROCEDURE — 97530 THERAPEUTIC ACTIVITIES: CPT

## 2024-01-17 PROCEDURE — 2060000000 HC ICU INTERMEDIATE R&B

## 2024-01-17 PROCEDURE — 97167 OT EVAL HIGH COMPLEX 60 MIN: CPT

## 2024-01-17 RX ORDER — DILTIAZEM HYDROCHLORIDE 5 MG/ML
10 INJECTION INTRAVENOUS ONCE
Status: COMPLETED | OUTPATIENT
Start: 2024-01-17 | End: 2024-01-17

## 2024-01-17 RX ORDER — GUAIFENESIN 200 MG/10ML
200 LIQUID ORAL EVERY 4 HOURS PRN
Status: DISCONTINUED | OUTPATIENT
Start: 2024-01-17 | End: 2024-02-01 | Stop reason: HOSPADM

## 2024-01-17 RX ADMIN — SODIUM CHLORIDE 5 MG/HR: 900 INJECTION, SOLUTION INTRAVENOUS at 11:03

## 2024-01-17 RX ADMIN — PANTOPRAZOLE SODIUM 40 MG: 40 INJECTION, POWDER, FOR SOLUTION INTRAVENOUS at 09:06

## 2024-01-17 RX ADMIN — SODIUM CHLORIDE, PRESERVATIVE FREE 10 ML: 5 INJECTION INTRAVENOUS at 09:10

## 2024-01-17 RX ADMIN — PIPERACILLIN AND TAZOBACTAM 3375 MG: 3; .375 INJECTION, POWDER, FOR SOLUTION INTRAVENOUS at 05:21

## 2024-01-17 RX ADMIN — DILTIAZEM HYDROCHLORIDE 60 MG: 60 TABLET ORAL at 17:13

## 2024-01-17 RX ADMIN — DILTIAZEM HYDROCHLORIDE 60 MG: 60 TABLET ORAL at 23:34

## 2024-01-17 RX ADMIN — PIPERACILLIN AND TAZOBACTAM 3375 MG: 3; .375 INJECTION, POWDER, FOR SOLUTION INTRAVENOUS at 13:43

## 2024-01-17 RX ADMIN — ASPIRIN 81 MG: 81 TABLET, COATED ORAL at 09:06

## 2024-01-17 RX ADMIN — METOPROLOL TARTRATE 25 MG: 25 TABLET, FILM COATED ORAL at 20:57

## 2024-01-17 RX ADMIN — METOPROLOL TARTRATE 25 MG: 25 TABLET, FILM COATED ORAL at 09:06

## 2024-01-17 RX ADMIN — AMIODARONE HYDROCHLORIDE 400 MG: 200 TABLET ORAL at 09:06

## 2024-01-17 RX ADMIN — DILTIAZEM HYDROCHLORIDE 10 MG: 5 INJECTION INTRAVENOUS at 04:28

## 2024-01-17 RX ADMIN — ATORVASTATIN CALCIUM 40 MG: 40 TABLET, FILM COATED ORAL at 20:57

## 2024-01-17 RX ADMIN — DONEPEZIL HYDROCHLORIDE 5 MG: 5 TABLET, FILM COATED ORAL at 20:57

## 2024-01-17 RX ADMIN — APIXABAN 5 MG: 5 TABLET, FILM COATED ORAL at 17:00

## 2024-01-17 RX ADMIN — TRAMADOL HYDROCHLORIDE 50 MG: 50 TABLET ORAL at 13:36

## 2024-01-17 RX ADMIN — AMIODARONE HYDROCHLORIDE 400 MG: 200 TABLET ORAL at 20:57

## 2024-01-17 RX ADMIN — GUAIFENESIN 200 MG: 200 SOLUTION ORAL at 17:00

## 2024-01-17 RX ADMIN — LISINOPRIL 2.5 MG: 2.5 TABLET ORAL at 09:06

## 2024-01-17 RX ADMIN — LEVETIRACETAM 750 MG: 100 SOLUTION ORAL at 09:25

## 2024-01-17 RX ADMIN — LEVETIRACETAM 750 MG: 100 SOLUTION ORAL at 20:57

## 2024-01-17 RX ADMIN — DILTIAZEM HYDROCHLORIDE 60 MG: 60 TABLET ORAL at 13:36

## 2024-01-17 RX ADMIN — SODIUM CHLORIDE, PRESERVATIVE FREE 10 ML: 5 INJECTION INTRAVENOUS at 20:57

## 2024-01-17 RX ADMIN — PIPERACILLIN AND TAZOBACTAM 3375 MG: 3; .375 INJECTION, POWDER, FOR SOLUTION INTRAVENOUS at 21:34

## 2024-01-17 ASSESSMENT — PAIN SCALES - WONG BAKER

## 2024-01-17 ASSESSMENT — PAIN SCALES - GENERAL: PAINLEVEL_OUTOF10: 0

## 2024-01-17 NOTE — PLAN OF CARE
SLP completed evaluation. Please refer to notes in EMR.      Thank you,   Lindsay Mcknight M.A. CCC-SLP   Speech-Language Pathologist

## 2024-01-17 NOTE — PROGRESS NOTES
Infectious Disease Follow up Notes    CC :  FUO, lethargy     Antibiotics:  Zosyn 3.375 q8 s 1/9/24    Admit Date:   1/14/2024  Hospital Day: 4    Subjective:   He remains AF   On cardizem infusion for rapid afib   Currently on 5L NC.  Weak and ineffective cough noted.  Suctioning per RN, improved saturations.  Currently 97% 5L.    No BM in few days.      Objective:     Patient Vitals for the past 8 hrs:   BP Temp Temp src Pulse Resp SpO2 Weight   01/17/24 1100 -- -- -- 73 -- -- --   01/17/24 1040 113/70 -- -- 64 -- 95 % --   01/17/24 1030 -- -- -- 80 -- -- --   01/17/24 1010 110/73 -- -- (!) 102 -- 93 % --   01/17/24 1000 -- -- -- (!) 112 -- 93 % --   01/17/24 0940 125/75 97.9 °F (36.6 °C) -- (!) 111 18 90 % --   01/17/24 0930 -- -- -- (!) 106 -- 93 % --   01/17/24 0909 121/79 98 °F (36.7 °C) -- -- -- -- --   01/17/24 0615 -- -- -- -- -- -- 100.1 kg (220 lb 10.9 oz)   01/17/24 0402 -- -- -- -- 20 97 % --   01/17/24 0349 127/81 98.1 °F (36.7 °C) Oral (!) 110 21 98 % --         EXAM:  Resting comfortably  Exposed skin without rash   Abd soft, flat, NT.  BS present   No pitting edema LE   PIV x2 in place           Scheduled Meds:   amiodarone  400 mg Oral BID    pantoprazole  40 mg IntraVENous Daily    modafinil  300 mg Oral Daily    donepezil  5 mg Oral Nightly    [Held by provider] dilTIAZem  60 mg Oral 4 times per day    sodium chloride flush  5-40 mL IntraVENous 2 times per day    aspirin  81 mg Oral Daily    atorvastatin  40 mg Oral Nightly    lisinopril  2.5 mg Oral Daily    metoprolol tartrate  25 mg Oral BID    levETIRAcetam  750 mg PEG Tube BID       Continuous Infusions:   dilTIAZem 100 mg in sodium chloride 0.9 % 100 mL infusion (ADD[facility]) 5 mg/hr (01/17/24 1103)    sodium chloride Stopped (01/15/24 1335)          Data Review:    Lab Results   Component Value Date    WBC 6.7 01/17/2024    HGB 9.0 (L) 01/17/2024    HCT 28.8

## 2024-01-17 NOTE — PROGRESS NOTES
Hospital Medicine Progress Note      Date of Admission: 1/14/2024  Hospital Day: 4    Chief Admission Complaint:  Decubitus ulcer    Subjective:  no new c/o.    Presenting Admission History:         \"73 y.o. male w/ hx TBI w/ bleed who presented to [facility] w/ decubitus ulcer for surgical debridement - done 15 Arturo w/out complications - discussed face to face w/ General Surgery, Dr. Stephenson 15 Arturo.      Patient is non-verbal with family at bedside\"        Assessment/Plan:      Current Principal Problem:  Decubitus skin ulcer        Traumatic Brain Injury  - with bilateral SDH, SAH, skull base fracture, 4 mm MLS, right traverse/sigmoid sinus thrombosis   - no surgical intervention  - serial scans during acute stay stable  - Keppra 750 mg BID, EEG showing rate epileptiform discharges during acute stay. OSH recommending 30 days of ppx. Consulted Neurology, due to abnormal findings on OSH EEG will continue Keppra for now.   - Aricept and provigil  - PT, OT, ST     Pulmonary insufficiency  - wean oxygen for goal SpO2>88%  - ABG showing CO2 retention  - XR chest negative for acute process  - Pulmonology following, suspected undiagnosed sleep apnea. Was on BiPAP intermittently, now on CPAP.      Intermittent Fevers  - infectious workup as OSH unremarkable  - prior infectious workups negative  - now with recurrence of fevers  - ID following, concern for infection of sacral wound. Started on Zosyn. Wound care following. General Surgery consulted, appreciate assistance, planning on debridement of sacral wound on Monday. Medicine consulted to have patient transfer back to acute care prior to OR on Monday.      Afib - chronic paroxysmal of unspecified and clinically unable to determine etiology - w/ episodic RVR.  Normally rate controlled on Amio/BBlocker and CCBlocker - continued.  Anticoagulated at baseline on home Eliquis due to secondary hypercoaguable state due to Afib - held - resume when

## 2024-01-17 NOTE — PROGRESS NOTES
01/17/24 0402   NIV Type   Equipment Type v60   Mode CPAP   Mask Type Full face mask   Mask Size Large   Assessment   Respirations 20   SpO2 97 %   Comfort Level Good   Using Accessory Muscles No   Mask Compliance Good   Skin Protection for O2 Device Yes   Orientation Middle   Location Nose   Settings/Measurements   PIP Observed 13 cm H20   CPAP/EPAP 12 cmH2O   Vt (Measured) 433 mL   FiO2  45 %   Minute Volume (L/min) 8.7 Liters   Mask Leak (lpm) 3 lpm   Patient's Home Machine No   Alarm Settings   Alarms On Y   Low Pressure (cmH2O) 6 cmH2O   High Pressure (cmH2O) 30 cmH2O   Apnea (secs) 20 secs   RR Low (bpm) 6   RR High (bpm) 40 br/min

## 2024-01-17 NOTE — PROGRESS NOTES
[facility] GENERAL SURGERY    Surgery Progress Note           POD # 2    PATIENT NAME: Isac Lemus     TODAY'S DATE: 1/17/2024    INTERVAL HISTORY:    Pt  resting comfortably, beginning to converse more.  No reported events regarding the sacral wound..     OBJECTIVE:   VITALS:  /61   Pulse 89   Temp 97.9 °F (36.6 °C)   Resp 18   Ht 1.702 m (5' 7\")   Wt 100.1 kg (220 lb 10.9 oz)   SpO2 95%   BMI 34.56 kg/m²     INTAKE/OUTPUT:    I/O last 3 completed shifts:  In: 1502.9 [P.O.:180; I.V.:20; NG/GT:1251; IV Piggyback:51.9]  Out: 1625 [Urine:1625]  I/O this shift:  In: 330 [P.O.:240; NG/GT:90]  Out: 300 [Urine:300]              CONSTITUTIONAL:  fatigued and    LUNGS:     ABDOMEN:     INCISION: sacral dressing in place    Data:  CBC:   Recent Labs     01/15/24  0623 01/16/24  0537 01/17/24 0441   WBC 7.3 6.6 6.7   HGB 9.7* 9.1* 9.0*   HCT 30.6* 28.4* 28.8*    380 332     BMP:    Recent Labs     01/15/24  0623 01/16/24  0537 01/17/24 0441    140 140   K 4.2 4.7 4.7    99 101   CO2 33* 35* 35*   BUN 13 12 11   CREATININE <0.5* <0.5* <0.5*   GLUCOSE 122* 129* 154*     Hepatic: No results for input(s): \"AST\", \"ALT\", \"ALB\", \"BILITOT\", \"ALKPHOS\" in the last 72 hours.  Mag:    No results for input(s): \"MG\" in the last 72 hours.   Phos:     Recent Labs     01/16/24  0537 01/17/24 0441   PHOS 3.4 3.1      INR: No results for input(s): \"INR\" in the last 72 hours.      Radiology Review:       ASSESSMENT AND PLAN:  73 y.o. male status post excisional debridement, sacral decubitus     - will cont wet/dry dressing today; reassess tomorrow - if it looks , will place Vac. If still w/ devitalized tissue, may need repeat I&D    - discussed above goals, plans with patient and wife.  They appear to understand.      Electronically signed by GEORGINA WOLF MD

## 2024-01-17 NOTE — PROGRESS NOTES
[facility]     Electrophysiology                                     Progress Note    Admission date:  2024    Reason for follow up visit: AF/AFL    HPI/CC: Isac Lemus was admitted on 2023 to the ARU from  after traumatic fall resulting in skull fracture and SAH/SDH. Complicated hospital course. EP consulted when EKG showed rapid AFL. An cardiac event monitor was placed on 2024 but this was removed 2024 at the family's request due to skin irritation.  On 2024, patient was admitted to the hospital for surgical debridement of his sacral wound.  Postoperatively he had RVR and IV diltiazem was started.  Rhythm is atrial flutter with heart rates in the 80s and 90s.    Subjective: Unable to assess given mental status. Family at bedside.     Vitals:  Blood pressure 127/81, pulse (!) 110, temperature 98.1 °F (36.7 °C), temperature source Oral, resp. rate 20, height 1.702 m (5' 7\"), weight 100.1 kg (220 lb 10.9 oz), SpO2 97 %.  Temp  Av.3 °F (36.8 °C)  Min: 97.9 °F (36.6 °C)  Max: 98.8 °F (37.1 °C)  Pulse  Av  Min: 93  Max: 110  BP  Min: 115/63  Max: 137/80  SpO2  Av.1 %  Min: 88 %  Max: 98 %  FiO2   Av %  Min: 45 %  Max: 45 %    24 hour I/O    Intake/Output Summary (Last 24 hours) at 2024 0915  Last data filed at 2024 2316  Gross per 24 hour   Intake 727 ml   Output 325 ml   Net 402 ml     Current Facility-Administered Medications   Medication Dose Route Frequency Provider Last Rate Last Admin    dilTIAZem 100 mg in sodium chloride 0.9 % 100 mL infusion ([facility])  2.5-15 mg/hr IntraVENous Continuous Kenny Ernandez MD 5 mL/hr at 24 1754 5 mg/hr at 24 1754    traMADol (ULTRAM) tablet 50 mg  50 mg Oral Q6H PRN Casey Stephenson MD   50 mg at 24 2241    amiodarone (CORDARONE) tablet 400 mg  400 mg Oral BID Kenny Ernandez MD   400 mg at 24 0906    pantoprazole (PROTONIX) injection 40 mg  40 mg IntraVENous Daily Oma

## 2024-01-17 NOTE — CARE COORDINATION
Patient transferred to C4. Possible wound vac placement today pending General Surgeon's rounds and assessment this AM. May need further debridement. Wound vac form for [employer] placed on chart for completion if needed. [employer] following for tube feedings. Has PEG. ID following but intends to transition to PO at D/C per note. Patient and family would like to return to ARU if appropriate and insurance will approve. Their second option would be skilled Care at [facility]. Third would be home with [facility] and [employer] and DME ie hospital bed and cathryn lift.

## 2024-01-17 NOTE — PROGRESS NOTES
Pt has been coughing this morning, very weak, performed mouth care and oral suctioning. Family said he has been coughing since med yesterday evening after dinner. Rhonchi upper and diminished bases. SPO2 90% on 5L. SLP order and told family to hold off on feeding him this morning     PS Dr. Ernandez

## 2024-01-17 NOTE — PROGRESS NOTES
01/17/24 0028   NIV Type   $NIV $Daily Charge   NIV Started/Stopped On   Equipment Type v60   Mode CPAP   Mask Type Full face mask   Mask Size Large   Assessment   Respirations 22   SpO2 93 %   Comfort Level Good   Using Accessory Muscles No   Mask Compliance Good   Skin Protection for O2 Device Yes   Orientation Middle   Location Nose   Settings/Measurements   PIP Observed 13 cm H20   CPAP/EPAP 12 cmH2O   Vt (Measured) 423 mL   FiO2  45 %   Minute Volume (L/min) 7.7 Liters   Mask Leak (lpm) 8 lpm   Patient's Home Machine No   Alarm Settings   Alarms On Y   Low Pressure (cmH2O) 6 cmH2O   High Pressure (cmH2O) 30 cmH2O   Apnea (secs) 20 secs   RR Low (bpm) 6   RR High (bpm) 40 br/min

## 2024-01-17 NOTE — PROGRESS NOTES
Occupational Therapy  [address]  Occupational Therapy Initial Assessment & Treatment Note     Name: Isac Lemus  : 1950  MRN: [id number]  Date of Service: 2024    Discharge Recommendations:  LTACH  OT Equipment Recommendations  Equipment Needed: No (defer)  Other: defer to next level of care     Therapy discharge recommendations take into account each patient's current medical complexities and are subject to input/oversight from the patient's healthcare team.     Barriers to Home Discharge:   [x] Steps to access home entry or bed/bath:   [x] Unable to transfer, ambulate, or propel wheelchair household distances without assist   [x] Limited available assist at home upon discharge    [x] Patient or family requests d/c to post-acute facility    [x] Poor cognition/safety awareness for d/c to home alone    [] Unable to maintain ordered weight bearing status    [x] Patient with salient signs of long-standing immobility   [x] Decreased independence with ADLs   [x] Increased risk for falls   [] Other:    If pt is unable to be seen after this session, please let this note serve as discharge summary.  Please see case management note for discharge disposition.  Thank you.      Patient Diagnosis(es): The encounter diagnosis was Wound of sacral region, initial encounter.  Past Medical History:  has a past medical history of Acid reflux, CAD (coronary artery disease), Diabetes mellitus (HCC), Heartburn, Hiatal hernia, Hypertension, and Seasonal allergies.  Past Surgical History:  has a past surgical history that includes Coronary artery bypass graft (10/2014); Colonoscopy (); Colonoscopy (3/16/16); and back surgery (N/A, 1/15/2024).    Assessment   Performance deficits / Impairments: Decreased functional mobility ;Decreased endurance;Decreased ADL status;Decreased balance;Decreased strength;Decreased safe awareness;Decreased cognition;Decreased fine motor control;Decreased high-level

## 2024-01-17 NOTE — PLAN OF CARE
Problem: Chronic Conditions and Co-morbidities  Goal: Patient's chronic conditions and co-morbidity symptoms are monitored and maintained or improved  1/17/2024 0545 by Linda Sanchez RN  Outcome: Progressing  1/16/2024 1952 by Linda Wallace RN  Outcome: Progressing     Problem: Safety - Adult  Goal: Free from fall injury  1/17/2024 0545 by Linda Sanchez RN  Outcome: Progressing  1/16/2024 1952 by Linda Wallace RN  Outcome: Progressing     Problem: Pain  Goal: Verbalizes/displays adequate comfort level or baseline comfort level  Recent Flowsheet Documentation  Taken 1/16/2024 2316 by Linda Sanchez RN  Verbalizes/displays adequate comfort level or baseline comfort level: Assess pain using appropriate pain scale  1/16/2024 1952 by Linda Wallace RN  Outcome: Progressing     Problem: ABCDS Injury Assessment  Goal: Absence of physical injury  1/16/2024 1952 by Linda Wallace RN  Outcome: Progressing     Problem: Skin/Tissue Integrity  Goal: Absence of new skin breakdown  Description: 1.  Monitor for areas of redness and/or skin breakdown  2.  Assess vascular access sites hourly  3.  Every 4-6 hours minimum:  Change oxygen saturation probe site  4.  Every 4-6 hours:  If on nasal continuous positive airway pressure, respiratory therapy assess nares and determine need for appliance change or resting period.  1/16/2024 1952 by Linda Wallace RN  Outcome: Progressing     Problem: Discharge Planning  Goal: Discharge to home or other facility with appropriate resources  1/16/2024 1952 by Linda Wallace RN  Outcome: Progressing     Problem: Nutrition Deficit:  Goal: Optimize nutritional status  1/16/2024 1952 by Linda Wallace RN  Outcome: Progressing

## 2024-01-17 NOTE — PROGRESS NOTES
Speech Language Pathology  Clinical Bedside Swallow Assessment  Facility/Department: [place] [address] PCU        Recommendations:  Diet recommendation: IDDSI 4 Puree Solids; IDDSI 0 Thin Liquids (via provale cup only); Meds crushed in puree as able or via PEG; ensure pt alertness prior to all PO  Instrumentation: Not clinically indicated at this time. Will continue to monitor  Risk management: upright for all intake, stay upright for at least 30 mins after intake, small bites/sips, total feed, downgrade to mildly thick if s/s of aspiration develop, 1:1 supervision with intake, oral care q4 hrs to reduce adverse affects in the event of aspiration, alternate bites/sips, slow rate of intake, STRICT aspiration precautions, and hold PO and contact SLP if s/s of aspiration or worsening respiratory status develop.  *Per FEES cmplete 24: \"..strict oral care and strict use of safe swallow strategies during all meals... If respiratory status worsens, downgrade to NTL.\"  *Noted pt with reduced cognitive-linguistic skills and previously receiving ST tx targeting communication [facility] serve sent to MD requesting orders for cog/speech/language eval.     NAME:Isac Lemus  : 1950 (73 y.o.)   MRN: [id number]  ROOM: [address] [place] [address]  ADMISSION DATE: 2024  PATIENT DIAGNOSIS(ES): Unspecified open wound of lower back and pelvis without penetration into retroperitoneum, initial encounter [S31.000A]  Decubitus skin ulcer [L89.90]  Sacral wound, initial encounter [S31.000A]  No chief complaint on file.    Patient Active Problem List    Diagnosis Date Noted    Chest pain 10/16/2014    Decubitus skin ulcer 2024    Sacral wound, initial encounter 2024    Atrial fibrillation and flutter (HCC) 2024    FUO (fever of unknown origin) 2024    Sacral wound 2024    Atrial arrhythmia 2024    Hypernatremia 2024    Elevated BUN 2024    Uncontrolled type 2 diabetes mellitus with  from oral cavity into valleculae with no appreciated swallow initiation. Verbal cues were not beneficial in encouraging pt to initiate a swallow. SLP attempted empty spoon presentations but this was also unsuccessful in encouraging the pt to swallow. Severe vallecular residue of thin and pureed bolus noted that spilled over laryngeal surface of epiglottis resulting in trace shallow laryngeal penetration. Ultimately, pt required deep oral suctioning to reduce severity of vallecular residue as he could not clear independently. He remains at an increased risk of aspiration. Based on limited assessment, recommend NPO diet w/ TF. Given that pt was previously initiating consistent swallow at bedside with water and puree earlier this date, suspect that lack of swallow initiation was behavioral in nature 2/2 potential dislike of barium taste. SLP to follow-up to determine readiness for participation in further instrumental assessment (FEES vs. MBS).\"    Vitals/labs:    SpO2: 95%  RR: 24  BP: 108/68  HR: 67  O2 device: 5L 02 via NC    Lab Values:    CBC:   Recent Labs     01/17/24  0441   WBC 6.7   HGB 9.0*         BMP:  Recent Labs     01/17/24  0441      K 4.7      CO2 35*   BUN 11   CREATININE <0.5*   GLUCOSE 154*          Cranial nerve exam: Pt difficulty following commands to complete and with limited verbalizations, thus limiting assessment  CN V (trigeminal): ophthalmic, maxillary, and mandibular facial sensation- pt unable to indicate sensation likely secondary to cog status   CN VII (facial): Reduced  CN IX/X (glossopharyngeal/vagus): MPT: GIA; pitch range: Reduced; vocal quality: Reduced and weak; cough: not elicited  CN XII (hypoglossal): Reduced    Laryngeal function exam:   Secretions: pts caregivers report suctioning of mucus   Vocal quality: See CN exam above  MPT: See CN exam above  Pitch range: See CN exam above  Cough: See CN exam above    Oral Care Status:    [] Oral Care WFL  [] Poor

## 2024-01-18 LAB
ALBUMIN SERPL-MCNC: 2.4 G/DL (ref 3.4–5)
ANION GAP SERPL CALCULATED.3IONS-SCNC: 2 MMOL/L (ref 3–16)
BACTERIA SPEC AEROBE CULT: ABNORMAL
BACTERIA SPEC AEROBE CULT: ABNORMAL
BACTERIA SPEC ANAEROBE CULT: ABNORMAL
BUN SERPL-MCNC: 10 MG/DL (ref 7–20)
CALCIUM SERPL-MCNC: 8.6 MG/DL (ref 8.3–10.6)
CHLORIDE SERPL-SCNC: 98 MMOL/L (ref 99–110)
CO2 SERPL-SCNC: 36 MMOL/L (ref 21–32)
CREAT SERPL-MCNC: <0.5 MG/DL (ref 0.8–1.3)
DEPRECATED RDW RBC AUTO: 18.4 % (ref 12.4–15.4)
EST. AVERAGE GLUCOSE BLD GHB EST-MCNC: 122.6 MG/DL
GFR SERPLBLD CREATININE-BSD FMLA CKD-EPI: >60 ML/MIN/{1.73_M2}
GLUCOSE BLD-MCNC: 118 MG/DL (ref 70–99)
GLUCOSE BLD-MCNC: 170 MG/DL (ref 70–99)
GLUCOSE BLD-MCNC: 180 MG/DL (ref 70–99)
GLUCOSE BLD-MCNC: 184 MG/DL (ref 70–99)
GLUCOSE SERPL-MCNC: 106 MG/DL (ref 70–99)
GRAM STN SPEC: ABNORMAL
HBA1C MFR BLD: 5.9 %
HCT VFR BLD AUTO: 28.5 % (ref 40.5–52.5)
HGB BLD-MCNC: 9.1 G/DL (ref 13.5–17.5)
MAGNESIUM SERPL-MCNC: 2 MG/DL (ref 1.8–2.4)
MCH RBC QN AUTO: 32.4 PG (ref 26–34)
MCHC RBC AUTO-ENTMCNC: 32.1 G/DL (ref 31–36)
MCV RBC AUTO: 101.2 FL (ref 80–100)
ORGANISM: ABNORMAL
PERFORMED ON: ABNORMAL
PHOSPHATE SERPL-MCNC: 2.9 MG/DL (ref 2.5–4.9)
PLATELET # BLD AUTO: 299 K/UL (ref 135–450)
PMV BLD AUTO: 7.5 FL (ref 5–10.5)
POTASSIUM SERPL-SCNC: 4.3 MMOL/L (ref 3.5–5.1)
RBC # BLD AUTO: 2.82 M/UL (ref 4.2–5.9)
SODIUM SERPL-SCNC: 136 MMOL/L (ref 136–145)
WBC # BLD AUTO: 6.5 K/UL (ref 4–11)

## 2024-01-18 PROCEDURE — 85027 COMPLETE CBC AUTOMATED: CPT

## 2024-01-18 PROCEDURE — 83036 HEMOGLOBIN GLYCOSYLATED A1C: CPT

## 2024-01-18 PROCEDURE — 97535 SELF CARE MNGMENT TRAINING: CPT

## 2024-01-18 PROCEDURE — 80069 RENAL FUNCTION PANEL: CPT

## 2024-01-18 PROCEDURE — 36415 COLL VENOUS BLD VENIPUNCTURE: CPT

## 2024-01-18 PROCEDURE — 6370000000 HC RX 637 (ALT 250 FOR IP): Performed by: SURGERY

## 2024-01-18 PROCEDURE — 83735 ASSAY OF MAGNESIUM: CPT

## 2024-01-18 PROCEDURE — 6370000000 HC RX 637 (ALT 250 FOR IP): Performed by: NURSE PRACTITIONER

## 2024-01-18 PROCEDURE — 2580000003 HC RX 258: Performed by: SURGERY

## 2024-01-18 PROCEDURE — 6370000000 HC RX 637 (ALT 250 FOR IP): Performed by: INTERNAL MEDICINE

## 2024-01-18 PROCEDURE — 6360000002 HC RX W HCPCS: Performed by: NURSE PRACTITIONER

## 2024-01-18 PROCEDURE — 6360000002 HC RX W HCPCS: Performed by: INTERNAL MEDICINE

## 2024-01-18 PROCEDURE — 94761 N-INVAS EAR/PLS OXIMETRY MLT: CPT

## 2024-01-18 PROCEDURE — 97530 THERAPEUTIC ACTIVITIES: CPT

## 2024-01-18 PROCEDURE — 97110 THERAPEUTIC EXERCISES: CPT

## 2024-01-18 PROCEDURE — 99232 SBSQ HOSP IP/OBS MODERATE 35: CPT | Performed by: NURSE PRACTITIONER

## 2024-01-18 PROCEDURE — 2700000000 HC OXYGEN THERAPY PER DAY

## 2024-01-18 PROCEDURE — 2060000000 HC ICU INTERMEDIATE R&B

## 2024-01-18 PROCEDURE — C9113 INJ PANTOPRAZOLE SODIUM, VIA: HCPCS | Performed by: INTERNAL MEDICINE

## 2024-01-18 PROCEDURE — 2580000003 HC RX 258: Performed by: INTERNAL MEDICINE

## 2024-01-18 RX ORDER — ENOXAPARIN SODIUM 100 MG/ML
1 INJECTION SUBCUTANEOUS 2 TIMES DAILY
Status: DISCONTINUED | OUTPATIENT
Start: 2024-01-18 | End: 2024-01-30

## 2024-01-18 RX ORDER — INSULIN LISPRO 100 [IU]/ML
0-4 INJECTION, SOLUTION INTRAVENOUS; SUBCUTANEOUS NIGHTLY
Status: DISCONTINUED | OUTPATIENT
Start: 2024-01-18 | End: 2024-01-25

## 2024-01-18 RX ORDER — INSULIN LISPRO 100 [IU]/ML
0-4 INJECTION, SOLUTION INTRAVENOUS; SUBCUTANEOUS EVERY 4 HOURS
Status: DISCONTINUED | OUTPATIENT
Start: 2024-01-18 | End: 2024-01-25

## 2024-01-18 RX ORDER — DEXTROSE MONOHYDRATE 100 MG/ML
INJECTION, SOLUTION INTRAVENOUS CONTINUOUS PRN
Status: DISCONTINUED | OUTPATIENT
Start: 2024-01-18 | End: 2024-02-01 | Stop reason: HOSPADM

## 2024-01-18 RX ORDER — TRAMADOL HYDROCHLORIDE 50 MG/1
50 TABLET ORAL EVERY 6 HOURS PRN
Status: DISCONTINUED | OUTPATIENT
Start: 2024-01-18 | End: 2024-02-01 | Stop reason: HOSPADM

## 2024-01-18 RX ORDER — INSULIN LISPRO 100 [IU]/ML
0-4 INJECTION, SOLUTION INTRAVENOUS; SUBCUTANEOUS
Status: DISCONTINUED | OUTPATIENT
Start: 2024-01-18 | End: 2024-01-25

## 2024-01-18 RX ADMIN — LEVETIRACETAM 750 MG: 100 SOLUTION ORAL at 21:07

## 2024-01-18 RX ADMIN — TRAMADOL HYDROCHLORIDE 50 MG: 50 TABLET ORAL at 05:44

## 2024-01-18 RX ADMIN — SODIUM CHLORIDE, PRESERVATIVE FREE 10 ML: 5 INJECTION INTRAVENOUS at 10:06

## 2024-01-18 RX ADMIN — ASPIRIN 81 MG: 81 TABLET, COATED ORAL at 10:09

## 2024-01-18 RX ADMIN — ENOXAPARIN SODIUM 100 MG: 100 INJECTION SUBCUTANEOUS at 21:06

## 2024-01-18 RX ADMIN — ATORVASTATIN CALCIUM 40 MG: 40 TABLET, FILM COATED ORAL at 21:07

## 2024-01-18 RX ADMIN — DILTIAZEM HYDROCHLORIDE 60 MG: 60 TABLET ORAL at 23:27

## 2024-01-18 RX ADMIN — TRAMADOL HYDROCHLORIDE 50 MG: 50 TABLET ORAL at 17:31

## 2024-01-18 RX ADMIN — DILTIAZEM HYDROCHLORIDE 60 MG: 60 TABLET ORAL at 05:44

## 2024-01-18 RX ADMIN — DONEPEZIL HYDROCHLORIDE 5 MG: 5 TABLET, FILM COATED ORAL at 21:07

## 2024-01-18 RX ADMIN — DILTIAZEM HYDROCHLORIDE 60 MG: 60 TABLET ORAL at 10:09

## 2024-01-18 RX ADMIN — AMIODARONE HYDROCHLORIDE 400 MG: 200 TABLET ORAL at 10:09

## 2024-01-18 RX ADMIN — MODAFINIL 300 MG: 200 TABLET ORAL at 10:09

## 2024-01-18 RX ADMIN — APIXABAN 5 MG: 5 TABLET, FILM COATED ORAL at 05:44

## 2024-01-18 RX ADMIN — PANTOPRAZOLE SODIUM 40 MG: 40 INJECTION, POWDER, FOR SOLUTION INTRAVENOUS at 10:06

## 2024-01-18 RX ADMIN — PIPERACILLIN AND TAZOBACTAM 3375 MG: 3; .375 INJECTION, POWDER, FOR SOLUTION INTRAVENOUS at 05:46

## 2024-01-18 RX ADMIN — DILTIAZEM HYDROCHLORIDE 60 MG: 60 TABLET ORAL at 17:31

## 2024-01-18 RX ADMIN — ACETAMINOPHEN 650 MG: 325 TABLET ORAL at 21:07

## 2024-01-18 RX ADMIN — PIPERACILLIN AND TAZOBACTAM 3375 MG: 3; .375 INJECTION, POWDER, FOR SOLUTION INTRAVENOUS at 21:13

## 2024-01-18 RX ADMIN — LISINOPRIL 2.5 MG: 2.5 TABLET ORAL at 10:09

## 2024-01-18 RX ADMIN — AMIODARONE HYDROCHLORIDE 400 MG: 200 TABLET ORAL at 21:06

## 2024-01-18 RX ADMIN — LEVETIRACETAM 750 MG: 100 SOLUTION ORAL at 10:09

## 2024-01-18 RX ADMIN — METOPROLOL TARTRATE 25 MG: 25 TABLET, FILM COATED ORAL at 10:09

## 2024-01-18 RX ADMIN — METOPROLOL TARTRATE 25 MG: 25 TABLET, FILM COATED ORAL at 21:07

## 2024-01-18 RX ADMIN — PIPERACILLIN AND TAZOBACTAM 3375 MG: 3; .375 INJECTION, POWDER, FOR SOLUTION INTRAVENOUS at 14:22

## 2024-01-18 ASSESSMENT — PAIN SCALES - WONG BAKER
WONGBAKER_NUMERICALRESPONSE: 0
WONGBAKER_NUMERICALRESPONSE: 2
WONGBAKER_NUMERICALRESPONSE: 0

## 2024-01-18 NOTE — PROGRESS NOTES
Physical Therapy  Facility/Department: [facility] C4 PCU  Daily Treatment Note  NAME: Isac Lemus  : 1950  MRN: [id number]    Date of Service: 2024    Discharge Recommendations:  Subacute/Skilled Nursing Facility, LTACH   PT Equipment Recommendations  Equipment Needed: No  Other: defer to facility    Patient Diagnosis(es): The encounter diagnosis was Wound of sacral region, initial encounter.    Therapy discharge recommendations take into account each patient's current medical complexities and are subject to input/oversight from the patient's healthcare team.   Barriers to Home Discharge:   [x] Steps to access home entry or bed/bath: 2   [x] Unable to transfer, ambulate, or propel wheelchair household distances without assist   [x] Limited available assist at home upon discharge    [x] Patient or family requests d/c to post-acute facility    [x] Poor cognition/safety awareness for d/c to home alone    []Unable to maintain ordered weight bearing status    [x] Patient with salient signs of long-standing immobility   [x] Patient is at risk for falls due to:   [] Other:    If pt is unable to be seen after this session, please let this note serve as discharge summary.  Please see case management note for discharge disposition.  Thank you.      Assessment   Assessment: Patient seen for LE ROM and bed mobility.  Patient cleared by RN for therapy participation this date.  Patient agreeable to therapy.   Patient completed rolling with max A x2. AAROM attempted BLE. Pt unable to follow commands to complete LE exercises despite max encouragement and cues. Pt demos minimal active participation, limited by level of arousal and cognition. P.T .will continue to follow throughout LOS. Recommending DC to LTACH vs. SNF pending progress.  Activity Tolerance: Patient limited by fatigue;Patient limited by endurance;Treatment limited secondary to decreased cognition;Patient tolerated evaluation without incident  Equipment

## 2024-01-18 NOTE — CARE COORDINATION
EP following. He is on amiodarone. Daily EKG'S. Holding Eliquis and starting therapeutic lovenox as there is possibility of further surgical debridement of sacral wound. General surgery following. Came from ARU and hopes to return when stable. If no longer appropriate then they want [facility]. Will follow.     Today's recommendations are for LTACH vs SNF. Unclear if patient meets criteria for LTACH but will have Susana at [facility] screen to see if patient meets criteria. Not appropriate for ARU at this time. Referral also initiated to [facility].

## 2024-01-18 NOTE — PROGRESS NOTES
01/18/24 0311   NIV Type   NIV Started/Stopped On   Equipment Type V60   Mode CPAP   Mask Type Full face mask   Mask Size Large   Assessment   Respirations 20   SpO2 96 %   Comfort Level Good   Using Accessory Muscles No   Mask Compliance Good   Skin Protection for O2 Device Yes   Location Nose   Breath Sounds   Right Upper Lobe Diminished   Right Middle Lobe Diminished   Right Lower Lobe Diminished   Left Upper Lobe Diminished   Left Lower Lobe Diminished   Settings/Measurements   CPAP/EPAP 12 cmH2O   Vt (Measured) 495 mL   FiO2  45 %   Minute Volume (L/min) 10.1 Liters   Mask Leak (lpm) 0 lpm   Patient's Home Machine No   Alarm Settings   Alarms On Y

## 2024-01-18 NOTE — PROGRESS NOTES
This nurse provided wound care per orders. Changed sheets, gown, marcus-care. Patient tolerated all well. Will continue to monitor

## 2024-01-18 NOTE — PROGRESS NOTES
Occupational Therapy  Facility/Department: [facility] C4 PCU  Daily Treatment Note  NAME: Isac Lemus  : 1950  MRN: [id number]    Date of Service: 2024    Discharge Recommendations:  Subacute/Skilled Nursing Facility, LTACH (SNF vs LTCAH pending medical complexity/ progress)  OT Equipment Recommendations  Equipment Needed: No (defer)    Therapy discharge recommendations take into account each patient's current medical complexities and are subject to input/oversight from the patient's healthcare team.     Barriers to Home Discharge:   [x] Steps to access home entry or bed/bath:   [x] Unable to transfer, ambulate, or propel wheelchair household distances without assist   [] Limited available assist at home upon discharge    [] Patient or family requests d/c to post-acute facility    [x] Poor cognition/safety awareness for d/c to home alone    [] Unable to maintain ordered weight bearing status    [x] Patient with salient signs of long-standing immobility   [x] Decreased independence with ADLs   [x] Increased risk for falls   [] Other:    If pt is unable to be seen after this session, please let this note serve as discharge summary.  Please see case management note for discharge disposition.  Thank you.      Patient Diagnosis(es): The encounter diagnosis was Wound of sacral region, initial encounter.     Assessment    Assessment: Co-tx collaboration this date to safely meet goals and will have better occupational performance outcomes with in a co-treatment than 1:1 session. pt w/ poor tolerance to session d/t inc lethargy and fatigue. Attempted BUE/LE therex supine w/ minimal pt participation despite max tactile/ verbal cues and encouragement from therapists and family present-- trace quad / bicep activation and minimal grasp noted. Noted pt soiled d/t urine incontinence r/t faulty purewick functioning, pt rolled total-max x2 to complete pericare and linene change. Pt rolled onto L side for pressure relief at  education needed;Demonstrated understanding    Goals  Short Term Goals  Time Frame for Short Term Goals: by 1/25/24 unless stated otherwise  Short Term Goal 1: Pt will sit EOB w/ no more than max A for balance in prep for seated ADLs  Short Term Goal 2: Pt will complete simple grooming task in supported sitting w/ no more than max A  Short Term Goal 3: Pt will tolerate x15 reps BUE therex -- progressing 1/18/24, PROM w/ all mvmts c trace digit/ wrist ROM.  Short Term Goal 4: Pt will follow commands to sequence 2 step task with only 1 tactile/ verbal cue  Patient Goals   Patient goals : pt unable to state    AM-PAC - ADL  AM-PAC Daily Activity - Inpatient   How much help is needed for putting on and taking off regular lower body clothing?: Total  How much help is needed for bathing (which includes washing, rinsing, drying)?: Total  How much help is needed for toileting (which includes using toilet, bedpan, or urinal)?: Total  How much help is needed for putting on and taking off regular upper body clothing?: Total  How much help is needed for taking care of personal grooming?: Total  How much help for eating meals?: Total  AM-PAC Inpatient Daily Activity Raw Score: 6  AM-PAC Inpatient ADL T-Scale Score : 17.07  ADL Inpatient CMS 0-100% Score: 100  ADL Inpatient CMS G-Code Modifier : CN    Therapy Time   Individual Concurrent Group Co-treatment   Time In       1439   Time Out       1505   Minutes       26   Timed Code Treatment Minutes: 26 Minutes       Zachary Gomes OT

## 2024-01-18 NOTE — PROGRESS NOTES
01/18/24 0006   NIV Type   NIV Started/Stopped On   Equipment Type V60   Mode CPAP   Mask Type Full face mask   Mask Size Large   Assessment   Respirations 24   SpO2 95 %   Comfort Level Good   Using Accessory Muscles No   Mask Compliance Good   Skin Protection for O2 Device Yes   Location Nose   Breath Sounds   Right Upper Lobe Diminished   Right Middle Lobe Diminished   Right Lower Lobe Diminished   Left Upper Lobe Diminished   Left Lower Lobe Diminished   Settings/Measurements   CPAP/EPAP 12 cmH2O   Vt (Measured) 479 mL   FiO2  45 %   Minute Volume (L/min) 11.6 Liters   Mask Leak (lpm) 2 lpm   Patient's Home Machine No   Alarm Settings   Alarms On Y

## 2024-01-18 NOTE — PLAN OF CARE

## 2024-01-18 NOTE — PROGRESS NOTES
Went in to do dressing change, family at bedside requesting pain meds to give to patient before dressing change. Oxycodone was already in the MAR, but when mentioned to family member, they requested to have Tramadol instead. Patients order for tramadol was discontinued. This nurse perfect served Eunice Seaman NP to ask about tramadol instead. Order for Tramadol 50 mg PRN every 6 hours ordered.

## 2024-01-18 NOTE — PROGRESS NOTES
01/17/24 2209   NIV Type   NIV Started/Stopped On   Equipment Type v60   Mode (S)  CPAP   Mask Type Full face mask   Assessment   Using Accessory Muscles No   Skin Assessment Skin breakdown present (see comment/note)   Skin Protection for O2 Device Yes   Location Nose   Settings/Measurements   CPAP/EPAP 12 cmH2O   Vt (Measured) 414 mL   Rate Ordered 23   FiO2  45 %   Mask Leak (lpm) 26 lpm

## 2024-01-18 NOTE — PROGRESS NOTES
[facility]     Electrophysiology                                     Progress Note    Admission date:  2024    Reason for follow up visit: AF/AFL    HPI/CC: Isac Lemus was admitted on 2023 to the ARU from  after traumatic fall resulting in skull fracture and SAH/SDH. Complicated hospital course. EP consulted when EKG showed rapid AFL. An cardiac event monitor was placed on 2024 but this was removed 2024 at the family's request due to skin irritation.  On 2024, patient was admitted to the hospital for surgical debridement of his sacral wound.  Postoperatively he had RVR and IV diltiazem was started.  Rhythm is atrial flutter with heart rates in the 80s and 90s.    Subjective: Unable to assess given mental status. Family at bedside.     Vitals:  Blood pressure 109/65, pulse 100, temperature 97.5 °F (36.4 °C), temperature source Oral, resp. rate 19, height 1.702 m (5' 7\"), weight 99.8 kg (220 lb 0.3 oz), SpO2 98 %.  Temp  Av.1 °F (36.7 °C)  Min: 97.5 °F (36.4 °C)  Max: 98.8 °F (37.1 °C)  Pulse  Av  Min: 64  Max: 100  BP  Min: 104/61  Max: 138/76  SpO2  Av.4 %  Min: 84 %  Max: 98 %  FiO2   Av %  Min: 45 %  Max: 45 %    24 hour I/O    Intake/Output Summary (Last 24 hours) at 2024 1132  Last data filed at 2024 2135  Gross per 24 hour   Intake 450 ml   Output 750 ml   Net -300 ml       Current Facility-Administered Medications   Medication Dose Route Frequency Provider Last Rate Last Admin    traMADol (ULTRAM) tablet 50 mg  50 mg Oral Q6H PRN Eunice Seaman APRN - CNP   50 mg at 24 0544    glucose chewable tablet 16 g  4 tablet Oral PRN eKnny Ernandez MD        dextrose bolus 10% 125 mL  125 mL IntraVENous PRN Kenny Ernandez MD        Or    dextrose bolus 10% 250 mL  250 mL IntraVENous PRN Kenny Ernandez MD        glucagon (rDNA) injection 1 mg  1 mg SubCUTAneous PRN Kenny Ernandez MD        dextrose 10 % infusion   IntraVENous

## 2024-01-18 NOTE — PROGRESS NOTES
Hospital Medicine Progress Note      Date of Admission: 1/14/2024  Hospital Day: 5    Chief Admission Complaint:  Decubitus ulcer    Subjective:  no new c/o.    Presenting Admission History:         \"73 y.o. male w/ hx TBI w/ bleed who [facility] w/ decubitus ulcer for surgical debridement - done 15 Arturo w/out complications - discussed face to face w/ General Surgery, Dr. Stephenson 15 Arturo.      Patient is non-verbal with family at bedside\"        Assessment/Plan:      Current Principal Problem:  Decubitus skin ulcer        Traumatic Brain Injury  - with bilateral SDH, SAH, skull base fracture, 4 mm MLS, right traverse/sigmoid sinus thrombosis   - no surgical intervention  - serial scans during acute stay stable  - Keppra 750 mg BID, EEG showing rate epileptiform discharges during acute stay. OSH recommending 30 days of ppx. Consulted Neurology, due to abnormal findings on OSH EEG will continue Keppra for now.   - Aricept and Provigil     Pulmonary insufficiency  - wean oxygen for goal SpO2>88%  - ABG showing CO2 retention  - XR chest negative for acute process  - Pulmonology following, suspected undiagnosed sleep apnea. Was on BiPAP intermittently, now on CPAP.      Intermittent Fevers  - infectious workup as OSH unremarkable  - prior infectious workups negative  - now with recurrence of fevers  - ID following, concern for infection of sacral wound. Started on Zosyn. Wound care following. General Surgery consulted s/p debridement Monday 15 Arturo w/out complications.     Afib - chronic paroxysmal of unspecified and clinically unable to determine etiology - w/ episodic RVR.  Normally rate controlled on Amio/BBlocker and CCBlocker - continued.  Anticoagulated at baseline on home Eliquis due to secondary hypercoaguable state due to Afib - held - resume when able.  Cardiology consulted and appreciated, predominantly for rate control.      Sleep wake Cycle Disturbance  -Note patient

## 2024-01-19 LAB
ALBUMIN SERPL-MCNC: 2.4 G/DL (ref 3.4–5)
ANION GAP SERPL CALCULATED.3IONS-SCNC: 5 MMOL/L (ref 3–16)
BUN SERPL-MCNC: 12 MG/DL (ref 7–20)
CALCIUM SERPL-MCNC: 8.8 MG/DL (ref 8.3–10.6)
CHLORIDE SERPL-SCNC: 98 MMOL/L (ref 99–110)
CO2 SERPL-SCNC: 34 MMOL/L (ref 21–32)
CREAT SERPL-MCNC: <0.5 MG/DL (ref 0.8–1.3)
DEPRECATED RDW RBC AUTO: 17.5 % (ref 12.4–15.4)
GFR SERPLBLD CREATININE-BSD FMLA CKD-EPI: >60 ML/MIN/{1.73_M2}
GLUCOSE BLD-MCNC: 115 MG/DL (ref 70–99)
GLUCOSE BLD-MCNC: 129 MG/DL (ref 70–99)
GLUCOSE BLD-MCNC: 158 MG/DL (ref 70–99)
GLUCOSE BLD-MCNC: 182 MG/DL (ref 70–99)
GLUCOSE BLD-MCNC: 183 MG/DL (ref 70–99)
GLUCOSE SERPL-MCNC: 114 MG/DL (ref 70–99)
HCT VFR BLD AUTO: 29.5 % (ref 40.5–52.5)
HGB BLD-MCNC: 9.5 G/DL (ref 13.5–17.5)
MCH RBC QN AUTO: 32.5 PG (ref 26–34)
MCHC RBC AUTO-ENTMCNC: 32.2 G/DL (ref 31–36)
MCV RBC AUTO: 101 FL (ref 80–100)
PERFORMED ON: ABNORMAL
PHOSPHATE SERPL-MCNC: 3 MG/DL (ref 2.5–4.9)
PLATELET # BLD AUTO: 306 K/UL (ref 135–450)
PMV BLD AUTO: 7.5 FL (ref 5–10.5)
POTASSIUM SERPL-SCNC: 4.2 MMOL/L (ref 3.5–5.1)
RBC # BLD AUTO: 2.92 M/UL (ref 4.2–5.9)
SODIUM SERPL-SCNC: 137 MMOL/L (ref 136–145)
WBC # BLD AUTO: 6.7 K/UL (ref 4–11)

## 2024-01-19 PROCEDURE — 6370000000 HC RX 637 (ALT 250 FOR IP): Performed by: INTERNAL MEDICINE

## 2024-01-19 PROCEDURE — 85027 COMPLETE CBC AUTOMATED: CPT

## 2024-01-19 PROCEDURE — 94660 CPAP INITIATION&MGMT: CPT

## 2024-01-19 PROCEDURE — 6360000002 HC RX W HCPCS: Performed by: INTERNAL MEDICINE

## 2024-01-19 PROCEDURE — 2700000000 HC OXYGEN THERAPY PER DAY

## 2024-01-19 PROCEDURE — 99232 SBSQ HOSP IP/OBS MODERATE 35: CPT | Performed by: SURGERY

## 2024-01-19 PROCEDURE — 2060000000 HC ICU INTERMEDIATE R&B

## 2024-01-19 PROCEDURE — 6370000000 HC RX 637 (ALT 250 FOR IP): Performed by: SURGERY

## 2024-01-19 PROCEDURE — 93320 DOPPLER ECHO COMPLETE: CPT

## 2024-01-19 PROCEDURE — 2580000003 HC RX 258: Performed by: INTERNAL MEDICINE

## 2024-01-19 PROCEDURE — 80069 RENAL FUNCTION PANEL: CPT

## 2024-01-19 PROCEDURE — 94761 N-INVAS EAR/PLS OXIMETRY MLT: CPT

## 2024-01-19 PROCEDURE — 2580000003 HC RX 258: Performed by: SURGERY

## 2024-01-19 PROCEDURE — 92526 ORAL FUNCTION THERAPY: CPT

## 2024-01-19 PROCEDURE — 51798 US URINE CAPACITY MEASURE: CPT

## 2024-01-19 PROCEDURE — 6370000000 HC RX 637 (ALT 250 FOR IP): Performed by: NURSE PRACTITIONER

## 2024-01-19 PROCEDURE — 99232 SBSQ HOSP IP/OBS MODERATE 35: CPT | Performed by: NURSE PRACTITIONER

## 2024-01-19 PROCEDURE — 6360000002 HC RX W HCPCS: Performed by: NURSE PRACTITIONER

## 2024-01-19 PROCEDURE — C9113 INJ PANTOPRAZOLE SODIUM, VIA: HCPCS | Performed by: INTERNAL MEDICINE

## 2024-01-19 PROCEDURE — 93308 TTE F-UP OR LMTD: CPT

## 2024-01-19 PROCEDURE — 36415 COLL VENOUS BLD VENIPUNCTURE: CPT

## 2024-01-19 RX ORDER — SODIUM CHLOR/HYPOCHLOROUS ACID 0.033 %
SOLUTION, IRRIGATION IRRIGATION 2 TIMES DAILY
Status: DISCONTINUED | OUTPATIENT
Start: 2024-01-19 | End: 2024-02-01 | Stop reason: HOSPADM

## 2024-01-19 RX ADMIN — LEVETIRACETAM 750 MG: 100 SOLUTION ORAL at 20:03

## 2024-01-19 RX ADMIN — DILTIAZEM HYDROCHLORIDE 60 MG: 60 TABLET ORAL at 12:20

## 2024-01-19 RX ADMIN — DILTIAZEM HYDROCHLORIDE 60 MG: 60 TABLET ORAL at 17:30

## 2024-01-19 RX ADMIN — TRAMADOL HYDROCHLORIDE 50 MG: 50 TABLET ORAL at 14:18

## 2024-01-19 RX ADMIN — DILTIAZEM HYDROCHLORIDE 60 MG: 60 TABLET ORAL at 05:26

## 2024-01-19 RX ADMIN — PIPERACILLIN AND TAZOBACTAM 3375 MG: 3; .375 INJECTION, POWDER, FOR SOLUTION INTRAVENOUS at 05:26

## 2024-01-19 RX ADMIN — LISINOPRIL 2.5 MG: 2.5 TABLET ORAL at 09:56

## 2024-01-19 RX ADMIN — SODIUM CHLORIDE, PRESERVATIVE FREE 10 ML: 5 INJECTION INTRAVENOUS at 09:57

## 2024-01-19 RX ADMIN — LEVETIRACETAM 750 MG: 100 SOLUTION ORAL at 09:57

## 2024-01-19 RX ADMIN — Medication: at 05:37

## 2024-01-19 RX ADMIN — ENOXAPARIN SODIUM 100 MG: 100 INJECTION SUBCUTANEOUS at 20:03

## 2024-01-19 RX ADMIN — DONEPEZIL HYDROCHLORIDE 5 MG: 5 TABLET, FILM COATED ORAL at 20:03

## 2024-01-19 RX ADMIN — METOPROLOL TARTRATE 25 MG: 25 TABLET, FILM COATED ORAL at 20:03

## 2024-01-19 RX ADMIN — PANTOPRAZOLE SODIUM 40 MG: 40 INJECTION, POWDER, FOR SOLUTION INTRAVENOUS at 09:56

## 2024-01-19 RX ADMIN — ASPIRIN 81 MG: 81 TABLET, COATED ORAL at 09:55

## 2024-01-19 RX ADMIN — PIPERACILLIN AND TAZOBACTAM 3375 MG: 3; .375 INJECTION, POWDER, FOR SOLUTION INTRAVENOUS at 21:03

## 2024-01-19 RX ADMIN — METOPROLOL TARTRATE 25 MG: 25 TABLET, FILM COATED ORAL at 09:56

## 2024-01-19 RX ADMIN — AMIODARONE HYDROCHLORIDE 400 MG: 200 TABLET ORAL at 20:03

## 2024-01-19 RX ADMIN — AMIODARONE HYDROCHLORIDE 400 MG: 200 TABLET ORAL at 09:56

## 2024-01-19 RX ADMIN — ENOXAPARIN SODIUM 100 MG: 100 INJECTION SUBCUTANEOUS at 09:56

## 2024-01-19 RX ADMIN — MODAFINIL 300 MG: 200 TABLET ORAL at 09:55

## 2024-01-19 RX ADMIN — PIPERACILLIN AND TAZOBACTAM 3375 MG: 3; .375 INJECTION, POWDER, FOR SOLUTION INTRAVENOUS at 12:29

## 2024-01-19 RX ADMIN — TRAMADOL HYDROCHLORIDE 50 MG: 50 TABLET ORAL at 03:13

## 2024-01-19 RX ADMIN — ATORVASTATIN CALCIUM 40 MG: 40 TABLET, FILM COATED ORAL at 20:03

## 2024-01-19 ASSESSMENT — PAIN SCALES - WONG BAKER
WONGBAKER_NUMERICALRESPONSE: 0

## 2024-01-19 NOTE — PROGRESS NOTES
Speech Language Pathology  Dysphagia Treatment/Follow-Up Note  Facility/Department: [place] [address] PCU    Recommendations:  Solid Consistency: IDDSI 5 Minced and moist Solids  Liquid Consistency: IDDSI 0 Thin Liquids (via provale cup only)  Medication: Meds crushed in puree as able or via PEG    Risk Management: upright for all intake, stay upright for at least 30 mins after intake, small bites/sips, total feed, downgrade to mildly thick if s/s of aspiration develop, 1:1 supervision with intake, oral care q4 hrs to reduce adverse affects in the event of aspiration, alternate bites/sips, slow rate of intake, STRICT aspiration precautions, and hold PO and contact SLP if s/s of aspiration or worsening respiratory status develop.     NAME:Isac Lemus  : 1950 (73 y.o.)   MRN: [id number]  ROOM: [facility]/0428-  ADMISSION DATE: 2024  PATIENT DIAGNOSIS(ES): Unspecified open wound of lower back and pelvis without penetration into retroperitoneum, initial encounter [S31.000A]  Decubitus skin ulcer [L89.90]  Sacral wound, initial encounter [S31.000A]  Allergies   Allergen Reactions    Heparin Other (See Comments)     Causes clots       DATE ONSET: 2024    Most Recent Instrumentals:  FEES: complete 24  \"Pt pleasant and cooperative, moving his head significantly throughout study, therefore visualization of anatomy was difficult at times. Pt tolerated ice chips, thin liquids via controlled cup and large cup sip, nectar thick liquids, puree, and soft solids without any aspiration. Pt did have suspected aspiration with thin liquids via straw due to deep penetration that occurred, but difficult to view exactly how much of bolus cleared due to head movements after swallow. Pt presents with mild-mod vallecular, pyriform, and UES residue post swallow. Pt able to clear residue with increased amount of liquid washes. Edema/erythema noted with anatomical structures.\"     MBSS: complete 23  \"Patient presents with

## 2024-01-19 NOTE — CARE COORDINATION
Jane from [facility] is supposed to do an onsite visit on patient and determine if he is appropriate for their facility and if they can accommodate his care. Susana from [facility] is also following and will reassess now that patient has been on PCU level of care. When she reviewed patient while he was in ARU patient lacked revenue codes to meet criteria for this level of care. Provided family with a [facility] folder . [facility] would be an agreeable location if so. General Surgery following and awaiting decision for further debridement vs wound vac placement. Currently on IVABX's but no plan for long term need at present. He has [facility] Medicare and will need pre-cert for SNF VS ltach/ currently not being recommended for ARU level of care.     Addendum: Do to inclement weather Jane is not doing an onsite visit today. They are reviewing and in Epic and will let writer know if they can accept.     Addendum: [facility] declined due to they feel he iss too medically complex. [facility] accepted and Susana will submit pre-cert today with [facility] Medicare.

## 2024-01-19 NOTE — PROGRESS NOTES
01/19/24 0400   NIV Type   $NIV $Daily Charge   Equipment Type V60   Mode CPAP   Mask Type Full face mask   Mask Size Large   Assessment   Respirations 18   SpO2 98 %   Comfort Level Good   Using Accessory Muscles No   Mask Compliance Good   Skin Assessment Clean, dry, & intact   Skin Protection for O2 Device Yes   Location Nose   Settings/Measurements   PIP Observed 12 cm H20   CPAP/EPAP 12 cmH2O   Vt (Measured) 360 mL   FiO2  45 %   Minute Volume (L/min) 6 Liters   Mask Leak (lpm) 38 lpm   Patient's Home Machine No   Alarm Settings   Alarms On Y

## 2024-01-19 NOTE — PROGRESS NOTES
Hospital Medicine Progress Note      Date of Admission: 1/14/2024  Hospital Day: 6    Chief Admission Complaint:  Decubitus ulcer    Subjective:  no new c/o.    Presenting Admission History:         \"73 y.o. male w/ hx TBI w/ bleed who presented to [facility] in transfer [facility]U w/ decubitus ulcer for surgical debridement - done 15 Arturo w/out complications - discussed face to face w/ General Surgery, Dr. Stephenson 15 Arturo.      Patient is non-verbal with family at bedside\"        Assessment/Plan:      Current Principal Problem:  Decubitus skin ulcer        Traumatic Brain Injury - prior to admission, had been in ARU for Rehab.  Stable from this standpoint.  Hx bilateral SDH, SAH, skull base fracture, 4 mm MLS, right traverse/sigmoid sinus thrombosis.  Had no surgical intervention and serial scans during prior acute stay stable. Keppra 750 mg BID, EEG showing rate epileptiform discharges during prior acute stay. OSH recommended 30 days of ppx. Consulted Neurology, due to abnormal findings on OSH EEG will continue Keppra for now.  Continue Aricept and Provigil     Pulmonary insufficiency  - wean oxygen for goal SpO2>88%  - ABG showing CO2 retention  - XR chest negative for acute process  - Pulmonology following, suspected undiagnosed sleep apnea. Was on BiPAP intermittently, now on CPAP.      Intermittent Fevers - resolved and not current issue here. Infectious workup as OSH unremarkable.  I&D had been following in ARU w/concern for infection of sacral wound. Started on Zosyn - continued. Transferred to inpatient for this admission specifically for General Surgery  debridement Monday 15 Arturo - done w/ou complications.     Afib - chronic paroxysmal of unspecified and clinically unable to determine etiology - w/ episodic RVR.  Normally rate controlled on Amio/BBlocker and CCBlocker - continued.  Anticoagulated at baseline on home Eliquis due to secondary hypercoaguable state due to Afib - held - resume when able.   renal monitoring for nephrotoxicity:     [] Post-Cath/Contrast study requiring serial renal monitoring for Contrast Induced Nephropathy  [] IV Narcotic analgesia for ADR   [] Aggressive IV diuresis requiring serial monitoring for renal impairment and electrolyte derangements  [] Hypertonic Saline requiring serial renal monitoring for appropriate electrolyte correction rate   [] Critical electrolyte abnormalities requiring IV replacement and close serial monitoring  [] Insulin - monitoring FSBS for Hypoglycemic ADR  [] Other - Dilt gtt  [] Change in code status:    [] Decision to escalate care:    [] Major surgery/procedure with associated risk factors:    ----------------------------------------------------------------------  C. Data (any 2)  [] Discussed management of the case with:    [x] Discussed the discharge plan in detail with case mgt including timing/barriers to discharge, need for support services and placement decision   [x] Imaging personally reviewed and interpreted, includes:   [x] Telemetry monitoring w/ rate controlled Afib   [x] Data Review (any 3)  [] Collateral history obtained from:    [x] All available Consultant notes from yesterday/today were reviewed  [x] All current labs were reviewed and interpreted for clinical significance   [x] Appropriate follow-up labs were ordered    Medications:  Personally reviewed in detail in conjunction w/ labs as documented for evidence of drug toxicity.     Infusion Medications    dextrose      dilTIAZem 100 mg in sodium chloride 0.9 % 100 mL infusion ([facility]) Stopped (01/17/24 1148)    sodium chloride Stopped (01/15/24 1335)     Scheduled Medications    vashe wound therapy   Topical BID    insulin lispro  0-4 Units SubCUTAneous TID WC    insulin lispro  0-4 Units SubCUTAneous Nightly    insulin lispro  0-4 Units SubCUTAneous Q4H    enoxaparin  1 mg/kg SubCUTAneous BID    piperacillin-tazobactam  3,375 mg IntraVENous Q8H    [Held by provider] apixaban  5

## 2024-01-19 NOTE — PLAN OF CARE
Problem: Discharge Planning  Goal: Discharge to home or other facility with appropriate resources  Outcome: Progressing     Problem: Chronic Conditions and Co-morbidities  Goal: Patient's chronic conditions and co-morbidity symptoms are monitored and maintained or improved  Outcome: Progressing     Problem: Safety - Adult  Goal: Free from fall injury  Outcome: Progressing     Problem: Pain  Goal: Verbalizes/displays adequate comfort level or baseline comfort level  Outcome: Progressing     Problem: Skin/Tissue Integrity  Goal: Absence of new skin breakdown  Description: 1.  Monitor for areas of redness and/or skin breakdown  2.  Assess vascular access sites hourly  3.  Every 4-6 hours minimum:  Change oxygen saturation probe site  4.  Every 4-6 hours:  If on nasal continuous positive airway pressure, respiratory therapy assess nares and determine need for appliance change or resting period.  Outcome: Progressing     Problem: Nutrition Deficit:  Goal: Optimize nutritional status  Outcome: Progressing

## 2024-01-19 NOTE — PROGRESS NOTES
Dr. Stephenson came to patient's room to check sacrum.   Picture taken place in wrong chart in error will be present correctly  by a.m.     Dr. Stephenson recommends continuing moist to dry dressing of soaked Vashe gauze into wound cover with dry gauze and ABD pad.  Will re evaluate Monday a.m.    But plans to have patient to be NPO midnight Sunday night so if needs debridement can do so.    Beside nurse Wanda will pass in report for the NPO on Michael night.  Dressing replaced.

## 2024-01-19 NOTE — PROGRESS NOTES
[facility] SURGERY    PATIENT NAME: Isac Lemus     TODAY'S DATE: 1/19/2024    CHIEF COMPLAINT: none    INTERVAL HISTORY/HPI:    Pt with some pain at sacrum.     REVIEW OF SYSTEMS:  Pertinent positives and negatives as per interval history section    OBJECTIVE:  VITALS:  /75   Pulse 85   Temp 98.7 °F (37.1 °C) (Axillary)   Resp 18   Ht 1.702 m (5' 7\")   Wt 100.1 kg (220 lb 10.9 oz)   SpO2 95%   BMI 34.56 kg/m²     INTAKE/OUTPUT:    I/O last 3 completed shifts:  In: 640 [P.O.:640]  Out: 650 [Urine:650]  I/O this shift:  In: 240 [P.O.:120; NG/GT:120]  Out: 400 [Urine:400]    CONSTITUTIONAL:  awake and alert  Sacrum - continued fibrinous slough along based and sides of wound    Data:  CBC:   Recent Labs     01/17/24 0441 01/18/24  0512 01/19/24  0604   WBC 6.7 6.5 6.7   HGB 9.0* 9.1* 9.5*   HCT 28.8* 28.5* 29.5*    299 306     BMP:    Recent Labs     01/17/24 0441 01/18/24  0512 01/19/24  0604    136 137   K 4.7 4.3 4.2    98* 98*   CO2 35* 36* 34*   BUN 11 10 12   CREATININE <0.5* <0.5* <0.5*   GLUCOSE 154* 106* 114*     Hepatic: No results for input(s): \"AST\", \"ALT\", \"ALB\", \"BILITOT\", \"ALKPHOS\" in the last 72 hours.  Mag:      Recent Labs     01/18/24  0512   MG 2.00      Phos:     Recent Labs     01/17/24 0441 01/18/24  0512 01/19/24  0604   PHOS 3.1 2.9 3.0      INR: No results for input(s): \"INR\" in the last 72 hours.    Radiology Review:  *Imaging personally reviewed by me.   NA      ASSESSMENT AND PLAN:  74 yo with sacral wound  Continue dressing changes this weekend. If not improving with this then set up for OR at 09:00 am Monday morning.  Continue to hold eliquis       Electronically signed by Casey Stephenson MD     [id number]

## 2024-01-19 NOTE — PROGRESS NOTES
01/18/24 2329   NIV Type   Equipment Type V60   Mode CPAP   Mask Type Full face mask   Mask Size Large   Assessment   Respirations 23   SpO2 95 %   Comfort Level Good   Using Accessory Muscles No   Mask Compliance Good   Skin Assessment Clean, dry, & intact   Skin Protection for O2 Device Yes   Location Nose   Settings/Measurements   PIP Observed 13 cm H20   CPAP/EPAP 12 cmH2O   Vt (Measured) 418 mL   FiO2  45 %   Minute Volume (L/min) 10 Liters   Mask Leak (lpm) 33 lpm   Patient's Home Machine No   Alarm Settings   Alarms On Y

## 2024-01-19 NOTE — PROGRESS NOTES
Comprehensive Nutrition Assessment    Type and Reason for Visit:  Reassess    Nutrition Recommendations/Plan:   Continue pureed diet - textures and consistencies per SLP   Magic Cups with meals   Encourage PO intakes as tolerated   If pt consumes less than 50% of meal, provide bolus of Jevity 1.5 of 237 mL up to 3 cans daily. 60 mL free water flush before and after administration   Provide 1 Packet of Prosource daily via PEG tube. 30 mL free water flush before and after administration   Monitor nutrition adequacy, pertinent labs, bowel habits, wt changes, and clinical progress     Malnutrition Assessment:  Malnutrition Status:  At risk for malnutrition (Comment) (01/19/24 1257)    Context:  Acute Illness     Findings of the 6 clinical characteristics of malnutrition:  Energy Intake:  Mild decrease in energy intake (Comment)  Fluid Accumulation:  Mild      Nutrition Assessment:    Follow up: Pt s/p debridement on 1/15. Pt continues on pureed diet w/ provale cup, PO intakes recently % of meals. Spoke to RN, reports pt ate 100% of breakfast today. Per EMR, pt has received one bolus feed on 1/17 and one feed on 1/18. RN reports if pt is unable to eat greater then 50% of lunch, plans for bolus feed. Wt trending up in EMR. Will continue magic cups. Continue current recommendations, will monitor.    Nutrition Related Findings:    -182 x 24 hours. + BM today. BUE trace edema, BLE + 1 non-pitting edema. Wound Type: Pressure Injury, Stage III, Surgical Incision       Current Nutrition Intake & Therapies:    Average Meal Intake: 51-75%, %, 0%  Average Supplements Intake: 1-25%  ADULT DIET; Dysphagia - Pureed  ADULT ORAL NUTRITION SUPPLEMENT; Breakfast, Lunch, Dinner; Frozen Oral Supplement  ADULT TUBE FEEDING; PEG; Standard with Fiber; Bolus; 3 Times Daily; 237; 60; Before and after each bolus; Protein; 1 bottle of Healthy Shot daily via syringe. 30 mL free water flush before and after administration  Current

## 2024-01-20 LAB
ALBUMIN SERPL-MCNC: 2.1 G/DL (ref 3.4–5)
ANION GAP SERPL CALCULATED.3IONS-SCNC: 4 MMOL/L (ref 3–16)
BUN SERPL-MCNC: 10 MG/DL (ref 7–20)
CALCIUM SERPL-MCNC: 8.7 MG/DL (ref 8.3–10.6)
CHLORIDE SERPL-SCNC: 98 MMOL/L (ref 99–110)
CO2 SERPL-SCNC: 35 MMOL/L (ref 21–32)
CREAT SERPL-MCNC: <0.5 MG/DL (ref 0.8–1.3)
DEPRECATED RDW RBC AUTO: 18.1 % (ref 12.4–15.4)
GFR SERPLBLD CREATININE-BSD FMLA CKD-EPI: >60 ML/MIN/{1.73_M2}
GLUCOSE BLD-MCNC: 104 MG/DL (ref 70–99)
GLUCOSE BLD-MCNC: 122 MG/DL (ref 70–99)
GLUCOSE BLD-MCNC: 131 MG/DL (ref 70–99)
GLUCOSE BLD-MCNC: 142 MG/DL (ref 70–99)
GLUCOSE BLD-MCNC: 143 MG/DL (ref 70–99)
GLUCOSE BLD-MCNC: 171 MG/DL (ref 70–99)
GLUCOSE BLD-MCNC: 193 MG/DL (ref 70–99)
GLUCOSE SERPL-MCNC: 107 MG/DL (ref 70–99)
HCT VFR BLD AUTO: 28.5 % (ref 40.5–52.5)
HGB BLD-MCNC: 9.2 G/DL (ref 13.5–17.5)
MCH RBC QN AUTO: 32.2 PG (ref 26–34)
MCHC RBC AUTO-ENTMCNC: 32.2 G/DL (ref 31–36)
MCV RBC AUTO: 100.1 FL (ref 80–100)
PERFORMED ON: ABNORMAL
PHOSPHATE SERPL-MCNC: 3 MG/DL (ref 2.5–4.9)
PLATELET # BLD AUTO: 306 K/UL (ref 135–450)
PMV BLD AUTO: 7.3 FL (ref 5–10.5)
POTASSIUM SERPL-SCNC: 4.4 MMOL/L (ref 3.5–5.1)
RBC # BLD AUTO: 2.84 M/UL (ref 4.2–5.9)
SODIUM SERPL-SCNC: 137 MMOL/L (ref 136–145)
WBC # BLD AUTO: 6.8 K/UL (ref 4–11)

## 2024-01-20 PROCEDURE — 94761 N-INVAS EAR/PLS OXIMETRY MLT: CPT

## 2024-01-20 PROCEDURE — 6370000000 HC RX 637 (ALT 250 FOR IP): Performed by: SURGERY

## 2024-01-20 PROCEDURE — C9113 INJ PANTOPRAZOLE SODIUM, VIA: HCPCS | Performed by: INTERNAL MEDICINE

## 2024-01-20 PROCEDURE — 36415 COLL VENOUS BLD VENIPUNCTURE: CPT

## 2024-01-20 PROCEDURE — 6370000000 HC RX 637 (ALT 250 FOR IP): Performed by: INTERNAL MEDICINE

## 2024-01-20 PROCEDURE — 2580000003 HC RX 258: Performed by: SURGERY

## 2024-01-20 PROCEDURE — 6360000002 HC RX W HCPCS: Performed by: INTERNAL MEDICINE

## 2024-01-20 PROCEDURE — 85027 COMPLETE CBC AUTOMATED: CPT

## 2024-01-20 PROCEDURE — 2580000003 HC RX 258: Performed by: INTERNAL MEDICINE

## 2024-01-20 PROCEDURE — 2700000000 HC OXYGEN THERAPY PER DAY

## 2024-01-20 PROCEDURE — 6360000002 HC RX W HCPCS: Performed by: NURSE PRACTITIONER

## 2024-01-20 PROCEDURE — 80069 RENAL FUNCTION PANEL: CPT

## 2024-01-20 PROCEDURE — 6370000000 HC RX 637 (ALT 250 FOR IP): Performed by: NURSE PRACTITIONER

## 2024-01-20 PROCEDURE — 2060000000 HC ICU INTERMEDIATE R&B

## 2024-01-20 PROCEDURE — 94660 CPAP INITIATION&MGMT: CPT

## 2024-01-20 RX ADMIN — SODIUM CHLORIDE, PRESERVATIVE FREE 10 ML: 5 INJECTION INTRAVENOUS at 10:40

## 2024-01-20 RX ADMIN — DILTIAZEM HYDROCHLORIDE 60 MG: 60 TABLET ORAL at 10:37

## 2024-01-20 RX ADMIN — METOPROLOL TARTRATE 25 MG: 25 TABLET, FILM COATED ORAL at 10:37

## 2024-01-20 RX ADMIN — PANTOPRAZOLE SODIUM 40 MG: 40 INJECTION, POWDER, FOR SOLUTION INTRAVENOUS at 10:40

## 2024-01-20 RX ADMIN — AMIODARONE HYDROCHLORIDE 400 MG: 200 TABLET ORAL at 10:37

## 2024-01-20 RX ADMIN — AMIODARONE HYDROCHLORIDE 400 MG: 200 TABLET ORAL at 21:00

## 2024-01-20 RX ADMIN — Medication: at 15:00

## 2024-01-20 RX ADMIN — PIPERACILLIN AND TAZOBACTAM 3375 MG: 3; .375 INJECTION, POWDER, FOR SOLUTION INTRAVENOUS at 13:10

## 2024-01-20 RX ADMIN — LEVETIRACETAM 750 MG: 100 SOLUTION ORAL at 10:39

## 2024-01-20 RX ADMIN — METOPROLOL TARTRATE 25 MG: 25 TABLET, FILM COATED ORAL at 21:00

## 2024-01-20 RX ADMIN — DONEPEZIL HYDROCHLORIDE 5 MG: 5 TABLET, FILM COATED ORAL at 21:01

## 2024-01-20 RX ADMIN — LEVETIRACETAM 750 MG: 100 SOLUTION ORAL at 21:06

## 2024-01-20 RX ADMIN — ENOXAPARIN SODIUM 100 MG: 100 INJECTION SUBCUTANEOUS at 10:40

## 2024-01-20 RX ADMIN — DILTIAZEM HYDROCHLORIDE 60 MG: 60 TABLET ORAL at 05:22

## 2024-01-20 RX ADMIN — DILTIAZEM HYDROCHLORIDE 60 MG: 60 TABLET ORAL at 23:35

## 2024-01-20 RX ADMIN — DILTIAZEM HYDROCHLORIDE 60 MG: 60 TABLET ORAL at 17:53

## 2024-01-20 RX ADMIN — TRAMADOL HYDROCHLORIDE 50 MG: 50 TABLET ORAL at 13:06

## 2024-01-20 RX ADMIN — LISINOPRIL 2.5 MG: 2.5 TABLET ORAL at 10:37

## 2024-01-20 RX ADMIN — PIPERACILLIN AND TAZOBACTAM 3375 MG: 3; .375 INJECTION, POWDER, FOR SOLUTION INTRAVENOUS at 05:18

## 2024-01-20 RX ADMIN — DILTIAZEM HYDROCHLORIDE 60 MG: 60 TABLET ORAL at 00:27

## 2024-01-20 RX ADMIN — ASPIRIN 81 MG: 81 TABLET, COATED ORAL at 10:37

## 2024-01-20 RX ADMIN — MODAFINIL 300 MG: 200 TABLET ORAL at 10:37

## 2024-01-20 RX ADMIN — ATORVASTATIN CALCIUM 40 MG: 40 TABLET, FILM COATED ORAL at 21:00

## 2024-01-20 RX ADMIN — Medication: at 21:43

## 2024-01-20 RX ADMIN — ENOXAPARIN SODIUM 100 MG: 100 INJECTION SUBCUTANEOUS at 21:00

## 2024-01-20 RX ADMIN — PIPERACILLIN AND TAZOBACTAM 3375 MG: 3; .375 INJECTION, POWDER, FOR SOLUTION INTRAVENOUS at 21:15

## 2024-01-20 ASSESSMENT — PAIN SCALES - WONG BAKER
WONGBAKER_NUMERICALRESPONSE: 0

## 2024-01-20 NOTE — PROGRESS NOTES
01/20/24 0345   NIV Type   Equipment Type v60   Mode CPAP   Mask Type Full face mask   Mask Size Large   Assessment   Respirations 21   Comfort Level Good   Using Accessory Muscles No   Mask Compliance Good   Skin Protection for O2 Device Yes   Orientation Middle   Location Nose   Settings/Measurements   CPAP/EPAP 12 cmH2O   Vt (Measured) 357 mL   FiO2  45 %   Minute Volume (L/min) 7.5 Liters   Mask Leak (lpm) 9 lpm   Patient's Home Machine No   Alarm Settings   Alarms On Y   Low Pressure (cmH2O) 6 cmH2O   High Pressure (cmH2O) 30 cmH2O   Delay Alarm 20 sec(s)   RR Low (bpm) 6   RR High (bpm) 40 br/min

## 2024-01-20 NOTE — PROGRESS NOTES
Stopped (01/15/24 1335)     PRN Meds: traMADol, 50 mg, Q6H PRN  glucose, 4 tablet, PRN  dextrose bolus, 125 mL, PRN   Or  dextrose bolus, 250 mL, PRN  glucagon (rDNA), 1 mg, PRN  dextrose, , Continuous PRN  guaiFENesin, 200 mg, Q4H PRN  sodium chloride flush, 5-40 mL, PRN  sodium chloride, , PRN  polyethylene glycol, 17 g, Daily PRN  acetaminophen, 650 mg, Q6H PRN   Or  acetaminophen, 650 mg, Q6H PRN        Labs and Imaging   No results found.    CBC:   Recent Labs     01/18/24  0512 01/19/24  0604 01/20/24  0601   WBC 6.5 6.7 6.8   HGB 9.1* 9.5* 9.2*    306 306     BMP:    Recent Labs     01/18/24  0512 01/19/24  0604 01/20/24  0601    137 137   K 4.3 4.2 4.4   CL 98* 98* 98*   CO2 36* 34* 35*   BUN 10 12 10   CREATININE <0.5* <0.5* <0.5*   GLUCOSE 106* 114* 107*     Hepatic: No results for input(s): \"AST\", \"ALT\", \"ALB\", \"BILITOT\", \"ALKPHOS\" in the last 72 hours.  Lipids:   Lab Results   Component Value Date/Time    CHOL 147 10/17/2014 07:22 AM    HDL 31 10/17/2014 07:22 AM    TRIG 111 12/23/2023 08:33 AM     Hemoglobin A1C:   Lab Results   Component Value Date/Time    LABA1C 5.9 01/18/2024 05:12 AM     TSH: No results found for: \"TSH\"  Troponin: No results found for: \"TROPONINT\"  Lactic Acid: No results for input(s): \"LACTA\" in the last 72 hours.  BNP: No results for input(s): \"PROBNP\" in the last 72 hours.  UA:  Lab Results   Component Value Date/Time    NITRU Negative 01/01/2024 08:46 PM    COLORU Yellow 01/01/2024 08:46 PM    PHUR 5.5 01/01/2024 08:46 PM    WBCUA 0-2 01/01/2024 08:46 PM    RBCUA 0-2 01/01/2024 08:46 PM    BACTERIA Rare 01/01/2024 08:46 PM    CLARITYU Clear 01/01/2024 08:46 PM    SPECGRAV 1.025 01/01/2024 08:46 PM    LEUKOCYTESUR Negative 01/01/2024 08:46 PM    UROBILINOGEN 1.0 01/01/2024 08:46 PM    BILIRUBINUR Negative 01/01/2024 08:46 PM    BLOODU Negative 01/01/2024 08:46 PM    GLUCOSEU Negative 01/01/2024 08:46 PM    KETUA Negative 01/01/2024 08:46 PM     Urine Cultures: No

## 2024-01-20 NOTE — PROGRESS NOTES
01/19/24 2205   NIV Type   NIV Started/Stopped On   Equipment Type v60   Mode CPAP   Mask Type Full face mask   Mask Size Large   Assessment   Pulse 92   Respirations 29   SpO2 92 %   Comfort Level Fair   Using Accessory Muscles Yes   Mask Compliance Good   Skin Assessment Other (comment/note)  (bandaid on bridge of nose)   Skin Protection for O2 Device Yes   Orientation Middle   Location Nose   Settings/Measurements   CPAP/EPAP 12 cmH2O   Vt (Measured) 478 mL   FiO2  45 %   Minute Volume (L/min) 7.6 Liters   Patient's Home Machine No   Alarm Settings   Alarms On Y   Low Pressure (cmH2O) 6 cmH2O   High Pressure (cmH2O) 30 cmH2O   Delay Alarm 20 sec(s)   RR Low (bpm) 6   RR High (bpm) 40 br/min

## 2024-01-20 NOTE — PROGRESS NOTES
During morning med pass pts family asked me to pre medicate the patient prior dressing change. Pt was just medicated and he wants to eat prior change. Will attempt again in 45 mins. Pts position was changed at 0830, 1100, and 1300. Removed prevent foam on pts nose bridge, there was a wound with what appeared to be gel. That was removed by wife and it was accqa cell strip with bandaid. Appears healing. Syed lilly

## 2024-01-21 LAB
GLUCOSE BLD-MCNC: 105 MG/DL (ref 70–99)
GLUCOSE BLD-MCNC: 121 MG/DL (ref 70–99)
GLUCOSE BLD-MCNC: 142 MG/DL (ref 70–99)
GLUCOSE BLD-MCNC: 172 MG/DL (ref 70–99)
GLUCOSE BLD-MCNC: 197 MG/DL (ref 70–99)
PERFORMED ON: ABNORMAL

## 2024-01-21 PROCEDURE — 6370000000 HC RX 637 (ALT 250 FOR IP): Performed by: INTERNAL MEDICINE

## 2024-01-21 PROCEDURE — 94761 N-INVAS EAR/PLS OXIMETRY MLT: CPT

## 2024-01-21 PROCEDURE — 2700000000 HC OXYGEN THERAPY PER DAY

## 2024-01-21 PROCEDURE — C9113 INJ PANTOPRAZOLE SODIUM, VIA: HCPCS | Performed by: INTERNAL MEDICINE

## 2024-01-21 PROCEDURE — 6360000002 HC RX W HCPCS: Performed by: INTERNAL MEDICINE

## 2024-01-21 PROCEDURE — 94660 CPAP INITIATION&MGMT: CPT

## 2024-01-21 PROCEDURE — 6370000000 HC RX 637 (ALT 250 FOR IP): Performed by: SURGERY

## 2024-01-21 PROCEDURE — 2580000003 HC RX 258: Performed by: INTERNAL MEDICINE

## 2024-01-21 PROCEDURE — 6370000000 HC RX 637 (ALT 250 FOR IP): Performed by: NURSE PRACTITIONER

## 2024-01-21 PROCEDURE — 2060000000 HC ICU INTERMEDIATE R&B

## 2024-01-21 PROCEDURE — 2580000003 HC RX 258: Performed by: SURGERY

## 2024-01-21 PROCEDURE — 6360000002 HC RX W HCPCS: Performed by: NURSE PRACTITIONER

## 2024-01-21 RX ADMIN — TRAMADOL HYDROCHLORIDE 50 MG: 50 TABLET ORAL at 08:56

## 2024-01-21 RX ADMIN — PIPERACILLIN AND TAZOBACTAM 3375 MG: 3; .375 INJECTION, POWDER, FOR SOLUTION INTRAVENOUS at 13:07

## 2024-01-21 RX ADMIN — Medication: at 20:41

## 2024-01-21 RX ADMIN — DILTIAZEM HYDROCHLORIDE 60 MG: 60 TABLET ORAL at 13:05

## 2024-01-21 RX ADMIN — ATORVASTATIN CALCIUM 40 MG: 40 TABLET, FILM COATED ORAL at 20:41

## 2024-01-21 RX ADMIN — LEVETIRACETAM 750 MG: 100 SOLUTION ORAL at 11:08

## 2024-01-21 RX ADMIN — DONEPEZIL HYDROCHLORIDE 5 MG: 5 TABLET, FILM COATED ORAL at 20:41

## 2024-01-21 RX ADMIN — TRAMADOL HYDROCHLORIDE 50 MG: 50 TABLET ORAL at 17:00

## 2024-01-21 RX ADMIN — AMIODARONE HYDROCHLORIDE 400 MG: 200 TABLET ORAL at 20:40

## 2024-01-21 RX ADMIN — DILTIAZEM HYDROCHLORIDE 60 MG: 60 TABLET ORAL at 17:00

## 2024-01-21 RX ADMIN — PIPERACILLIN AND TAZOBACTAM 3375 MG: 3; .375 INJECTION, POWDER, FOR SOLUTION INTRAVENOUS at 05:05

## 2024-01-21 RX ADMIN — METOPROLOL TARTRATE 25 MG: 25 TABLET, FILM COATED ORAL at 20:41

## 2024-01-21 RX ADMIN — AMIODARONE HYDROCHLORIDE 400 MG: 200 TABLET ORAL at 08:56

## 2024-01-21 RX ADMIN — LISINOPRIL 2.5 MG: 2.5 TABLET ORAL at 08:57

## 2024-01-21 RX ADMIN — TRAMADOL HYDROCHLORIDE 50 MG: 50 TABLET ORAL at 03:24

## 2024-01-21 RX ADMIN — PANTOPRAZOLE SODIUM 40 MG: 40 INJECTION, POWDER, FOR SOLUTION INTRAVENOUS at 08:57

## 2024-01-21 RX ADMIN — PIPERACILLIN AND TAZOBACTAM 3375 MG: 3; .375 INJECTION, POWDER, FOR SOLUTION INTRAVENOUS at 21:05

## 2024-01-21 RX ADMIN — SODIUM CHLORIDE, PRESERVATIVE FREE 10 ML: 5 INJECTION INTRAVENOUS at 08:58

## 2024-01-21 RX ADMIN — ENOXAPARIN SODIUM 100 MG: 100 INJECTION SUBCUTANEOUS at 08:56

## 2024-01-21 RX ADMIN — ASPIRIN 81 MG: 81 TABLET, COATED ORAL at 08:56

## 2024-01-21 RX ADMIN — DILTIAZEM HYDROCHLORIDE 60 MG: 60 TABLET ORAL at 04:34

## 2024-01-21 RX ADMIN — ENOXAPARIN SODIUM 100 MG: 100 INJECTION SUBCUTANEOUS at 20:42

## 2024-01-21 RX ADMIN — Medication: at 17:00

## 2024-01-21 RX ADMIN — METOPROLOL TARTRATE 25 MG: 25 TABLET, FILM COATED ORAL at 08:57

## 2024-01-21 RX ADMIN — LEVETIRACETAM 750 MG: 100 SOLUTION ORAL at 20:42

## 2024-01-21 RX ADMIN — MODAFINIL 300 MG: 200 TABLET ORAL at 08:56

## 2024-01-21 ASSESSMENT — PAIN SCALES - WONG BAKER
WONGBAKER_NUMERICALRESPONSE: 0

## 2024-01-21 NOTE — PROGRESS NOTES
01/20/24 2214   NIV Type   NIV Started/Stopped On   Equipment Type v60   Mode CPAP   Mask Type Full face mask   Mask Size Large   Assessment   Respirations 26   SpO2 93 %   Comfort Level Good   Using Accessory Muscles No   Mask Compliance Good   Skin Protection for O2 Device Yes   Orientation Middle   Location Nose   Intervention(s) Skin Barrier   Settings/Measurements   PIP Observed 14 cm H20   CPAP/EPAP 12 cmH2O   Vt (Measured) 432 mL   FiO2  45 %   Minute Volume (L/min) 13.1 Liters   Mask Leak (lpm) 10 lpm   Patient's Home Machine No   Alarm Settings   Alarms On Y   Low Pressure (cmH2O) 6 cmH2O   High Pressure (cmH2O) 30 cmH2O   Apnea (secs) 20 secs   RR Low (bpm) 6   RR High (bpm) 40 br/min

## 2024-01-21 NOTE — PROGRESS NOTES
01/20/24 2334   NIV Type   Equipment Type v60   Mode CPAP   Mask Type Full face mask   Mask Size Large   Assessment   Respirations 26   SpO2 93 %   Comfort Level Good   Using Accessory Muscles No   Mask Compliance Good   Skin Protection for O2 Device Yes   Orientation Middle   Location Nose   Intervention(s) Skin Barrier   Settings/Measurements   PIP Observed 13 cm H20   CPAP/EPAP 12 cmH2O   Vt (Measured) 400 mL   FiO2  45 %   Minute Volume (L/min) 10.8 Liters   Mask Leak (lpm) 6 lpm   Patient's Home Machine No   Alarm Settings   Alarms On Y   Low Pressure (cmH2O) 6 cmH2O   High Pressure (cmH2O) 30 cmH2O   Apnea (secs) 20 secs   RR Low (bpm) 6   RR High (bpm) 40 br/min

## 2024-01-21 NOTE — PROGRESS NOTES
V2.0    [facility] [place] Progress Note      Name:  Isac Lemus /Age/Sex: 1950  (73 y.o. male)   MRN & CSN:  [id number] & [id number] Encounter Date/Time: 2024 12:49 PM EST   Location:  042 PCP: Taryn Rivera APRN - CNP     Attending:Greg Valle MD       Hospital Day: 8    Assessment and Recommendations     Patient is a 73-year-old male past medical history of TBI with bleed who was admitted to [facility] from acute rehab with decubitus ulcer for surgical debridement    #.  Intermittent fevers likely due to infected sacral wound: fever resolved.   #.  Suspected infected sacral wound  Infectious workup at [facility] unremarkable  S/p I&D 1/15/2024 without complication, without osteo  Continue Zosyn  General surgery following and plan for wound chech  noted for Monday.     #.  Traumatic brain injury  History of traumatic brain injury, bilateral subdural hematoma, subarachnoid hemorrhage, skull base fracture, right transverse/sigmoid sinus thrombosis  Plan no surgical intervention and serial scans during prior acute stay stable  Continue Keppra  EKG showing epileptiform discharges during prior acute stay  Neurology consulted  Continue Aricept and provigil    #.  Paroxysmal atrial fibrillation  With episode of RVR  Continue to hold Eliquis for potential surgical intervention for sacral wound by general surgery  Continue amiodarone, statins, aspirin, diltiazem, metoprolol    #.  Dysphagia status post PEG  Continue tube feeds    #.  History of right peroneal DVT  Continue to hold Eliquis for potential I&D on Monday if needed    #.  Thrombocytopenia: Resolved  #.  History of HIT  Resume Eliquis if surgery does not recommend repeat I&D on Monday    #.  Diabetes mellitus, type II with hyperglycemia  Continue current insulin regimen      DVT Prophylaxis: Lovenox.   Code Status: Total Support.   Barrier to DC: Surgical clearance, pending clinical improvement,          Subjective:     Patient was seen and

## 2024-01-21 NOTE — PROGRESS NOTES
01/21/24 0000   NIV Type   $NIV $Daily Charge   Equipment Type v60   Mode CPAP   Mask Type Full face mask   Mask Size Large   Assessment   Respirations 28   SpO2 93 %   Comfort Level Good   Using Accessory Muscles No   Mask Compliance Good   Skin Protection for O2 Device Yes   Orientation Middle   Location Nose   Intervention(s) Skin Barrier   Settings/Measurements   PIP Observed 13 cm H20   CPAP/EPAP 12 cmH2O   Vt (Measured) 524 mL   FiO2  45 %   Minute Volume (L/min) 14.8 Liters   Mask Leak (lpm) 7 lpm   Patient's Home Machine No   Alarm Settings   Alarms On Y   Low Pressure (cmH2O) 6 cmH2O   High Pressure (cmH2O) 30 cmH2O   Apnea (secs) 20 secs   RR Low (bpm) 6   RR High (bpm) 40 br/min

## 2024-01-21 NOTE — PROGRESS NOTES
01/21/24 0343   NIV Type   Equipment Type v60   Mode CPAP   Mask Type Full face mask   Mask Size Large   Assessment   Respirations 23   SpO2 97 %   Comfort Level Good   Using Accessory Muscles No   Mask Compliance Good   Skin Protection for O2 Device Yes   Orientation Middle   Location Nose   Intervention(s) Skin Barrier   Settings/Measurements   PIP Observed 13 cm H20   CPAP/EPAP 12 cmH2O   Vt (Measured) 486 mL   FiO2  45 %   Minute Volume (L/min) 11 Liters   Mask Leak (lpm) 3 lpm   Patient's Home Machine No   Alarm Settings   Alarms On Y   Low Pressure (cmH2O) 6 cmH2O   High Pressure (cmH2O) 30 cmH2O   Apnea (secs) 20 secs   RR Low (bpm) 6   RR High (bpm) 40 br/min

## 2024-01-22 LAB
GLUCOSE BLD-MCNC: 104 MG/DL (ref 70–99)
GLUCOSE BLD-MCNC: 111 MG/DL (ref 70–99)
GLUCOSE BLD-MCNC: 114 MG/DL (ref 70–99)
GLUCOSE BLD-MCNC: 133 MG/DL (ref 70–99)
GLUCOSE BLD-MCNC: 136 MG/DL (ref 70–99)
GLUCOSE BLD-MCNC: 142 MG/DL (ref 70–99)
PERFORMED ON: ABNORMAL

## 2024-01-22 PROCEDURE — 6370000000 HC RX 637 (ALT 250 FOR IP): Performed by: INTERNAL MEDICINE

## 2024-01-22 PROCEDURE — 97530 THERAPEUTIC ACTIVITIES: CPT

## 2024-01-22 PROCEDURE — 6370000000 HC RX 637 (ALT 250 FOR IP): Performed by: NURSE PRACTITIONER

## 2024-01-22 PROCEDURE — 94761 N-INVAS EAR/PLS OXIMETRY MLT: CPT

## 2024-01-22 PROCEDURE — 2580000003 HC RX 258: Performed by: INTERNAL MEDICINE

## 2024-01-22 PROCEDURE — 6360000002 HC RX W HCPCS: Performed by: INTERNAL MEDICINE

## 2024-01-22 PROCEDURE — 6360000002 HC RX W HCPCS: Performed by: NURSE PRACTITIONER

## 2024-01-22 PROCEDURE — 97110 THERAPEUTIC EXERCISES: CPT

## 2024-01-22 PROCEDURE — 97535 SELF CARE MNGMENT TRAINING: CPT

## 2024-01-22 PROCEDURE — 2060000000 HC ICU INTERMEDIATE R&B

## 2024-01-22 PROCEDURE — 6370000000 HC RX 637 (ALT 250 FOR IP): Performed by: SURGERY

## 2024-01-22 PROCEDURE — 2580000003 HC RX 258: Performed by: SURGERY

## 2024-01-22 PROCEDURE — 99232 SBSQ HOSP IP/OBS MODERATE 35: CPT | Performed by: NURSE PRACTITIONER

## 2024-01-22 PROCEDURE — C9113 INJ PANTOPRAZOLE SODIUM, VIA: HCPCS | Performed by: INTERNAL MEDICINE

## 2024-01-22 PROCEDURE — 2700000000 HC OXYGEN THERAPY PER DAY

## 2024-01-22 PROCEDURE — 94660 CPAP INITIATION&MGMT: CPT

## 2024-01-22 RX ORDER — AMIODARONE HYDROCHLORIDE 200 MG/1
200 TABLET ORAL 2 TIMES DAILY
Status: DISCONTINUED | OUTPATIENT
Start: 2024-01-22 | End: 2024-01-22

## 2024-01-22 RX ORDER — AMIODARONE HYDROCHLORIDE 200 MG/1
200 TABLET ORAL DAILY
Status: DISCONTINUED | OUTPATIENT
Start: 2024-01-23 | End: 2024-01-24

## 2024-01-22 RX ADMIN — DILTIAZEM HYDROCHLORIDE 60 MG: 60 TABLET ORAL at 05:27

## 2024-01-22 RX ADMIN — PIPERACILLIN AND TAZOBACTAM 3375 MG: 3; .375 INJECTION, POWDER, FOR SOLUTION INTRAVENOUS at 05:20

## 2024-01-22 RX ADMIN — DILTIAZEM HYDROCHLORIDE 60 MG: 60 TABLET ORAL at 13:33

## 2024-01-22 RX ADMIN — LEVETIRACETAM 750 MG: 100 SOLUTION ORAL at 10:21

## 2024-01-22 RX ADMIN — TRAMADOL HYDROCHLORIDE 50 MG: 50 TABLET ORAL at 03:13

## 2024-01-22 RX ADMIN — SODIUM CHLORIDE, PRESERVATIVE FREE 10 ML: 5 INJECTION INTRAVENOUS at 22:17

## 2024-01-22 RX ADMIN — AMIODARONE HYDROCHLORIDE 400 MG: 200 TABLET ORAL at 10:15

## 2024-01-22 RX ADMIN — ENOXAPARIN SODIUM 100 MG: 100 INJECTION SUBCUTANEOUS at 10:20

## 2024-01-22 RX ADMIN — ATORVASTATIN CALCIUM 40 MG: 40 TABLET, FILM COATED ORAL at 22:17

## 2024-01-22 RX ADMIN — MODAFINIL 300 MG: 200 TABLET ORAL at 10:16

## 2024-01-22 RX ADMIN — LISINOPRIL 2.5 MG: 2.5 TABLET ORAL at 10:15

## 2024-01-22 RX ADMIN — TRAMADOL HYDROCHLORIDE 50 MG: 50 TABLET ORAL at 18:07

## 2024-01-22 RX ADMIN — DONEPEZIL HYDROCHLORIDE 5 MG: 5 TABLET, FILM COATED ORAL at 22:17

## 2024-01-22 RX ADMIN — Medication: at 10:15

## 2024-01-22 RX ADMIN — ASPIRIN 81 MG: 81 TABLET, COATED ORAL at 10:16

## 2024-01-22 RX ADMIN — METOPROLOL TARTRATE 25 MG: 25 TABLET, FILM COATED ORAL at 10:15

## 2024-01-22 RX ADMIN — DILTIAZEM HYDROCHLORIDE 60 MG: 60 TABLET ORAL at 18:07

## 2024-01-22 RX ADMIN — SODIUM CHLORIDE, PRESERVATIVE FREE 10 ML: 5 INJECTION INTRAVENOUS at 10:17

## 2024-01-22 RX ADMIN — PIPERACILLIN AND TAZOBACTAM 3375 MG: 3; .375 INJECTION, POWDER, FOR SOLUTION INTRAVENOUS at 13:34

## 2024-01-22 RX ADMIN — PIPERACILLIN AND TAZOBACTAM 3375 MG: 3; .375 INJECTION, POWDER, FOR SOLUTION INTRAVENOUS at 22:28

## 2024-01-22 RX ADMIN — ENOXAPARIN SODIUM 100 MG: 100 INJECTION SUBCUTANEOUS at 22:17

## 2024-01-22 RX ADMIN — METOPROLOL TARTRATE 25 MG: 25 TABLET, FILM COATED ORAL at 22:17

## 2024-01-22 RX ADMIN — LEVETIRACETAM 750 MG: 100 SOLUTION ORAL at 22:16

## 2024-01-22 RX ADMIN — DILTIAZEM HYDROCHLORIDE 60 MG: 60 TABLET ORAL at 00:45

## 2024-01-22 RX ADMIN — PANTOPRAZOLE SODIUM 40 MG: 40 INJECTION, POWDER, FOR SOLUTION INTRAVENOUS at 10:14

## 2024-01-22 ASSESSMENT — PAIN SCALES - WONG BAKER

## 2024-01-22 ASSESSMENT — PAIN SCALES - GENERAL: PAINLEVEL_OUTOF10: 0

## 2024-01-22 NOTE — PROGRESS NOTES
V2.0    [facility] Progress Note      Name:  Isac Lemus /Age/Sex: 1950  (73 y.o. male)   MRN & CSN:  [id number] & [id number] Encounter Date/Time: 2024 12:49 PM EST   Location:  042 PCP: Taryn Rivera APRN - CNP     Attending:Greg Valle MD       Hospital Day: 9    Assessment and Recommendations     Patient is a 73-year-old male past medical history of TBI with bleed who was admitted to [facility] from acute rehab with decubitus ulcer for surgical debridement    #.  Intermittent fevers likely due to infected sacral wound: fever resolved.   #.  Suspected infected sacral wound  Infectious workup at [facility] unremarkable  S/p I&D 1/15/2024 without complication, without osteo  Continue Zosyn: can Switch to oral augmentin at [place]  General surgery following and plan for wound check pending. This is was planned for today but had to postphone due to surgeon being sick.       #.  Traumatic brain injury  History of traumatic brain injury, bilateral subdural hematoma, subarachnoid hemorrhage, skull base fracture, right transverse/sigmoid sinus thrombosis  Plan no surgical intervention and serial scans during prior acute stay stable  Continue Keppra  EKG showing epileptiform discharges during prior acute stay  Continue Aricept and provigil    #.  Paroxysmal atrial fibrillation  With episode of RVR  Continue to hold Eliquis for potential surgical intervention for sacral wound by general surgery  Continue amiodarone, statins, aspirin, diltiazem, metoprolol    #.  Dysphagia status post PEG  Continue tube feeds    #.  History of right peroneal DVT  Continue to hold Eliquis for potential I&D on Monday if needed    #.  Thrombocytopenia: Resolved  #.  History of HIT  Resume Eliquis if surgery does not recommend repeat I&D on Monday    #.  Diabetes mellitus, type II with hyperglycemia  Continue current insulin regimen    Completed P2P on  for LTAC which was denied.       DVT Prophylaxis: Lovenox.  Oral BID    levETIRAcetam  750 mg PEG Tube BID      Infusions:    dextrose      dilTIAZem 100 mg in sodium chloride 0.9 % 100 mL infusion ([facility]) Stopped (01/17/24 1148)    sodium chloride Stopped (01/15/24 1335)     PRN Meds: traMADol, 50 mg, Q6H PRN  glucose, 4 tablet, PRN  dextrose bolus, 125 mL, PRN   Or  dextrose bolus, 250 mL, PRN  glucagon (rDNA), 1 mg, PRN  dextrose, , Continuous PRN  guaiFENesin, 200 mg, Q4H PRN  sodium chloride flush, 5-40 mL, PRN  sodium chloride, , PRN  polyethylene glycol, 17 g, Daily PRN  acetaminophen, 650 mg, Q6H PRN   Or  acetaminophen, 650 mg, Q6H PRN        Labs and Imaging   No results found.    CBC:   Recent Labs     01/20/24  0601   WBC 6.8   HGB 9.2*          BMP:    Recent Labs     01/20/24  0601      K 4.4   CL 98*   CO2 35*   BUN 10   CREATININE <0.5*   GLUCOSE 107*       Hepatic: No results for input(s): \"AST\", \"ALT\", \"ALB\", \"BILITOT\", \"ALKPHOS\" in the last 72 hours.  Lipids:   Lab Results   Component Value Date/Time    CHOL 147 10/17/2014 07:22 AM    HDL 31 10/17/2014 07:22 AM    TRIG 111 12/23/2023 08:33 AM     Hemoglobin A1C:   Lab Results   Component Value Date/Time    LABA1C 5.9 01/18/2024 05:12 AM     TSH: No results found for: \"TSH\"  Troponin: No results found for: \"TROPONINT\"  Lactic Acid: No results for input(s): \"LACTA\" in the last 72 hours.  BNP: No results for input(s): \"PROBNP\" in the last 72 hours.  UA:  Lab Results   Component Value Date/Time    NITRU Negative 01/01/2024 08:46 PM    COLORU Yellow 01/01/2024 08:46 PM    PHUR 5.5 01/01/2024 08:46 PM    WBCUA 0-2 01/01/2024 08:46 PM    RBCUA 0-2 01/01/2024 08:46 PM    BACTERIA Rare 01/01/2024 08:46 PM    CLARITYU Clear 01/01/2024 08:46 PM    SPECGRAV 1.025 01/01/2024 08:46 PM    LEUKOCYTESUR Negative 01/01/2024 08:46 PM    UROBILINOGEN 1.0 01/01/2024 08:46 PM    BILIRUBINUR Negative 01/01/2024 08:46 PM    BLOODU Negative 01/01/2024 08:46 PM    GLUCOSEU Negative 01/01/2024 08:46 PM    KETUA

## 2024-01-22 NOTE — PROGRESS NOTES
Left ventricle: The cavity size is normal. Wall thickness was increased in     a pattern of moderate LVH. Systolic function is normal. The estimated     ejection fraction is 60-65%. Wall motion is normal; there are no regional     wall motion abnormalities. Cannot assess LV diastolic function.   - Mitral valve: The annulus is mildly calcified.   - Right ventricle: Systolic function is normal.   - Atrial septum: Agitated saline contrast study at baseline or with     provocation, shows no right-to-left atrial level shunt.      All labs and testing reviewed.  Lab Review     Renal Profile:   Lab Results   Component Value Date/Time    CREATININE <0.5 01/20/2024 06:01 AM    BUN 10 01/20/2024 06:01 AM     01/20/2024 06:01 AM    K 4.4 01/20/2024 06:01 AM    K 4.2 01/15/2024 06:23 AM    CL 98 01/20/2024 06:01 AM    CO2 35 01/20/2024 06:01 AM     CBC:    Lab Results   Component Value Date/Time    WBC 6.8 01/20/2024 06:01 AM    RBC 2.84 01/20/2024 06:01 AM    HGB 9.2 01/20/2024 06:01 AM    HCT 28.5 01/20/2024 06:01 AM    .1 01/20/2024 06:01 AM    RDW 18.1 01/20/2024 06:01 AM     01/20/2024 06:01 AM     BNP:  No results found for: \"BNP\"  Fasting Lipid Panel:    Lab Results   Component Value Date/Time    CHOL 147 10/17/2014 07:22 AM    HDL 31 10/17/2014 07:22 AM    TRIG 111 12/23/2023 08:33 AM     Cardiac Enzymes:  CK/MbTroponin  Lab Results   Component Value Date/Time    TROPONINI <0.01 04/06/2016 12:36 AM     PT/ INR   Lab Results   Component Value Date/Time    INR 1.46 10/26/2014 04:38 AM    INR 1.44 10/25/2014 04:26 AM    INR 1.53 10/24/2014 04:20 AM    PROTIME 16.1 10/26/2014 04:38 AM    PROTIME 15.9 10/25/2014 04:26 AM    PROTIME 16.9 10/24/2014 04:20 AM     PTT No results found for: \"PTT\"   Lab Results   Component Value Date/Time    MG 2.00 01/18/2024 05:12 AM    No results found for: \"TSH\"    Assessment:  Persistent atrial arrhythmias (AF/AFL): ongoing with improved HR              -[id number] score  4 (age, HTN, DM)   CAD s/p CABG 2014   HTN: controlled   HLD  DM  Fall resulting in skull fractures with SDH and SAH              -prolonged hospitalization complicated by HIT, respiratory failure, DVT, pneumonia, colonic pseudo-obstruction and dysphagia   Fever with normal WBC              -ID suspecting central fever   Normocytic anemia   Hypercapnia   Sacral wound s/p surgical debridement    -positive for Pseudomonas and Proteus      Plan:   1. Continue therapeutic Lovenox as there is a possibility of repeat surgical debridement   2. Decrease amiodarone to 200 mg PO daily   3. Continue Cardizem 60 mg per PEG q 6 hours   4. Can plan for cardioversion in the future once there is no longer a possibility of interruption in anticoagulation if he fails to convert chemically. In the interim, I believe the amiodarone has contributed to improved HR.   5. Continue to monitor on telemetry  6. QTc 462 ms. If ID feels that a fluoroquinolone antibiotic may be of benefit, likely okay to start, especially while patient is on a monitored unit. Would recommend daily EKGs. Reviewed with Dr. Kan.         Linda Srinivasan, VANGIE-CNP  [facility] [place]  [phone]          Imaging Studies/Medications

## 2024-01-22 NOTE — PROGRESS NOTES
01/21/24 2301   NIV Type   NIV Started/Stopped On   Equipment Type v60   Mode CPAP   Mask Type Full face mask   Mask Size Large   Assessment   Respirations 27   SpO2 90 %   Comfort Level Good   Using Accessory Muscles No   Mask Compliance Good   Skin Protection for O2 Device Yes   Orientation Middle   Location Nose   Settings/Measurements   PIP Observed 13 cm H20   CPAP/EPAP 12 cmH2O   Vt (Measured) 350 mL   FiO2  45 %  (increased to 55%)   Minute Volume (L/min) 8.8 Liters   Mask Leak (lpm) 20 lpm   Patient's Home Machine No   Electrical Safety Check Performed Yes   Alarm Settings   Alarms On Y   Low Pressure (cmH2O) 6 cmH2O   High Pressure (cmH2O) 30 cmH2O   Apnea (secs) 20 secs   RR Low (bpm) 6   RR High (bpm) 40 br/min

## 2024-01-22 NOTE — CARE COORDINATION
This writer received a call from [facility] liaison (Kristy) to notify us of intent to deny and requesting P2P as follows:  Isac Lemus  BD [birth year]  ID # [id number]    Phone # [phone]  Opt #5  Deadline  1/22/24  1400    PS sent to Dr Vivien Vines, RN

## 2024-01-22 NOTE — CARE COORDINATION
Documentation from Dr Puga - P2P denial upheld. This writer spoke with Susana ([facility] liaison) and updated family on status. [facility] has advised us to wait until the debridement  is done, and wound vac applied - then reinitiate a pre cert. Dr Puga is in agreement with this plan.    Following.    Sharmaine Vines RN

## 2024-01-22 NOTE — PROGRESS NOTES
01/22/24 0003   NIV Type   $NIV $Daily Charge   Equipment Type v60   Mode CPAP   Mask Type Full face mask   Mask Size Large   Assessment   Respirations 24   SpO2 94 %   Comfort Level Good   Using Accessory Muscles No   Mask Compliance Good   Skin Protection for O2 Device Yes   Orientation Middle   Location Nose   Settings/Measurements   PIP Observed 12 cm H20   CPAP/EPAP 12 cmH2O   Vt (Measured) 491 mL   FiO2  55 %   Minute Volume (L/min) 13 Liters   Mask Leak (lpm) 8 lpm   Patient's Home Machine No   Alarm Settings   Alarms On Y   Low Pressure (cmH2O) 6 cmH2O   High Pressure (cmH2O) 30 cmH2O   Apnea (secs) 20 secs   RR Low (bpm) 6   RR High (bpm) 40 br/min

## 2024-01-22 NOTE — PROGRESS NOTES
Physical Therapy  Facility/Department: [place] [address] PCU  Daily Treatment Note  NAME: Isac Lemus  : 1950  MRN: [id number]    Date of Service: 2024    Discharge Recommendations:  Subacute/Skilled Nursing Facility, LTACH   PT Equipment Recommendations  Equipment Needed: No    Therapy discharge recommendations take into account each patient's current medical complexities and are subject to input/oversight from the patient's healthcare team.      Barriers to Home Discharge:   [x] Steps to access home entry or bed/bath:   [x] Unable to transfer, ambulate, or propel wheelchair household distances without assist   [x] Limited available assist at home upon discharge    [x] Patient or family requests d/c to post-acute facility    [x] Poor cognition/safety awareness for d/c to home alone    [] Unable to maintain ordered weight bearing status    [x] Patient with salient signs of long-standing immobility   [x] Decreased independence with ADLs   [x] Increased risk for falls   [] Other:     If pt is unable to be seen after this session, please let this note serve as discharge summary.  Please see case management note for discharge disposition.  Thank you.    Patient Diagnosis(es): The encounter diagnosis was Wound of sacral region, initial encounter.    Assessment   Assessment: Pt seen as cotx with OT to progress functional mobility safely and maximize outcomes compared to 1:1 session. Pt overall limited by endurance and arousal, although arousal did improve once place in chair position in bed. Pt not as participative in therex this date. Max verbal, tactile cues required. Family supportive and at bedside, but pt does appear over stimulated with complex cues and multiple cues. Pt would benefit from continued skilled PT to address remaining deficits. Recommend LTACH vs SNF upon d/c    Activity Tolerance: Patient limited by fatigue;Patient limited by endurance;Treatment limited secondary to decreased cognition;Patient

## 2024-01-22 NOTE — PROGRESS NOTES
[facility]  Consult Note       NAME:  Isac Lemus  MEDICAL RECORD NUMBER:  [id number]  AGE: 73 y.o.   GENDER: male  : 1950  TODAY'S DATE:  2024    Subjective; Spouse at bedside.   Patient moans.   Reason for WOCN Evaluation and Assessment: change sacrum wound dressing.  Patient to be NPO at midnight plan to be able to have debridement tomorrow and NPWT placed      Isac Lemus is a 73 y.o. male referred by:   [] Physician  [x] Nursing  [] Other:     Wound Identification:  Wound Type: pressure  Contributing Factors: diabetes, poor glucose control, chronic pressure, decreased mobility, and shear force    Wound History: patient admitted to Acute rehab where they were treated for DTI to sacrum.  Now after a debridement area is being followed by General surgery for another debridement and possible NPWT applied.  Current Wound Care Treatment:  moist to moist    Patient Goal of Care:  [x] Wound Healing  [] Odor Control  [] Palliative Care  [x] Pain Control   [] Other:         PAST MEDICAL HISTORY        Diagnosis Date    Acid reflux     CAD (coronary artery disease)     Diabetes mellitus (HCC)     Heartburn     Hiatal hernia     Hypertension     Seasonal allergies        PAST SURGICAL HISTORY    Past Surgical History:   Procedure Laterality Date    BACK SURGERY N/A 1/15/2024    SACRAL WOUND DEBRIDEMENT performed by Casey Stephenson MD at [facility] OR    COLONOSCOPY      POLYPS    COLONOSCOPY  3/16/16    polyps    CORONARY ARTERY BYPASS GRAFT  10/2014    X5       FAMILY HISTORY    Family History   Problem Relation Age of Onset    Cancer Mother         BREAST    Heart Disease Father     Cancer Father         PROSTATE       SOCIAL HISTORY    Social History     Tobacco Use    Smoking status: Never   Substance Use Topics    Alcohol use: No    Drug use: No       ALLERGIES    Allergies   Allergen Reactions    Heparin Other (See Comments)     Causes clots       MEDICATIONS    No  current facility-administered medications on file prior to encounter.     Current Outpatient Medications on File Prior to Encounter   Medication Sig Dispense Refill    aspirin 81 MG EC tablet Take 1 tablet by mouth daily      vitamin B-12 (CYANOCOBALAMIN) 1000 MCG tablet Take 1 tablet by mouth daily      aspirin 325 MG EC tablet Take 1 tablet by mouth daily. 30 tablet 3    atorvastatin (LIPITOR) 40 MG tablet Take 1 tablet by mouth nightly. 30 tablet 3    lisinopril (PRINIVIL;ZESTRIL) 2.5 MG tablet Take 1 tablet by mouth daily. (Patient taking differently: Take 4 tablets by mouth daily) 30 tablet 0    metoprolol (LOPRESSOR) 25 MG tablet Take 1 tablet by mouth 2 times daily. 60 tablet 3    potassium chloride (K-DUR) 10 MEQ tablet Take 1 tablet by mouth 3 times daily for 10 days. 30 tablet 0    furosemide (LASIX) 40 MG tablet Take 1 tablet by mouth daily for 10 days. 10 tablet 0    Glucosamine-Chondroit-Vit C-Mn (GLUCOSAMINE CHONDR 1500 COMPLX) CAPS Take  by mouth daily.        metformin (GLUCOPHAGE) 500 MG tablet Take 2 tablets by mouth 2 times daily (with meals)         Objective    /66   Pulse 77   Temp 98.4 °F (36.9 °C)   Resp 16   Ht 1.702 m (5' 7\")   Wt 92.2 kg (203 lb 4.2 oz)   SpO2 92%   BMI 31.84 kg/m²     LABS:  WBC:    Lab Results   Component Value Date/Time    WBC 6.8 01/20/2024 06:01 AM     H/H:    Lab Results   Component Value Date/Time    HGB 9.2 01/20/2024 06:01 AM    HCT 28.5 01/20/2024 06:01 AM     PTT:    Lab Results   Component Value Date/Time    APTT 35.7 10/20/2014 08:50 PM   [APTT}  PT/INR:    Lab Results   Component Value Date/Time    PROTIME 16.1 10/26/2014 04:38 AM    INR 1.46 10/26/2014 04:38 AM     HgBA1c:    Lab Results   Component Value Date/Time    LABA1C 5.9 01/18/2024 05:12 AM       Assessment   Devon Risk Score: Devon Scale Score: 10    Decubitus skin ulcer    Measurements:  Incision 10/20/14 Sternum Anterior (Active)   Number of days: 3381       Incision 10/20/14 Chest

## 2024-01-22 NOTE — PROGRESS NOTES
01/22/24 0346   NIV Type   Equipment Type v60   Mode CPAP   Mask Type Full face mask   Mask Size Large   Assessment   Respirations 18   SpO2 98 %   Comfort Level Good   Using Accessory Muscles No   Mask Compliance Good   Skin Protection for O2 Device Yes   Orientation Middle   Location Nose   Intervention(s) Reposition Device   Settings/Measurements   PIP Observed 13 cm H20   CPAP/EPAP 12 cmH2O   Vt (Measured) 452 mL   FiO2  55 %   Minute Volume (L/min) 9 Liters   Mask Leak (lpm) 18 lpm   Patient's Home Machine No   Alarm Settings   Alarms On Y   Low Pressure (cmH2O) 6 cmH2O   High Pressure (cmH2O) 30 cmH2O   Apnea (secs) 20 secs   RR Low (bpm) 6   RR High (bpm) 40 br/min

## 2024-01-22 NOTE — PROGRESS NOTES
Occupational Therapy  Facility/Department: [facility] C4 PCU  Daily Treatment Note  NAME: Isac Lemus  : 1950  MRN: [id number]    Date of Service: 2024    Discharge Recommendations:  LTACH  OT Equipment Recommendations  Equipment Needed: No (defer)    Therapy discharge recommendations take into account each patient's current medical complexities and are subject to input/oversight from the patient's healthcare team.     Barriers to Home Discharge:   [x] Steps to access home entry or bed/bath:   [x] Unable to transfer, ambulate, or propel wheelchair household distances without assist   [x] Limited available assist at home upon discharge    [x] Patient or family requests d/c to post-acute facility    [x] Poor cognition/safety awareness for d/c to home alone    [] Unable to maintain ordered weight bearing status    [x] Patient with salient signs of long-standing immobility   [x] Decreased independence with ADLs   [x] Increased risk for falls   [] Other:    If pt is unable to be seen after this session, please let this note serve as discharge summary.  Please see case management note for discharge disposition.  Thank you.      Patient Diagnosis(es): The encounter diagnosis was Wound of sacral region, initial encounter.     Assessment    Assessment: Pt w/ fair tolerance to session; initially difficult to wake as pt soundly sleeping upon arrival but family w/ inc encouragement to work with therapy. Pt noted inc alertness when bed placed in chair position to complete self-care ADLs and BUE/LE therex. Pt remains limited by fatigue, decreased cognition and inc BECK required to complete ADLs/ mobility as pt remains max- total assist for all functional tasks. Pt w/ minimal participation in BUE therex this date w/ min digit/ wrist mvmt and trace tricep/bicep activation noted. Pt still functioning below baseline and would benefit from skilled OT services to improve functional IND and safety. OT recommend d/c LTACH, cont  safety  Problem Solving: Decreased awareness of errors  Insights: Not aware of deficits  Initiation: Requires cues for all  Sequencing: Requires cues for all        Objective    Vitals  Vitals:    01/22/24 1315   BP: 118/72   Pulse: 100   Resp: 20   Temp: 97.6 °F (36.4 °C)   SpO2: 97%          Bed Mobility Training  Bed Mobility Training: No  Neuromuscular Education  Neuromuscular education: Yes  Functional Movement Patterns: shoulder horizontal ABD and elbow flexion PROM vs intermittnet AAROM in prep for self-feeding; min wrist/ digit mvmt and trace tricep/bicep activation noted  Response to Techniques: [place] to hold/ grasp washcloth to complete facial hygiene w/ min grasp noted  ADL  Feeding: NPO  Feeding Skilled Clinical Factors: NPO in prep for anticipated wound vac procedure in AM.  Grooming: Dependent/Total  Grooming Skilled Clinical Factors: total HOHA to complete facial hygiene and application of lotion to B feet/ hands  Toileting: Dependent/Total  Toileting Skilled Clinical Factors: purewick  Skin Care: Bath wipes  OT Exercises  Exercise Treatment: x10 reps PROM vs AAROM supine w/ minimal pt participation (digit/ wrist) + trace bicep/ tricep activation noted when pt attempting to resist mvmt: hand squeezes, forearm sup/pronation, elbow flex/ ext, shoulder horizontal ABD     Safety Devices  Type of Devices: All fall risk precautions in place;All harry prominences offloaded;Patient at risk for falls;Nurse notified;Gait belt;Heels elevated for pressure relief;Bed alarm in place;Left in bed;Call light within reach  Restraints  Restraints Initially in Place: No     Patient Education  Education Given To: Patient;Family  Education Provided: Role of Therapy;Plan of Care;Precautions;ADL Adaptive Strategies;Transfer Training;Family Education  Education Provided Comments: disease specific: role of OT, POC, retrograde edema massage  Education Method: Demonstration;Verbal  Barriers to Learning:

## 2024-01-22 NOTE — CARE COORDINATION
This writer received a call from Susana (liaison) Tiffany to say that pre cert from [facility] is still pending. She will update when she gets decision.      Sharmaine Vines RN

## 2024-01-23 ENCOUNTER — ANESTHESIA (OUTPATIENT)
Dept: OPERATING ROOM | Age: 74
End: 2024-01-23
Payer: MEDICARE

## 2024-01-23 ENCOUNTER — ANESTHESIA EVENT (OUTPATIENT)
Dept: OPERATING ROOM | Age: 74
End: 2024-01-23
Payer: MEDICARE

## 2024-01-23 LAB
GLUCOSE BLD-MCNC: 101 MG/DL (ref 70–99)
GLUCOSE BLD-MCNC: 114 MG/DL (ref 70–99)
GLUCOSE BLD-MCNC: 115 MG/DL (ref 70–99)
GLUCOSE BLD-MCNC: 148 MG/DL (ref 70–99)
GLUCOSE BLD-MCNC: 150 MG/DL (ref 70–99)
GLUCOSE BLD-MCNC: 154 MG/DL (ref 70–99)
GLUCOSE BLD-MCNC: 155 MG/DL (ref 70–99)
PERFORMED ON: ABNORMAL

## 2024-01-23 PROCEDURE — 2060000000 HC ICU INTERMEDIATE R&B

## 2024-01-23 PROCEDURE — 2580000003 HC RX 258: Performed by: NURSE ANESTHETIST, CERTIFIED REGISTERED

## 2024-01-23 PROCEDURE — 7100000001 HC PACU RECOVERY - ADDTL 15 MIN: Performed by: SURGERY

## 2024-01-23 PROCEDURE — 6370000000 HC RX 637 (ALT 250 FOR IP): Performed by: NURSE PRACTITIONER

## 2024-01-23 PROCEDURE — 2500000003 HC RX 250 WO HCPCS: Performed by: NURSE ANESTHETIST, CERTIFIED REGISTERED

## 2024-01-23 PROCEDURE — A4217 STERILE WATER/SALINE, 500 ML: HCPCS | Performed by: SURGERY

## 2024-01-23 PROCEDURE — 6370000000 HC RX 637 (ALT 250 FOR IP): Performed by: INTERNAL MEDICINE

## 2024-01-23 PROCEDURE — 6370000000 HC RX 637 (ALT 250 FOR IP): Performed by: SURGERY

## 2024-01-23 PROCEDURE — 94761 N-INVAS EAR/PLS OXIMETRY MLT: CPT

## 2024-01-23 PROCEDURE — 6360000002 HC RX W HCPCS: Performed by: NURSE ANESTHETIST, CERTIFIED REGISTERED

## 2024-01-23 PROCEDURE — 2709999900 HC NON-CHARGEABLE SUPPLY: Performed by: SURGERY

## 2024-01-23 PROCEDURE — 3700000001 HC ADD 15 MINUTES (ANESTHESIA): Performed by: SURGERY

## 2024-01-23 PROCEDURE — 11046 DBRDMT MUSC&/FSCA EA ADDL: CPT | Performed by: SURGERY

## 2024-01-23 PROCEDURE — 94660 CPAP INITIATION&MGMT: CPT

## 2024-01-23 PROCEDURE — 3700000000 HC ANESTHESIA ATTENDED CARE: Performed by: SURGERY

## 2024-01-23 PROCEDURE — 7100000000 HC PACU RECOVERY - FIRST 15 MIN: Performed by: SURGERY

## 2024-01-23 PROCEDURE — 99231 SBSQ HOSP IP/OBS SF/LOW 25: CPT | Performed by: SURGERY

## 2024-01-23 PROCEDURE — 6360000002 HC RX W HCPCS: Performed by: NURSE PRACTITIONER

## 2024-01-23 PROCEDURE — 97110 THERAPEUTIC EXERCISES: CPT

## 2024-01-23 PROCEDURE — 2580000003 HC RX 258: Performed by: SURGERY

## 2024-01-23 PROCEDURE — 3600000013 HC SURGERY LEVEL 3 ADDTL 15MIN: Performed by: SURGERY

## 2024-01-23 PROCEDURE — 3600000003 HC SURGERY LEVEL 3 BASE: Performed by: SURGERY

## 2024-01-23 PROCEDURE — C9113 INJ PANTOPRAZOLE SODIUM, VIA: HCPCS | Performed by: INTERNAL MEDICINE

## 2024-01-23 PROCEDURE — 6360000002 HC RX W HCPCS: Performed by: SURGERY

## 2024-01-23 PROCEDURE — 2500000003 HC RX 250 WO HCPCS: Performed by: SURGERY

## 2024-01-23 PROCEDURE — 2700000000 HC OXYGEN THERAPY PER DAY

## 2024-01-23 PROCEDURE — 11043 DBRDMT MUSC&/FSCA 1ST 20/<: CPT | Performed by: SURGERY

## 2024-01-23 PROCEDURE — 6360000002 HC RX W HCPCS: Performed by: INTERNAL MEDICINE

## 2024-01-23 PROCEDURE — 0KBP0ZZ EXCISION OF LEFT HIP MUSCLE, OPEN APPROACH: ICD-10-PCS | Performed by: SURGERY

## 2024-01-23 PROCEDURE — 0KBN0ZZ EXCISION OF RIGHT HIP MUSCLE, OPEN APPROACH: ICD-10-PCS | Performed by: SURGERY

## 2024-01-23 RX ORDER — MEPERIDINE HYDROCHLORIDE 50 MG/ML
12.5 INJECTION INTRAMUSCULAR; INTRAVENOUS; SUBCUTANEOUS EVERY 5 MIN PRN
Status: DISCONTINUED | OUTPATIENT
Start: 2024-01-23 | End: 2024-01-23 | Stop reason: HOSPADM

## 2024-01-23 RX ORDER — FENTANYL CITRATE 50 UG/ML
INJECTION, SOLUTION INTRAMUSCULAR; INTRAVENOUS PRN
Status: DISCONTINUED | OUTPATIENT
Start: 2024-01-23 | End: 2024-01-23 | Stop reason: SDUPTHER

## 2024-01-23 RX ORDER — PROPOFOL 10 MG/ML
INJECTION, EMULSION INTRAVENOUS PRN
Status: DISCONTINUED | OUTPATIENT
Start: 2024-01-23 | End: 2024-01-23 | Stop reason: SDUPTHER

## 2024-01-23 RX ORDER — SODIUM CHLORIDE 0.9 % (FLUSH) 0.9 %
5-40 SYRINGE (ML) INJECTION PRN
Status: DISCONTINUED | OUTPATIENT
Start: 2024-01-23 | End: 2024-01-23 | Stop reason: HOSPADM

## 2024-01-23 RX ORDER — SODIUM CHLORIDE, SODIUM LACTATE, POTASSIUM CHLORIDE, CALCIUM CHLORIDE 600; 310; 30; 20 MG/100ML; MG/100ML; MG/100ML; MG/100ML
INJECTION, SOLUTION INTRAVENOUS CONTINUOUS PRN
Status: DISCONTINUED | OUTPATIENT
Start: 2024-01-23 | End: 2024-01-23 | Stop reason: SDUPTHER

## 2024-01-23 RX ORDER — BUPIVACAINE HYDROCHLORIDE AND EPINEPHRINE 5; 5 MG/ML; UG/ML
INJECTION, SOLUTION PERINEURAL PRN
Status: DISCONTINUED | OUTPATIENT
Start: 2024-01-23 | End: 2024-01-23 | Stop reason: ALTCHOICE

## 2024-01-23 RX ORDER — MAGNESIUM HYDROXIDE 1200 MG/15ML
LIQUID ORAL CONTINUOUS PRN
Status: COMPLETED | OUTPATIENT
Start: 2024-01-23 | End: 2024-01-23

## 2024-01-23 RX ORDER — SODIUM CHLORIDE 9 MG/ML
INJECTION, SOLUTION INTRAVENOUS PRN
Status: DISCONTINUED | OUTPATIENT
Start: 2024-01-23 | End: 2024-01-23 | Stop reason: HOSPADM

## 2024-01-23 RX ORDER — OXYCODONE HYDROCHLORIDE 5 MG/1
10 TABLET ORAL PRN
Status: DISCONTINUED | OUTPATIENT
Start: 2024-01-23 | End: 2024-01-23 | Stop reason: HOSPADM

## 2024-01-23 RX ORDER — DIPHENHYDRAMINE HYDROCHLORIDE 50 MG/ML
6.25 INJECTION INTRAMUSCULAR; INTRAVENOUS
Status: DISCONTINUED | OUTPATIENT
Start: 2024-01-23 | End: 2024-01-23 | Stop reason: HOSPADM

## 2024-01-23 RX ORDER — SODIUM CHLORIDE 0.9 % (FLUSH) 0.9 %
5-40 SYRINGE (ML) INJECTION EVERY 12 HOURS SCHEDULED
Status: DISCONTINUED | OUTPATIENT
Start: 2024-01-23 | End: 2024-01-23 | Stop reason: HOSPADM

## 2024-01-23 RX ORDER — OXYCODONE HYDROCHLORIDE 5 MG/1
5 TABLET ORAL PRN
Status: DISCONTINUED | OUTPATIENT
Start: 2024-01-23 | End: 2024-01-23 | Stop reason: HOSPADM

## 2024-01-23 RX ORDER — MIDAZOLAM HYDROCHLORIDE 1 MG/ML
2 INJECTION INTRAMUSCULAR; INTRAVENOUS
Status: DISCONTINUED | OUTPATIENT
Start: 2024-01-23 | End: 2024-01-23 | Stop reason: HOSPADM

## 2024-01-23 RX ORDER — ONDANSETRON 2 MG/ML
4 INJECTION INTRAMUSCULAR; INTRAVENOUS ONCE
Status: DISCONTINUED | OUTPATIENT
Start: 2024-01-23 | End: 2024-01-23 | Stop reason: HOSPADM

## 2024-01-23 RX ORDER — LIDOCAINE HYDROCHLORIDE 20 MG/ML
INJECTION, SOLUTION INFILTRATION; PERINEURAL PRN
Status: DISCONTINUED | OUTPATIENT
Start: 2024-01-23 | End: 2024-01-23 | Stop reason: SDUPTHER

## 2024-01-23 RX ORDER — NOREPINEPHRINE BITARTRATE 1 MG/ML
INJECTION, SOLUTION INTRAVENOUS PRN
Status: DISCONTINUED | OUTPATIENT
Start: 2024-01-23 | End: 2024-01-23 | Stop reason: SDUPTHER

## 2024-01-23 RX ORDER — ONDANSETRON 2 MG/ML
INJECTION INTRAMUSCULAR; INTRAVENOUS PRN
Status: DISCONTINUED | OUTPATIENT
Start: 2024-01-23 | End: 2024-01-23 | Stop reason: SDUPTHER

## 2024-01-23 RX ORDER — ROCURONIUM BROMIDE 10 MG/ML
INJECTION, SOLUTION INTRAVENOUS PRN
Status: DISCONTINUED | OUTPATIENT
Start: 2024-01-23 | End: 2024-01-23 | Stop reason: SDUPTHER

## 2024-01-23 RX ORDER — PHENYLEPHRINE HCL IN 0.9% NACL 1 MG/10 ML
SYRINGE (ML) INTRAVENOUS PRN
Status: DISCONTINUED | OUTPATIENT
Start: 2024-01-23 | End: 2024-01-23 | Stop reason: SDUPTHER

## 2024-01-23 RX ADMIN — LEVETIRACETAM 750 MG: 100 SOLUTION ORAL at 11:09

## 2024-01-23 RX ADMIN — PIPERACILLIN AND TAZOBACTAM 3375 MG: 3; .375 INJECTION, POWDER, FOR SOLUTION INTRAVENOUS at 17:55

## 2024-01-23 RX ADMIN — Medication 100 MCG: at 14:40

## 2024-01-23 RX ADMIN — SUGAMMADEX 200 MG: 100 INJECTION, SOLUTION INTRAVENOUS at 15:03

## 2024-01-23 RX ADMIN — SODIUM CHLORIDE, PRESERVATIVE FREE 10 ML: 5 INJECTION INTRAVENOUS at 11:07

## 2024-01-23 RX ADMIN — ENOXAPARIN SODIUM 100 MG: 100 INJECTION SUBCUTANEOUS at 21:37

## 2024-01-23 RX ADMIN — MODAFINIL 300 MG: 200 TABLET ORAL at 11:04

## 2024-01-23 RX ADMIN — METOPROLOL TARTRATE 25 MG: 25 TABLET, FILM COATED ORAL at 21:38

## 2024-01-23 RX ADMIN — LEVETIRACETAM 750 MG: 100 SOLUTION ORAL at 21:38

## 2024-01-23 RX ADMIN — PROPOFOL 150 MG: 10 INJECTION, EMULSION INTRAVENOUS at 14:20

## 2024-01-23 RX ADMIN — AMIODARONE HYDROCHLORIDE 200 MG: 200 TABLET ORAL at 11:04

## 2024-01-23 RX ADMIN — DILTIAZEM HYDROCHLORIDE 60 MG: 60 TABLET ORAL at 11:04

## 2024-01-23 RX ADMIN — NOREPINEPHRINE BITARTRATE 16 MCG: 1 INJECTION INTRAVENOUS at 14:44

## 2024-01-23 RX ADMIN — SUGAMMADEX 200 MG: 100 INJECTION, SOLUTION INTRAVENOUS at 14:48

## 2024-01-23 RX ADMIN — Medication 100 MCG: at 14:42

## 2024-01-23 RX ADMIN — DILTIAZEM HYDROCHLORIDE 60 MG: 60 TABLET ORAL at 00:19

## 2024-01-23 RX ADMIN — PANTOPRAZOLE SODIUM 40 MG: 40 INJECTION, POWDER, FOR SOLUTION INTRAVENOUS at 11:06

## 2024-01-23 RX ADMIN — NOREPINEPHRINE BITARTRATE 16 MCG: 1 INJECTION INTRAVENOUS at 14:42

## 2024-01-23 RX ADMIN — POLYETHYLENE GLYCOL 3350 17 G: 17 POWDER, FOR SOLUTION ORAL at 18:01

## 2024-01-23 RX ADMIN — SODIUM CHLORIDE, SODIUM LACTATE, POTASSIUM CHLORIDE, AND CALCIUM CHLORIDE: .6; .31; .03; .02 INJECTION, SOLUTION INTRAVENOUS at 14:20

## 2024-01-23 RX ADMIN — Medication 100 MCG: at 14:37

## 2024-01-23 RX ADMIN — ATORVASTATIN CALCIUM 40 MG: 40 TABLET, FILM COATED ORAL at 21:38

## 2024-01-23 RX ADMIN — SUGAMMADEX 200 MG: 100 INJECTION, SOLUTION INTRAVENOUS at 14:54

## 2024-01-23 RX ADMIN — ONDANSETRON 4 MG: 2 INJECTION INTRAMUSCULAR; INTRAVENOUS at 14:20

## 2024-01-23 RX ADMIN — TRAMADOL HYDROCHLORIDE 50 MG: 50 TABLET ORAL at 04:01

## 2024-01-23 RX ADMIN — LISINOPRIL 2.5 MG: 2.5 TABLET ORAL at 11:04

## 2024-01-23 RX ADMIN — FENTANYL CITRATE 100 MCG: 50 INJECTION, SOLUTION INTRAMUSCULAR; INTRAVENOUS at 14:20

## 2024-01-23 RX ADMIN — METOPROLOL TARTRATE 25 MG: 25 TABLET, FILM COATED ORAL at 11:04

## 2024-01-23 RX ADMIN — Medication: at 04:44

## 2024-01-23 RX ADMIN — DONEPEZIL HYDROCHLORIDE 5 MG: 5 TABLET, FILM COATED ORAL at 21:38

## 2024-01-23 RX ADMIN — LIDOCAINE HYDROCHLORIDE 80 MG: 20 INJECTION, SOLUTION INFILTRATION; PERINEURAL at 14:20

## 2024-01-23 RX ADMIN — ROCURONIUM BROMIDE 50 MG: 50 INJECTION, SOLUTION INTRAVENOUS at 14:20

## 2024-01-23 RX ADMIN — DILTIAZEM HYDROCHLORIDE 60 MG: 60 TABLET ORAL at 06:05

## 2024-01-23 RX ADMIN — DILTIAZEM HYDROCHLORIDE 60 MG: 60 TABLET ORAL at 17:47

## 2024-01-23 ASSESSMENT — PAIN SCALES - WONG BAKER
WONGBAKER_NUMERICALRESPONSE: 0

## 2024-01-23 ASSESSMENT — PAIN DESCRIPTION - DESCRIPTORS: DESCRIPTORS: ACHING

## 2024-01-23 ASSESSMENT — PAIN DESCRIPTION - LOCATION: LOCATION: BACK

## 2024-01-23 ASSESSMENT — PAIN SCALES - GENERAL: PAINLEVEL_OUTOF10: 6

## 2024-01-23 NOTE — CONSULTS
1/23/24 @ 2793 paged Dr. Stephenson per RN request regarding pts upcoming surgery. Enedina Juarez

## 2024-01-23 NOTE — PROGRESS NOTES
Pt going off floor to OR.  CMU made aware.  Pt in gown, transporting on 6L O2 per nasal cannula, external catheter capped.  Pt's family traveling with pt.  Rosalinda Medina RN  1/23/2024

## 2024-01-23 NOTE — PROGRESS NOTES
V2.0    [facility] Progress Note      Name:  Isac Lemus /Age/Sex: 1950  (73 y.o. male)   MRN & CSN:  [id number] & [id number] Encounter Date/Time: 2024 12:49 PM EST   Location:   PCP: Taryn Rivera APRN - CNP     Attending:Greg Valle MD       Hospital Day: 10    Assessment and Recommendations     Patient is a 73-year-old male past medical history of TBI with bleed who was admitted to [facility] from acute rehab with decubitus ulcer for surgical debridement    #.  Intermittent fevers likely due to infected sacral wound: fever resolved.   #.  Suspected infected sacral wound  Infectious workup at [facility] unremarkable  S/p I&D 1/15/2024 without complication, without osteo  Continue Zosyn: can Switch to oral augmentin at AL  General surgery following and plan for wound check today.       #.  Traumatic brain injury  History of traumatic brain injury, bilateral subdural hematoma, subarachnoid hemorrhage, skull base fracture, right transverse/sigmoid sinus thrombosis  Plan no surgical intervention and serial scans during prior acute stay stable  Continue Keppra  EKG showing epileptiform discharges during prior acute stay  Continue Aricept and provigil    #.  Paroxysmal atrial fibrillation  With episode of RVR  Continue to hold Eliquis for potential surgical intervention for sacral wound by general surgery  Continue amiodarone, statins, aspirin, diltiazem, metoprolol    #.  Dysphagia status post PEG  Continue tube feeds    #.  History of right peroneal DVT  Continue to hold Eliquis for potential I&D on Monday if needed    #.  Thrombocytopenia: Resolved  #.  History of HIT  Resume Eliquis if surgery does not recommend repeat I&D on Monday    #.  Diabetes mellitus, type II with hyperglycemia  Continue current insulin regimen    Completed P2P on  for LTAC which was denied.       DVT Prophylaxis: Lovenox.   Code Status: Total Support.   Barrier to DC: Surgical clearance, pending clinical

## 2024-01-23 NOTE — PROGRESS NOTES
/65   Pulse (!) 102   Temp 97.9 °F (36.6 °C) (Oral)   Resp 20   Ht 1.702 m (5' 7\")   Wt 92 kg (202 lb 13.2 oz)   SpO2 96%   BMI 31.77 kg/m²  on room air.  Pt resting quietly in bed with wife and daughter at bedside.  Pt alert, able to follow some very simple commands.  Pt does not appear to be in any pain or respiratory distress. Lungs clear upper lobes, otherwise diminished.  Atrial Fibrillation on tele.  Pt remains NPO as order for planned surgery this afternoon.  Meds given through peg tube.  Thorough oral care provided.  Pt turned and repositioned every 2 hours.  Dressing on coccyx, clean, dry and intact.  External catheter in place.  BLE elevated off bed onto pillow with heels floated off pillow.  Pt's family instructed to call out for any needs and assistance.  Will continue to monitor.  Rosalinda Medina RN  1/23/2024

## 2024-01-23 NOTE — ANESTHESIA POSTPROCEDURE EVALUATION
Department of Anesthesiology  Postprocedure Note    Patient: Isac Lemus  MRN: [id number]  YOB: 1950  Date of evaluation: 1/23/2024    Procedure Summary       Date: 01/23/24 Room / Location: [address]    Anesthesia Start: 1408 Anesthesia Stop: 1525    Procedure: SACRAL WOUND DEBRIDEMENT (Back) Diagnosis:       Pressure injury of skin of sacral region, unspecified injury stage      (Pressure injury of skin of sacral region, unspecified injury stage [L89.159])    Surgeons: Casey Stephenson MD Responsible Provider: Tim Roberts MD    Anesthesia Type: general ASA Status: 4            Anesthesia Type: No value filed.    Donovan Phase I: Donovan Score: 7    Donovan Phase II:      Anesthesia Post Evaluation    Comments: Anes Post-op Note    Name:    Isac Lemus  MRN:      [id number]    Patient Vitals in the past 12 hrs:  01/23/24 1609, BP:135/77, Temp:97 °F (36.1 °C), Temp src:Temporal, Pulse:(!) 112, SpO2:90 %  01/23/24 1535, Pulse:(!) 118  01/23/24 1523, BP:(!) 182/107, Temp:97.9 °F (36.6 °C), Temp src:Temporal, Pulse:(!) 110, SpO2:90 %  01/23/24 1353, BP:133/70, Temp:98.4 °F (36.9 °C), Temp src:Axillary, Pulse:63, Resp:16, SpO2:95 %  01/23/24 1051, BP:115/65, Temp:97.9 °F (36.6 °C), Temp src:Oral, Pulse:(!) 102, Resp:20, SpO2:96 %  01/23/24 0806, BP:115/72, Temp:98.1 °F (36.7 °C), Temp src:Oral, Pulse:90, Resp:20, SpO2:90 %  01/23/24 0602, BP:127/76, Temp:98.6 °F (37 °C), Temp src:Oral, Pulse:(!) 105, Resp:20, SpO2:98 %     LABS:    CBC  Lab Results       Component                Value               Date/Time                  WBC                      6.8                 01/20/2024 06:01 AM        HGB                      9.2 (L)             01/20/2024 06:01 AM        HCT                      28.5 (L)            01/20/2024 06:01 AM        PLT                      306                 01/20/2024 06:01 AM   RENAL  Lab Results       Component                Value

## 2024-01-23 NOTE — OP NOTE
Date of Surgery: 1/23/24    Preop Dx:  Sacral wound    Postop Dx:  same    Procedure:  Sacral wound debridement    Surgeon:  Seema    Assistant:      Anesthesia:  GETA    EBL:   <50ml    Specimen:  none    Complications: none    Drains/Lines:  none    Indications:  74 yo with sacral wound with need for debridement    Description:  Patient family was given adequate description of the risks and rewards of the procedure, including bleeding, further infection and freely consented.  He was given appropriate antibiotics and brought to the OR where GETA anesthesia was induced.  He was placed in lateral position.  Prepped and draped in usual sterile fashion.     Using sharp dissection the nonviable skin, subcutaneous tissue and muscle were excisionally debrided.  Local anesthetic instilled and hemostasis achieved.  Total debrided was about [id number].       Sterile dressing placed.  All suture, sponge and instrument count correct times two at end of case.  Transferred to PACU in stable condition.    Lewis Stephenson MD

## 2024-01-23 NOTE — PROGRESS NOTES
01/23/24 0313   NIV Type   $NIV $Daily Charge   NIV Started/Stopped On   Equipment Type v60   Mode CPAP   Mask Type Full face mask   Mask Size Large   Assessment   Respirations 19   SpO2 95 %   Settings/Measurements   CPAP/EPAP 12 cmH2O   Vt (Measured) 425 mL   FiO2  55 %   Patient's Home Machine No   Alarm Settings   Alarms On Y   Low Pressure (cmH2O) 6 cmH2O   High Pressure (cmH2O) 30 cmH2O   Patient Observation   Observations mepilex on nose

## 2024-01-23 NOTE — PROGRESS NOTES
[facility] SURGERY    PATIENT NAME: Isac Lemus     TODAY'S DATE: 1/23/2024    CHIEF COMPLAINT: none    INTERVAL HISTORY/HPI:    Pt without complaint.     REVIEW OF SYSTEMS:  Pertinent positives and negatives as per interval history section    OBJECTIVE:  VITALS:  /65   Pulse (!) 102   Temp 97.9 °F (36.6 °C) (Oral)   Resp 20   Ht 1.702 m (5' 7\")   Wt 92 kg (202 lb 13.2 oz)   SpO2 96%   BMI 31.77 kg/m²     INTAKE/OUTPUT:    I/O last 3 completed shifts:  In: 360 [P.O.:120; NG/GT:240]  Out: 2200 [Urine:2200]  I/O this shift:  In: 290 [I.V.:10; NG/GT:280]  Out: 300 [Urine:300]    CONSTITUTIONAL:  awake and alert  Sacral wound - persistent fibrinous tissue     Data:  CBC: No results for input(s): \"WBC\", \"HGB\", \"HCT\", \"PLT\" in the last 72 hours.  BMP:  No results for input(s): \"NA\", \"K\", \"CL\", \"CO2\", \"BUN\", \"CREATININE\", \"GLUCOSE\" in the last 72 hours.  Hepatic: No results for input(s): \"AST\", \"ALT\", \"ALB\", \"BILITOT\", \"ALKPHOS\" in the last 72 hours.  Mag:    No results for input(s): \"MG\" in the last 72 hours.   Phos:   No results for input(s): \"PHOS\" in the last 72 hours.   INR: No results for input(s): \"INR\" in the last 72 hours.    Radiology Review:  *Imaging personally reviewed by me.   NA      ASSESSMENT AND PLAN:  73 [place] with sacral wound  Plan debridement in OR today  Possible vac tomorrow if looks ok     Electronically signed by Casey Stephenson MD     [id number]

## 2024-01-23 NOTE — PROGRESS NOTES
Temp 98.0 axillary, pulse 100, respirations 20, blood pressure 135/75, O2 sats 90% on 6L O2 per nasal cannula.  Pt back to floor from pacu.  Pt drowsy, sleeping with eyes closed, will respond to voice.  Pt turned and repositioned.  New external catheter placed, secured with brief.  Dressing to sacrum is clean, dry and intact at this time.  BLE elevated off bed onto pillows with heels floated off pillows.   Pt's wife at bedside.  Rosalinda Medina RN  1/23/2024

## 2024-01-23 NOTE — ANESTHESIA PRE PROCEDURE
discussed with healthcare power of .      Plan discussed with CRNA.                CLEMENCIA DALE MD   1/23/2024

## 2024-01-23 NOTE — PROGRESS NOTES
01/22/24 2316   NIV Type   NIV Started/Stopped On   Equipment Type v60   Mode CPAP   Mask Type Full face mask   Mask Size Large   Assessment   Respirations 27   SpO2 93 %   Settings/Measurements   CPAP/EPAP 12 cmH2O   Vt (Measured) 302 mL   FiO2  55 %   Mask Leak (lpm) 18 lpm   Patient's Home Machine No   Alarm Settings   Alarms On Y   Low Pressure (cmH2O) 6 cmH2O   High Pressure (cmH2O) 30 cmH2O

## 2024-01-23 NOTE — PROGRESS NOTES
Comprehensive Nutrition Assessment    Type and Reason for Visit:  Reassess    Nutrition Recommendations/Plan:   Resume minced and moist diet - textures and consistencies per SLP   Resume Magic Cups and Kendall BID when able  Encourage PO intakes as tolerated   If pt consumes less than 50% of meal, provide bolus of Jevity 1.5 of 237 mL up to 3 cans daily. 60 mL free water flush before and after administration   Provide 1 Packet of Prosource daily via PEG tube. 30 mL free water flush before and after administration   Monitor nutrition adequacy, pertinent labs, bowel habits, wt changes, and clinical progress     Malnutrition Assessment:  Malnutrition Status:  At risk for malnutrition (Comment) (01/19/24 1257)    Context:  Acute Illness     Findings of the 6 clinical characteristics of malnutrition:  Energy Intake:  Mild decrease in energy intake (Comment)  Weight loss: Greater than 5% x 1 month  Fluid Accumulation:  Mild      Nutrition Assessment:    Follow up: Pt advanced to minced and moist diet on 1/19, currently NPO for plan for wound check today. PO intakes 26-76% of meals. Pt sleeping at time of visit, spoke to pt's family members. Reports they help him w/ his meals and he eats it all. Reports pt has not been receiving bolus feeds d/t improvement in intake. Reports family member did ask RN to do a bolus yesterday in preperation for pt to be NPO today. Recommend resuming minced and moist diet when appropriate. Magic cups previously added, intakes % in EMR. Willing to trial Kendall BID to promote wound healing. Encouraged protein intake such as cottage cheese, meat. Pt compliant. Continue to encourage PO intake, will continue to monitor.    Nutrition Related Findings:    BG WNL x 24 hours. Trace BUE, BLE + 4 pitting edema. No labs ordered. Wound Type: Pressure Injury, Stage II, Stage IV, Multiple       Current Nutrition Intake & Therapies:    Average Meal Intake: 26-50%, 51-75%, %  Average Supplements  Evaluation:   Behavioral-Environmental Outcomes: None Identified  Food/Nutrient Intake Outcomes: Diet Advancement/Tolerance, Food and Nutrient Intake, Supplement Intake, Enteral Nutrition Intake/Tolerance  Physical Signs/Symptoms Outcomes: Biochemical Data, Chewing or Swallowing, GI Status, Meal Time Behavior, Nutrition Focused Physical Findings, Weight, Skin    Discharge Planning:    Too soon to determine     Katelin Encarnacion MS, RD, LD  Contact: Office: [phone]; Anthony: [phone]

## 2024-01-23 NOTE — PROGRESS NOTES
Occupational Therapy  Facility/Department: [facility] C4 PCU  Daily Treatment Note  NAME: Isac Lemus  : 1950  MRN: [id number]    Date of Service: 2024    Discharge Recommendations:  LTACH   Barriers to Home Discharge:   [x] Steps to access home entry or bed/bath:   [x] Unable to transfer, ambulate, or propel wheelchair household distances without assist   [x] Limited available assist at home upon discharge    [x] Patient or family requests d/c to post-acute facility    [x] Poor cognition/safety awareness for d/c to home alone    [] Unable to maintain ordered weight bearing status    [x] Patient with salient signs of long-standing immobility   [x] Decreased independence with ADLs   [x] Increased risk for falls   [] Other:   If pt is unable to be seen after this session, please let this note serve as discharge summary.  Please see case management note for discharge disposition.  Thank you.    Patient Diagnosis(es): The encounter diagnosis was Wound of sacral region, initial encounter.     Assessment    Assessment: Pt seen for UE positioning, ther execises and family education. Pt participated in A/AAROM of LUE and P/AAROM of RUE 10x each. He demo'd decreased awareness of R side of body and R visual field. He was able to turn head to R with min cues and locate family members in room.  Family observed UE/LE exercises for instruction.  Cont OT in acute care.  Activity Tolerance: Patient limited by fatigue;Patient limited by endurance;Treatment limited secondary to decreased cognition  Discharge Recommendations: LTACH      Plan   Occupational Therapy Plan  Times Per Week: 3-5x/week  Current Treatment Recommendations: Strengthening;ROM;Balance training;Functional mobility training;Endurance training;Neuromuscular re-education;Cognitive reorientation;Pain management;Safety education & training;Patient/Caregiver education & training;Equipment evaluation, education, & procurement;Positioning;Self-Care /  ADL;Coordination training;Gait training     Restrictions  Restrictions/Precautions  Restrictions/Precautions: Seizure;Fall Risk;Up as Tolerated;Aspiration Risk;General Precautions;Modified Diet  Position Activity Restriction  Other position/activity restrictions: PEG, tube feed, Puree, IV, O2, purewick    Subjective   Subjective  Subjective: Pt was in bed and alert upon arrival. Family present  Pain: Pt had pain response to L foot dorsiflexion which decreased with gentle ROM and repositioning of pillow  Orientation  Overall Orientation Status: Impaired  Orientation Level: Oriented to person;Disoriented to place;Disoriented to time;Disoriented to situation  Cognition  Overall Cognitive Status: Exceptions  Arousal/Alertness: Delayed responses to stimuli  Following Commands: Follows one step commands with repetition  Attention Span: Difficulty attending to directions;Attends with cues to redirect  Safety Judgement: Decreased awareness of need for safety;Decreased awareness of need for assistance  Problem Solving: Decreased awareness of errors  Insights: Not aware of deficits  Initiation: Requires cues for all  Sequencing: Requires cues for all      Objective    Vitals  Vitals:    01/23/24 0806   BP: 115/72   Pulse: 90   Resp: 20   Temp: 98.1 °F (36.7 °C)   SpO2: 90%      Bed Mobility Training  Bed Mobility Training: No     ADL  Feeding: NPO  Grooming: Dependent/Total  Grooming Skilled Clinical Factors: for oral swab  UE Dressing: Dependent/Total  UE Dressing Skilled Clinical Factors: to adjust gown over arms  Toileting: Dependent/Total  Skin Care: Bath wipes  OT Exercises  Exercise Treatment: Performed BUE P/AA/AROM  exercises 10-15x each. Pt able to participate in AAROM for L shoulder, elbow, forearm and hand. Required PROM for R shoulder and elbow--minimal awareness of RUE movement.  Performed B finger flex/ext 10x with max cues for attention. Performed gentle head rotation to R and L 3x. Family present for education  Detail Level: Detailed Size Of Lesion: 1.25x0.8cm no...

## 2024-01-24 ENCOUNTER — APPOINTMENT (OUTPATIENT)
Dept: GENERAL RADIOLOGY | Age: 74
End: 2024-01-24
Attending: INTERNAL MEDICINE
Payer: MEDICARE

## 2024-01-24 LAB
ANION GAP SERPL CALCULATED.3IONS-SCNC: 5 MMOL/L (ref 3–16)
BASOPHILS # BLD: 0 K/UL (ref 0–0.2)
BASOPHILS NFR BLD: 0.5 %
BUN SERPL-MCNC: 8 MG/DL (ref 7–20)
CALCIUM SERPL-MCNC: 8.8 MG/DL (ref 8.3–10.6)
CHLORIDE SERPL-SCNC: 97 MMOL/L (ref 99–110)
CO2 SERPL-SCNC: 32 MMOL/L (ref 21–32)
CREAT SERPL-MCNC: <0.5 MG/DL (ref 0.8–1.3)
DEPRECATED RDW RBC AUTO: 17.8 % (ref 12.4–15.4)
EOSINOPHIL # BLD: 0.1 K/UL (ref 0–0.6)
EOSINOPHIL NFR BLD: 0.7 %
GFR SERPLBLD CREATININE-BSD FMLA CKD-EPI: >60 ML/MIN/{1.73_M2}
GLUCOSE BLD-MCNC: 102 MG/DL (ref 70–99)
GLUCOSE BLD-MCNC: 107 MG/DL (ref 70–99)
GLUCOSE BLD-MCNC: 125 MG/DL (ref 70–99)
GLUCOSE BLD-MCNC: 147 MG/DL (ref 70–99)
GLUCOSE BLD-MCNC: 171 MG/DL (ref 70–99)
GLUCOSE SERPL-MCNC: 138 MG/DL (ref 70–99)
HCT VFR BLD AUTO: 30.6 % (ref 40.5–52.5)
HGB BLD-MCNC: 9.6 G/DL (ref 13.5–17.5)
LYMPHOCYTES # BLD: 1.1 K/UL (ref 1–5.1)
LYMPHOCYTES NFR BLD: 15.7 %
MAGNESIUM SERPL-MCNC: 1.8 MG/DL (ref 1.8–2.4)
MCH RBC QN AUTO: 31.4 PG (ref 26–34)
MCHC RBC AUTO-ENTMCNC: 31.4 G/DL (ref 31–36)
MCV RBC AUTO: 100 FL (ref 80–100)
MONOCYTES # BLD: 0.8 K/UL (ref 0–1.3)
MONOCYTES NFR BLD: 10.8 %
NEUTROPHILS # BLD: 5.2 K/UL (ref 1.7–7.7)
NEUTROPHILS NFR BLD: 72.3 %
PERFORMED ON: ABNORMAL
PHOSPHATE SERPL-MCNC: 3 MG/DL (ref 2.5–4.9)
PLATELET # BLD AUTO: 277 K/UL (ref 135–450)
PMV BLD AUTO: 7.2 FL (ref 5–10.5)
POTASSIUM SERPL-SCNC: 4.1 MMOL/L (ref 3.5–5.1)
RBC # BLD AUTO: 3.06 M/UL (ref 4.2–5.9)
SODIUM SERPL-SCNC: 134 MMOL/L (ref 136–145)
WBC # BLD AUTO: 7.2 K/UL (ref 4–11)

## 2024-01-24 PROCEDURE — 97112 NEUROMUSCULAR REEDUCATION: CPT

## 2024-01-24 PROCEDURE — 6360000002 HC RX W HCPCS: Performed by: INTERNAL MEDICINE

## 2024-01-24 PROCEDURE — 6360000002 HC RX W HCPCS: Performed by: SURGERY

## 2024-01-24 PROCEDURE — 6370000000 HC RX 637 (ALT 250 FOR IP): Performed by: INTERNAL MEDICINE

## 2024-01-24 PROCEDURE — 2580000003 HC RX 258: Performed by: SURGERY

## 2024-01-24 PROCEDURE — 36415 COLL VENOUS BLD VENIPUNCTURE: CPT

## 2024-01-24 PROCEDURE — 84100 ASSAY OF PHOSPHORUS: CPT

## 2024-01-24 PROCEDURE — 71045 X-RAY EXAM CHEST 1 VIEW: CPT

## 2024-01-24 PROCEDURE — 97530 THERAPEUTIC ACTIVITIES: CPT

## 2024-01-24 PROCEDURE — 6370000000 HC RX 637 (ALT 250 FOR IP): Performed by: HOSPITALIST

## 2024-01-24 PROCEDURE — C9113 INJ PANTOPRAZOLE SODIUM, VIA: HCPCS | Performed by: INTERNAL MEDICINE

## 2024-01-24 PROCEDURE — 94660 CPAP INITIATION&MGMT: CPT

## 2024-01-24 PROCEDURE — 83735 ASSAY OF MAGNESIUM: CPT

## 2024-01-24 PROCEDURE — 2060000000 HC ICU INTERMEDIATE R&B

## 2024-01-24 PROCEDURE — 2700000000 HC OXYGEN THERAPY PER DAY

## 2024-01-24 PROCEDURE — 6370000000 HC RX 637 (ALT 250 FOR IP): Performed by: SURGERY

## 2024-01-24 PROCEDURE — 6370000000 HC RX 637 (ALT 250 FOR IP): Performed by: NURSE PRACTITIONER

## 2024-01-24 PROCEDURE — 94761 N-INVAS EAR/PLS OXIMETRY MLT: CPT

## 2024-01-24 PROCEDURE — 80048 BASIC METABOLIC PNL TOTAL CA: CPT

## 2024-01-24 PROCEDURE — 85025 COMPLETE CBC W/AUTO DIFF WBC: CPT

## 2024-01-24 PROCEDURE — 93228 REMOTE 30 DAY ECG REV/REPORT: CPT | Performed by: INTERNAL MEDICINE

## 2024-01-24 PROCEDURE — 97110 THERAPEUTIC EXERCISES: CPT

## 2024-01-24 RX ORDER — ASPIRIN 81 MG/1
81 TABLET, CHEWABLE ORAL DAILY
Status: DISCONTINUED | OUTPATIENT
Start: 2024-01-24 | End: 2024-02-01 | Stop reason: HOSPADM

## 2024-01-24 RX ORDER — ATORVASTATIN CALCIUM 40 MG/1
40 TABLET, FILM COATED ORAL NIGHTLY
Status: DISCONTINUED | OUTPATIENT
Start: 2024-01-24 | End: 2024-02-01 | Stop reason: HOSPADM

## 2024-01-24 RX ORDER — AMIODARONE HYDROCHLORIDE 200 MG/1
200 TABLET ORAL DAILY
Status: DISCONTINUED | OUTPATIENT
Start: 2024-01-24 | End: 2024-01-31

## 2024-01-24 RX ORDER — DONEPEZIL HYDROCHLORIDE 5 MG/1
5 TABLET, FILM COATED ORAL NIGHTLY
Status: DISCONTINUED | OUTPATIENT
Start: 2024-01-24 | End: 2024-02-01 | Stop reason: HOSPADM

## 2024-01-24 RX ORDER — DILTIAZEM HYDROCHLORIDE 60 MG/1
60 TABLET, FILM COATED ORAL EVERY 6 HOURS SCHEDULED
Status: DISCONTINUED | OUTPATIENT
Start: 2024-01-24 | End: 2024-01-31

## 2024-01-24 RX ORDER — MODAFINIL 200 MG/1
300 TABLET ORAL DAILY
Status: DISCONTINUED | OUTPATIENT
Start: 2024-01-24 | End: 2024-02-01 | Stop reason: HOSPADM

## 2024-01-24 RX ORDER — LISINOPRIL 2.5 MG/1
2.5 TABLET ORAL DAILY
Status: DISCONTINUED | OUTPATIENT
Start: 2024-01-24 | End: 2024-02-01 | Stop reason: HOSPADM

## 2024-01-24 RX ADMIN — DILTIAZEM HYDROCHLORIDE 60 MG: 60 TABLET ORAL at 19:02

## 2024-01-24 RX ADMIN — PANTOPRAZOLE SODIUM 40 MG: 40 INJECTION, POWDER, FOR SOLUTION INTRAVENOUS at 08:53

## 2024-01-24 RX ADMIN — ENOXAPARIN SODIUM 100 MG: 100 INJECTION SUBCUTANEOUS at 20:30

## 2024-01-24 RX ADMIN — DILTIAZEM HYDROCHLORIDE 60 MG: 60 TABLET ORAL at 02:05

## 2024-01-24 RX ADMIN — MUPIROCIN: 20 OINTMENT TOPICAL at 15:49

## 2024-01-24 RX ADMIN — TRAMADOL HYDROCHLORIDE 50 MG: 50 TABLET ORAL at 12:16

## 2024-01-24 RX ADMIN — AMIODARONE HYDROCHLORIDE 200 MG: 200 TABLET ORAL at 08:52

## 2024-01-24 RX ADMIN — ENOXAPARIN SODIUM 100 MG: 100 INJECTION SUBCUTANEOUS at 08:52

## 2024-01-24 RX ADMIN — PIPERACILLIN AND TAZOBACTAM 3375 MG: 3; .375 INJECTION, POWDER, FOR SOLUTION INTRAVENOUS at 19:06

## 2024-01-24 RX ADMIN — DILTIAZEM HYDROCHLORIDE 60 MG: 60 TABLET ORAL at 06:07

## 2024-01-24 RX ADMIN — LEVETIRACETAM 750 MG: 100 SOLUTION ORAL at 20:31

## 2024-01-24 RX ADMIN — PIPERACILLIN AND TAZOBACTAM 3375 MG: 3; .375 INJECTION, POWDER, FOR SOLUTION INTRAVENOUS at 09:17

## 2024-01-24 RX ADMIN — MUPIROCIN: 20 OINTMENT TOPICAL at 20:42

## 2024-01-24 RX ADMIN — LEVETIRACETAM 750 MG: 100 SOLUTION ORAL at 09:01

## 2024-01-24 RX ADMIN — METOPROLOL TARTRATE 25 MG: 25 TABLET, FILM COATED ORAL at 08:52

## 2024-01-24 RX ADMIN — ATORVASTATIN CALCIUM 40 MG: 40 TABLET, FILM COATED ORAL at 20:30

## 2024-01-24 RX ADMIN — LISINOPRIL 2.5 MG: 2.5 TABLET ORAL at 08:52

## 2024-01-24 RX ADMIN — MODAFINIL 300 MG: 200 TABLET ORAL at 08:51

## 2024-01-24 RX ADMIN — PIPERACILLIN AND TAZOBACTAM 3375 MG: 3; .375 INJECTION, POWDER, FOR SOLUTION INTRAVENOUS at 02:07

## 2024-01-24 RX ADMIN — METOPROLOL TARTRATE 25 MG: 25 TABLET, FILM COATED ORAL at 20:30

## 2024-01-24 RX ADMIN — ASPIRIN 81 MG 81 MG: 81 TABLET ORAL at 08:52

## 2024-01-24 RX ADMIN — DILTIAZEM HYDROCHLORIDE 60 MG: 60 TABLET ORAL at 12:17

## 2024-01-24 RX ADMIN — DONEPEZIL HYDROCHLORIDE 5 MG: 5 TABLET, FILM COATED ORAL at 20:30

## 2024-01-24 ASSESSMENT — PAIN SCALES - WONG BAKER

## 2024-01-24 ASSESSMENT — PAIN DESCRIPTION - DESCRIPTORS: DESCRIPTORS: ACHING

## 2024-01-24 ASSESSMENT — PAIN SCALES - GENERAL
PAINLEVEL_OUTOF10: 0

## 2024-01-24 ASSESSMENT — PAIN DESCRIPTION - LOCATION: LOCATION: BACK

## 2024-01-24 NOTE — PROGRESS NOTES
Occupational Therapy  Facility/Department: [facility] C4 PCU  Daily Treatment Note  NAME: Isac Lemus  : 1950  MRN: [id number]    Date of Service: 2024    Discharge Recommendations:  LTACH       Barriers to Home Discharge:   [x] Steps to access home entry or bed/bath:   [x] Unable to transfer, ambulate, or propel wheelchair household distances without assist   [x] Limited available assist at home upon discharge    [x] Patient or family requests d/c to post-acute facility    [x] Poor cognition/safety awareness for d/c to home alone    [] Unable to maintain ordered weight bearing status    [x] Patient with salient signs of long-standing immobility   [x] Decreased independence with ADLs   [x] Increased risk for falls   [] Other:    Patient Diagnosis(es): The encounter diagnosis was Wound of sacral region, initial encounter.     Assessment    Assessment: Pt tolerated session fair overall, initially very alert with good visual attention to each individual as they spoke, as session progressed pt with increased difficulty remaining alert to follow commands. Pt does require increased processing time for all commands and for initiation of tasks. Family reports completing ROM exercises earlier this date. Co-tx collaboration this date with PT staff to safely progress pt toward goals. Pt will have better occupational performance outcomes within a co-treatment than 1:1 session. Pt continues to benefit from skilled OT in acute care at this time.  Activity Tolerance: Patient limited by fatigue;Treatment limited secondary to decreased cognition;Patient limited by endurance  Discharge Recommendations: LTACH      Plan   Occupational Therapy Plan  Times Per Week: 3-5x/week     Restrictions  Restrictions/Precautions  Restrictions/Precautions: Fall Risk;Up as Tolerated;Aspiration Risk;General Precautions;Modified Diet  Position Activity Restriction  Other position/activity restrictions: PEG, tube feed, Puree, IV, O2, purewick,

## 2024-01-24 NOTE — CONSULTS
[facility] Nurse  Consult Note       NAME:  Isac Lemus  MEDICAL RECORD NUMBER:  [id number]  AGE: 73 y.o.   GENDER: male  : 1950  TODAY'S DATE:  2024    Subjective; Spouse, Nancy at bedside , patient grimaces.   Reason for WOCN Evaluation and Assessment: place NPWT      Isac Lemus is a 73 y.o. male referred by:   [] Physician  [x] Nursing  [] Other:     Wound Identification:  Wound Type: pressure and non-healing surgical  Contributing Factors: diabetes, poor glucose control, chronic pressure, decreased mobility, shear force, and obesity    Wound History: Patient first admitted in [facility]  between 2023 and New Year of 2024.  Wound Care first saw patient 2024 for DTI to buttocks.  Has had I/D by Dr. Stephenson 01/15/24, then 24, now placing NPWT  Current Wound Care Treatment:  gauze    Patient Goal of Care:  [x] Wound Healing  [] Odor Control  [] Palliative Care  [x] Pain Control   [] Other:         PAST MEDICAL HISTORY        Diagnosis Date    Acid reflux     CAD (coronary artery disease)     Diabetes mellitus (HCC)     Heartburn     Hiatal hernia     Hypertension     Seasonal allergies        PAST SURGICAL HISTORY    Past Surgical History:   Procedure Laterality Date    BACK SURGERY N/A 1/15/2024    SACRAL WOUND DEBRIDEMENT performed by Casey Stephenson MD at [facility] OR    BACK SURGERY N/A 2024    SACRAL WOUND DEBRIDEMENT performed by Casey Stephenson MD at [facility] OR    COLONOSCOPY      POLYPS    COLONOSCOPY  3/16/16    polyps    CORONARY ARTERY BYPASS GRAFT  10/2014    X5       FAMILY HISTORY    Family History   Problem Relation Age of Onset    Cancer Mother         BREAST    Heart Disease Father     Cancer Father         PROSTATE       SOCIAL HISTORY    Social History     Tobacco Use    Smoking status: Never   Substance Use Topics    Alcohol use: No    Drug use: No       ALLERGIES    Allergies   Allergen Reactions    Heparin Other

## 2024-01-24 NOTE — PROGRESS NOTES
Per patients daughters request to hold off on BIPAP machine tonight to left his sore on his nose have a break.

## 2024-01-24 NOTE — PROGRESS NOTES
V2.0    [facility] [place]      Name:  Isac Lemus /Age/Sex: 1950  (73 y.o. male)   MRN & CSN:  [id number] & [id number] Encounter Date/Time: 2024 12:49 PM EST   Location:   PCP: Taryn Rivera APRN - CNP     Attending:Greg Valle MD       Hospital Day: 11    Assessment and Recommendations     Patient is a 73-year-old male past medical history of TBI with bleed who was admitted to [facility] from acute rehab with decubitus ulcer for surgical debridement    #.  Intermittent fevers likely due to infected sacral wound: fever resolved.   #.  Suspected infected sacral wound  Infectious workup at [facility] unremarkable  S/p I&D 1/15/2024 without complication, without osteo  S/p  repeat wound debridement on 2024  Continue Zosyn: can Switch to oral augmentin at DC  Discharge pending gen surgery clearance and placement.       #.  Traumatic brain injury  History of traumatic brain injury, bilateral subdural hematoma, subarachnoid hemorrhage, skull base fracture, right transverse/sigmoid sinus thrombosis  Plan no surgical intervention and serial scans during prior acute stay stable  Continue Keppra  EKG showing epileptiform discharges during prior acute stay  Continue Aricept and provigil    #.  Paroxysmal atrial fibrillation  With episode of RVR  Resume eliquis when ok with gen surgery.   Continue amiodarone, statins, aspirin, diltiazem, metoprolol    #.  Dysphagia status post PEG  Continue tube feeds    #.  History of right peroneal DVT  Continue to hold Eliquis for potential I&D on Monday if needed    #.  Thrombocytopenia: Resolved  #.  History of HIT  Resume Eliquis if surgery does not recommend repeat I&D on Monday    #.  Diabetes mellitus, type II with hyperglycemia  Continue current insulin regimen    Completed P2P on  for LTAC which was denied.       DVT Prophylaxis: Lovenox.   Code Status: Total Support.   Barrier to DC: Surgical clearance, pending clinical improvement,

## 2024-01-24 NOTE — CARE COORDINATION
Patient underwent debridement of sacral wound. Surgery to round today and place wound vac if would looks okay. [facility] plans to start pre-cert once wound vac is placed for LTACH. Patient has been wearing hospital CPAP and needs outpatient sleep study. Spoke with daughter who is concerned about the sore on patient's nose caused by CPAP mask and she would like to speak to the doctor and get a pulmonary consult regarding CPAP and need for this at d/c. Spoke with Susana at [facility] who is following. As soon as wound vac is placed they will start pre-cert.

## 2024-01-24 NOTE — PLAN OF CARE
Problem: Pain  Goal: Verbalizes/displays adequate comfort level or baseline comfort level  Recent Flowsheet Documentation  Taken 1/23/2024 2130 by Glo Davis RN  Verbalizes/displays adequate comfort level or baseline comfort level:   Encourage patient to monitor pain and request assistance   Assess pain using appropriate pain scale   Administer analgesics based on type and severity of pain and evaluate response   Implement non-pharmacological measures as appropriate and evaluate response   Consider cultural and social influences on pain and pain management   Notify Licensed Independent Practitioner if interventions unsuccessful or patient reports new pain     Problem: Safety - Adult  Goal: Free from fall injury  Outcome: Progressing     Problem: ABCDS Injury Assessment  Goal: Absence of physical injury  Outcome: Progressing     Problem: Nutrition Deficit:  Goal: Optimize nutritional status  Outcome: Progressing

## 2024-01-24 NOTE — PROGRESS NOTES
Attempted to wean patient multiple times with no success in maintaining above 85% on 9l nc humidified. Pt now on FM at 15l SpO2 98%. Daughter at bedside refusing c-pap at this time d/t lesion on outer bridge of nose and swollen nose. Daughter would like to discuss c-pap with the hospitalist, Dr Massey notified of daughter concerns. RT notified to review pt FM and assist with placement of humidity per daughter request with FM.

## 2024-01-24 NOTE — PROGRESS NOTES
Physical Therapy  Facility/Department: [place] [address] [facility]U  Daily Treatment Note  NAME: Isac Lemus  : 1950  MRN: [id number]    Date of Service: 2024    Discharge Recommendations:  Subacute/Skilled Nursing Facility, LTACH   PT Equipment Recommendations  Equipment Needed: No    Therapy discharge recommendations take into account each patient's current medical complexities and are subject to input/oversight from the patient's healthcare team.      Barriers to Home Discharge:   [x] Steps to access home entry or bed/bath:   [x] Unable to transfer, ambulate, or propel wheelchair household distances without assist   [x] Limited available assist at home upon discharge    [x] Patient or family requests d/c to post-acute facility    [x] Poor cognition/safety awareness for d/c to home alone    [] Unable to maintain ordered weight bearing status    [x] Patient with salient signs of long-standing immobility   [x] Decreased independence with ADLs   [x] Increased risk for falls   [] Other:     If pt is unable to be seen after this session, please let this note serve as discharge summary.  Please see case management note for discharge disposition.  Thank you    Patient Diagnosis(es): The encounter diagnosis was Wound of sacral region, initial encounter.    Assessment   Assessment: Pt seen as cotx with OT to progress functional mobility safely and maximize outcomes compared to 1:1 session. Pt more awake this date upon arrival, required mod(A)x2 for rolling. Pt had wound vac placed earlier this date. Pt overall limited by cognition and initiation. Pt would benefit from continued skilled PT to address remaining deficits. Recommend LTACH vs SNF upon d/c    Activity Tolerance: Patient limited by fatigue;Treatment limited secondary to decreased cognition;Patient limited by endurance     Plan    Physical Therapy Plan  General Plan: 3-5 times per week  Current Treatment Recommendations: Strengthening;ROM;Balance  Cognition;Hearing  Education Outcome: Unable to verbalize;Continued education needed    AM-PAC - Mobility    AM-PAC Basic Mobility - Inpatient   How much help is needed turning from your back to your side while in a flat bed without using bedrails?: Total  How much help is needed moving from lying on your back to sitting on the side of a flat bed without using bedrails?: Total  How much help is needed moving to and from a bed to a chair?: Total  How much help is needed standing up from a chair using your arms?: Total  How much help is needed walking in hospital room?: Total  How much help is needed climbing 3-5 steps with a railing?: Total  AM-PAC Inpatient Mobility Raw Score : 6  AM-PAC Inpatient T-Scale Score : 23.55  Mobility Inpatient CMS 0-100% Score: 100  Mobility Inpatient CMS G-Code Modifier : CN    Therapy Time   Individual Concurrent Group Co-treatment   Time In       1330   Time Out       1408   Minutes       38   Timed Code Treatment Minutes: 38 Minutes       Kathia Vasquez PT

## 2024-01-25 PROBLEM — J96.01 ACUTE RESPIRATORY FAILURE WITH HYPOXIA (HCC): Status: ACTIVE | Noted: 2024-01-25

## 2024-01-25 LAB
GLUCOSE BLD-MCNC: 105 MG/DL (ref 70–99)
GLUCOSE BLD-MCNC: 116 MG/DL (ref 70–99)
GLUCOSE BLD-MCNC: 124 MG/DL (ref 70–99)
GLUCOSE BLD-MCNC: 125 MG/DL (ref 70–99)
GLUCOSE BLD-MCNC: 143 MG/DL (ref 70–99)
GLUCOSE BLD-MCNC: 152 MG/DL (ref 70–99)
PERFORMED ON: ABNORMAL
PROCALCITONIN SERPL IA-MCNC: 0.23 NG/ML (ref 0–0.15)

## 2024-01-25 PROCEDURE — 6370000000 HC RX 637 (ALT 250 FOR IP): Performed by: STUDENT IN AN ORGANIZED HEALTH CARE EDUCATION/TRAINING PROGRAM

## 2024-01-25 PROCEDURE — 2700000000 HC OXYGEN THERAPY PER DAY

## 2024-01-25 PROCEDURE — 84145 PROCALCITONIN (PCT): CPT

## 2024-01-25 PROCEDURE — 6370000000 HC RX 637 (ALT 250 FOR IP): Performed by: NURSE PRACTITIONER

## 2024-01-25 PROCEDURE — 94660 CPAP INITIATION&MGMT: CPT

## 2024-01-25 PROCEDURE — 36415 COLL VENOUS BLD VENIPUNCTURE: CPT

## 2024-01-25 PROCEDURE — 6360000002 HC RX W HCPCS: Performed by: SURGERY

## 2024-01-25 PROCEDURE — 2580000003 HC RX 258: Performed by: INTERNAL MEDICINE

## 2024-01-25 PROCEDURE — 2580000003 HC RX 258: Performed by: SURGERY

## 2024-01-25 PROCEDURE — 99232 SBSQ HOSP IP/OBS MODERATE 35: CPT

## 2024-01-25 PROCEDURE — C9113 INJ PANTOPRAZOLE SODIUM, VIA: HCPCS | Performed by: INTERNAL MEDICINE

## 2024-01-25 PROCEDURE — 94761 N-INVAS EAR/PLS OXIMETRY MLT: CPT

## 2024-01-25 PROCEDURE — 6370000000 HC RX 637 (ALT 250 FOR IP): Performed by: INTERNAL MEDICINE

## 2024-01-25 PROCEDURE — 99223 1ST HOSP IP/OBS HIGH 75: CPT | Performed by: STUDENT IN AN ORGANIZED HEALTH CARE EDUCATION/TRAINING PROGRAM

## 2024-01-25 PROCEDURE — 6360000002 HC RX W HCPCS: Performed by: INTERNAL MEDICINE

## 2024-01-25 PROCEDURE — 6370000000 HC RX 637 (ALT 250 FOR IP): Performed by: HOSPITALIST

## 2024-01-25 PROCEDURE — 2060000000 HC ICU INTERMEDIATE R&B

## 2024-01-25 PROCEDURE — 94640 AIRWAY INHALATION TREATMENT: CPT

## 2024-01-25 RX ORDER — POLYETHYLENE GLYCOL 3350 17 G/17G
17 POWDER, FOR SOLUTION ORAL 2 TIMES DAILY
Status: DISCONTINUED | OUTPATIENT
Start: 2024-01-25 | End: 2024-01-26

## 2024-01-25 RX ORDER — IPRATROPIUM BROMIDE AND ALBUTEROL SULFATE 2.5; .5 MG/3ML; MG/3ML
1 SOLUTION RESPIRATORY (INHALATION)
Status: DISCONTINUED | OUTPATIENT
Start: 2024-01-25 | End: 2024-01-29

## 2024-01-25 RX ORDER — INSULIN LISPRO 100 [IU]/ML
0-16 INJECTION, SOLUTION INTRAVENOUS; SUBCUTANEOUS
Status: DISCONTINUED | OUTPATIENT
Start: 2024-01-26 | End: 2024-02-01 | Stop reason: HOSPADM

## 2024-01-25 RX ORDER — INSULIN LISPRO 100 [IU]/ML
0-4 INJECTION, SOLUTION INTRAVENOUS; SUBCUTANEOUS NIGHTLY
Status: DISCONTINUED | OUTPATIENT
Start: 2024-01-25 | End: 2024-02-01 | Stop reason: HOSPADM

## 2024-01-25 RX ADMIN — DILTIAZEM HYDROCHLORIDE 60 MG: 60 TABLET ORAL at 13:08

## 2024-01-25 RX ADMIN — IPRATROPIUM BROMIDE AND ALBUTEROL SULFATE 1 DOSE: 2.5; .5 SOLUTION RESPIRATORY (INHALATION) at 19:12

## 2024-01-25 RX ADMIN — PIPERACILLIN AND TAZOBACTAM 3375 MG: 3; .375 INJECTION, POWDER, FOR SOLUTION INTRAVENOUS at 09:15

## 2024-01-25 RX ADMIN — MUPIROCIN: 20 OINTMENT TOPICAL at 12:00

## 2024-01-25 RX ADMIN — METOPROLOL TARTRATE 25 MG: 25 TABLET, FILM COATED ORAL at 08:56

## 2024-01-25 RX ADMIN — ENOXAPARIN SODIUM 100 MG: 100 INJECTION SUBCUTANEOUS at 22:27

## 2024-01-25 RX ADMIN — METOPROLOL TARTRATE 25 MG: 25 TABLET, FILM COATED ORAL at 22:27

## 2024-01-25 RX ADMIN — LEVETIRACETAM 750 MG: 100 SOLUTION ORAL at 10:02

## 2024-01-25 RX ADMIN — AMIODARONE HYDROCHLORIDE 200 MG: 200 TABLET ORAL at 08:56

## 2024-01-25 RX ADMIN — MODAFINIL 300 MG: 200 TABLET ORAL at 08:55

## 2024-01-25 RX ADMIN — DONEPEZIL HYDROCHLORIDE 5 MG: 5 TABLET, FILM COATED ORAL at 22:27

## 2024-01-25 RX ADMIN — PANTOPRAZOLE SODIUM 40 MG: 40 INJECTION, POWDER, FOR SOLUTION INTRAVENOUS at 08:56

## 2024-01-25 RX ADMIN — DILTIAZEM HYDROCHLORIDE 60 MG: 60 TABLET ORAL at 00:27

## 2024-01-25 RX ADMIN — POLYETHYLENE GLYCOL 3350 17 G: 17 POWDER, FOR SOLUTION ORAL at 10:02

## 2024-01-25 RX ADMIN — DILTIAZEM HYDROCHLORIDE 60 MG: 60 TABLET ORAL at 05:52

## 2024-01-25 RX ADMIN — PIPERACILLIN AND TAZOBACTAM 3375 MG: 3; .375 INJECTION, POWDER, FOR SOLUTION INTRAVENOUS at 17:54

## 2024-01-25 RX ADMIN — MUPIROCIN: 20 OINTMENT TOPICAL at 22:27

## 2024-01-25 RX ADMIN — DILTIAZEM HYDROCHLORIDE 60 MG: 60 TABLET ORAL at 17:48

## 2024-01-25 RX ADMIN — ASPIRIN 81 MG 81 MG: 81 TABLET ORAL at 08:56

## 2024-01-25 RX ADMIN — ENOXAPARIN SODIUM 100 MG: 100 INJECTION SUBCUTANEOUS at 08:56

## 2024-01-25 RX ADMIN — PIPERACILLIN AND TAZOBACTAM 3375 MG: 3; .375 INJECTION, POWDER, FOR SOLUTION INTRAVENOUS at 01:56

## 2024-01-25 RX ADMIN — SODIUM CHLORIDE, PRESERVATIVE FREE 10 ML: 5 INJECTION INTRAVENOUS at 08:56

## 2024-01-25 RX ADMIN — ATORVASTATIN CALCIUM 40 MG: 40 TABLET, FILM COATED ORAL at 22:27

## 2024-01-25 RX ADMIN — LISINOPRIL 2.5 MG: 2.5 TABLET ORAL at 08:56

## 2024-01-25 RX ADMIN — LEVETIRACETAM 750 MG: 100 SOLUTION ORAL at 22:28

## 2024-01-25 RX ADMIN — POLYETHYLENE GLYCOL 3350 17 G: 17 POWDER, FOR SOLUTION ORAL at 22:29

## 2024-01-25 ASSESSMENT — PAIN SCALES - WONG BAKER
WONGBAKER_NUMERICALRESPONSE: 0

## 2024-01-25 NOTE — CARE COORDINATION
[employer] Medicare has intent to deny and is offering peer to peer for patient that can be done between now and 9 AM tomorrow . MD needs to call [phone] #[phone] and have member number and . [place] served MD to relay information.

## 2024-01-25 NOTE — PROGRESS NOTES
01/24/24 2212   NIV Type   $NIV $Daily Charge   NIV Started/Stopped On   Equipment Type v60   Mode CPAP   Mask Type Total face   Assessment   Respirations 25   SpO2 91 %   Comfort Level Good   Using Accessory Muscles No   Mask Compliance Good   Settings/Measurements   PIP Observed 13 cm H20   CPAP/EPAP 12 cmH2O   Vt (Measured) 417 mL   FiO2  55 %  (increased to 60%)   Minute Volume (L/min) 11.5 Liters   Mask Leak (lpm) 26 lpm   Patient's Home Machine No   Alarm Settings   Alarms On Y   Low Pressure (cmH2O) 6 cmH2O   High Pressure (cmH2O) 30 cmH2O   Apnea (secs) 20 secs   RR Low (bpm) 6   RR High (bpm) 40 br/min

## 2024-01-25 NOTE — PROGRESS NOTES
01/25/24 0012   NIV Type   $NIV $Daily Charge   Equipment Type V60   Mode CPAP   Mask Type Total face   Assessment   Respirations 25   SpO2 98 %   Comfort Level Good   Using Accessory Muscles No   Mask Compliance Good   Intervention(s) Reposition Device   Settings/Measurements   PIP Observed 12 cm H20   CPAP/EPAP 12 cmH2O   Vt (Measured) 414 mL   FiO2  60 %  (DECREASED TO 55%)   Minute Volume (L/min) 10 Liters   Mask Leak (lpm) 22 lpm   Patient's Home Machine No   Alarm Settings   Alarms On Y   Low Pressure (cmH2O) 6 cmH2O   High Pressure (cmH2O) 30 cmH2O   Apnea (secs) 20 secs   RR Low (bpm) 6   RR High (bpm) 40 br/min

## 2024-01-25 NOTE — PROGRESS NOTES
01/25/24 0353   NIV Type   Equipment Type v60   Mode CPAP   Mask Type Total face   Assessment   Respirations 20   SpO2 98 %   Comfort Level Good   Using Accessory Muscles No   Mask Compliance Good   Settings/Measurements   PIP Observed 13 cm H20   CPAP/EPAP 12 cmH2O   Vt (Measured) 417 mL   FiO2  55 %   Minute Volume (L/min) 8 Liters   Mask Leak (lpm) 48 lpm   Patient's Home Machine No   Alarm Settings   Alarms On Y   Low Pressure (cmH2O) 6 cmH2O   High Pressure (cmH2O) 30 cmH2O   Apnea (secs) 20 secs   RR Low (bpm) 6   RR High (bpm) 40 br/min

## 2024-01-25 NOTE — CONSULTS
Pulmonary New Consultation       Patient Name:  Isac Lemus    REASONFOR ADMISSION/CC: Respiratory failure    PCP: Taryn Rivera, APRN - CNP    HISTORY OF PRESENT ILLNESS:   73 y.o. year old male with significant past medical history of hypertension, CAD, diabetes mellitus, A-fib presents to [facility] with decubitus ulcer.  Patient had a fall on Eliquis around November 28, 2023 and suffered a traumatic brain injury.  He was discharged and taken to [facility] rehab for further management.  He started develop a back sore and was admitted to the hospital.  Since being in the hospital, his oxygen requirements have increased and he is on high flow of 7 to 8 L.  He is unable to provide history.  However, his wife who is bedside reports that he does not have any cough, chest pains, nausea, vomiting, abdominal pain.    Patient is a never smoker, no illicit drug use, no alcohol use.  He retired around the age of 46 to take care of his granddaughter, he used to flip houses in the past.  They have a dog at home, no other pets/birds.  They deny any household exposures    Past Medical History:   Diagnosis Date    Acid reflux     CAD (coronary artery disease)     Diabetes mellitus (HCC)     Heartburn     Hiatal hernia     Hypertension     Seasonal allergies        Past Surgical History:   Procedure Laterality Date    BACK SURGERY N/A 1/15/2024    SACRAL WOUND DEBRIDEMENT performed by Casey Stephenson MD at [facility] OR    BACK SURGERY N/A 1/23/2024    SACRAL WOUND DEBRIDEMENT performed by Casey Stephenson MD at [facility] OR    COLONOSCOPY  2012    POLYPS    COLONOSCOPY  3/16/16    polyps    CORONARY ARTERY BYPASS GRAFT  10/2014    X5       family history includes Cancer in his father and mother; Heart Disease in his father.    Social History     Tobacco Use    Smoking status: Never    Smokeless tobacco: Not on file   Substance Use Topics    Alcohol use: No        Meds:    Current Facility-Administered Medications:

## 2024-01-25 NOTE — PROGRESS NOTES
V2.0    [facility] [place]      Name:  Isac Lemus /Age/Sex: 1950  (73 y.o. male)   MRN & CSN:  [id number] & [id number] Encounter Date/Time: 2024 12:49 PM EST   Location:   PCP: Taryn Rivera APRN - CNP     Attending:Greg Valle MD       Hospital Day: 12    Assessment and Recommendations     Patient is a 73-year-old male past medical history of TBI with bleed who was admitted to [facility] from acute rehab with decubitus ulcer for surgical debridement    #.  Intermittent fevers likely due to infected sacral wound: fever resolved.   #.  Suspected infected sacral wound  Infectious workup at [facility] unremarkable  S/p I&D 1/15/2024 without complication, without osteo  S/p  repeat wound debridement on 2024  S/P wound vac placement.   Continue Zosyn: can Switch to oral augmentin at DC  Discharge pending gen surgery clearance and placement.       #.  Traumatic brain injury  History of traumatic brain injury, bilateral subdural hematoma, subarachnoid hemorrhage, skull base fracture, right transverse/sigmoid sinus thrombosis  Plan no surgical intervention and serial scans during prior acute stay stable  Continue Keppra  EKG showing epileptiform discharges during prior acute stay  Continue Aricept and provigil    #. Acute hypoxic respiratory failure  Suspect atelectasis, encourage insentive spirometry  Consult pulmonary  for hypoxia and Cpap management for argenis     #.  Paroxysmal atrial fibrillation  With episode of RVR  Resume eliquis when ok with gen surgery.   Continue amiodarone, statins, aspirin, diltiazem, metoprolol    #.  Dysphagia status post PEG  Continue tube feeds    #.  History of right peroneal DVT  Continue to hold Eliquis for potential I&D on Monday if needed    #.  Thrombocytopenia: Resolved  #.  History of HIT  Resume Eliquis if surgery does not recommend repeat I&D on Monday    #.  Diabetes mellitus, type II with hyperglycemia  Continue current insulin  enoxaparin  1 mg/kg SubCUTAneous BID    [Held by provider] apixaban  5 mg Oral BID    pantoprazole  40 mg IntraVENous Daily    sodium chloride flush  5-40 mL IntraVENous 2 times per day    levETIRAcetam  750 mg PEG Tube BID      Infusions:    dextrose      dilTIAZem 100 mg in sodium chloride 0.9 % 100 mL infusion ([facility]) Stopped (01/17/24 1148)    sodium chloride Stopped (01/15/24 1335)     PRN Meds: traMADol, 50 mg, Q6H PRN  glucose, 4 tablet, PRN  dextrose bolus, 125 mL, PRN   Or  dextrose bolus, 250 mL, PRN  glucagon (rDNA), 1 mg, PRN  dextrose, , Continuous PRN  guaiFENesin, 200 mg, Q4H PRN  sodium chloride flush, 5-40 mL, PRN  sodium chloride, , PRN  polyethylene glycol, 17 g, Daily PRN  acetaminophen, 650 mg, Q6H PRN   Or  acetaminophen, 650 mg, Q6H PRN        Labs and Imaging   No results found.    CBC:   Recent Labs     01/24/24  1156   WBC 7.2   HGB 9.6*          BMP:    Recent Labs     01/24/24  1156   *   K 4.1   CL 97*   CO2 32   BUN 8   CREATININE <0.5*   GLUCOSE 138*       Hepatic: No results for input(s): \"AST\", \"ALT\", \"ALB\", \"BILITOT\", \"ALKPHOS\" in the last 72 hours.  Lipids:   Lab Results   Component Value Date/Time    CHOL 147 10/17/2014 07:22 AM    HDL 31 10/17/2014 07:22 AM    TRIG 111 12/23/2023 08:33 AM     Hemoglobin A1C:   Lab Results   Component Value Date/Time    LABA1C 5.9 01/18/2024 05:12 AM     TSH: No results found for: \"TSH\"  Troponin: No results found for: \"TROPONINT\"  Lactic Acid: No results for input(s): \"LACTA\" in the last 72 hours.  BNP: No results for input(s): \"PROBNP\" in the last 72 hours.  UA:  Lab Results   Component Value Date/Time    NITRU Negative 01/01/2024 08:46 PM    COLORU Yellow 01/01/2024 08:46 PM    PHUR 5.5 01/01/2024 08:46 PM    WBCUA 0-2 01/01/2024 08:46 PM    RBCUA 0-2 01/01/2024 08:46 PM    BACTERIA Rare 01/01/2024 08:46 PM    CLARITYU Clear 01/01/2024 08:46 PM    SPECGRAV 1.025 01/01/2024 08:46 PM    LEUKOCYTESUR Negative 01/01/2024 08:46 PM

## 2024-01-25 NOTE — PROGRESS NOTES
[facility]     Electrophysiology                                     Progress Note    Admission date:  2024    Reason for follow up visit: Atrial fibrillation    HPI/CC: Isac Lemus was admitted on 2023 in the ARU from  after traumatic fall resulting in skull fracture and SAH/SDH. Complicated hospital course. EP consulted when ECG revealed rapid atrial flutter. An cardiac event monitor was placed on 2024 but this was removed 2024 at the family's request due to skin irritation.  On 2024, patient was admitted to the hospital for surgical debridement of his sacral wound.  Postoperatively he had RVR and IV diltiazem was initiated. Echo revealed an EF of 55-60%. Rhythm is atrial flutter with heart rates in the 80s and 90s. patient has had intermittent fevers due to infected sacral wound..  1/15/2024 Patient underwent I&D which was repeated on 2024 and VAC placed to wound    Rhythm has been atrial fibrillation rate 80    Subjective: Patient awake and alert.  Unable to answer questions.  He does make eye contact with his wife who was at the bedside.  She states he occasionally gives one-word answers and other times he does not speak at all.. O2 6 LNC. Pulmonary consulted.    Vitals:  Blood pressure 111/73, pulse 95, temperature 98.5 °F (36.9 °C), temperature source Axillary, resp. rate 18, height 1.702 m (5' 7\"), weight 96.5 kg (212 lb 11.9 oz), SpO2 93 %.  Temp  Av.7 °F (36.5 °C)  Min: 97 °F (36.1 °C)  Max: 98.5 °F (36.9 °C)  Pulse  Av.8  Min: 95  Max: 118  BP  Min: 107/59  Max: 138/75  SpO2  Av.9 %  Min: 88 %  Max: 98 %  FiO2   Av.7 %  Min: 55 %  Max: 60 %    24 hour I/O    Intake/Output Summary (Last 24 hours) at 2024 1525  Last data filed at 2024 1354  Gross per 24 hour   Intake 240 ml   Output 1100 ml   Net -860 ml     Current Facility-Administered Medications   Medication Dose Route Frequency Provider Last Rate Last Admin     polyethylene glycol (GLYCOLAX) packet 17 g  17 g Per G Tube BID Greg Valle MD        amiodarone (CORDARONE) tablet 200 mg  200 mg PEG Tube Daily Morgan Sanchez APRN - CNP   200 mg at 01/25/24 0856    atorvastatin (LIPITOR) tablet 40 mg  40 mg PEG Tube Nightly Morgan Sanchez APRN - CNP   40 mg at 01/24/24 2030    dilTIAZem (CARDIZEM) tablet 60 mg  60 mg PEG Tube 4 times per day Morgan Sanchez APRN - CNP   60 mg at 01/25/24 1308    donepezil (ARICEPT) tablet 5 mg  5 mg PEG Tube Nightly Morgan Sanchez APRN - CNP   5 mg at 01/24/24 2030    lisinopril (PRINIVIL;ZESTRIL) tablet 2.5 mg  2.5 mg PEG Tube Daily Morgan Sanchez APRN - CNP   2.5 mg at 01/25/24 0856    metoprolol tartrate (LOPRESSOR) tablet 25 mg  25 mg PEG Tube BID Morgan Sanchez APRN - CNP   25 mg at 01/25/24 0856    modafinil (PROVIGIL) tablet 300 mg  300 mg PEG Tube Daily Morgan Sanchez APRN - CNP   300 mg at 01/25/24 0855    aspirin chewable tablet 81 mg  81 mg PEG Tube Daily Morgan Sanchez APRN - CNP   81 mg at 01/25/24 0856    mupirocin (BACTROBAN) 2 % ointment   Topical BID Greg Valle MD   Given at 01/25/24 1200    piperacillin-tazobactam (ZOSYN) 3,375 mg in sodium chloride 0.9 % 50 mL IVPB (mini-bag)  3,375 mg IntraVENous Q8H Kenny Sung MD   Stopped at 01/25/24 1346    vashe wound therapy external solution   Topical BID Casey Stephenson MD   Given at 01/23/24 0444    traMADol (ULTRAM) tablet 50 mg  50 mg Oral Q6H PRN Casey Stephenson MD   50 mg at 01/24/24 1216    glucose chewable tablet 16 g  4 tablet Oral PRN Casey Stephenson MD        dextrose bolus 10% 125 mL  125 mL IntraVENous PRN Casey Stephenson MD        Or    dextrose bolus 10% 250 mL  250 mL IntraVENous PRN Casey Stephenson MD        glucagon (rDNA) injection 1 mg  1 mg SubCUTAneous PRN Casey Stephenson MD        dextrose 10 % infusion   IntraVENous Continuous PRN Casey Stephenson MD        insulin lispro (HUMALOG) injection vial 0-4 Units  0-4 Units SubCUTAneous TID

## 2024-01-25 NOTE — PROGRESS NOTES
Physician Progress Note      PATIENT:               JOVANY GOMEZ  CSN #:                  [id number]  :                       1950  ADMIT DATE:       2024 5:47 PM  DISCH DATE:  RESPONDING  PROVIDER #:        Greg Valle MD          QUERY TEXT:    Pt admitted  to [facility] from acute rehab with decubitus ulcer for   surgical debridement. Pt noted to have pulmonary insufficiency and chronic   respiratory failure documented. If possible, please document in the progress   notes and discharge summary if you are evaluating and/or treating any of the   following:    [Acute on chronic respiratory failure with hypoxia::This patient is in acute   on chronic respiratory failure with hypoxia.]]    The medical record reflects the following:  Risk Factors: Chronic respiratory failure with hypercapnia -Hx bilateral SDH,   SAH, skull base fracture, 4 mm MLS, right traverse/sigmoid sinus thrombosis.  Clinical Indicators: flowsheet notes pt using 6-7l of oxygen with CPAP - PN-   - Pulmonary insufficiency- wean oxygen for goal SpO2>88%- ABG showing CO2   retention-XR chest negative for acute process  - Pulmonology following, suspected undiagnosed sleep apnea. Was on BiPAP   intermittently, now on CPAP.  Treatment: Was on BiPAP intermittently, now on CPAP.   wean oxygen for goal   SpO2>88%    Thank-You, Sarai Villareal RN, BSN, CCDS  Options provided:  -- Acute on chronic respiratory failure with hypercapnia  -- Acute on chronic respiratory failure with hypoxia and hypercapnia  -- Acute pulmonary insufficiency (with underlying undiagnosed sleep apnea)  -- Other - I will add my own diagnosis  -- Disagree - Not applicable / Not valid  -- Disagree - Clinically unable to determine / Unknown  -- Refer to Clinical Documentation Reviewer    PROVIDER RESPONSE TEXT:    This patient is in acute on chronic respiratory failure with hypercapnia.    Query created by: Oswald Villareal on 2024 12:53 PM      Electronically

## 2024-01-26 LAB
GLUCOSE BLD-MCNC: 119 MG/DL (ref 70–99)
GLUCOSE BLD-MCNC: 128 MG/DL (ref 70–99)
GLUCOSE BLD-MCNC: 138 MG/DL (ref 70–99)
GLUCOSE BLD-MCNC: 142 MG/DL (ref 70–99)
NT-PROBNP SERPL-MCNC: 328 PG/ML (ref 0–449)
PERFORMED ON: ABNORMAL

## 2024-01-26 PROCEDURE — 2060000000 HC ICU INTERMEDIATE R&B

## 2024-01-26 PROCEDURE — 6370000000 HC RX 637 (ALT 250 FOR IP): Performed by: HOSPITALIST

## 2024-01-26 PROCEDURE — 6370000000 HC RX 637 (ALT 250 FOR IP): Performed by: NURSE PRACTITIONER

## 2024-01-26 PROCEDURE — 99233 SBSQ HOSP IP/OBS HIGH 50: CPT | Performed by: STUDENT IN AN ORGANIZED HEALTH CARE EDUCATION/TRAINING PROGRAM

## 2024-01-26 PROCEDURE — 94660 CPAP INITIATION&MGMT: CPT

## 2024-01-26 PROCEDURE — 97530 THERAPEUTIC ACTIVITIES: CPT

## 2024-01-26 PROCEDURE — 2580000003 HC RX 258: Performed by: SURGERY

## 2024-01-26 PROCEDURE — 94640 AIRWAY INHALATION TREATMENT: CPT

## 2024-01-26 PROCEDURE — C9113 INJ PANTOPRAZOLE SODIUM, VIA: HCPCS | Performed by: INTERNAL MEDICINE

## 2024-01-26 PROCEDURE — 6370000000 HC RX 637 (ALT 250 FOR IP): Performed by: SURGERY

## 2024-01-26 PROCEDURE — 83880 ASSAY OF NATRIURETIC PEPTIDE: CPT

## 2024-01-26 PROCEDURE — 94761 N-INVAS EAR/PLS OXIMETRY MLT: CPT

## 2024-01-26 PROCEDURE — 2580000003 HC RX 258: Performed by: INTERNAL MEDICINE

## 2024-01-26 PROCEDURE — 6370000000 HC RX 637 (ALT 250 FOR IP): Performed by: STUDENT IN AN ORGANIZED HEALTH CARE EDUCATION/TRAINING PROGRAM

## 2024-01-26 PROCEDURE — 6360000002 HC RX W HCPCS: Performed by: SURGERY

## 2024-01-26 PROCEDURE — 6370000000 HC RX 637 (ALT 250 FOR IP)

## 2024-01-26 PROCEDURE — 6360000002 HC RX W HCPCS: Performed by: INTERNAL MEDICINE

## 2024-01-26 PROCEDURE — 36415 COLL VENOUS BLD VENIPUNCTURE: CPT

## 2024-01-26 PROCEDURE — 2700000000 HC OXYGEN THERAPY PER DAY

## 2024-01-26 PROCEDURE — 6370000000 HC RX 637 (ALT 250 FOR IP): Performed by: INTERNAL MEDICINE

## 2024-01-26 PROCEDURE — 97112 NEUROMUSCULAR REEDUCATION: CPT

## 2024-01-26 PROCEDURE — 99024 POSTOP FOLLOW-UP VISIT: CPT | Performed by: SURGERY

## 2024-01-26 RX ORDER — POLYETHYLENE GLYCOL 3350 17 G/17G
17 POWDER, FOR SOLUTION ORAL 2 TIMES DAILY
Status: DISCONTINUED | OUTPATIENT
Start: 2024-01-26 | End: 2024-02-01 | Stop reason: HOSPADM

## 2024-01-26 RX ORDER — METOPROLOL TARTRATE 50 MG/1
50 TABLET, FILM COATED ORAL 2 TIMES DAILY
Status: DISCONTINUED | OUTPATIENT
Start: 2024-01-26 | End: 2024-01-31

## 2024-01-26 RX ADMIN — MODAFINIL 300 MG: 200 TABLET ORAL at 09:53

## 2024-01-26 RX ADMIN — METOPROLOL TARTRATE 50 MG: 50 TABLET, FILM COATED ORAL at 22:04

## 2024-01-26 RX ADMIN — IPRATROPIUM BROMIDE AND ALBUTEROL SULFATE 1 DOSE: 2.5; .5 SOLUTION RESPIRATORY (INHALATION) at 11:19

## 2024-01-26 RX ADMIN — PIPERACILLIN AND TAZOBACTAM 3375 MG: 3; .375 INJECTION, POWDER, FOR SOLUTION INTRAVENOUS at 00:51

## 2024-01-26 RX ADMIN — MUPIROCIN: 20 OINTMENT TOPICAL at 22:06

## 2024-01-26 RX ADMIN — DILTIAZEM HYDROCHLORIDE 60 MG: 60 TABLET ORAL at 17:28

## 2024-01-26 RX ADMIN — DONEPEZIL HYDROCHLORIDE 5 MG: 5 TABLET, FILM COATED ORAL at 22:04

## 2024-01-26 RX ADMIN — SODIUM CHLORIDE, PRESERVATIVE FREE 10 ML: 5 INJECTION INTRAVENOUS at 09:49

## 2024-01-26 RX ADMIN — PANTOPRAZOLE SODIUM 40 MG: 40 INJECTION, POWDER, FOR SOLUTION INTRAVENOUS at 09:48

## 2024-01-26 RX ADMIN — AMIODARONE HYDROCHLORIDE 200 MG: 200 TABLET ORAL at 09:48

## 2024-01-26 RX ADMIN — LEVETIRACETAM 750 MG: 100 SOLUTION ORAL at 12:48

## 2024-01-26 RX ADMIN — TRAMADOL HYDROCHLORIDE 50 MG: 50 TABLET ORAL at 09:53

## 2024-01-26 RX ADMIN — ATORVASTATIN CALCIUM 40 MG: 40 TABLET, FILM COATED ORAL at 22:04

## 2024-01-26 RX ADMIN — POLYETHYLENE GLYCOL 3350 17 G: 17 POWDER, FOR SOLUTION ORAL at 09:48

## 2024-01-26 RX ADMIN — ENOXAPARIN SODIUM 100 MG: 100 INJECTION SUBCUTANEOUS at 09:48

## 2024-01-26 RX ADMIN — LISINOPRIL 2.5 MG: 2.5 TABLET ORAL at 09:48

## 2024-01-26 RX ADMIN — DILTIAZEM HYDROCHLORIDE 60 MG: 60 TABLET ORAL at 05:07

## 2024-01-26 RX ADMIN — ENOXAPARIN SODIUM 100 MG: 100 INJECTION SUBCUTANEOUS at 22:04

## 2024-01-26 RX ADMIN — METOPROLOL TARTRATE 25 MG: 25 TABLET, FILM COATED ORAL at 09:48

## 2024-01-26 RX ADMIN — PIPERACILLIN AND TAZOBACTAM 3375 MG: 3; .375 INJECTION, POWDER, FOR SOLUTION INTRAVENOUS at 17:37

## 2024-01-26 RX ADMIN — POLYETHYLENE GLYCOL 3350 17 G: 17 POWDER, FOR SOLUTION ORAL at 22:04

## 2024-01-26 RX ADMIN — DILTIAZEM HYDROCHLORIDE 60 MG: 60 TABLET ORAL at 22:04

## 2024-01-26 RX ADMIN — IPRATROPIUM BROMIDE AND ALBUTEROL SULFATE 1 DOSE: 2.5; .5 SOLUTION RESPIRATORY (INHALATION) at 15:42

## 2024-01-26 RX ADMIN — IPRATROPIUM BROMIDE AND ALBUTEROL SULFATE 1 DOSE: 2.5; .5 SOLUTION RESPIRATORY (INHALATION) at 07:44

## 2024-01-26 RX ADMIN — IPRATROPIUM BROMIDE AND ALBUTEROL SULFATE 1 DOSE: 2.5; .5 SOLUTION RESPIRATORY (INHALATION) at 21:08

## 2024-01-26 RX ADMIN — LEVETIRACETAM 750 MG: 100 SOLUTION ORAL at 22:04

## 2024-01-26 RX ADMIN — MUPIROCIN: 20 OINTMENT TOPICAL at 10:10

## 2024-01-26 RX ADMIN — PIPERACILLIN AND TAZOBACTAM 3375 MG: 3; .375 INJECTION, POWDER, FOR SOLUTION INTRAVENOUS at 10:22

## 2024-01-26 RX ADMIN — ASPIRIN 81 MG 81 MG: 81 TABLET ORAL at 09:48

## 2024-01-26 RX ADMIN — DILTIAZEM HYDROCHLORIDE 60 MG: 60 TABLET ORAL at 00:46

## 2024-01-26 RX ADMIN — DILTIAZEM HYDROCHLORIDE 60 MG: 60 TABLET ORAL at 12:48

## 2024-01-26 ASSESSMENT — PAIN SCALES - WONG BAKER

## 2024-01-26 ASSESSMENT — PAIN SCALES - GENERAL: PAINLEVEL_OUTOF10: 4

## 2024-01-26 NOTE — PROGRESS NOTES
Occupational Therapy  Facility/Department: [facility] PCU  Daily Treatment Note  NAME: Isac Lemus  : 1950  MRN: [id number]    Date of Service: 2024    Discharge Recommendations:  LTACH  OT Equipment Recommendations  Equipment Needed: No (defer)    Therapy discharge recommendations take into account each patient's current medical complexities and are subject to input/oversight from the patient's healthcare team.     Barriers to Home Discharge:   [] Steps to access home entry or bed/bath:   [x] Unable to transfer, ambulate, or propel wheelchair household distances without assist   [] Limited available assist at home upon discharge    [x] Patient or family requests d/c to post-acute facility    [x] Poor cognition/safety awareness for d/c to home alone    [] Unable to maintain ordered weight bearing status    [] Patient with salient signs of long-standing immobility   [x] Decreased independence with ADLs   [x] Increased risk for falls   [] Other:    If pt is unable to be seen after this session, please let this note serve as discharge summary.  Please see case management note for discharge disposition.  Thank you.    Patient Diagnosis(es): The encounter diagnosis was Wound of sacral region, initial encounter.     Assessment    Assessment: Pt tolerated session well this date. Co-tx collaboration this date to safely meet goals and will have better occupational performance outcomes with in a co-treatment than 1:1 session. Pt performed BUE reaching exercises while seated in chair position in bed. Pt demo'd better ability to reach toward midline this date (more active participation in LUE vs RUE). He sat on the EOB for ~10 min with mod-maxA (to correct R lateral lean). He will benefit form continued skilled acute OT services to maximize safety and IND with ADLs and mobility. LTACH is recommended at d/c.   Activity Tolerance: Patient limited by fatigue;Treatment limited secondary to decreased cognition;Patient

## 2024-01-26 NOTE — CARE COORDINATION
Peer to peer denied. Family wants to proceed with next level appeal with [facility]  Chika who is the covering admissions liaison is starting appeal with [facility] Medicare today. Writer also contacted the [facility] again per family request to re-visit patient going there for skilled rehab. They initially have denied due to extensive nature and complexity of wound. Patient has since had a debridement of sacral wound and wound vac placed. Family prefers LTACH of care. Jane at [facility] is following and states they will review clinical again to see if now appropriate for SNF and Chika from [facility] will notify CM of decision of appeal with [facility]. Patient here through the weekend most likely.     Addendum: [facility] returned call and per Jane they cannot accept still based on complexity of wound and they anticipate patient will need to be there longer than [facility] will allow. Daughter investigating [facility] Tripathi. Appeal in process with [facility] and [facility]

## 2024-01-26 NOTE — PROGRESS NOTES
01/26/24 0006   NIV Type   $NIV $Daily Charge   NIV Started/Stopped On   Equipment Type v60   Mode CPAP   Mask Type Total face   Mask Size Large   Breath Sounds   Breath Sounds Bilateral Diminished   Right Upper Lobe Diminished   Right Middle Lobe Diminished   Right Lower Lobe Diminished   Left Upper Lobe Diminished   Left Lower Lobe Diminished   Settings/Measurements   PIP Observed 12 cm H20   CPAP/EPAP 12 cmH2O   Vt (Measured) 472 mL   FiO2  55 %   Minute Volume (L/min) 11.3 Liters   Mask Leak (lpm) 6 lpm   Patient's Home Machine No   Electrical Safety Check Performed Yes   Alarm Settings   Alarms On Y   Low Pressure (cmH2O) 6 cmH2O   High Pressure (cmH2O) 30 cmH2O   Delay Alarm 20 sec(s)   Apnea (secs) 20 secs   RR Low (bpm) 6   RR High (bpm) 40 br/min

## 2024-01-26 NOTE — RT PROTOCOL NOTE
RT Inhaler-Nebulizer Bronchodilator Protocol Note    There is a bronchodilator order in the chart from a provider indicating to follow the RT Bronchodilator Protocol and there is an “Initiate RT Inhaler-Nebulizer Bronchodilator Protocol” order as well (see protocol at bottom of note).    CXR Findings:  XR CHEST PORTABLE    Result Date: 1/25/2024  1. Low lung volumes with basilar opacities favored to represent atelectasis or other infection.       The findings from the last RT Protocol Assessment were as follows:   History Pulmonary Disease: Chronic pulmonary disease  Respiratory Pattern: Dyspnea on exertion or RR 21-25 bpm  Breath Sounds: Slightly diminished and/or crackles  Cough: Weak, productive  Indication for Bronchodilator Therapy:    Bronchodilator Assessment Score: 8    Aerosolized bronchodilator medication orders have been revised according to the RT Inhaler-Nebulizer Bronchodilator Protocol below.    Respiratory Therapist to perform RT Therapy Protocol Assessment initially then follow the protocol.  Repeat RT Therapy Protocol Assessment PRN for score 0-3 or on second treatment, BID, and PRN for scores above 3.    No Indications - adjust the frequency to every 6 hours PRN wheezing or bronchospasm, if no treatments needed after 48 hours then discontinue using Per Protocol order mode.     If indication present, adjust the RT bronchodilator orders based on the Bronchodilator Assessment Score as indicated below.  Use Inhaler orders unless patient has one or more of the following: on home nebulizer, not able to hold breath for 10 seconds, is not alert and oriented, cannot activate and use MDI correctly, or respiratory rate 25 breaths per minute or more, then use the equivalent nebulizer order(s) with same Frequency and PRN reasons based on the score.  If a patient is on this medication at home then do not decrease Frequency below that used at home.    0-3 - enter or revise RT bronchodilator order(s) to

## 2024-01-26 NOTE — PROGRESS NOTES
[facility] Continence Nurse  Follow-up Progress Note       NAME:  Isac Lemus  MEDICAL RECORD NUMBER:  [id number]  AGE:  73 y.o.   GENDER:  male  :  1950  TODAY'S DATE:  2024    Subjective: Spouse and daughter at bedside.  Patient awake, eyes open.   Wound Identification:  Wound Type: pressure and non-healing surgical  Contributing Factors: diabetes, poor glucose control, chronic pressure, decreased mobility, shear force, and obesity     Wound History: Patient first admitted in [facility]  between 2023 and New Year of 2024.  Wound Care first saw patient 2024 for DTI to buttocks.  Has had I/D by Dr. Stephenson 01/15/24, then 24, now placing NPWT        Patient Goal of Care:  [x] Wound Healing  [] Odor Control  [] Palliative Care  [x] Pain Control   [] Other:     Objective: lying in bed    /70   Pulse 92   Temp 98 °F (36.7 °C) (Axillary)   Resp 20   Ht 1.702 m (5' 7\")   Wt 95.8 kg (211 lb 3.2 oz)   SpO2 98%   BMI 33.08 kg/m²   Devon Risk Score: Devon Scale Score: 12  Assessment: mild odor from sacrum.  Sanguinous discharge in canister.  Slough within wound.   Measurements:  Negative Pressure Wound Therapy Sacrum Mid (Active)   $ Standard NPWT >50 sq cm PER TX $ Yes 24 1120   Wound Type Pressure ulcer: Stage IV 24 1120   Unit Type [id number] 24 1120   Dressing Type Black Foam;White Foam 24 1120   Number of pieces used 2 24 1120   Number of pieces removed 2 24 1120   Cycle Continuous 24 1120   Target Pressure (mmHg) 125 24 1120   Intensity 1 24 1120   Canister changed? No 24 1120   Dressing Status New dressing applied 24 1120   Dressing Changed Changed/New 24 1120   Drainage Amount Moderate 24 1120   Drainage Description Sanguinous 24 1120   Dressing Change Due 24 1120   Output (ml) 50 ml 24 2200   Wound Assessment

## 2024-01-26 NOTE — CARE COORDINATION
Referral received to check IV Benefits. Patients benefit information is as follows:     Patient covered at 100%    Electronically signed by Cabrera Wallace on 1/26/2024 at 1:02 PM   Cell # [phone], Office # [phone]

## 2024-01-26 NOTE — PROGRESS NOTES
01/26/24 0412   NIV Type   NIV Started/Stopped On   Equipment Type v60   Mode CPAP   Mask Type Total face   Mask Size Large   Breath Sounds   Breath Sounds Bilateral Diminished   Settings/Measurements   PIP Observed 11 cm H20   CPAP/EPAP 12 cmH2O   Vt (Measured) 296 mL   FiO2  55 %   Minute Volume (L/min) 5.9 Liters   Mask Leak (lpm) 10 lpm   Patient's Home Machine No   Electrical Safety Check Performed Yes   Alarm Settings   Alarms On Y   Low Pressure (cmH2O) 6 cmH2O   High Pressure (cmH2O) 30 cmH2O   Delay Alarm 20 sec(s)   Apnea (secs) 20 secs   RR Low (bpm) 6   RR High (bpm) 40 br/min

## 2024-01-26 NOTE — PLAN OF CARE
Problem: Safety - Adult  Goal: Free from fall injury  Outcome: Progressing     Problem: Pain  Goal: Verbalizes/displays adequate comfort level or baseline comfort level  Recent Flowsheet Documentation  Taken 1/26/2024 0050 by Glo Davis RN  Verbalizes/displays adequate comfort level or baseline comfort level:   Encourage patient to monitor pain and request assistance   Assess pain using appropriate pain scale   Administer analgesics based on type and severity of pain and evaluate response   Implement non-pharmacological measures as appropriate and evaluate response   Consider cultural and social influences on pain and pain management   Notify Licensed Independent Practitioner if interventions unsuccessful or patient reports new pain  Taken 1/25/2024 1912 by Glo Daivs RN  Verbalizes/displays adequate comfort level or baseline comfort level:   Encourage patient to monitor pain and request assistance   Assess pain using appropriate pain scale   Administer analgesics based on type and severity of pain and evaluate response   Implement non-pharmacological measures as appropriate and evaluate response   Notify Licensed Independent Practitioner if interventions unsuccessful or patient reports new pain   Consider cultural and social influences on pain and pain management     Problem: ABCDS Injury Assessment  Goal: Absence of physical injury  Outcome: Progressing     Problem: Nutrition Deficit:  Goal: Optimize nutritional status  Outcome: Progressing

## 2024-01-26 NOTE — PROGRESS NOTES
Physical Therapy  Facility/Department: [place] [address] [facility]U  Daily Treatment Note  NAME: Isac Lemus  : 1950  MRN: [id number]    Date of Service: 2024    Discharge Recommendations:  Subacute/Skilled Nursing Facility, LTACH   PT Equipment Recommendations  Equipment Needed: No    Therapy discharge recommendations take into account each patient's current medical complexities and are subject to input/oversight from the patient's healthcare team.      Barriers to Home Discharge:   [x] Steps to access home entry or bed/bath:   [x] Unable to transfer, ambulate, or propel wheelchair household distances without assist   [x] Limited available assist at home upon discharge    [x] Patient or family requests d/c to post-acute facility    [x] Poor cognition/safety awareness for d/c to home alone    [] Unable to maintain ordered weight bearing status    [x] Patient with salient signs of long-standing immobility   [x] Decreased independence with ADLs   [x] Increased risk for falls   [x] Other: wound vac     If pt is unable to be seen after this session, please let this note serve as discharge summary.  Please see case management note for discharge disposition.  Thank you  Patient Diagnosis(es): The encounter diagnosis was Wound of sacral region, initial encounter.    Assessment   Assessment: Pt seen as cotx with OT to progress functional mobility safely and maximize outcomes compared to 1:1 session. Overall, improved participation compared to previous session. Pt able to participate in cross body activity with AAROM and able to track stimuli with improved attention to R side. Pt currently able to tolerate sitting EOB x10 minutes with mod-max(A) at trunk. Pt would benefit from continued skilled PT to address remaining deficits. Recommend LTACH upon d/c    Activity Tolerance: Patient limited by fatigue;Treatment limited secondary to decreased cognition;Patient limited by endurance  Equipment Needed: No     Plan    Physical  Goals : pt unable to verbalize    Education  Patient Education  Education Given To: Patient;Family  Education Provided: Family Education;Plan of Care;Role of Therapy;Home Exercise Program;Orientation  Education Provided Comments: positioning, bed mobility, HEP, visual tracking and tasks, edu family on chair position during meals  Education Method: Verbal;Demonstration  Barriers to Learning: Cognition;Hearing  Education Outcome: Unable to verbalize;Continued education needed    AM-PAC - Mobility    AM-PAC Basic Mobility - Inpatient   How much help is needed turning from your back to your side while in a flat bed without using bedrails?: Total  How much help is needed moving from lying on your back to sitting on the side of a flat bed without using bedrails?: Total  How much help is needed moving to and from a bed to a chair?: Total  How much help is needed standing up from a chair using your arms?: Total  How much help is needed walking in hospital room?: Total  How much help is needed climbing 3-5 steps with a railing?: Total  AM-PAC Inpatient Mobility Raw Score : 6  AM-PAC Inpatient T-Scale Score : 23.55  Mobility Inpatient CMS 0-100% Score: 100  Mobility Inpatient CMS G-Code Modifier : CN         Therapy Time   Individual Concurrent Group Co-treatment   Time In       1142   Time Out       1215   Minutes       33   Timed Code Treatment Minutes: 33 Minutes       Kathia Vasquez PT

## 2024-01-26 NOTE — PROGRESS NOTES
Patient: Isac Lemus MRN: [id number]  Date of  Admission: 1/14/2024   YOB: 1950  Age: 73 y.o.  Sex: male    [address]  [facility] Admitting Physician: GEORGINA WOODS    Attending Physician:  Greg Valle MD         Pulmonary Service Note      SUBJECTIVE:    Patient's oxygen requirements are still high.  He received therapy today.        OBJECTIVE    Medications    Continuous Infusions:   dextrose      dilTIAZem 100 mg in sodium chloride 0.9 % 100 mL infusion ([facility]) Stopped (01/17/24 1148)    sodium chloride Stopped (01/15/24 1335)       Scheduled Meds:   polyethylene glycol  17 g Per G Tube BID    ipratropium 0.5 mg-albuterol 2.5 mg  1 Dose Inhalation Q4H WA RT    insulin lispro  0-16 Units SubCUTAneous TID WC    insulin lispro  0-4 Units SubCUTAneous Nightly    amiodarone  200 mg PEG Tube Daily    atorvastatin  40 mg PEG Tube Nightly    dilTIAZem  60 mg PEG Tube 4 times per day    donepezil  5 mg PEG Tube Nightly    lisinopril  2.5 mg PEG Tube Daily    metoprolol tartrate  25 mg PEG Tube BID    modafinil  300 mg PEG Tube Daily    aspirin  81 mg PEG Tube Daily    mupirocin   Topical BID    piperacillin-tazobactam  3,375 mg IntraVENous Q8H    vashe wound therapy   Topical BID    enoxaparin  1 mg/kg SubCUTAneous BID    [Held by provider] apixaban  5 mg Oral BID    pantoprazole  40 mg IntraVENous Daily    sodium chloride flush  5-40 mL IntraVENous 2 times per day    levETIRAcetam  750 mg PEG Tube BID       PRN Meds:  traMADol, glucose, dextrose bolus **OR** dextrose bolus, glucagon (rDNA), dextrose, guaiFENesin, sodium chloride flush, sodium chloride, polyethylene glycol, acetaminophen **OR** acetaminophen    Physical    Patient Vitals for the past 24 hrs:   BP Temp Temp src Pulse Resp SpO2 Weight   01/26/24 1256 -- -- -- (!) 120 -- 92 % --   01/26/24 1246 -- -- -- -- -- (!) 89 % --   01/26/24 1200 120/81 -- Axillary (!) 121 -- 94 % --   01/26/24 1147 115/72 -- -- (!) 103  -- 95 % --   01/26/24 1121 -- -- -- -- -- 98 % --   01/26/24 1032 -- -- -- 92 -- 93 % --   01/26/24 1030 -- -- -- 99 -- 94 % --   01/26/24 0947 119/70 98 °F (36.7 °C) Axillary (!) 103 20 96 % --   01/26/24 0744 -- -- -- -- -- 99 % --   01/26/24 0512 -- -- -- -- -- -- 95.8 kg (211 lb 3.2 oz)   01/26/24 0500 111/74 98 °F (36.7 °C) Axillary (!) 111 21 96 % --   01/26/24 0050 125/77 98.1 °F (36.7 °C) -- 92 -- 97 % --   01/25/24 2020 114/72 98.3 °F (36.8 °C) -- -- -- -- --   01/25/24 1912 (!) 143/74 -- -- (!) 108 22 92 % --   01/25/24 1600 -- -- -- -- -- 96 % --   01/25/24 1530 (!) 143/74 98.1 °F (36.7 °C) Axillary 75 16 92 % --        Physical Exam  Constitutional:       Appearance: He is ill-appearing.   HENT:      Head: Normocephalic and atraumatic.      Nose: Nose normal.      Mouth/Throat:      Pharynx: No oropharyngeal exudate.   Eyes:      General: No scleral icterus.        Right eye: No discharge.         Left eye: No discharge.   Cardiovascular:      Rate and Rhythm: Tachycardia present.      Heart sounds: No murmur heard.     No gallop.   Pulmonary:      Breath sounds: No wheezing.      Comments: Diminished breath sounds at the bases  Abdominal:      General: Abdomen is flat. Bowel sounds are normal. There is no distension.      Tenderness: There is no abdominal tenderness.   Musculoskeletal:         General: No swelling.      Cervical back: Normal range of motion.   Skin:     General: Skin is warm and dry.   Neurological:      Mental Status: He is alert.      Comments: Alert, does not converse much at baseline          Weight  Weight change: -0.7 kg (-1 lb 8.7 oz)      Labs:   Recent Labs     01/24/24  1156   WBC 7.2   HGB 9.6*   HCT 30.6*         Recent Labs     01/24/24  1156   *   K 4.1   CL 97*   CO2 32   BUN 8   GLUCOSE 138*       Additional labs      Radiology, images personally reviewed.   PFT: none     Chest x-ray 1/24/2024  Low lung volumes bilaterally.  Difficult to see left

## 2024-01-26 NOTE — PROGRESS NOTES
[facility] GENERAL SURGERY    Surgery Progress Note           POD # 3    PATIENT NAME: Isac Lemus     TODAY'S DATE: 1/26/2024    INTERVAL HISTORY:    Pt  without acute events.  Vac changed this AM - per family, Wound Care concerned w/ odor, but replaced the Vac anyway.     OBJECTIVE:   VITALS:  /81   Pulse (!) 120   Temp 98 °F (36.7 °C) (Axillary)   Resp 20   Ht 1.702 m (5' 7\")   Wt 95.8 kg (211 lb 3.2 oz)   SpO2 92%   BMI 33.08 kg/m²     INTAKE/OUTPUT:    I/O last 3 completed shifts:  In: 490 [P.O.:240; NG/GT:250]  Out: 1150 [Urine:1100; Drains:50]  I/O this shift:  In: 690 [P.O.:190; NG/GT:500]  Out: 350 [Urine:350]              CONSTITUTIONAL:  fatigued and somnolent    INCISION: Vac in place    Data:  CBC:   Recent Labs     01/24/24  1156   WBC 7.2   HGB 9.6*   HCT 30.6*        BMP:    Recent Labs     01/24/24  1156   *   K 4.1   CL 97*   CO2 32   BUN 8   CREATININE <0.5*   GLUCOSE 138*     Hepatic: No results for input(s): \"AST\", \"ALT\", \"ALB\", \"BILITOT\", \"ALKPHOS\" in the last 72 hours.  Mag:      Recent Labs     01/24/24  1156   MG 1.80      Phos:     Recent Labs     01/24/24  1156   PHOS 3.0      INR: No results for input(s): \"INR\" in the last 72 hours.      Radiology Review:       ASSESSMENT AND PLAN:  73 y.o. male status post excisional debridement of sacral decubitus ulcer x2   - will check wound during next Vac change on Monday.  Please give Surgery a call when Wound Care team is there to change the dressing    Will not follow this weekend, please call /w questions.         Electronically signed by GEORGINA WOLF MD

## 2024-01-27 ENCOUNTER — APPOINTMENT (OUTPATIENT)
Dept: GENERAL RADIOLOGY | Age: 74
End: 2024-01-27
Attending: INTERNAL MEDICINE
Payer: MEDICARE

## 2024-01-27 LAB
ANION GAP SERPL CALCULATED.3IONS-SCNC: 8 MMOL/L (ref 3–16)
BUN SERPL-MCNC: 12 MG/DL (ref 7–20)
CALCIUM SERPL-MCNC: 8.5 MG/DL (ref 8.3–10.6)
CHLORIDE SERPL-SCNC: 96 MMOL/L (ref 99–110)
CO2 SERPL-SCNC: 30 MMOL/L (ref 21–32)
CREAT SERPL-MCNC: <0.5 MG/DL (ref 0.8–1.3)
DEPRECATED RDW RBC AUTO: 18.4 % (ref 12.4–15.4)
GFR SERPLBLD CREATININE-BSD FMLA CKD-EPI: >60 ML/MIN/{1.73_M2}
GLUCOSE BLD-MCNC: 125 MG/DL (ref 70–99)
GLUCOSE BLD-MCNC: 128 MG/DL (ref 70–99)
GLUCOSE BLD-MCNC: 133 MG/DL (ref 70–99)
GLUCOSE BLD-MCNC: 152 MG/DL (ref 70–99)
GLUCOSE BLD-MCNC: 191 MG/DL (ref 70–99)
GLUCOSE SERPL-MCNC: 168 MG/DL (ref 70–99)
HCT VFR BLD AUTO: 27.7 % (ref 40.5–52.5)
HGB BLD-MCNC: 9 G/DL (ref 13.5–17.5)
MAGNESIUM SERPL-MCNC: 1.6 MG/DL (ref 1.8–2.4)
MCH RBC QN AUTO: 32 PG (ref 26–34)
MCHC RBC AUTO-ENTMCNC: 32.4 G/DL (ref 31–36)
MCV RBC AUTO: 98.7 FL (ref 80–100)
PERFORMED ON: ABNORMAL
PHOSPHATE SERPL-MCNC: 2.5 MG/DL (ref 2.5–4.9)
PLATELET # BLD AUTO: 283 K/UL (ref 135–450)
PMV BLD AUTO: 7.4 FL (ref 5–10.5)
POTASSIUM SERPL-SCNC: 3.6 MMOL/L (ref 3.5–5.1)
RBC # BLD AUTO: 2.81 M/UL (ref 4.2–5.9)
SODIUM SERPL-SCNC: 134 MMOL/L (ref 136–145)
WBC # BLD AUTO: 10.3 K/UL (ref 4–11)

## 2024-01-27 PROCEDURE — 94761 N-INVAS EAR/PLS OXIMETRY MLT: CPT

## 2024-01-27 PROCEDURE — 6360000002 HC RX W HCPCS: Performed by: SURGERY

## 2024-01-27 PROCEDURE — 6370000000 HC RX 637 (ALT 250 FOR IP): Performed by: INTERNAL MEDICINE

## 2024-01-27 PROCEDURE — 6370000000 HC RX 637 (ALT 250 FOR IP): Performed by: STUDENT IN AN ORGANIZED HEALTH CARE EDUCATION/TRAINING PROGRAM

## 2024-01-27 PROCEDURE — 74018 RADEX ABDOMEN 1 VIEW: CPT

## 2024-01-27 PROCEDURE — 2580000003 HC RX 258: Performed by: SURGERY

## 2024-01-27 PROCEDURE — 2580000003 HC RX 258: Performed by: INTERNAL MEDICINE

## 2024-01-27 PROCEDURE — 6370000000 HC RX 637 (ALT 250 FOR IP): Performed by: SURGERY

## 2024-01-27 PROCEDURE — 94660 CPAP INITIATION&MGMT: CPT

## 2024-01-27 PROCEDURE — 2060000000 HC ICU INTERMEDIATE R&B

## 2024-01-27 PROCEDURE — 36415 COLL VENOUS BLD VENIPUNCTURE: CPT

## 2024-01-27 PROCEDURE — 6360000002 HC RX W HCPCS: Performed by: INTERNAL MEDICINE

## 2024-01-27 PROCEDURE — 84100 ASSAY OF PHOSPHORUS: CPT

## 2024-01-27 PROCEDURE — C9113 INJ PANTOPRAZOLE SODIUM, VIA: HCPCS | Performed by: INTERNAL MEDICINE

## 2024-01-27 PROCEDURE — 6370000000 HC RX 637 (ALT 250 FOR IP): Performed by: HOSPITALIST

## 2024-01-27 PROCEDURE — 6370000000 HC RX 637 (ALT 250 FOR IP)

## 2024-01-27 PROCEDURE — 94640 AIRWAY INHALATION TREATMENT: CPT

## 2024-01-27 PROCEDURE — 85027 COMPLETE CBC AUTOMATED: CPT

## 2024-01-27 PROCEDURE — 83735 ASSAY OF MAGNESIUM: CPT

## 2024-01-27 PROCEDURE — 99232 SBSQ HOSP IP/OBS MODERATE 35: CPT

## 2024-01-27 PROCEDURE — 6370000000 HC RX 637 (ALT 250 FOR IP): Performed by: NURSE PRACTITIONER

## 2024-01-27 PROCEDURE — 2700000000 HC OXYGEN THERAPY PER DAY

## 2024-01-27 PROCEDURE — 80048 BASIC METABOLIC PNL TOTAL CA: CPT

## 2024-01-27 RX ORDER — SENNA AND DOCUSATE SODIUM 50; 8.6 MG/1; MG/1
2 TABLET, FILM COATED ORAL 2 TIMES DAILY
Status: DISCONTINUED | OUTPATIENT
Start: 2024-01-27 | End: 2024-02-01 | Stop reason: HOSPADM

## 2024-01-27 RX ADMIN — PIPERACILLIN AND TAZOBACTAM 3375 MG: 3; .375 INJECTION, POWDER, FOR SOLUTION INTRAVENOUS at 09:20

## 2024-01-27 RX ADMIN — LISINOPRIL 2.5 MG: 2.5 TABLET ORAL at 08:48

## 2024-01-27 RX ADMIN — SODIUM CHLORIDE, PRESERVATIVE FREE 10 ML: 5 INJECTION INTRAVENOUS at 08:41

## 2024-01-27 RX ADMIN — MODAFINIL 300 MG: 200 TABLET ORAL at 08:59

## 2024-01-27 RX ADMIN — METOPROLOL TARTRATE 50 MG: 50 TABLET, FILM COATED ORAL at 20:08

## 2024-01-27 RX ADMIN — AMIODARONE HYDROCHLORIDE 200 MG: 200 TABLET ORAL at 08:48

## 2024-01-27 RX ADMIN — DILTIAZEM HYDROCHLORIDE 60 MG: 60 TABLET ORAL at 17:31

## 2024-01-27 RX ADMIN — DONEPEZIL HYDROCHLORIDE 5 MG: 5 TABLET, FILM COATED ORAL at 20:08

## 2024-01-27 RX ADMIN — ATORVASTATIN CALCIUM 40 MG: 40 TABLET, FILM COATED ORAL at 20:34

## 2024-01-27 RX ADMIN — IPRATROPIUM BROMIDE AND ALBUTEROL SULFATE 1 DOSE: 2.5; .5 SOLUTION RESPIRATORY (INHALATION) at 11:41

## 2024-01-27 RX ADMIN — DILTIAZEM HYDROCHLORIDE 60 MG: 60 TABLET ORAL at 23:56

## 2024-01-27 RX ADMIN — MUPIROCIN: 20 OINTMENT TOPICAL at 08:52

## 2024-01-27 RX ADMIN — Medication: at 21:45

## 2024-01-27 RX ADMIN — IPRATROPIUM BROMIDE AND ALBUTEROL SULFATE 1 DOSE: 2.5; .5 SOLUTION RESPIRATORY (INHALATION) at 15:37

## 2024-01-27 RX ADMIN — PIPERACILLIN AND TAZOBACTAM 3375 MG: 3; .375 INJECTION, POWDER, FOR SOLUTION INTRAVENOUS at 17:33

## 2024-01-27 RX ADMIN — LEVETIRACETAM 750 MG: 100 SOLUTION ORAL at 08:59

## 2024-01-27 RX ADMIN — ENOXAPARIN SODIUM 100 MG: 100 INJECTION SUBCUTANEOUS at 08:47

## 2024-01-27 RX ADMIN — DILTIAZEM HYDROCHLORIDE 60 MG: 60 TABLET ORAL at 06:11

## 2024-01-27 RX ADMIN — LEVETIRACETAM 750 MG: 100 SOLUTION ORAL at 21:46

## 2024-01-27 RX ADMIN — DILTIAZEM HYDROCHLORIDE 60 MG: 60 TABLET ORAL at 11:13

## 2024-01-27 RX ADMIN — SENNOSIDES AND DOCUSATE SODIUM 2 TABLET: 50; 8.6 TABLET ORAL at 20:34

## 2024-01-27 RX ADMIN — POLYETHYLENE GLYCOL 3350 17 G: 17 POWDER, FOR SOLUTION ORAL at 20:08

## 2024-01-27 RX ADMIN — PIPERACILLIN AND TAZOBACTAM 3375 MG: 3; .375 INJECTION, POWDER, FOR SOLUTION INTRAVENOUS at 02:12

## 2024-01-27 RX ADMIN — SENNOSIDES AND DOCUSATE SODIUM 2 TABLET: 50; 8.6 TABLET ORAL at 13:06

## 2024-01-27 RX ADMIN — PANTOPRAZOLE SODIUM 40 MG: 40 INJECTION, POWDER, FOR SOLUTION INTRAVENOUS at 08:40

## 2024-01-27 RX ADMIN — METOPROLOL TARTRATE 50 MG: 50 TABLET, FILM COATED ORAL at 08:48

## 2024-01-27 RX ADMIN — ACETAMINOPHEN 650 MG: 325 TABLET ORAL at 17:51

## 2024-01-27 RX ADMIN — IPRATROPIUM BROMIDE AND ALBUTEROL SULFATE 1 DOSE: 2.5; .5 SOLUTION RESPIRATORY (INHALATION) at 07:50

## 2024-01-27 RX ADMIN — TRAMADOL HYDROCHLORIDE 50 MG: 50 TABLET ORAL at 11:14

## 2024-01-27 RX ADMIN — SODIUM CHLORIDE, PRESERVATIVE FREE 10 ML: 5 INJECTION INTRAVENOUS at 20:34

## 2024-01-27 RX ADMIN — MUPIROCIN: 20 OINTMENT TOPICAL at 20:46

## 2024-01-27 RX ADMIN — ASPIRIN 81 MG 81 MG: 81 TABLET ORAL at 08:48

## 2024-01-27 RX ADMIN — POLYETHYLENE GLYCOL 3350 17 G: 17 POWDER, FOR SOLUTION ORAL at 08:59

## 2024-01-27 ASSESSMENT — PAIN SCALES - WONG BAKER
WONGBAKER_NUMERICALRESPONSE: 2
WONGBAKER_NUMERICALRESPONSE: 2
WONGBAKER_NUMERICALRESPONSE: 0
WONGBAKER_NUMERICALRESPONSE: 2
WONGBAKER_NUMERICALRESPONSE: 0
WONGBAKER_NUMERICALRESPONSE: 0
WONGBAKER_NUMERICALRESPONSE: 2
WONGBAKER_NUMERICALRESPONSE: 0
WONGBAKER_NUMERICALRESPONSE: 2

## 2024-01-27 ASSESSMENT — PAIN SCALES - GENERAL
PAINLEVEL_OUTOF10: 4
PAINLEVEL_OUTOF10: 0
PAINLEVEL_OUTOF10: 0
PAINLEVEL_OUTOF10: 2
PAINLEVEL_OUTOF10: 0
PAINLEVEL_OUTOF10: 4
PAINLEVEL_OUTOF10: 0

## 2024-01-27 NOTE — PROGRESS NOTES
01/27/24 0408   NIV Type   Equipment Type v60   Mode CPAP   Mask Type Total face   Mask Size Large   Assessment   Respirations 23   Comfort Level Good   Using Accessory Muscles No   Mask Compliance Good   Settings/Measurements   CPAP/EPAP 12 cmH2O   Vt (Measured) 560 mL   FiO2  55 %   Minute Volume (L/min) 12.8 Liters   Mask Leak (lpm) 41 lpm   Alarm Settings   Alarms On Y   Low Pressure (cmH2O) 6 cmH2O   High Pressure (cmH2O) 30 cmH2O   Delay Alarm 20 sec(s)   RR Low (bpm) 6   RR High (bpm) 40 br/min

## 2024-01-27 NOTE — PROGRESS NOTES
Noted additional sanguenous output from wound vac this shift. I recorded 150ml for a total of 400ml since the wound vac was placed on Wednesday 1/24. I also found additional sanguenous output coming out from around wound vac site. These findings were reported to attending and surgeon, orders obtained to check CBC, BMP, Mg, and Phos this afternoon and address as needed. Pt is stable at this time, will continue to monitor output closely. Pt's wife at bedside and updated with these events. Pain managed with PRN tylenol and toradol throughout shift using KING Faces Scale.

## 2024-01-27 NOTE — PROGRESS NOTES
V2.0    [facility]      Name:  Isac Lemus /Age/Sex: 1950  (73 y.o. male)   MRN & CSN:  [id number] & [id number] Encounter Date/Time: 2024 12:49 PM EST   Location:   PCP: Taryn Rivera APRN - CNP     Attending:Greg Valle MD       Hospital Day: 14    Assessment and Recommendations     Patient is a 73-year-old male past medical history of TBI with bleed who was admitted to [facility] from acute rehab with decubitus ulcer for surgical debridement    #.  Intermittent fevers likely due to infected sacral wound: fever resolved.   #.  Suspected infected sacral wound  Infectious workup at [facility] unremarkable  S/p I&D 1/15/2024 without complication, without osteo  S/p  repeat wound debridement on 2024  S/P wound vac placement: plan for wound check and vac exchange planned on Monday.   Continue Zosyn: can Switch to oral augmentin at DC  Discharge pending gen surgery clearance and placement.       #.  Traumatic brain injury  History of traumatic brain injury, bilateral subdural hematoma, subarachnoid hemorrhage, skull base fracture, right transverse/sigmoid sinus thrombosis  Plan no surgical intervention and serial scans during prior acute stay stable  Continue Keppra  EKG showing epileptiform discharges during prior acute stay  Continue Aricept and provigil    #. Acute hypoxic respiratory failure  Suspect atelectasis, encourage insentive spirometry  Consult pulmonary  for hypoxia and Cpap management for argenis     #.  Paroxysmal atrial fibrillation  With episode of RVR  Resume eliquis when ok with gen surgery.   Continue amiodarone, statins, aspirin, diltiazem, metoprolol    #.  Dysphagia status post PEG  Continue tube feeds    #.  History of right peroneal DVT  Continue to hold Eliquis for potential I&D on Monday if needed    #.  Thrombocytopenia: Resolved  #.  History of HIT  Resume Eliquis if surgery does not recommend repeat I&D on Monday    #.  Diabetes mellitus, type II  with hyperglycemia  Continue current insulin regimen    Completed P2P on 1/222024 and 1/26for LTAC which was denied.       DVT Prophylaxis: Lovenox.   Code Status: Total Support.   Barrier to DC: hypoxia. Pulm consultation , placement pending.         Subjective:     Patient was seen and examined today.  No acute events overnight.  Patient damian afebrile stable vital signs.    Review of Systems:      Pertinent positives and negatives discussed in HPI    Objective:     Intake/Output Summary (Last 24 hours) at 1/27/2024 1123  Last data filed at 1/27/2024 0915  Gross per 24 hour   Intake 930 ml   Output 625 ml   Net 305 ml        Vitals:   Vitals:    01/27/24 0815 01/27/24 0848 01/27/24 1045 01/27/24 1113   BP: (!) 143/83 (!) 143/83 112/72    Pulse: (!) 120  81    Resp:    16   Temp: 98.1 °F (36.7 °C)      TempSrc: Oral      SpO2:       Weight:       Height:             Physical Exam:      General: awake, alert, in NAD  Eyes: EOMI  ENT: neck supple  Cardiovascular: Regular rate.  Respiratory: Clear to auscultation  Gastrointestinal: Soft, non tender, BS+  Genitourinary: no suprapubic tenderness  Musculoskeletal: No edema  Skin: warm, dry  Neuro: Alert alert, Cranial nerves intack, no focal motor or sensory deficits.   Psych: Mood appropriate.         Medications:   Medications:    polyethylene glycol  17 g Oral BID    metoprolol tartrate  50 mg PEG Tube BID    ipratropium 0.5 mg-albuterol 2.5 mg  1 Dose Inhalation Q4H WA RT    insulin lispro  0-16 Units SubCUTAneous TID WC    insulin lispro  0-4 Units SubCUTAneous Nightly    amiodarone  200 mg PEG Tube Daily    atorvastatin  40 mg PEG Tube Nightly    dilTIAZem  60 mg PEG Tube 4 times per day    donepezil  5 mg PEG Tube Nightly    lisinopril  2.5 mg PEG Tube Daily    modafinil  300 mg PEG Tube Daily    aspirin  81 mg PEG Tube Daily    mupirocin   Topical BID    piperacillin-tazobactam  3,375 mg IntraVENous Q8H    vashe wound therapy   Topical BID    enoxaparin  1 mg/kg

## 2024-01-27 NOTE — PROGRESS NOTES
[facility]     Electrophysiology                                     Progress Note    Admission date:  2024    Reason for follow up visit: Atrial fibrillation    HPI/CC: Isac Lemus was admitted on 2023 in the ARU from  after traumatic fall resulting in skull fracture and SAH/SDH. Complicated hospital course. EP consulted when ECG revealed rapid atrial flutter. An cardiac event monitor was placed on 2024 but this was removed 2024 at the family's request due to skin irritation.  On 2024, patient was admitted to the hospital for surgical debridement of his sacral wound.  Postoperatively he had RVR and IV diltiazem was initiated. Echo revealed an EF of 55-60%. Rhythm is atrial flutter with heart rates in the 80s and 90s. patient has had intermittent fevers due to infected sacral wound..  1/15/2024 Patient underwent I&D which was repeated on 2024 and VAC placed to wound    Rhythm has been atrial fibrillation rate 110    Subjective: Patient awake and alert.  Unable to answer questions.  He does make eye contact with his wife who was at the bedside. O2 6 LNC. Pulmonary following.  Wife states patient may need to go back to the OR next due to issues with wound and wound VAC    Vitals:  Blood pressure 112/72, pulse 89, temperature 98.3 °F (36.8 °C), temperature source Oral, resp. rate 16, height 1.702 m (5' 7\"), weight 102.5 kg (225 lb 15.5 oz), SpO2 94 %.  Temp  Av.1 °F (36.7 °C)  Min: 97.8 °F (36.6 °C)  Max: 98.3 °F (36.8 °C)  Pulse  Av.5  Min: 67  Max: 125  BP  Min: 112/72  Max: 143/83  SpO2  Av %  Min: 31 %  Max: 98 %  FiO2   Av %  Min: 55 %  Max: 55 %    24 hour I/O    Intake/Output Summary (Last 24 hours) at 2024 1318  Last data filed at 2024 1315  Gross per 24 hour   Intake 1150 ml   Output 375 ml   Net 775 ml       Current Facility-Administered Medications   Medication Dose Route Frequency Provider Last Rate Last Admin     mg Oral Q6H PRN Casey Stephenson MD   50 mg at 01/27/24 1114    glucose chewable tablet 16 g  4 tablet Oral PRN Casey Stephenson MD        dextrose bolus 10% 125 mL  125 mL IntraVENous PRN Casey Stephenson MD        Or    dextrose bolus 10% 250 mL  250 mL IntraVENous PRN Casey Stephenson MD        glucagon (rDNA) injection 1 mg  1 mg SubCUTAneous PRN Casey Stephenson MD        dextrose 10 % infusion   IntraVENous Continuous PRN Casey Stephenson MD        enoxaparin (LOVENOX) injection 100 mg  1 mg/kg SubCUTAneous BID Casey Stephenson MD   100 mg at 01/27/24 0847    [Held by provider] apixaban (ELIQUIS) tablet 5 mg  5 mg Oral BID Casey Stephenson MD   5 mg at 01/18/24 0544    guaiFENesin (ROBITUSSIN) 100 MG/5ML liquid 200 mg  200 mg Oral Q4H PRN Casey Stephenson MD   200 mg at 01/17/24 1700    dilTIAZem 100 mg in sodium chloride 0.9 % 100 mL infusion (ADD-[place])  2.5-15 mg/hr IntraVENous Continuous Casey Stephenson MD   Stopped at 01/17/24 1148    pantoprazole (PROTONIX) injection 40 mg  40 mg IntraVENous Daily Kenny Ernandez MD   40 mg at 01/27/24 0840    sodium chloride flush 0.9 % injection 5-40 mL  5-40 mL IntraVENous 2 times per day Kenny Ernandez MD   10 mL at 01/27/24 0841    sodium chloride flush 0.9 % injection 5-40 mL  5-40 mL IntraVENous PRN Kenny Ernnadez MD        0.9 % sodium chloride infusion   IntraVENous PRN Kenny Ernandez MD   Stopped at 01/15/24 1335    polyethylene glycol (GLYCOLAX) packet 17 g  17 g Oral Daily PRN Kenny Ernandez MD   17 g at 01/25/24 1002    acetaminophen (TYLENOL) tablet 650 mg  650 mg Oral Q6H PRN Kenny Ernandez MD   650 mg at 01/18/24 2107    Or    acetaminophen (TYLENOL) suppository 650 mg  650 mg Rectal Q6H PRN Kenny Ernandez MD        levETIRAcetam (KEPPRA) 100 MG/ML oral solution 750 mg  750 mg PEG Tube BID Kenny Ernandez MD   750 mg at 01/27/24 0859       Objective:     Telemetry monitor: Telemetry independently reviewed and

## 2024-01-27 NOTE — PROGRESS NOTES
01/26/24 2233   NIV Type   NIV Started/Stopped On   Equipment Type v60   Mode CPAP   Mask Type Total face   Mask Size Large   Assessment   Pulse 93   Respirations 30   SpO2 90 %   Comfort Level Good   Using Accessory Muscles No   Mask Compliance Good   Skin Assessment Clean, dry, & intact   Breath Sounds   Breath Sounds Bilateral Diminished   Settings/Measurements   PIP Observed 12 cm H20   CPAP/EPAP 12 cmH2O   Vt (Measured) 401 mL   FiO2  55 %   Minute Volume (L/min) 10.6 Liters   Mask Leak (lpm) 16 lpm   Patient's Home Machine No   Electrical Safety Check Performed Yes   Alarm Settings   Alarms On Y   Low Pressure (cmH2O) 6 cmH2O   High Pressure (cmH2O) 30 cmH2O   Delay Alarm 20 sec(s)   Apnea (secs) 20 secs   RR Low (bpm) 6   RR High (bpm) 40 br/min   Oxygen Therapy/Pulse Ox   O2 Therapy Oxygen   Blood Gas  Performed? No   Oxygen Therapy   O2 Device PAP (positive airway pressure)

## 2024-01-27 NOTE — PROGRESS NOTES
Dr. Stephenson to bedside to visualize wound for potential cause of bleeding. Wound vac discontinued, provider applied 1x suture inside wound and packed with wet kerlex wrap, dry gauze, ABD pads, and tape. Family updated by provider. subq lovenox held by provider for now.

## 2024-01-27 NOTE — PROGRESS NOTES
01/26/24 2340   NIV Type   Equipment Type v60   Mode CPAP   Mask Type Total face   Mask Size Large   Assessment   Pulse 67   Respirations 28   SpO2 96 %   Comfort Level Good   Using Accessory Muscles No   Mask Compliance Good   Settings/Measurements   CPAP/EPAP 12 cmH2O   Vt (Measured) 329 mL   FiO2  55 %   Minute Volume (L/min) 9.8 Liters   Mask Leak (lpm) 31 lpm   Patient's Home Machine No

## 2024-01-27 NOTE — PROGRESS NOTES
Called to bedside for bleeding from wound.  Vac with bloody output and gauze saturated with blood.  Removed and generalized ooze and clot along entire wound surface.  Clot removed and more prominent oozing centrally with suture placed.  Likely related to therapeutic lovenox so will hold.  Packed with saline soaked gauze.  Change gauze as needed for drainage.  Will hold off on vac for now.    Lewis Stephenson MD

## 2024-01-28 PROBLEM — S06.9XAA TRAUMATIC BRAIN INJURY WITH DELAYED RECOVERY (HCC): Status: ACTIVE | Noted: 2024-01-28

## 2024-01-28 PROBLEM — R53.2 QUADRIPLEGIA, FUNCTIONAL (HCC): Status: ACTIVE | Noted: 2024-01-28

## 2024-01-28 LAB
GLUCOSE BLD-MCNC: 107 MG/DL (ref 70–99)
GLUCOSE BLD-MCNC: 116 MG/DL (ref 70–99)
GLUCOSE BLD-MCNC: 148 MG/DL (ref 70–99)
GLUCOSE BLD-MCNC: 162 MG/DL (ref 70–99)
PERFORMED ON: ABNORMAL

## 2024-01-28 PROCEDURE — 94640 AIRWAY INHALATION TREATMENT: CPT

## 2024-01-28 PROCEDURE — 99232 SBSQ HOSP IP/OBS MODERATE 35: CPT | Performed by: SURGERY

## 2024-01-28 PROCEDURE — 6370000000 HC RX 637 (ALT 250 FOR IP): Performed by: HOSPITALIST

## 2024-01-28 PROCEDURE — 6370000000 HC RX 637 (ALT 250 FOR IP): Performed by: INTERNAL MEDICINE

## 2024-01-28 PROCEDURE — 6370000000 HC RX 637 (ALT 250 FOR IP)

## 2024-01-28 PROCEDURE — 2580000003 HC RX 258: Performed by: INTERNAL MEDICINE

## 2024-01-28 PROCEDURE — 6360000002 HC RX W HCPCS: Performed by: INTERNAL MEDICINE

## 2024-01-28 PROCEDURE — 6370000000 HC RX 637 (ALT 250 FOR IP): Performed by: NURSE PRACTITIONER

## 2024-01-28 PROCEDURE — 99232 SBSQ HOSP IP/OBS MODERATE 35: CPT

## 2024-01-28 PROCEDURE — 94660 CPAP INITIATION&MGMT: CPT

## 2024-01-28 PROCEDURE — C9113 INJ PANTOPRAZOLE SODIUM, VIA: HCPCS | Performed by: INTERNAL MEDICINE

## 2024-01-28 PROCEDURE — 6370000000 HC RX 637 (ALT 250 FOR IP): Performed by: STUDENT IN AN ORGANIZED HEALTH CARE EDUCATION/TRAINING PROGRAM

## 2024-01-28 PROCEDURE — 2580000003 HC RX 258: Performed by: SURGERY

## 2024-01-28 PROCEDURE — 2060000000 HC ICU INTERMEDIATE R&B

## 2024-01-28 PROCEDURE — 2700000000 HC OXYGEN THERAPY PER DAY

## 2024-01-28 PROCEDURE — 94761 N-INVAS EAR/PLS OXIMETRY MLT: CPT

## 2024-01-28 PROCEDURE — 6360000002 HC RX W HCPCS: Performed by: SURGERY

## 2024-01-28 PROCEDURE — 99233 SBSQ HOSP IP/OBS HIGH 50: CPT | Performed by: STUDENT IN AN ORGANIZED HEALTH CARE EDUCATION/TRAINING PROGRAM

## 2024-01-28 RX ORDER — PANTOPRAZOLE SODIUM 40 MG/1
40 TABLET, DELAYED RELEASE ORAL
Status: DISCONTINUED | OUTPATIENT
Start: 2024-01-29 | End: 2024-02-01 | Stop reason: HOSPADM

## 2024-01-28 RX ADMIN — SODIUM CHLORIDE, PRESERVATIVE FREE 10 ML: 5 INJECTION INTRAVENOUS at 09:05

## 2024-01-28 RX ADMIN — Medication: at 21:07

## 2024-01-28 RX ADMIN — PIPERACILLIN AND TAZOBACTAM 3375 MG: 3; .375 INJECTION, POWDER, FOR SOLUTION INTRAVENOUS at 09:48

## 2024-01-28 RX ADMIN — MUPIROCIN: 20 OINTMENT TOPICAL at 09:06

## 2024-01-28 RX ADMIN — DONEPEZIL HYDROCHLORIDE 5 MG: 5 TABLET, FILM COATED ORAL at 20:35

## 2024-01-28 RX ADMIN — LEVETIRACETAM 750 MG: 100 SOLUTION ORAL at 09:35

## 2024-01-28 RX ADMIN — AMIODARONE HYDROCHLORIDE 200 MG: 200 TABLET ORAL at 09:26

## 2024-01-28 RX ADMIN — ASPIRIN 81 MG 81 MG: 81 TABLET ORAL at 09:26

## 2024-01-28 RX ADMIN — PIPERACILLIN AND TAZOBACTAM 3375 MG: 3; .375 INJECTION, POWDER, FOR SOLUTION INTRAVENOUS at 01:25

## 2024-01-28 RX ADMIN — IPRATROPIUM BROMIDE AND ALBUTEROL SULFATE 1 DOSE: 2.5; .5 SOLUTION RESPIRATORY (INHALATION) at 16:18

## 2024-01-28 RX ADMIN — SODIUM CHLORIDE, PRESERVATIVE FREE 10 ML: 5 INJECTION INTRAVENOUS at 21:06

## 2024-01-28 RX ADMIN — IPRATROPIUM BROMIDE AND ALBUTEROL SULFATE 1 DOSE: 2.5; .5 SOLUTION RESPIRATORY (INHALATION) at 07:33

## 2024-01-28 RX ADMIN — MODAFINIL 300 MG: 200 TABLET ORAL at 09:26

## 2024-01-28 RX ADMIN — IPRATROPIUM BROMIDE AND ALBUTEROL SULFATE 1 DOSE: 2.5; .5 SOLUTION RESPIRATORY (INHALATION) at 19:31

## 2024-01-28 RX ADMIN — MUPIROCIN: 20 OINTMENT TOPICAL at 21:07

## 2024-01-28 RX ADMIN — DILTIAZEM HYDROCHLORIDE 60 MG: 60 TABLET ORAL at 12:41

## 2024-01-28 RX ADMIN — PIPERACILLIN AND TAZOBACTAM 3375 MG: 3; .375 INJECTION, POWDER, FOR SOLUTION INTRAVENOUS at 16:45

## 2024-01-28 RX ADMIN — PANTOPRAZOLE SODIUM 40 MG: 40 INJECTION, POWDER, FOR SOLUTION INTRAVENOUS at 09:26

## 2024-01-28 RX ADMIN — POLYETHYLENE GLYCOL 3350 17 G: 17 POWDER, FOR SOLUTION ORAL at 20:34

## 2024-01-28 RX ADMIN — SENNOSIDES AND DOCUSATE SODIUM 2 TABLET: 50; 8.6 TABLET ORAL at 09:26

## 2024-01-28 RX ADMIN — DILTIAZEM HYDROCHLORIDE 60 MG: 60 TABLET ORAL at 16:44

## 2024-01-28 RX ADMIN — IPRATROPIUM BROMIDE AND ALBUTEROL SULFATE 1 DOSE: 2.5; .5 SOLUTION RESPIRATORY (INHALATION) at 11:08

## 2024-01-28 RX ADMIN — POLYETHYLENE GLYCOL 3350 17 G: 17 POWDER, FOR SOLUTION ORAL at 09:26

## 2024-01-28 RX ADMIN — LISINOPRIL 2.5 MG: 2.5 TABLET ORAL at 12:41

## 2024-01-28 RX ADMIN — ATORVASTATIN CALCIUM 40 MG: 40 TABLET, FILM COATED ORAL at 20:35

## 2024-01-28 RX ADMIN — METOPROLOL TARTRATE 50 MG: 50 TABLET, FILM COATED ORAL at 20:35

## 2024-01-28 RX ADMIN — LEVETIRACETAM 750 MG: 100 SOLUTION ORAL at 20:34

## 2024-01-28 RX ADMIN — METOPROLOL TARTRATE 50 MG: 50 TABLET, FILM COATED ORAL at 09:26

## 2024-01-28 RX ADMIN — SENNOSIDES AND DOCUSATE SODIUM 2 TABLET: 50; 8.6 TABLET ORAL at 20:35

## 2024-01-28 ASSESSMENT — PAIN SCALES - GENERAL
PAINLEVEL_OUTOF10: 0

## 2024-01-28 ASSESSMENT — PAIN SCALES - WONG BAKER
WONGBAKER_NUMERICALRESPONSE: 0
WONGBAKER_NUMERICALRESPONSE: 0

## 2024-01-28 NOTE — PROGRESS NOTES
01/27/24 2329   NIV Type   Equipment Type v60   Mode CPAP   Mask Type Total face   Assessment   Pulse (!) 113   SpO2 92 %   Comfort Level Good   Using Accessory Muscles No   Mask Compliance Good   Skin Assessment Clean, dry, & intact   Skin Protection for O2 Device N/A   Settings/Measurements   PIP Observed 12 cm H20   CPAP/EPAP 12 cmH2O   Vt (Measured) 377 mL   FiO2  55 %   Minute Volume (L/min) 8.9 Liters   Mask Leak (lpm) 2 lpm   Patient's Home Machine No   Alarm Settings   Alarms On Y   Low Pressure (cmH2O) 6 cmH2O   High Pressure (cmH2O) 30 cmH2O   Delay Alarm 20 sec(s)   RR Low (bpm) 6   RR High (bpm) 40 br/min

## 2024-01-28 NOTE — PROGRESS NOTES
[facility] GENERAL SURGERY    Surgery Progress Note           POD # 5    PATIENT NAME: Isac Lemus     TODAY'S DATE: 1/28/2024    INTERVAL HISTORY:    Pt without need for dressing change, no painm no fevers or chills     OBJECTIVE:   VITALS:  /66   Pulse 93   Temp 97.7 °F (36.5 °C) (Axillary)   Resp 23   Ht 1.702 m (5' 7\")   Wt 92.5 kg (203 lb 14.8 oz)   SpO2 96%   BMI 31.94 kg/m²     INTAKE/OUTPUT:    I/O last 3 completed shifts:  In: 2123.4 [NG/GT:1550; IV Piggyback:573.4]  Out: 1025 [Urine:875; Drains:150]  I/O this shift:  In: 350 [P.O.:50; NG/GT:300]  Out: 25 [Urine:25]              CONSTITUTIONAL:  fatigued and somnolent    INCISION: more of clot removed, no active bleeding    Data:  CBC:   Recent Labs     01/27/24 1937   WBC 10.3   HGB 9.0*   HCT 27.7*          BMP:    Recent Labs     01/27/24 1937   *   K 3.6   CL 96*   CO2 30   BUN 12   CREATININE <0.5*   GLUCOSE 168*       Hepatic: No results for input(s): \"AST\", \"ALT\", \"ALB\", \"BILITOT\", \"ALKPHOS\" in the last 72 hours.  Mag:      Recent Labs     01/27/24 1937   MG 1.60*        Phos:     Recent Labs     01/27/24 1937   PHOS 2.5        INR: No results for input(s): \"INR\" in the last 72 hours.      Radiology Review:       ASSESSMENT AND PLAN:  73 y.o. male status post excisional debridement of sacral decubitus ulcer x2   - dressing changed, no active bleeding   - continue to hold lovenox   - as more clot removed with dressing changes will see if vac can be replaced or if further debridement needed    Electronically signed by Casey Stephenson MD

## 2024-01-28 NOTE — PROGRESS NOTES
Patient: Isac Lemus MRN: [id number]  Date of  Admission: 1/14/2024   YOB: 1950  Age: 73 y.o.  Sex: male    Unit: [address]  Room/Bed: [facility]0428- Admitting Physician: GEORGINA WOODS    Attending Physician:  Shoaib Ballard MD         Pulmonary Service Note      SUBJECTIVE:    Patient's oxygen requirements are decreasing.  No acute events overnight.        OBJECTIVE    Medications    Continuous Infusions:   dextrose      dilTIAZem 100 mg in sodium chloride 0.9 % 100 mL infusion (ADD-[place]) Stopped (01/17/24 1148)    sodium chloride Stopped (01/15/24 1335)       Scheduled Meds:   sennosides-docusate sodium  2 tablet Oral BID    polyethylene glycol  17 g Oral BID    metoprolol tartrate  50 mg PEG Tube BID    ipratropium 0.5 mg-albuterol 2.5 mg  1 Dose Inhalation Q4H WA RT    insulin lispro  0-16 Units SubCUTAneous TID WC    insulin lispro  0-4 Units SubCUTAneous Nightly    amiodarone  200 mg PEG Tube Daily    atorvastatin  40 mg PEG Tube Nightly    dilTIAZem  60 mg PEG Tube 4 times per day    donepezil  5 mg PEG Tube Nightly    lisinopril  2.5 mg PEG Tube Daily    modafinil  300 mg PEG Tube Daily    aspirin  81 mg PEG Tube Daily    mupirocin   Topical BID    piperacillin-tazobactam  3,375 mg IntraVENous Q8H    vashe wound therapy   Topical BID    [Held by provider] enoxaparin  1 mg/kg SubCUTAneous BID    [Held by provider] apixaban  5 mg Oral BID    pantoprazole  40 mg IntraVENous Daily    sodium chloride flush  5-40 mL IntraVENous 2 times per day    levETIRAcetam  750 mg PEG Tube BID       PRN Meds:  traMADol, glucose, dextrose bolus **OR** dextrose bolus, glucagon (rDNA), dextrose, guaiFENesin, sodium chloride flush, sodium chloride, polyethylene glycol, acetaminophen **OR** acetaminophen    Physical    Patient Vitals for the past 24 hrs:   BP Temp Temp src Pulse Resp SpO2 Weight   01/28/24 1130 103/66 -- -- 93 -- 96 % --   01/28/24 1109 -- -- -- -- -- 98 % --   01/28/24 0930 -- -- -- -- --

## 2024-01-28 NOTE — PROGRESS NOTES
01/28/24 0401   NIV Type   NIV Started/Stopped On   Equipment Type v60   Mode CPAP   Mask Type Total face   Assessment   Pulse (!) 122   SpO2 99 %   Comfort Level Good   Using Accessory Muscles No   Mask Compliance Good   Skin Assessment Clean, dry, & intact   Skin Protection for O2 Device N/A   Settings/Measurements   PIP Observed 13 cm H20   CPAP/EPAP 12 cmH2O   Vt (Measured) 418 mL   FiO2  55 %   Minute Volume (L/min) 9.4 Liters   Mask Leak (lpm) 30 lpm   Patient's Home Machine No   Alarm Settings   Alarms On Y   Low Pressure (cmH2O) 6 cmH2O   High Pressure (cmH2O) 30 cmH2O   Delay Alarm 20 sec(s)   RR Low (bpm) 6   RR High (bpm) 40 br/min

## 2024-01-28 NOTE — PLAN OF CARE
The dressing on patient's coccyx was changed today by surgeon, no additional bleeding observed. No additional output reported from overnight. MD holding lovenox at this time and will re-evaluate appropriateness for wound vac again on Monday. New orders for PT, OT, and SLP eval & treat. SLP already providing swallow eval support, but consult placed to also support speech therapy. Pt continues eating full meal trays with feeding support provided by family. Pt has not yet had a bowel movement, however is having flatulence and occasional mucousy stools, senokot added to bowel regimen 1/27. Pt able to follow simple commands to squeeze hands and push with feet when adequately awake and alert. Pain seems better controlled today as well, not requiring PRN tramadol.

## 2024-01-28 NOTE — PROGRESS NOTES
SLP ALL NOTES    Speech-Language Pathology  Swallowing Disorders and Dysphagia Therapy  Cognitive-Communication Evaluation     SLP Brief       Name: Isac Lemus  : 1950  Medical Diagnosis: Unspecified open wound of lower back and pelvis without penetration into retroperitoneum, initial encounter [S31.000A]  Decubitus skin ulcer [L89.90]  Sacral wound, initial encounter [S31.000A]    Attempted cognitive-communication evaluation to resume speech therapy for communication and aphasia. Pt sleeping soundly at this time. Per RN, pt swallowing current diet without difficulty, with texture modifications and aspiration precautions followed by his spouse who has been trained in previous ST visits.  Will reattempt during the week.       Zainab Velasquez MS CCC-SLP  Speech Language Pathologist   Phone: [phone]

## 2024-01-28 NOTE — PROGRESS NOTES
3,375 mg IntraVENous Q8H Kenny Sung MD   Stopped at 01/28/24 0525    vashe wound therapy external solution   Topical BID Casey Stephenson MD   Given at 01/27/24 2145    traMADol (ULTRAM) tablet 50 mg  50 mg Oral Q6H PRN Casey Stephenson MD   50 mg at 01/27/24 1114    glucose chewable tablet 16 g  4 tablet Oral PRN Casey Stephenson MD        dextrose bolus 10% 125 mL  125 mL IntraVENous PRN Casey Stephenson MD        Or    dextrose bolus 10% 250 mL  250 mL IntraVENous PRN Casey Stephenson MD        glucagon (rDNA) injection 1 mg  1 mg SubCUTAneous PRN Casey Stephenson MD        dextrose 10 % infusion   IntraVENous Continuous PRN Casey Stephenson MD        [Held by provider] enoxaparin (LOVENOX) injection 100 mg  1 mg/kg SubCUTAneous BID Casey Stephenson MD   100 mg at 01/27/24 0847    [Held by provider] apixaban (ELIQUIS) tablet 5 mg  5 mg Oral BID Casey Stephenson MD   5 mg at 01/18/24 0544    guaiFENesin (ROBITUSSIN) 100 MG/5ML liquid 200 mg  200 mg Oral Q4H PRN Casey Stephenson MD   200 mg at 01/17/24 1700    dilTIAZem 100 mg in sodium chloride 0.9 % 100 mL infusion (ADD-[place])  2.5-15 mg/hr IntraVENous Continuous Casey Stephenson MD   Stopped at 01/17/24 1148    pantoprazole (PROTONIX) injection 40 mg  40 mg IntraVENous Daily Kenny Ernandez MD   40 mg at 01/27/24 0840    sodium chloride flush 0.9 % injection 5-40 mL  5-40 mL IntraVENous 2 times per day Kenny Ernandez MD   10 mL at 01/27/24 2034    sodium chloride flush 0.9 % injection 5-40 mL  5-40 mL IntraVENous PRN Kenny Ernandez MD        0.9 % sodium chloride infusion   IntraVENous PRN Kenny Ernandez MD   Stopped at 01/15/24 1335    polyethylene glycol (GLYCOLAX) packet 17 g  17 g Oral Daily PRN Kenny Ernandez MD   17 g at 01/25/24 1002    acetaminophen (TYLENOL) tablet 650 mg  650 mg Oral Q6H PRN Kenny Ernandez MD   650 mg at 01/27/24 1751    Or    acetaminophen (TYLENOL) suppository 650 mg  650 mg  Rectal Q6H PRN Kenny Ernandez MD        levETIRAcetam (KEPPRA) 100 MG/ML oral solution 750 mg  750 mg PEG Tube BID Kenny Ernandez MD   750 mg at 01/27/24 2146       Objective:     Telemetry monitor: Telemetry independently reviewed and interpreted by me today and shows: atrial fibrillation 120    Physical Exam:  Constitutional and general appearance: alert, uncooperative, no distress, and appears older than stated age  HEENT: PERRL, no cervical lymphadenopathy. No masses palpable. Normal oral mucosa  Respiratory:  Normal excursion and expansion without use of accessory muscles  Resp auscultation: Normal breath sounds without wheezing, rhonchi, and rales  Cardiovascular:  The apical impulse is not displaced  Heart tones are crisp and normal. irregular S1 and S2.  Jugular venous pulsation   The carotid upstroke is normal in amplitude and contour without delay or bruit  Peripheral pulses are symmetrical and full   Abdomen:  No masses or tenderness  Bowel sounds present  Extremities:   No cyanosis or clubbing   Yes lower extremity edema   Skin: warm and dry  Neurological:  Alert and oriented  Moves all extremities well  No abnormalities of mood, affect, memory, mentation, or behavior are noted    Data    Echo 11/2023:  Study Conclusions     - Left ventricle: The cavity size is normal. Wall thickness was increased in     a pattern of moderate LVH. Systolic function is normal. The estimated     ejection fraction is 60-65%. Wall motion is normal; there are no regional     wall motion abnormalities. Cannot assess LV diastolic function.   - Mitral valve: The annulus is mildly calcified.   - Right ventricle: Systolic function is normal.   - Atrial septum: Agitated saline contrast study at baseline or with     provocation, shows no right-to-left atrial level shunt.      All labs and testing reviewed.  Lab Review     Renal Profile:   Lab Results   Component Value Date/Time    CREATININE <0.5 01/27/2024 07:37 PM    BUN 12

## 2024-01-28 NOTE — PROGRESS NOTES
sodium chloride 0.9 % 50 mL IVPB (mini-bag), 3,375 mg, IntraVENous, Q8H  vashe wound therapy external solution, , Topical, BID  traMADol (ULTRAM) tablet 50 mg, 50 mg, Oral, Q6H PRN  glucose chewable tablet 16 g, 4 tablet, Oral, PRN  dextrose bolus 10% 125 mL, 125 mL, IntraVENous, PRN **OR** dextrose bolus 10% 250 mL, 250 mL, IntraVENous, PRN  glucagon (rDNA) injection 1 mg, 1 mg, SubCUTAneous, PRN  dextrose 10 % infusion, , IntraVENous, Continuous PRN  [Held by provider] enoxaparin (LOVENOX) injection 100 mg, 1 mg/kg, SubCUTAneous, BID  [Held by provider] apixaban (ELIQUIS) tablet 5 mg, 5 mg, Oral, BID  guaiFENesin (ROBITUSSIN) 100 MG/5ML liquid 200 mg, 200 mg, Oral, Q4H PRN  [COMPLETED] dilTIAZem injection 10 mg, 10 mg, IntraVENous, Once **FOLLOWED BY** dilTIAZem 100 mg in sodium chloride 0.9 % 100 mL infusion (ADD-[place]), 2.5-15 mg/hr, IntraVENous, Continuous  pantoprazole (PROTONIX) injection 40 mg, 40 mg, IntraVENous, Daily  sodium chloride flush 0.9 % injection 5-40 mL, 5-40 mL, IntraVENous, 2 times per day  sodium chloride flush 0.9 % injection 5-40 mL, 5-40 mL, IntraVENous, PRN  0.9 % sodium chloride infusion, , IntraVENous, PRN  polyethylene glycol (GLYCOLAX) packet 17 g, 17 g, Oral, Daily PRN  acetaminophen (TYLENOL) tablet 650 mg, 650 mg, Oral, Q6H PRN **OR** acetaminophen (TYLENOL) suppository 650 mg, 650 mg, Rectal, Q6H PRN  levETIRAcetam (KEPPRA) 100 MG/ML oral solution 750 mg, 750 mg, PEG Tube, BID    Physical      Vitals: /66   Pulse 93   Temp 97.7 °F (36.5 °C) (Axillary)   Resp 23   Ht 1.702 m (5' 7\")   Wt 92.5 kg (203 lb 14.8 oz)   SpO2 96%   BMI 31.94 kg/m²   Temp: Temp: 97.7 °F (36.5 °C)  Max: Temp  Av.8 °F (37.1 °C)  Min: 97.7 °F (36.5 °C)  Max: 100.3 °F (37.9 °C)  Respiration range:  Resp  Av  Min: 16  Max: 24  Pulse Range:  Pulse  Av.9  Min: 93  Max: 129  Blood pressure range:  Systolic (24hrs), Av , Min:91 , Max:148   , Diastolic (24hrs), Av, Min:53,     Discussed with family   IV Protonix time to change to PO    Patient is off zosyn

## 2024-01-28 NOTE — PLAN OF CARE
Problem: Chronic Conditions and Co-morbidities  Goal: Patient's chronic conditions and co-morbidity symptoms are monitored and maintained or improved  1/27/2024 2123 by Daphney George RN  Outcome: Progressing  Flowsheets (Taken 1/18/2024 0815 by Tennille Resendez, RN)  Care Plan - Patient's Chronic Conditions and Co-Morbidity Symptoms are Monitored and Maintained or Improved: Monitor and assess patient's chronic conditions and comorbid symptoms for stability, deterioration, or improvement  1/27/2024 1756 by Kenny Jama RN  Outcome: Progressing     Problem: Safety - Adult  Goal: Free from fall injury  1/27/2024 2123 by Daphney George RN  Outcome: Progressing  Flowsheets (Taken 1/18/2024 0800 by Tennille Resendez, RN)  Free From Fall Injury: Instruct family/caregiver on patient safety  1/27/2024 1756 by Kenny Jama RN  Outcome: Progressing     Problem: Pain  Goal: Verbalizes/displays adequate comfort level or baseline comfort level  1/27/2024 2123 by Daphney George RN  Outcome: Progressing  Flowsheets (Taken 1/27/2024 0540 by Glo Davis RN)  Verbalizes/displays adequate comfort level or baseline comfort level:   Encourage patient to monitor pain and request assistance   Assess pain using appropriate pain scale   Administer analgesics based on type and severity of pain and evaluate response   Implement non-pharmacological measures as appropriate and evaluate response   Consider cultural and social influences on pain and pain management   Notify Licensed Independent Practitioner if interventions unsuccessful or patient reports new pain  1/27/2024 1756 by Kenny Jama RN  Outcome: Progressing  Flowsheets (Taken 1/27/2024 0540 by Glo Davis RN)  Verbalizes/displays adequate comfort level or baseline comfort level:   Encourage patient to monitor pain and request assistance   Assess pain using appropriate pain scale   Administer analgesics based on type and severity of pain and evaluate response   Implement

## 2024-01-29 ENCOUNTER — ANESTHESIA EVENT (OUTPATIENT)
Dept: OPERATING ROOM | Age: 74
End: 2024-01-29
Payer: MEDICARE

## 2024-01-29 PROBLEM — I51.89 RIGHT VENTRICULAR SYSTOLIC DYSFUNCTION: Status: ACTIVE | Noted: 2024-01-29

## 2024-01-29 PROBLEM — I48.91 ATRIAL FIBRILLATION WITH RVR (HCC): Status: ACTIVE | Noted: 2024-01-29

## 2024-01-29 PROBLEM — L08.9 WOUND INFECTION: Status: ACTIVE | Noted: 2024-01-29

## 2024-01-29 PROBLEM — G47.33 OSA (OBSTRUCTIVE SLEEP APNEA): Status: ACTIVE | Noted: 2024-01-29

## 2024-01-29 PROBLEM — T14.8XXA WOUND INFECTION: Status: ACTIVE | Noted: 2024-01-29

## 2024-01-29 PROBLEM — I51.7 RIGHT VENTRICULAR DILATION: Status: ACTIVE | Noted: 2024-01-29

## 2024-01-29 LAB
ANION GAP SERPL CALCULATED.3IONS-SCNC: 8 MMOL/L (ref 3–16)
BASOPHILS # BLD: 0 K/UL (ref 0–0.2)
BASOPHILS NFR BLD: 0.4 %
BUN SERPL-MCNC: 11 MG/DL (ref 7–20)
CALCIUM SERPL-MCNC: 8.9 MG/DL (ref 8.3–10.6)
CHLORIDE SERPL-SCNC: 96 MMOL/L (ref 99–110)
CO2 SERPL-SCNC: 31 MMOL/L (ref 21–32)
CREAT SERPL-MCNC: <0.5 MG/DL (ref 0.8–1.3)
DEPRECATED RDW RBC AUTO: 18.4 % (ref 12.4–15.4)
EOSINOPHIL # BLD: 0.1 K/UL (ref 0–0.6)
EOSINOPHIL NFR BLD: 0.7 %
GFR SERPLBLD CREATININE-BSD FMLA CKD-EPI: >60 ML/MIN/{1.73_M2}
GLUCOSE BLD-MCNC: 115 MG/DL (ref 70–99)
GLUCOSE BLD-MCNC: 137 MG/DL (ref 70–99)
GLUCOSE BLD-MCNC: 142 MG/DL (ref 70–99)
GLUCOSE BLD-MCNC: 154 MG/DL (ref 70–99)
GLUCOSE SERPL-MCNC: 126 MG/DL (ref 70–99)
HCT VFR BLD AUTO: 27.2 % (ref 40.5–52.5)
HGB BLD-MCNC: 8.6 G/DL (ref 13.5–17.5)
LYMPHOCYTES # BLD: 1.6 K/UL (ref 1–5.1)
LYMPHOCYTES NFR BLD: 17.8 %
MCH RBC QN AUTO: 31.5 PG (ref 26–34)
MCHC RBC AUTO-ENTMCNC: 31.7 G/DL (ref 31–36)
MCV RBC AUTO: 99.4 FL (ref 80–100)
MONOCYTES # BLD: 0.9 K/UL (ref 0–1.3)
MONOCYTES NFR BLD: 9.3 %
NEUTROPHILS # BLD: 6.6 K/UL (ref 1.7–7.7)
NEUTROPHILS NFR BLD: 71.8 %
PERFORMED ON: ABNORMAL
PLATELET # BLD AUTO: 260 K/UL (ref 135–450)
PMV BLD AUTO: 7.2 FL (ref 5–10.5)
POTASSIUM SERPL-SCNC: 3.2 MMOL/L (ref 3.5–5.1)
RBC # BLD AUTO: 2.74 M/UL (ref 4.2–5.9)
SODIUM SERPL-SCNC: 135 MMOL/L (ref 136–145)
WBC # BLD AUTO: 9.2 K/UL (ref 4–11)

## 2024-01-29 PROCEDURE — 94640 AIRWAY INHALATION TREATMENT: CPT

## 2024-01-29 PROCEDURE — 6370000000 HC RX 637 (ALT 250 FOR IP)

## 2024-01-29 PROCEDURE — 6370000000 HC RX 637 (ALT 250 FOR IP): Performed by: INTERNAL MEDICINE

## 2024-01-29 PROCEDURE — 80048 BASIC METABOLIC PNL TOTAL CA: CPT

## 2024-01-29 PROCEDURE — 99233 SBSQ HOSP IP/OBS HIGH 50: CPT | Performed by: INTERNAL MEDICINE

## 2024-01-29 PROCEDURE — 94761 N-INVAS EAR/PLS OXIMETRY MLT: CPT

## 2024-01-29 PROCEDURE — 92523 SPEECH SOUND LANG COMPREHEN: CPT

## 2024-01-29 PROCEDURE — 6370000000 HC RX 637 (ALT 250 FOR IP): Performed by: HOSPITALIST

## 2024-01-29 PROCEDURE — 2580000003 HC RX 258: Performed by: SURGERY

## 2024-01-29 PROCEDURE — 97112 NEUROMUSCULAR REEDUCATION: CPT

## 2024-01-29 PROCEDURE — 97530 THERAPEUTIC ACTIVITIES: CPT

## 2024-01-29 PROCEDURE — 2700000000 HC OXYGEN THERAPY PER DAY

## 2024-01-29 PROCEDURE — 2580000003 HC RX 258: Performed by: INTERNAL MEDICINE

## 2024-01-29 PROCEDURE — 6370000000 HC RX 637 (ALT 250 FOR IP): Performed by: NURSE PRACTITIONER

## 2024-01-29 PROCEDURE — 97535 SELF CARE MNGMENT TRAINING: CPT

## 2024-01-29 PROCEDURE — 36415 COLL VENOUS BLD VENIPUNCTURE: CPT

## 2024-01-29 PROCEDURE — 2060000000 HC ICU INTERMEDIATE R&B

## 2024-01-29 PROCEDURE — 6360000002 HC RX W HCPCS: Performed by: SURGERY

## 2024-01-29 PROCEDURE — 85025 COMPLETE CBC W/AUTO DIFF WBC: CPT

## 2024-01-29 PROCEDURE — 94660 CPAP INITIATION&MGMT: CPT

## 2024-01-29 PROCEDURE — 99232 SBSQ HOSP IP/OBS MODERATE 35: CPT | Performed by: SURGERY

## 2024-01-29 PROCEDURE — 99232 SBSQ HOSP IP/OBS MODERATE 35: CPT | Performed by: NURSE PRACTITIONER

## 2024-01-29 PROCEDURE — 97110 THERAPEUTIC EXERCISES: CPT

## 2024-01-29 PROCEDURE — 6360000002 HC RX W HCPCS: Performed by: INTERNAL MEDICINE

## 2024-01-29 PROCEDURE — 92526 ORAL FUNCTION THERAPY: CPT

## 2024-01-29 RX ORDER — LEVALBUTEROL 1.25 MG/.5ML
1.25 SOLUTION, CONCENTRATE RESPIRATORY (INHALATION)
Status: DISCONTINUED | OUTPATIENT
Start: 2024-01-29 | End: 2024-02-01 | Stop reason: HOSPADM

## 2024-01-29 RX ORDER — SODIUM CHLORIDE FOR INHALATION 0.9 %
3 VIAL, NEBULIZER (ML) INHALATION PRN
Status: DISCONTINUED | OUTPATIENT
Start: 2024-01-29 | End: 2024-02-01 | Stop reason: HOSPADM

## 2024-01-29 RX ADMIN — DILTIAZEM HYDROCHLORIDE 60 MG: 60 TABLET ORAL at 00:47

## 2024-01-29 RX ADMIN — ACETAMINOPHEN 650 MG: 325 TABLET ORAL at 17:21

## 2024-01-29 RX ADMIN — MUPIROCIN: 20 OINTMENT TOPICAL at 08:56

## 2024-01-29 RX ADMIN — Medication: at 20:57

## 2024-01-29 RX ADMIN — DONEPEZIL HYDROCHLORIDE 5 MG: 5 TABLET, FILM COATED ORAL at 20:26

## 2024-01-29 RX ADMIN — SODIUM CHLORIDE, PRESERVATIVE FREE 10 ML: 5 INJECTION INTRAVENOUS at 20:56

## 2024-01-29 RX ADMIN — DILTIAZEM HYDROCHLORIDE 60 MG: 60 TABLET ORAL at 17:21

## 2024-01-29 RX ADMIN — POLYETHYLENE GLYCOL 3350 17 G: 17 POWDER, FOR SOLUTION ORAL at 08:56

## 2024-01-29 RX ADMIN — LEVETIRACETAM 750 MG: 100 SOLUTION ORAL at 20:26

## 2024-01-29 RX ADMIN — MODAFINIL 300 MG: 200 TABLET ORAL at 09:14

## 2024-01-29 RX ADMIN — ATORVASTATIN CALCIUM 40 MG: 40 TABLET, FILM COATED ORAL at 20:26

## 2024-01-29 RX ADMIN — ASPIRIN 81 MG 81 MG: 81 TABLET ORAL at 08:56

## 2024-01-29 RX ADMIN — SODIUM CHLORIDE, PRESERVATIVE FREE 10 ML: 5 INJECTION INTRAVENOUS at 10:41

## 2024-01-29 RX ADMIN — PIPERACILLIN AND TAZOBACTAM 3375 MG: 3; .375 INJECTION, POWDER, FOR SOLUTION INTRAVENOUS at 22:14

## 2024-01-29 RX ADMIN — ACETAMINOPHEN 650 MG: 325 TABLET ORAL at 09:14

## 2024-01-29 RX ADMIN — MUPIROCIN: 20 OINTMENT TOPICAL at 20:26

## 2024-01-29 RX ADMIN — DILTIAZEM HYDROCHLORIDE 60 MG: 60 TABLET ORAL at 12:50

## 2024-01-29 RX ADMIN — DILTIAZEM HYDROCHLORIDE 60 MG: 60 TABLET ORAL at 05:20

## 2024-01-29 RX ADMIN — LEVETIRACETAM 750 MG: 100 SOLUTION ORAL at 08:56

## 2024-01-29 RX ADMIN — LEVALBUTEROL 1.25 MG: 1.25 SOLUTION, CONCENTRATE RESPIRATORY (INHALATION) at 11:27

## 2024-01-29 RX ADMIN — LEVALBUTEROL 1.25 MG: 1.25 SOLUTION, CONCENTRATE RESPIRATORY (INHALATION) at 19:52

## 2024-01-29 RX ADMIN — SENNOSIDES AND DOCUSATE SODIUM 2 TABLET: 50; 8.6 TABLET ORAL at 20:26

## 2024-01-29 RX ADMIN — PIPERACILLIN AND TAZOBACTAM 3375 MG: 3; .375 INJECTION, POWDER, FOR SOLUTION INTRAVENOUS at 15:23

## 2024-01-29 RX ADMIN — POLYETHYLENE GLYCOL 3350 17 G: 17 POWDER, FOR SOLUTION ORAL at 20:25

## 2024-01-29 RX ADMIN — AMIODARONE HYDROCHLORIDE 200 MG: 200 TABLET ORAL at 08:56

## 2024-01-29 RX ADMIN — PIPERACILLIN AND TAZOBACTAM 3375 MG: 3; .375 INJECTION, POWDER, FOR SOLUTION INTRAVENOUS at 00:26

## 2024-01-29 RX ADMIN — LEVALBUTEROL 1.25 MG: 1.25 SOLUTION, CONCENTRATE RESPIRATORY (INHALATION) at 16:20

## 2024-01-29 RX ADMIN — LISINOPRIL 2.5 MG: 2.5 TABLET ORAL at 08:56

## 2024-01-29 RX ADMIN — METOPROLOL TARTRATE 50 MG: 50 TABLET, FILM COATED ORAL at 08:56

## 2024-01-29 RX ADMIN — ISODIUM CHLORIDE 3 ML: 0.03 SOLUTION RESPIRATORY (INHALATION) at 11:27

## 2024-01-29 RX ADMIN — METOPROLOL TARTRATE 50 MG: 50 TABLET, FILM COATED ORAL at 20:26

## 2024-01-29 RX ADMIN — PANTOPRAZOLE SODIUM 40 MG: 40 TABLET, DELAYED RELEASE ORAL at 05:20

## 2024-01-29 RX ADMIN — SENNOSIDES AND DOCUSATE SODIUM 2 TABLET: 50; 8.6 TABLET ORAL at 08:56

## 2024-01-29 RX ADMIN — ISODIUM CHLORIDE 3 ML: 0.03 SOLUTION RESPIRATORY (INHALATION) at 16:20

## 2024-01-29 NOTE — PROGRESS NOTES
[facility] SURGERY    PATIENT NAME: Isac Lemus     TODAY'S DATE: 1/29/2024    CHIEF COMPLAINT: none    INTERVAL HISTORY/HPI:    Pt feels OK. Pain controlled with tylenol. .     REVIEW OF SYSTEMS:  Pertinent positives and negatives as per interval history section    OBJECTIVE:  VITALS:  /67   Pulse (!) 124   Temp 97.9 °F (36.6 °C) (Axillary)   Resp 26   Ht 1.702 m (5' 7\")   Wt 91.5 kg (201 lb 11.5 oz)   SpO2 94%   BMI 31.59 kg/m²     INTAKE/OUTPUT:    I/O last 3 completed shifts:  In: 1869.1 [P.O.:50; NG/GT:1720; IV Piggyback:99.1]  Out: 1075 [Urine:1075]  No intake/output data recorded.    CONSTITUTIONAL:  awake and alert  LUNGS:  Respirations easy and unlabored  CARD:  irregularly irregular rhythm  SKIN:  Sacral wound with foul-smelling drainage, some residual/recurrent necrotic tissue, and rough exposed bone     Data:  CBC:   Recent Labs     01/27/24 1937 01/29/24  0910   WBC 10.3 9.2   HGB 9.0* 8.6*   HCT 27.7* 27.2*    260     BMP:    Recent Labs     01/27/24 1937 01/29/24  0910   * 135*   K 3.6 3.2*   CL 96* 96*   CO2 30 31   BUN 12 11   CREATININE <0.5* <0.5*   GLUCOSE 168* 126*     Hepatic: No results for input(s): \"AST\", \"ALT\", \"ALB\", \"BILITOT\", \"ALKPHOS\" in the last 72 hours.  Mag:      Recent Labs     01/27/24 1937   MG 1.60*      Phos:     Recent Labs     01/27/24 1937   PHOS 2.5      INR: No results for input(s): \"INR\" in the last 72 hours.    Radiology Review:  *Imaging personally reviewed by me.   NA      ASSESSMENT AND PLAN:  Sacral wound with osteo. Continue antibiotics, local care, offloading. Scheduled for repeat debridement in OR tomorrow     Electronically signed by HAMILTON GARCIA MD     [id number]

## 2024-01-29 NOTE — PROGRESS NOTES
Family at bedside, daughter, Eunice, explained and as noted in notes, to Wound Care that over week end Dr. Stephenson had to remove VAC due to generalized ooze and clot along entire wound surface.  Dr. Stephenson has placed hold on NPWT appliance to stay in room for now.  Assisting M.D. had been in room prior to Wound Care and changed dressing.   Is to inform Dr. Stephenson of condition of wound.  Wound Care to continue to follow per needs.

## 2024-01-29 NOTE — PLAN OF CARE
Problem: Chronic Conditions and Co-morbidities  Goal: Patient's chronic conditions and co-morbidity symptoms are monitored and maintained or improved  1/28/2024 2150 by Daphney George RN  Outcome: Progressing  1/28/2024 1412 by Kenny Jama RN  Outcome: Progressing     Problem: Safety - Adult  Goal: Free from fall injury  1/28/2024 2150 by Daphney George RN  Outcome: Progressing  1/28/2024 1412 by Kenny Jama RN  Outcome: Progressing     Problem: Pain  Goal: Verbalizes/displays adequate comfort level or baseline comfort level  1/28/2024 2150 by Daphney George RN  Outcome: Progressing  1/28/2024 1412 by Kenny Jama RN  Outcome: Progressing     Problem: ABCDS Injury Assessment  Goal: Absence of physical injury  1/28/2024 2150 by Daphney George RN  Outcome: Progressing  1/28/2024 1412 by Kenny Jama RN  Outcome: Progressing     Problem: Skin/Tissue Integrity  Goal: Absence of new skin breakdown  Description: 1.  Monitor for areas of redness and/or skin breakdown  2.  Assess vascular access sites hourly  3.  Every 4-6 hours minimum:  Change oxygen saturation probe site  4.  Every 4-6 hours:  If on nasal continuous positive airway pressure, respiratory therapy assess nares and determine need for appliance change or resting period.  1/28/2024 2150 by Daphney George RN  Outcome: Progressing  1/28/2024 1412 by Kenny Jama RN  Outcome: Progressing     Problem: Discharge Planning  Goal: Discharge to home or other facility with appropriate resources  1/28/2024 2150 by Daphney George RN  Outcome: Progressing  1/28/2024 1412 by Kenny Jama RN  Outcome: Progressing     Problem: Nutrition Deficit:  Goal: Optimize nutritional status  1/28/2024 2150 by Daphney George RN  Outcome: Progressing  1/28/2024 1412 by Kenny Jama RN  Outcome: Progressing

## 2024-01-29 NOTE — PROGRESS NOTES
Occupational Therapy  Facility/Department: [facility] C4 PCU  Daily Treatment Note  NAME: Isac Lemus  : 1950  MRN: [id number]    Date of Service: 2024    Discharge Recommendations:  LTACH, Patient would benefit from continued therapy after discharge  OT Equipment Recommendations  Other: defer to next level of care    Therapy discharge recommendations take into account each patient's current medical complexities and are subject to input/oversight from the patient's healthcare team.   Barriers to Home Discharge:   [x] Steps to access home entry or bed/bath:   [x] Unable to transfer, ambulate, or propel wheelchair household distances without assist   [x] Limited available assist at home upon discharge    [x] Patient or family requests d/c to post-acute facility    [x] Poor cognition/safety awareness for d/c to home alone   [] Unable to maintain ordered weight bearing status    [] Patient with salient signs of long-standing immobility   [x] Other: Decreased IND with ADLs    Patient Diagnosis(es): The encounter diagnosis was Wound of sacral region, initial encounter.     Assessment    Assessment: Patient tolerated therapy session well. Patient seen as cotx with PT to maximize mobility while maintaining safety of patient/staff that could not be achieved with 1:1 treatment session. Patient progressing with sitting tolerance, tolerating 25 minutes seated EOB with Min-Mod A for majority of sitting balance trial. Patient unable to complete sit to stand trial this date. Patient continues to require Total A for bed mobility and LB ADL at bed level. At baseline patient is independent with ADLs and functional transfers. Patient remains far from baseline level of occupational performance and is not safe to return home at this time. Recommend continued OT services in an inpatient setting to increase safety and independence with ADLs and functional transfers.  Activity Tolerance: Patient limited by fatigue;Treatment  Treatments/Modalities: OT facilitates positioning to maintain skin integrity in supine using wedge to off weight from sacral wound, heels floated and BLE positioned in neurtral rotation, BUE elevated on pillows.  Safety Devices  Type of Devices: Left in bed;Bed alarm in place;Call light within reach;Nurse notified;All fall risk precautions in place;Patient at risk for falls;Heels elevated for pressure relief     Patient Education  Education Given To: Patient;Family  Education Provided: Role of Therapy;Plan of Care  Education Provided Comments: bed mobility, sitting balance, positioning in bed for maintaining skin integrity.  Education Method: Demonstration;Verbal  Barriers to Learning: Cognition;Hearing  Education Outcome: Continued education needed    Goals  Short Term Goals  Time Frame for Short Term Goals: by 1/25/24 - extended to 1/30/24 d/t LOS;  Short Term Goal 1: Pt will sit EOB w/ no more than max A for balance in prep for seated ADLs - GOAL MET 1/26. Progress goal to sitting EOB with no more than modA  Short Term Goal 2: Pt will complete simple grooming task in supported sitting w/ no more than max A - ongoing  Short Term Goal 3: Pt will tolerate x15 reps BUE therex- ongoing  Short Term Goal 4: Pt will follow commands to sequence 2 step task with only 1 tactile/ verbal cue - ongoing  Short Term Goal 5: Pt will tolerate x15 BUE ther ex. PROM for all movements - ongoing  Patient Goals   Patient goals : pt unable to state    AM-PAC - ADL  AM-PAC Daily Activity - Inpatient   How much help is needed for putting on and taking off regular lower body clothing?: Total  How much help is needed for bathing (which includes washing, rinsing, drying)?: Total  How much help is needed for toileting (which includes using toilet, bedpan, or urinal)?: Total  How much help is needed for putting on and taking off regular upper body clothing?: Total  How much help is needed for taking care of personal grooming?: Total  How much

## 2024-01-29 NOTE — PROGRESS NOTES
[facility]     Electrophysiology                                     Progress Note    Admission date:  2024    Reason for follow up visit: AF/AFL    HPI/CC: Isac Lemus was admitted on 2023 to the ARU from  after traumatic fall resulting in skull fracture and SAH/SDH. Complicated hospital course. EP consulted when EKG showed rapid AFL. An cardiac event monitor was placed on 2024 but this was removed 2024 at the family's request due to skin irritation.  On 2024, patient was admitted to the hospital for surgical debridement of his sacral wound.  Postoperatively he had RVR and IV diltiazem was started. Echo showed an EF of 55-60%. Rhythm is atrial flutter with heart rates in the 80s and 90s and intermittent tachycardia.     Subjective: Unable to assess given mental status. Family at bedside.       Vitals:  Blood pressure (!) 99/59, pulse (!) 102, temperature 97.9 °F (36.6 °C), temperature source Axillary, resp. rate 26, height 1.702 m (5' 7\"), weight 91.5 kg (201 lb 11.5 oz), SpO2 (!) 89 %.  Temp  Av.2 °F (36.8 °C)  Min: 97.9 °F (36.6 °C)  Max: 98.4 °F (36.9 °C)  Pulse  Av.5  Min: 81  Max: 126  BP  Min: 94/52  Max: 128/80  SpO2  Av.3 %  Min: 86 %  Max: 97 %  FiO2   Av %  Min: 40 %  Max: 40 %    24 hour I/O    Intake/Output Summary (Last 24 hours) at 2024 1457  Last data filed at 2024 0611  Gross per 24 hour   Intake 670 ml   Output 400 ml   Net 270 ml       Current Facility-Administered Medications   Medication Dose Route Frequency Provider Last Rate Last Admin    levalbuterol (XOPENEX) nebulizer solution 1.25 mg  1.25 mg Nebulization 4x Daily RT Aly Acharya MD   1.25 mg at 24 1127    sodium chloride nebulizer 0.9 % solution 3 mL  3 mL Nebulization PRN Aly Acharya MD   3 mL at 24 1127    piperacillin-tazobactam (ZOSYN) 3,375 mg in sodium chloride 0.9 % 50 mL IVPB (mini-bag)  3,375 mg IntraVENous Q8H Vasiliy Pacheco

## 2024-01-29 NOTE — CARE COORDINATION
Appeal for denial for [facility] level of care was overturned and now patient can go to LTACH when ready. Susana in admissions will let writer know time frame auth is good for. General Surgery planning to take back to OR in AM for further debridement. There is foul smelling drainage in wound, open bone and necrotic tissue exposed.

## 2024-01-29 NOTE — PROGRESS NOTES
INPATIENT PULMONARY CRITICAL CARE PROGRESS NOTE      Reason for visit    Respiratory failure    SUBJECTIVE: Patient when seen this morning was comfortably sleeping in the bed without any significant increased work of breathing, as per patient's family patient was given a full facemask which was much more comfortable for the patient and patient was able to tolerate the BiPAP overnight without any issues, patient does not have too much of chest congestion per se, patient was afebrile, patient continues to be in atrial fibrillation with rapid ventricular rate on the monitor, patient's hemodynamics were borderline, patient has slightly elevated blood sugars but are acceptable, patient's urine output is borderline, patient has a cumulative fluid balance of -2.3 L, patient is quite weak and needs assistance and family wanted to see if patient can be put in chair with assistance today, no other pertinent review of system could be obtained       Physical Exam:  Blood pressure 118/72, pulse (!) 123, temperature 98.7 °F (37.1 °C), temperature source Axillary, resp. rate 24, height 1.702 m (5' 7\"), weight 91.5 kg (201 lb 11.5 oz), SpO2 94 %.'   Constitutional:  No acute distress.   HENT:  Oropharynx is clear and moist. No thyromegaly.  Eyes:  Conjunctivae are normal. Pupils equal, round, and reactive to light. No scleral icterus.   Neck: . No tracheal deviation present. No obvious thyroid mass.  Short large neck  Cardiovascular: Normal rate, regular rhythm, normal heart sounds.  No right ventricular heave.  Some lower extremity edema.  Pulmonary/Chest: No wheezes.  Scattered rales.  Chest wall is not dull to percussion.  No accessory muscle usage or stridor.  Decreased breath sound intensity  Abdominal: Soft. Bowel sounds present. No distension or hernia. No tenderness.    Musculoskeletal: No cyanosis. No clubbing. No obvious joint deformity.   Lymphadenopathy: No cervical or supraclavicular adenopathy.   Skin: Skin is warm

## 2024-01-29 NOTE — PROGRESS NOTES
Speech Language Pathology  Dysphagia Treatment Follow-Up Note  Speech / Language / Cognitive Treatment Follow-Up Note  Facility/Department: [place] [address] PCU    Recommendations:  Solid Consistency: IDDSI 5 Minced and moist Solids  Liquid Consistency: IDDSI 0 Thin Liquids (via Provale Cup only)  Medication: Meds crushed in puree as able or via PEG     Risk Management: upright for all intake, stay upright for at least 30 mins after intake, small bites/sips, total feed, downgrade to mildly thick if s/s of aspiration develop, 1:1 supervision with intake, oral care q4 hrs to reduce adverse affects in the event of aspiration, alternate bites/sips, slow rate of intake, general GERD precautions, general aspiration precautions, and hold PO and contact SLP if s/s of aspiration or worsening respiratory status develop..     NAME:Isac Lemus  : 1950 (73 y.o.)   MRN: [id number]  ROOM: [address] [place] [address]  ADMISSION DATE: 2024  PATIENT DIAGNOSIS(ES): Unspecified open wound of lower back and pelvis without penetration into retroperitoneum, initial encounter [S31.000A]  Decubitus skin ulcer [L89.90]  Sacral wound, initial encounter [S31.000A]  Allergies   Allergen Reactions    Heparin Other (See Comments)     Causes clots       DATE ONSET: 2024    Pain: The patient does not complain of pain       Current Diet: ADULT ORAL NUTRITION SUPPLEMENT; Breakfast, Lunch, Dinner; Frozen Oral Supplement  ADULT DIET; Dysphagia - Minced and Moist  ADULT ORAL NUTRITION SUPPLEMENT; Breakfast, Dinner; Wound Healing Oral Supplement      Diet Tolerance:  Patient tolerating current diet level without signs/symptoms of aspiration.     Subjective:   Pt seen in room at bedside with RN permission   Behavior / Cognition: Limited verbalizations; Difficulty following commands  RN Report/Chart Review: The pt reportedly is accepting and tolerating his current diet.  Patient tolerance: Limited ability to respond    Dysphagia Treatment and  Impressions:  Baseline Respiratory Status Measures: Pt with SPO2% of 90 on 3 LPM NC with RR of 20    Liquid PO Trials:    IDDSI 0 Thin:  Assessed via cup and straw: suspect premature bolus loss into pharynx, suspect delayed swallow onset, no clinical s/s of aspiration, no coughing, dry vocal quality, and no throat clearing      Solid PO Trials  IDDSI 5 Minced and Moist:   suspect reduced/impaired A-P bolus transit, prolonged mastication, oral clearance grossly WFL, no clinical s/s of aspiration, no coughing, dry vocal quality, and no throat clearing      Repeat Respiratory Status Measures: Pt with SPO2% of 90 on 3 LPM NC with RR of 24     Impressions:  The pt appears to be tolerating his current diet per today's limited number of PO trials accepted. The pt was able to tolerate trials of thin liquids via cup and straw without overt s/s aspiration. Likewise, he tolerate given trials of minced and moist solids. The pt's wife and daughter were present. They were giving the pt liquid via cup and straw, instead of via the Provale cup. They said that the pt was not drinking much from the Provale cup. Education was given re: the benefit of the Provale cup in limiting his sip size and reducing his risk of aspiration. At this time, continuing the pt's current diet is recommended.                 Dysphagia Goals:  Timeframe for Long-term Goals: (7 days 1/24/24)  Goal 1: The patient will tolerate least restrictive diet with no clinical s/s of aspiration or worsening respiratory/pulmonary status: 1/29/23: Ongoing; See above     Short-term Goals  Timeframe for Short-term Goals: (5 days 1/22/24)  Goal 1: The patient will tolerate recommended diet with no clinical s/s of aspiration 5/5: 1/29/23: Ongoing; See above  Goal 2: The patient will tolerate therapeutic diet upgrade trials with no clinical s/s of aspiration 5/5: 1/29/23: Ongoing; See above; Noted apparent improved tolerance of thins via cup and straw in single sips

## 2024-01-29 NOTE — PROGRESS NOTES
Hospital Medicine Progress Note      Date of Admission: 1/14/2024  Hospital Day: 16    Chief Admission Complaint:  decub ulcer     Subjective:  resting in bed, on 3L, family at bedside who noted good appetite earlier for bkfst, pt not very interactive with author,     Presenting Admission History:         73 y.o. male w/ hx TBI w/ bleed who [facility] w/ decubitus ulcer for surgical debridement - done 15 Arturo w/out complications - discussed face to face w/ General Surgery, Dr. Stephenson 15 Arturo.      Patient is non-verbal with family at bedside.     Assessment/Plan:      Current Principal Problem:  Decubitus skin ulcer      #.  Intermittent fevers likely due to infected sacral wound: fever resolved.   #.  Suspected infected sacral wound  Infectious workup at [facility] unremarkable  S/p I&D 1/15/2024 without complication, without osteo  S/p  repeat wound debridement on 1/23/2024  S/P wound vac placement: planned for wound check and vac exchange planned on Monday.   -ID was following, apprec recs  Continued Zosyn: can Switch to oral augmentin at DC  Discharge pending gen surgery clearance and placement.    -reeval of wound noted bone involvement as of 1/29, I&D planned 1/30     #.  Traumatic brain injury  History of traumatic brain injury, bilateral subdural hematoma, subarachnoid hemorrhage, skull base fracture, right transverse/sigmoid sinus thrombosis  Plan no surgical intervention and serial scans during prior acute stay stable  Continued Keppra  EKG showing epileptiform discharges during prior acute stay  Continued Aricept and provigil     #. Acute hypoxic respiratory failure  Suspected atelectasis, encourage insentive spirometry  Consulted pulmonary  for hypoxia and Cpap management for argenis , encouraged more pulm toilet     #.  Paroxysmal atrial fibrillation  With episode of RVR  Held home AC, resumed eliquis when ok with gen surgery.   Continued amiodarone, statins, aspirin,  Oral BID    metoprolol tartrate  50 mg PEG Tube BID    insulin lispro  0-16 Units SubCUTAneous TID WC    insulin lispro  0-4 Units SubCUTAneous Nightly    amiodarone  200 mg PEG Tube Daily    atorvastatin  40 mg PEG Tube Nightly    dilTIAZem  60 mg PEG Tube 4 times per day    donepezil  5 mg PEG Tube Nightly    lisinopril  2.5 mg PEG Tube Daily    modafinil  300 mg PEG Tube Daily    aspirin  81 mg PEG Tube Daily    mupirocin   Topical BID    vashe wound therapy   Topical BID    [Held by provider] enoxaparin  1 mg/kg SubCUTAneous BID    [Held by provider] apixaban  5 mg Oral BID    sodium chloride flush  5-40 mL IntraVENous 2 times per day    levETIRAcetam  750 mg PEG Tube BID     PRN Meds: sodium chloride nebulizer, traMADol, glucose, dextrose bolus **OR** dextrose bolus, glucagon (rDNA), dextrose, guaiFENesin, sodium chloride flush, sodium chloride, polyethylene glycol, acetaminophen **OR** acetaminophen     Labs:  Personally reviewed and interpreted for clinical significance.     Recent Labs     01/27/24 1937 01/29/24  0910   WBC 10.3 9.2   HGB 9.0* 8.6*   HCT 27.7* 27.2*    260     Recent Labs     01/27/24 1937 01/29/24  0910   * 135*   K 3.6 3.2*   CL 96* 96*   CO2 30 31   BUN 12 11   CREATININE <0.5* <0.5*   CALCIUM 8.5 8.9   MG 1.60*  --    PHOS 2.5  --      No results for input(s): \"PROBNP\", \"TROPHS\" in the last 72 hours.  No results for input(s): \"LABA1C\" in the last 72 hours.  No results for input(s): \"AST\", \"ALT\", \"BILIDIR\", \"BILITOT\", \"ALKPHOS\" in the last 72 hours.  No results for input(s): \"INR\", \"LACTA\", \"TSH\" in the last 72 hours.    Urine Cultures: No results found for: \"LABURIN\"  Blood Cultures:   Lab Results   Component Value Date/Time    BC No Growth after 4 days of incubation. 12/29/2023 08:54 AM     Lab Results   Component Value Date/Time    BLOODCULT2 No Growth after 4 days of incubation. 01/01/2024 07:23 PM     Organism:   Lab Results   Component Value Date/Time    ORG

## 2024-01-29 NOTE — PROGRESS NOTES
Comprehensive Nutrition Assessment    Type and Reason for Visit:  Reassess    Nutrition Recommendations/Plan:   Continue Dysphagia Minced and Moist.  Continue Magic Cup TID.  Continue wound healing supplement BID.     Malnutrition Assessment:  Malnutrition Status:  At risk for malnutrition (Comment) (01/19/24 1257)    Context:  Acute Illness     Findings of the 6 clinical characteristics of malnutrition:  Energy Intake:  Mild decrease in energy intake (Comment)  Weight Loss:  Greater than 5% over 1 month     Body Fat Loss:  No significant body fat loss     Muscle Mass Loss:  No significant muscle mass loss    Fluid Accumulation:  Mild     Strength:  Not Performed    Nutrition Assessment:    Follow-up. Pt s/p wound debridement and wound vac placement on 1/23. Pt with wound check today and replacement. Noted pt eating well at most meals, >75% at most. ONS on board, hard to assess actual intakes but previous RD expressed need for pt to receive higher protein foods to aid in wound healing.    Nutrition Related Findings:    +2 BLE and trace BUE edema; BM 1/29 Wound Type: Pressure Injury, Multiple, Stage II, Stage IV, Wound Vac       Current Nutrition Intake & Therapies:    Average Meal Intake: 26-50%, 51-75%, %  Average Supplements Intake: %  ADULT ORAL NUTRITION SUPPLEMENT; Breakfast, Lunch, Dinner; Frozen Oral Supplement  ADULT DIET; Dysphagia - Minced and Moist  ADULT ORAL NUTRITION SUPPLEMENT; Breakfast, Dinner; Wound Healing Oral Supplement    Anthropometric Measures:  Height: 170.2 cm (5' 7\")  Ideal Body Weight (IBW): 148 lbs (67 kg)       Current Body Weight: 91.5 kg (201 lb 11.5 oz), 136.3 % IBW. Weight Source: Bed Scale  Current BMI (kg/m2): 31.6  Usual Body Weight: 96.6 kg (213 lb) (12/22/23 bed scale)  % Weight Change (Calculated): -0.5                    BMI Categories: Obese Class 1 (BMI 30.0-34.9)    Estimated Daily Nutrient Needs:  Energy Requirements Based On: Kcal/kg (25-30)  Weight Used

## 2024-01-29 NOTE — PROGRESS NOTES
01/28/24 7720   NIV Type   NIV Started/Stopped On   Equipment Type v60   Mode CPAP   Mask Type Total face   Assessment   Respirations 24   SpO2 94 %   Comfort Level Good   Using Accessory Muscles No   Mask Compliance Good   Skin Protection for O2 Device N/A   Settings/Measurements   CPAP/EPAP 12 cmH2O   Vt (Measured) 393 mL   FiO2  40 %   Minute Volume (L/min) 9.7 Liters   Mask Leak (lpm) 5 lpm   Patient's Home Machine No   Alarm Settings   Alarms On Y   Low Pressure (cmH2O) 6 cmH2O   High Pressure (cmH2O) 30 cmH2O   Delay Alarm 20 sec(s)   RR Low (bpm) 6   RR High (bpm) 40 br/min

## 2024-01-29 NOTE — PROGRESS NOTES
Speech Language Pathology  Facility/Department: [facility] C4 PCU  Initial Speech/Language/Cognitive Assessment       NAME:Isac Lemus  : 1950 (73 y.o.)   MRN: [id number]  ROOM: [facility]-  ADMISSION DATE: 2024  PATIENT DIAGNOSIS(ES): Unspecified open wound of lower back and pelvis without penetration into retroperitoneum, initial encounter [S31.000A]  Decubitus skin ulcer [L89.90]  Sacral wound, initial encounter [S31.000A]  Patient Active Problem List    Diagnosis Date Noted    Chest pain 10/16/2014    Traumatic brain injury with delayed recovery (HCC) 2024    Quadriplegia, functional (HCC) 2024    Acute respiratory failure with hypoxia (HCC) 2024    Decubitus skin ulcer 2024    Sacral wound, initial encounter 2024    Atrial fibrillation and flutter (HCC) 2024    FUO (fever of unknown origin) 2024    Sacral wound 2024    Atrial flutter (HCC) 2024    Hypernatremia 2024    Elevated BUN 2024    Uncontrolled type 2 diabetes mellitus with hyperglycemia (HCC) 2024    Macrocytic anemia 2024    Chronic respiratory failure with hypercapnia ([facility]) 2023    SDH (subdural hematoma) ([facility]) 2023    DM (diabetes mellitus) (HCC) 10/16/2014    HTN (hypertension) 10/16/2014    Obesity 10/16/2014     Past Medical History:   Diagnosis Date    Acid reflux     CAD (coronary artery disease)     Diabetes mellitus (HCC)     Heartburn     Hiatal hernia     Hypertension     Seasonal allergies      Past Surgical History:   Procedure Laterality Date    BACK SURGERY N/A 1/15/2024    SACRAL WOUND DEBRIDEMENT performed by Casey Stephenson MD at [facility] OR    BACK SURGERY N/A 2024    SACRAL WOUND DEBRIDEMENT performed by Casey Stephenson MD at [facility] OR    COLONOSCOPY      POLYPS    COLONOSCOPY  3/16/16    polyps    CORONARY ARTERY BYPASS GRAFT  10/2014    X5     Allergies   Allergen Reactions    Heparin Other (See Comments)     Causes

## 2024-01-29 NOTE — PROGRESS NOTES
Pt wife and daughter wanted to wait and sign consent form with surgeon tomorrow morning at the bedside before being taken down for procedure. Procedure consent printed and in the chart.

## 2024-01-29 NOTE — PROGRESS NOTES
Physical Therapy  Facility/Department: [place] [address] [facility]U  Daily Treatment Note  NAME: Isac Lemus  : 1950  MRN: [id number]    Date of Service: 2024    Discharge Recommendations:  Subacute/Skilled Nursing Facility, LTACH (LTACH vs SNF)   PT Equipment Recommendations  Other: defer to facility    Therapy discharge recommendations take into account each patient's current medical complexities and are subject to input/oversight from the patient's healthcare team.      Barriers to Home Discharge:   [x] Steps to access home entry or bed/bath:   [x] Unable to transfer, ambulate, or propel wheelchair household distances without assist   [x] Limited available assist at home upon discharge    [x] Patient or family requests d/c to post-acute facility    [x] Poor cognition/safety awareness for d/c to home alone    [] Unable to maintain ordered weight bearing status    [x] Patient with salient signs of long-standing immobility   [x] Decreased independence with ADLs   [x] Increased risk for falls   [x] Other: wound vac     If pt is unable to be seen after this session, please let this note serve as discharge summary.  Please see case management note for discharge disposition.  Thank you    Patient Diagnosis(es): The encounter diagnosis was Wound of sacral region, initial encounter.    Assessment   Assessment: Pt was able to rouse this date and able to initiate some active UE movement with bed mobility and sitting balance/tolerance.  However, when prompted to assist with LE exercises in supine or sitting, pt was unable to initiate any active LE movement.  Pt tolerated sitting EOB 25 minutes with variable min to max A balance support.  Attempted trial of Jaki Stedy but with pt's lack of LE movements, transfer with this deviced was discontinued.  Pt too fatigued and family/RN decided to allow him to rest bed rather than transfer to recliner chair.  Recommend LTACH (vs SNF).  Activity Tolerance: Patient limited by

## 2024-01-30 ENCOUNTER — APPOINTMENT (OUTPATIENT)
Dept: GENERAL RADIOLOGY | Age: 74
End: 2024-01-30
Attending: INTERNAL MEDICINE
Payer: MEDICARE

## 2024-01-30 ENCOUNTER — ANESTHESIA (OUTPATIENT)
Dept: OPERATING ROOM | Age: 74
End: 2024-01-30
Payer: MEDICARE

## 2024-01-30 LAB
ANION GAP SERPL CALCULATED.3IONS-SCNC: 6 MMOL/L (ref 3–16)
BASE EXCESS BLDA CALC-SCNC: 5 MMOL/L (ref -3–3)
BASE EXCESS BLDA CALC-SCNC: 7 MMOL/L (ref -3–3)
BASE EXCESS BLDV CALC-SCNC: 7.5 MMOL/L (ref -3–3)
BASOPHILS # BLD: 0 K/UL (ref 0–0.2)
BASOPHILS NFR BLD: 0.4 %
BUN SERPL-MCNC: 11 MG/DL (ref 7–20)
CA-I BLD-SCNC: 1.26 MMOL/L (ref 1.12–1.32)
CA-I BLD-SCNC: 1.28 MMOL/L (ref 1.12–1.32)
CALCIUM SERPL-MCNC: 9 MG/DL (ref 8.3–10.6)
CHLORIDE SERPL-SCNC: 98 MMOL/L (ref 99–110)
CO2 BLDA-SCNC: 35 MMOL/L
CO2 BLDA-SCNC: 37 MMOL/L
CO2 BLDV-SCNC: 35 MMOL/L
CO2 SERPL-SCNC: 32 MMOL/L (ref 21–32)
COHGB MFR BLDV: 1.5 % (ref 0–1.5)
CREAT SERPL-MCNC: <0.5 MG/DL (ref 0.8–1.3)
DEPRECATED RDW RBC AUTO: 18.3 % (ref 12.4–15.4)
EKG ATRIAL RATE: 250 BPM
EKG DIAGNOSIS: NORMAL
EKG P AXIS: 117 DEGREES
EKG Q-T INTERVAL: 446 MS
EKG QRS DURATION: 166 MS
EKG QTC CALCULATION (BAZETT): 548 MS
EKG R AXIS: -38 DEGREES
EKG T AXIS: -18 DEGREES
EKG VENTRICULAR RATE: 91 BPM
EOSINOPHIL # BLD: 0.1 K/UL (ref 0–0.6)
EOSINOPHIL NFR BLD: 0.7 %
GFR SERPLBLD CREATININE-BSD FMLA CKD-EPI: >60 ML/MIN/{1.73_M2}
GLUCOSE BLD-MCNC: 116 MG/DL (ref 70–99)
GLUCOSE BLD-MCNC: 118 MG/DL (ref 70–99)
GLUCOSE BLD-MCNC: 154 MG/DL (ref 70–99)
GLUCOSE BLD-MCNC: 173 MG/DL (ref 70–99)
GLUCOSE BLD-MCNC: 173 MG/DL (ref 70–99)
GLUCOSE BLD-MCNC: 196 MG/DL (ref 70–99)
GLUCOSE SERPL-MCNC: 105 MG/DL (ref 70–99)
HCO3 BLDA-SCNC: 32.7 MMOL/L (ref 21–29)
HCO3 BLDA-SCNC: 34.8 MMOL/L (ref 21–29)
HCO3 BLDV-SCNC: 33.1 MMOL/L (ref 23–29)
HCT VFR BLD AUTO: 29 % (ref 40.5–52.5)
HCT VFR BLD AUTO: 29 % (ref 40.5–52.5)
HCT VFR BLD AUTO: 29.7 % (ref 40.5–52.5)
HGB BLD CALC-MCNC: 10 GM/DL (ref 13.5–17.5)
HGB BLD CALC-MCNC: 9.9 GM/DL (ref 13.5–17.5)
HGB BLD-MCNC: 9.5 G/DL (ref 13.5–17.5)
LACTATE BLD-SCNC: 1.18 MMOL/L (ref 0.4–2)
LACTATE BLD-SCNC: 1.19 MMOL/L (ref 0.4–2)
LYMPHOCYTES # BLD: 1.6 K/UL (ref 1–5.1)
LYMPHOCYTES NFR BLD: 17.6 %
MCH RBC QN AUTO: 31.9 PG (ref 26–34)
MCHC RBC AUTO-ENTMCNC: 32.1 G/DL (ref 31–36)
MCV RBC AUTO: 99.4 FL (ref 80–100)
METHGB MFR BLDV: 0.2 %
MONOCYTES # BLD: 0.9 K/UL (ref 0–1.3)
MONOCYTES NFR BLD: 9.5 %
NEUTROPHILS # BLD: 6.6 K/UL (ref 1.7–7.7)
NEUTROPHILS NFR BLD: 71.8 %
O2 THERAPY: ABNORMAL
PCO2 BLDA: 83.9 MM HG (ref 35–45)
PCO2 BLDA: 84.7 MM HG (ref 35–45)
PCO2 BLDV: 53.6 MMHG (ref 40–50)
PERFORMED ON: ABNORMAL
PH BLDA: 7.2 [PH] (ref 7.35–7.45)
PH BLDA: 7.23 [PH] (ref 7.35–7.45)
PH BLDV: 7.41 [PH] (ref 7.35–7.45)
PLATELET # BLD AUTO: 277 K/UL (ref 135–450)
PMV BLD AUTO: 7.4 FL (ref 5–10.5)
PO2 BLDA: 159.9 MM HG (ref 75–108)
PO2 BLDA: 51.2 MM HG (ref 75–108)
PO2 BLDV: 148.9 MMHG (ref 25–40)
POC SAMPLE TYPE: ABNORMAL
POC SAMPLE TYPE: ABNORMAL
POTASSIUM BLD-SCNC: 3.7 MMOL/L (ref 3.5–5.1)
POTASSIUM BLD-SCNC: 3.7 MMOL/L (ref 3.5–5.1)
POTASSIUM SERPL-SCNC: 3.6 MMOL/L (ref 3.5–5.1)
RBC # BLD AUTO: 2.99 M/UL (ref 4.2–5.9)
SAO2 % BLDA: 76 % (ref 93–100)
SAO2 % BLDA: 99 % (ref 93–100)
SAO2 % BLDV: 99 %
SODIUM BLD-SCNC: 138 MMOL/L (ref 136–145)
SODIUM BLD-SCNC: 139 MMOL/L (ref 136–145)
SODIUM SERPL-SCNC: 136 MMOL/L (ref 136–145)
WBC # BLD AUTO: 9.2 K/UL (ref 4–11)

## 2024-01-30 PROCEDURE — 6370000000 HC RX 637 (ALT 250 FOR IP): Performed by: NURSE PRACTITIONER

## 2024-01-30 PROCEDURE — 2580000003 HC RX 258: Performed by: NURSE PRACTITIONER

## 2024-01-30 PROCEDURE — 7100000001 HC PACU RECOVERY - ADDTL 15 MIN: Performed by: SURGERY

## 2024-01-30 PROCEDURE — 6370000000 HC RX 637 (ALT 250 FOR IP): Performed by: INTERNAL MEDICINE

## 2024-01-30 PROCEDURE — 6360000002 HC RX W HCPCS: Performed by: INTERNAL MEDICINE

## 2024-01-30 PROCEDURE — 80048 BASIC METABOLIC PNL TOTAL CA: CPT

## 2024-01-30 PROCEDURE — 82947 ASSAY GLUCOSE BLOOD QUANT: CPT

## 2024-01-30 PROCEDURE — 6370000000 HC RX 637 (ALT 250 FOR IP)

## 2024-01-30 PROCEDURE — 2700000000 HC OXYGEN THERAPY PER DAY

## 2024-01-30 PROCEDURE — 87075 CULTR BACTERIA EXCEPT BLOOD: CPT

## 2024-01-30 PROCEDURE — P9045 ALBUMIN (HUMAN), 5%, 250 ML: HCPCS | Performed by: INTERNAL MEDICINE

## 2024-01-30 PROCEDURE — 2580000003 HC RX 258: Performed by: INTERNAL MEDICINE

## 2024-01-30 PROCEDURE — 93010 ELECTROCARDIOGRAM REPORT: CPT | Performed by: INTERNAL MEDICINE

## 2024-01-30 PROCEDURE — 2580000003 HC RX 258: Performed by: SURGERY

## 2024-01-30 PROCEDURE — 6370000000 HC RX 637 (ALT 250 FOR IP): Performed by: HOSPITALIST

## 2024-01-30 PROCEDURE — 6360000002 HC RX W HCPCS: Performed by: SURGERY

## 2024-01-30 PROCEDURE — 2500000003 HC RX 250 WO HCPCS: Performed by: NURSE ANESTHETIST, CERTIFIED REGISTERED

## 2024-01-30 PROCEDURE — 99233 SBSQ HOSP IP/OBS HIGH 50: CPT | Performed by: INTERNAL MEDICINE

## 2024-01-30 PROCEDURE — 82803 BLOOD GASES ANY COMBINATION: CPT

## 2024-01-30 PROCEDURE — 2580000003 HC RX 258: Performed by: NURSE ANESTHETIST, CERTIFIED REGISTERED

## 2024-01-30 PROCEDURE — 84295 ASSAY OF SERUM SODIUM: CPT

## 2024-01-30 PROCEDURE — 11047 DBRDMT BONE EACH ADDL: CPT | Performed by: SURGERY

## 2024-01-30 PROCEDURE — 87186 SC STD MICRODIL/AGAR DIL: CPT

## 2024-01-30 PROCEDURE — 94761 N-INVAS EAR/PLS OXIMETRY MLT: CPT

## 2024-01-30 PROCEDURE — 87070 CULTURE OTHR SPECIMN AEROBIC: CPT

## 2024-01-30 PROCEDURE — 71045 X-RAY EXAM CHEST 1 VIEW: CPT

## 2024-01-30 PROCEDURE — 6370000000 HC RX 637 (ALT 250 FOR IP): Performed by: SURGERY

## 2024-01-30 PROCEDURE — 3700000000 HC ANESTHESIA ATTENDED CARE: Performed by: SURGERY

## 2024-01-30 PROCEDURE — 3600000003 HC SURGERY LEVEL 3 BASE: Performed by: SURGERY

## 2024-01-30 PROCEDURE — 0KBN0ZZ EXCISION OF RIGHT HIP MUSCLE, OPEN APPROACH: ICD-10-PCS | Performed by: SURGERY

## 2024-01-30 PROCEDURE — 6360000002 HC RX W HCPCS: Performed by: NURSE ANESTHETIST, CERTIFIED REGISTERED

## 2024-01-30 PROCEDURE — 0KBP0ZZ EXCISION OF LEFT HIP MUSCLE, OPEN APPROACH: ICD-10-PCS | Performed by: SURGERY

## 2024-01-30 PROCEDURE — 94640 AIRWAY INHALATION TREATMENT: CPT

## 2024-01-30 PROCEDURE — A4217 STERILE WATER/SALINE, 500 ML: HCPCS | Performed by: SURGERY

## 2024-01-30 PROCEDURE — APPNB45 APP NON BILLABLE 31-45 MINUTES: Performed by: NURSE PRACTITIONER

## 2024-01-30 PROCEDURE — 7100000000 HC PACU RECOVERY - FIRST 15 MIN: Performed by: SURGERY

## 2024-01-30 PROCEDURE — 87076 CULTURE ANAEROBE IDENT EACH: CPT

## 2024-01-30 PROCEDURE — 85014 HEMATOCRIT: CPT

## 2024-01-30 PROCEDURE — 3600000013 HC SURGERY LEVEL 3 ADDTL 15MIN: Performed by: SURGERY

## 2024-01-30 PROCEDURE — 3700000001 HC ADD 15 MINUTES (ANESTHESIA): Performed by: SURGERY

## 2024-01-30 PROCEDURE — 82330 ASSAY OF CALCIUM: CPT

## 2024-01-30 PROCEDURE — 84132 ASSAY OF SERUM POTASSIUM: CPT

## 2024-01-30 PROCEDURE — 87205 SMEAR GRAM STAIN: CPT

## 2024-01-30 PROCEDURE — 83605 ASSAY OF LACTIC ACID: CPT

## 2024-01-30 PROCEDURE — 2000000000 HC ICU R&B

## 2024-01-30 PROCEDURE — 94660 CPAP INITIATION&MGMT: CPT

## 2024-01-30 PROCEDURE — 2709999900 HC NON-CHARGEABLE SUPPLY: Performed by: SURGERY

## 2024-01-30 PROCEDURE — 93005 ELECTROCARDIOGRAM TRACING: CPT | Performed by: INTERNAL MEDICINE

## 2024-01-30 PROCEDURE — 36415 COLL VENOUS BLD VENIPUNCTURE: CPT

## 2024-01-30 PROCEDURE — 6360000002 HC RX W HCPCS: Performed by: NURSE PRACTITIONER

## 2024-01-30 PROCEDURE — 85025 COMPLETE CBC W/AUTO DIFF WBC: CPT

## 2024-01-30 PROCEDURE — 87077 CULTURE AEROBIC IDENTIFY: CPT

## 2024-01-30 PROCEDURE — 11044 DBRDMT BONE 1ST 20 SQ CM/<: CPT | Performed by: SURGERY

## 2024-01-30 PROCEDURE — 2500000003 HC RX 250 WO HCPCS: Performed by: NURSE PRACTITIONER

## 2024-01-30 PROCEDURE — 2580000003 HC RX 258: Performed by: ANESTHESIOLOGY

## 2024-01-30 RX ORDER — ALBUMIN, HUMAN INJ 5% 5 %
25 SOLUTION INTRAVENOUS ONCE
Status: COMPLETED | OUTPATIENT
Start: 2024-01-30 | End: 2024-01-30

## 2024-01-30 RX ORDER — SODIUM CHLORIDE 0.9 % (FLUSH) 0.9 %
5-40 SYRINGE (ML) INJECTION PRN
Status: DISCONTINUED | OUTPATIENT
Start: 2024-01-30 | End: 2024-01-30 | Stop reason: HOSPADM

## 2024-01-30 RX ORDER — LIDOCAINE HYDROCHLORIDE 20 MG/ML
INJECTION, SOLUTION INFILTRATION; PERINEURAL PRN
Status: DISCONTINUED | OUTPATIENT
Start: 2024-01-30 | End: 2024-01-30 | Stop reason: SDUPTHER

## 2024-01-30 RX ORDER — NOREPINEPHRINE BITARTRATE 1 MG/ML
INJECTION, SOLUTION INTRAVENOUS PRN
Status: DISCONTINUED | OUTPATIENT
Start: 2024-01-30 | End: 2024-01-30 | Stop reason: SDUPTHER

## 2024-01-30 RX ORDER — SODIUM CHLORIDE, SODIUM LACTATE, POTASSIUM CHLORIDE, CALCIUM CHLORIDE 600; 310; 30; 20 MG/100ML; MG/100ML; MG/100ML; MG/100ML
INJECTION, SOLUTION INTRAVENOUS CONTINUOUS PRN
Status: DISCONTINUED | OUTPATIENT
Start: 2024-01-30 | End: 2024-01-30 | Stop reason: SDUPTHER

## 2024-01-30 RX ORDER — OXYCODONE HYDROCHLORIDE 5 MG/1
10 TABLET ORAL PRN
Status: DISCONTINUED | OUTPATIENT
Start: 2024-01-30 | End: 2024-01-30 | Stop reason: HOSPADM

## 2024-01-30 RX ORDER — LABETALOL HYDROCHLORIDE 5 MG/ML
5 INJECTION, SOLUTION INTRAVENOUS EVERY 10 MIN PRN
Status: DISCONTINUED | OUTPATIENT
Start: 2024-01-30 | End: 2024-01-30 | Stop reason: HOSPADM

## 2024-01-30 RX ORDER — SODIUM CHLORIDE 9 MG/ML
INJECTION, SOLUTION INTRAVENOUS PRN
Status: DISCONTINUED | OUTPATIENT
Start: 2024-01-30 | End: 2024-01-30 | Stop reason: HOSPADM

## 2024-01-30 RX ORDER — DEXAMETHASONE SODIUM PHOSPHATE 10 MG/ML
INJECTION INTRAMUSCULAR; INTRAVENOUS PRN
Status: DISCONTINUED | OUTPATIENT
Start: 2024-01-30 | End: 2024-01-30 | Stop reason: SDUPTHER

## 2024-01-30 RX ORDER — PHENYLEPHRINE HCL IN 0.9% NACL 1 MG/10 ML
SYRINGE (ML) INTRAVENOUS PRN
Status: DISCONTINUED | OUTPATIENT
Start: 2024-01-30 | End: 2024-01-30 | Stop reason: SDUPTHER

## 2024-01-30 RX ORDER — DIPHENHYDRAMINE HYDROCHLORIDE 50 MG/ML
12.5 INJECTION INTRAMUSCULAR; INTRAVENOUS
Status: DISCONTINUED | OUTPATIENT
Start: 2024-01-30 | End: 2024-01-30 | Stop reason: HOSPADM

## 2024-01-30 RX ORDER — MAGNESIUM HYDROXIDE 1200 MG/15ML
LIQUID ORAL CONTINUOUS PRN
Status: DISCONTINUED | OUTPATIENT
Start: 2024-01-30 | End: 2024-01-30 | Stop reason: HOSPADM

## 2024-01-30 RX ORDER — LIDOCAINE HYDROCHLORIDE 10 MG/ML
0.3 INJECTION, SOLUTION INFILTRATION; PERINEURAL
Status: ACTIVE | OUTPATIENT
Start: 2024-01-30 | End: 2024-01-31

## 2024-01-30 RX ORDER — MEPERIDINE HYDROCHLORIDE 50 MG/ML
12.5 INJECTION INTRAMUSCULAR; INTRAVENOUS; SUBCUTANEOUS EVERY 5 MIN PRN
Status: DISCONTINUED | OUTPATIENT
Start: 2024-01-30 | End: 2024-01-30 | Stop reason: HOSPADM

## 2024-01-30 RX ORDER — PROPOFOL 10 MG/ML
INJECTION, EMULSION INTRAVENOUS PRN
Status: DISCONTINUED | OUTPATIENT
Start: 2024-01-30 | End: 2024-01-30 | Stop reason: SDUPTHER

## 2024-01-30 RX ORDER — ROCURONIUM BROMIDE 10 MG/ML
INJECTION, SOLUTION INTRAVENOUS PRN
Status: DISCONTINUED | OUTPATIENT
Start: 2024-01-30 | End: 2024-01-30 | Stop reason: SDUPTHER

## 2024-01-30 RX ORDER — SODIUM CHLORIDE, SODIUM LACTATE, POTASSIUM CHLORIDE, CALCIUM CHLORIDE 600; 310; 30; 20 MG/100ML; MG/100ML; MG/100ML; MG/100ML
INJECTION, SOLUTION INTRAVENOUS CONTINUOUS
Status: DISCONTINUED | OUTPATIENT
Start: 2024-01-30 | End: 2024-01-31

## 2024-01-30 RX ORDER — SODIUM CHLORIDE 0.9 % (FLUSH) 0.9 %
5-40 SYRINGE (ML) INJECTION PRN
Status: DISCONTINUED | OUTPATIENT
Start: 2024-01-30 | End: 2024-02-01 | Stop reason: HOSPADM

## 2024-01-30 RX ORDER — SODIUM CHLORIDE 9 MG/ML
INJECTION, SOLUTION INTRAVENOUS PRN
Status: DISCONTINUED | OUTPATIENT
Start: 2024-01-30 | End: 2024-02-01 | Stop reason: HOSPADM

## 2024-01-30 RX ORDER — OXYCODONE HYDROCHLORIDE 5 MG/1
5 TABLET ORAL PRN
Status: DISCONTINUED | OUTPATIENT
Start: 2024-01-30 | End: 2024-01-30 | Stop reason: HOSPADM

## 2024-01-30 RX ORDER — SODIUM CHLORIDE, SODIUM LACTATE, POTASSIUM CHLORIDE, AND CALCIUM CHLORIDE .6; .31; .03; .02 G/100ML; G/100ML; G/100ML; G/100ML
500 INJECTION, SOLUTION INTRAVENOUS ONCE
Status: COMPLETED | OUTPATIENT
Start: 2024-01-30 | End: 2024-01-30

## 2024-01-30 RX ORDER — ONDANSETRON 2 MG/ML
4 INJECTION INTRAMUSCULAR; INTRAVENOUS
Status: DISCONTINUED | OUTPATIENT
Start: 2024-01-30 | End: 2024-01-30 | Stop reason: HOSPADM

## 2024-01-30 RX ORDER — EPHEDRINE SULFATE 50 MG/ML
INJECTION INTRAVENOUS PRN
Status: DISCONTINUED | OUTPATIENT
Start: 2024-01-30 | End: 2024-01-30 | Stop reason: SDUPTHER

## 2024-01-30 RX ORDER — ONDANSETRON 2 MG/ML
INJECTION INTRAMUSCULAR; INTRAVENOUS PRN
Status: DISCONTINUED | OUTPATIENT
Start: 2024-01-30 | End: 2024-01-30 | Stop reason: SDUPTHER

## 2024-01-30 RX ORDER — 0.9 % SODIUM CHLORIDE 0.9 %
250 INTRAVENOUS SOLUTION INTRAVENOUS ONCE
Status: COMPLETED | OUTPATIENT
Start: 2024-01-30 | End: 2024-01-30

## 2024-01-30 RX ORDER — SODIUM CHLORIDE 0.9 % (FLUSH) 0.9 %
5-40 SYRINGE (ML) INJECTION EVERY 12 HOURS SCHEDULED
Status: DISCONTINUED | OUTPATIENT
Start: 2024-01-30 | End: 2024-02-01 | Stop reason: HOSPADM

## 2024-01-30 RX ORDER — METOPROLOL TARTRATE 1 MG/ML
5 INJECTION, SOLUTION INTRAVENOUS EVERY 6 HOURS
Status: DISCONTINUED | OUTPATIENT
Start: 2024-01-30 | End: 2024-01-31

## 2024-01-30 RX ORDER — SODIUM CHLORIDE 0.9 % (FLUSH) 0.9 %
5-40 SYRINGE (ML) INJECTION EVERY 12 HOURS SCHEDULED
Status: DISCONTINUED | OUTPATIENT
Start: 2024-01-30 | End: 2024-01-30 | Stop reason: HOSPADM

## 2024-01-30 RX ADMIN — NOREPINEPHRINE BITARTRATE 5 MCG: 1 INJECTION INTRAVENOUS at 15:31

## 2024-01-30 RX ADMIN — SODIUM CHLORIDE, POTASSIUM CHLORIDE, SODIUM LACTATE AND CALCIUM CHLORIDE: 600; 310; 30; 20 INJECTION, SOLUTION INTRAVENOUS at 17:32

## 2024-01-30 RX ADMIN — Medication 200 MCG: at 14:56

## 2024-01-30 RX ADMIN — SODIUM CHLORIDE, POTASSIUM CHLORIDE, SODIUM LACTATE AND CALCIUM CHLORIDE: 600; 310; 30; 20 INJECTION, SOLUTION INTRAVENOUS at 22:35

## 2024-01-30 RX ADMIN — PIPERACILLIN AND TAZOBACTAM 3375 MG: 3; .375 INJECTION, POWDER, FOR SOLUTION INTRAVENOUS at 17:38

## 2024-01-30 RX ADMIN — SODIUM CHLORIDE: 9 INJECTION, SOLUTION INTRAVENOUS at 17:33

## 2024-01-30 RX ADMIN — LIDOCAINE HYDROCHLORIDE 100 MG: 20 INJECTION, SOLUTION INFILTRATION; PERINEURAL at 14:44

## 2024-01-30 RX ADMIN — EPHEDRINE SULFATE 10 MG: 50 INJECTION INTRAVENOUS at 15:05

## 2024-01-30 RX ADMIN — Medication 200 MCG: at 15:07

## 2024-01-30 RX ADMIN — ASPIRIN 81 MG 81 MG: 81 TABLET ORAL at 08:55

## 2024-01-30 RX ADMIN — LEVALBUTEROL 1.25 MG: 1.25 SOLUTION, CONCENTRATE RESPIRATORY (INHALATION) at 07:35

## 2024-01-30 RX ADMIN — SODIUM CHLORIDE, SODIUM LACTATE, POTASSIUM CHLORIDE, AND CALCIUM CHLORIDE: .6; .31; .03; .02 INJECTION, SOLUTION INTRAVENOUS at 14:36

## 2024-01-30 RX ADMIN — DILTIAZEM HYDROCHLORIDE 60 MG: 60 TABLET ORAL at 05:53

## 2024-01-30 RX ADMIN — POLYETHYLENE GLYCOL 3350 17 G: 17 POWDER, FOR SOLUTION ORAL at 09:08

## 2024-01-30 RX ADMIN — PIPERACILLIN AND TAZOBACTAM 3375 MG: 3; .375 INJECTION, POWDER, FOR SOLUTION INTRAVENOUS at 05:52

## 2024-01-30 RX ADMIN — PANTOPRAZOLE SODIUM 40 MG: 40 TABLET, DELAYED RELEASE ORAL at 05:53

## 2024-01-30 RX ADMIN — LEVALBUTEROL 1.25 MG: 1.25 SOLUTION, CONCENTRATE RESPIRATORY (INHALATION) at 19:39

## 2024-01-30 RX ADMIN — SODIUM CHLORIDE, PRESERVATIVE FREE 10 ML: 5 INJECTION INTRAVENOUS at 20:32

## 2024-01-30 RX ADMIN — ISODIUM CHLORIDE 3 ML: 0.03 SOLUTION RESPIRATORY (INHALATION) at 19:39

## 2024-01-30 RX ADMIN — EPHEDRINE SULFATE 10 MG: 50 INJECTION INTRAVENOUS at 15:01

## 2024-01-30 RX ADMIN — PIPERACILLIN AND TAZOBACTAM 3375 MG: 3; .375 INJECTION, POWDER, FOR SOLUTION INTRAVENOUS at 22:34

## 2024-01-30 RX ADMIN — ALBUMIN (HUMAN) 25 G: 12.5 INJECTION, SOLUTION INTRAVENOUS at 11:05

## 2024-01-30 RX ADMIN — MUPIROCIN: 20 OINTMENT TOPICAL at 20:51

## 2024-01-30 RX ADMIN — PROPOFOL 40 MG: 10 INJECTION, EMULSION INTRAVENOUS at 14:44

## 2024-01-30 RX ADMIN — METOPROLOL TARTRATE 50 MG: 50 TABLET, FILM COATED ORAL at 08:55

## 2024-01-30 RX ADMIN — Medication 10 ML: at 20:33

## 2024-01-30 RX ADMIN — ROCURONIUM BROMIDE 50 MG: 50 INJECTION, SOLUTION INTRAVENOUS at 14:44

## 2024-01-30 RX ADMIN — LEVETIRACETAM 750 MG: 100 SOLUTION ORAL at 08:57

## 2024-01-30 RX ADMIN — NOREPINEPHRINE BITARTRATE 5 MCG: 1 INJECTION INTRAVENOUS at 15:13

## 2024-01-30 RX ADMIN — DILTIAZEM HYDROCHLORIDE 60 MG: 60 TABLET ORAL at 11:42

## 2024-01-30 RX ADMIN — SODIUM CHLORIDE, POTASSIUM CHLORIDE, SODIUM LACTATE AND CALCIUM CHLORIDE 500 ML: 600; 310; 30; 20 INJECTION, SOLUTION INTRAVENOUS at 18:18

## 2024-01-30 RX ADMIN — Medication 100 MCG: at 15:04

## 2024-01-30 RX ADMIN — SENNOSIDES AND DOCUSATE SODIUM 2 TABLET: 50; 8.6 TABLET ORAL at 08:55

## 2024-01-30 RX ADMIN — METOPROLOL TARTRATE 5 MG: 5 INJECTION INTRAVENOUS at 20:35

## 2024-01-30 RX ADMIN — LEVETIRACETAM 750 MG: 100 INJECTION, SOLUTION INTRAVENOUS at 21:59

## 2024-01-30 RX ADMIN — NOREPINEPHRINE BITARTRATE 5 MCG: 1 INJECTION INTRAVENOUS at 15:14

## 2024-01-30 RX ADMIN — LISINOPRIL 2.5 MG: 2.5 TABLET ORAL at 08:55

## 2024-01-30 RX ADMIN — SODIUM CHLORIDE 250 ML: 9 INJECTION, SOLUTION INTRAVENOUS at 11:02

## 2024-01-30 RX ADMIN — SODIUM CHLORIDE, PRESERVATIVE FREE 10 ML: 5 INJECTION INTRAVENOUS at 09:09

## 2024-01-30 RX ADMIN — DILTIAZEM HYDROCHLORIDE 60 MG: 60 TABLET ORAL at 17:39

## 2024-01-30 RX ADMIN — ONDANSETRON 4 MG: 2 INJECTION INTRAMUSCULAR; INTRAVENOUS at 14:56

## 2024-01-30 RX ADMIN — SUGAMMADEX 200 MG: 100 INJECTION, SOLUTION INTRAVENOUS at 15:39

## 2024-01-30 RX ADMIN — DILTIAZEM HYDROCHLORIDE 60 MG: 60 TABLET ORAL at 00:15

## 2024-01-30 RX ADMIN — DEXAMETHASONE SODIUM PHOSPHATE 4 MG: 10 INJECTION INTRAMUSCULAR; INTRAVENOUS at 14:56

## 2024-01-30 RX ADMIN — MODAFINIL 300 MG: 200 TABLET ORAL at 08:57

## 2024-01-30 RX ADMIN — AMIODARONE HYDROCHLORIDE 200 MG: 200 TABLET ORAL at 09:07

## 2024-01-30 RX ADMIN — LEVALBUTEROL 1.25 MG: 1.25 SOLUTION, CONCENTRATE RESPIRATORY (INHALATION) at 11:36

## 2024-01-30 ASSESSMENT — PAIN SCALES - GENERAL
PAINLEVEL_OUTOF10: 0

## 2024-01-30 ASSESSMENT — PAIN SCALES - WONG BAKER
WONGBAKER_NUMERICALRESPONSE: 0
WONGBAKER_NUMERICALRESPONSE: 0

## 2024-01-30 NOTE — ANESTHESIA POSTPROCEDURE EVALUATION
Department of Anesthesiology  Postprocedure Note    Patient: Isac Lemus  MRN: [id number]  YOB: 1950  Date of evaluation: 1/30/2024    Procedure Summary       Date: 01/30/24 Room / Location: [address]    Anesthesia Start: 1436 Anesthesia Stop: 1617    Procedure: SACRAL WOUND DEBRIDEMENT (Back) Diagnosis:       Wound of sacral region, initial encounter      (Wound of sacral region, initial encounter [S31.000A])    Surgeons: Casey Stephenson MD Responsible Provider: Aditya Minor MD    Anesthesia Type: general ASA Status: 4            Anesthesia Type: No value filed.    Donovan Phase I: Donovan Score: 4    Donovan Phase II:      Anesthesia Post Evaluation    Comments: Postoperative Anesthesia Note    Name:    Isac Lemus  MRN:      [id number]    Patient Vitals in the past 12 hrs:  01/30/24 1730, Pulse:(!) 119, Resp:21  01/30/24 1717, BP:(!) 96/59, Temp:97.7 °F (36.5 °C), Temp src:Axillary, Pulse:(!) 120, Resp:19, SpO2:100 %  01/30/24 1706, Pulse:(!) 116, Resp:28  01/30/24 1655, BP:(!) 118/48, Pulse:(!) 116  01/30/24 1645, BP:(!) 118/54, Pulse:(!) 115  01/30/24 1640, BP:122/61, Pulse:(!) 112  01/30/24 1635, BP:(!) 112/53, Temp:98.2 °F (36.8 °C), Pulse:(!) 115  01/30/24 1630, BP:(!) 120/56, Pulse:(!) 119  01/30/24 1625, BP:(!) 118/58, Pulse:(!) 112, Resp:(!) 32, SpO2:94 %  01/30/24 1620, BP:(!) 127/55, Pulse:(!) 109  01/30/24 1615, BP:125/62, Pulse:(!) 113  01/30/24 1600, Temp:97.4 °F (36.3 °C), Temp src:Temporal  01/30/24 1351, BP:114/68, Temp:98.7 °F (37.1 °C), Temp src:Axillary, Pulse:97, Resp:16, SpO2:97 %  01/30/24 1136, Pulse:89, Resp:18, SpO2:92 %  01/30/24 1130, BP:110/62, Temp:98.3 °F (36.8 °C), Temp src:Axillary, Pulse:93, Resp:17, SpO2:(!) 88 %  01/30/24 0830, BP:136/80, Temp:98 °F (36.7 °C), Temp src:Axillary, Pulse:(!) 128, Resp:20, SpO2:(!) 89 %  01/30/24 0735, Pulse:(!) 115, Resp:20, SpO2:92 %  01/30/24 0553, BP:122/65, Pulse:(!) 126     LABS:    CBC  Lab

## 2024-01-30 NOTE — PROGRESS NOTES
Noted pt to be incontinent of urine. Pad and gown saturated w/ urine. Obtained order from Dr. Minor to place indwelling catheter.

## 2024-01-30 NOTE — PROGRESS NOTES
Pt admitted from PACU on BIPAP and obtunded. ICU monitoring commenced obtained ABG, Mandy davey critical care NP at bedside to evaluate. Continue current treatment at this time. Will obtain another ABG to trend results. Virginia Barraza RN

## 2024-01-30 NOTE — PROGRESS NOTES
[x]No    ------------------------------------------------------------------------------------------------------------------------------------------------------------------------    MDM      [x] High (any 2)    A. Problems (any 1)  [x] Acute/Chronic Illness/injury posing threat to life or bodily function:    [] Severe exacerbation of chronic illness:    ---------------------------------------------------------------------  B. Risk of Treatment (any 1)   [] Drugs/treatments that require intensive monitoring for toxicity include:    [] Change in code status:    [] Decision to escalate care:    [] Major surgery/procedure with associated risk factors:    ----------------------------------------------------------------------  C. Data (any 2)  [x] Discussed current management and discharge planning options with Case Management.  [] Discussed management of the case with:    [] Telemetry personally reviewed and interpreted as documented above    [] Imaging personally reviewed and interpreted, includes:    [x] Data Review (any 3)  [] Collateral history obtained from:    [x] All available Consultant notes from yesterday/today were reviewed  [x] All current labs were reviewed and interpreted for clinical significance   [x] Appropriate follow-up labs were ordered    Medications:  Personally reviewed in detail in conjunction w/ labs as documented for evidence of drug toxicity.     Infusion Medications    dextrose      dilTIAZem 100 mg in sodium chloride 0.9 % 100 mL infusion (ADD-Plessis) Stopped (01/17/24 1148)    sodium chloride Stopped (01/15/24 1335)     Scheduled Medications    levalbuterol  1.25 mg Nebulization 4x Daily RT    piperacillin-tazobactam  3,375 mg IntraVENous Q8H    pantoprazole  40 mg Oral QAM AC    sennosides-docusate sodium  2 tablet Oral BID    polyethylene glycol  17 g Oral BID    metoprolol tartrate  50 mg PEG Tube BID    insulin lispro  0-16 Units SubCUTAneous TID WC    insulin lispro  0-4 Units  SubCUTAneous Nightly    amiodarone  200 mg PEG Tube Daily    atorvastatin  40 mg PEG Tube Nightly    dilTIAZem  60 mg PEG Tube 4 times per day    donepezil  5 mg PEG Tube Nightly    lisinopril  2.5 mg PEG Tube Daily    modafinil  300 mg PEG Tube Daily    aspirin  81 mg PEG Tube Daily    mupirocin   Topical BID    vashe wound therapy   Topical BID    [Held by provider] enoxaparin  1 mg/kg SubCUTAneous BID    [Held by provider] apixaban  5 mg Oral BID    sodium chloride flush  5-40 mL IntraVENous 2 times per day    levETIRAcetam  750 mg PEG Tube BID     PRN Meds: sodium chloride nebulizer, traMADol, glucose, dextrose bolus **OR** dextrose bolus, glucagon (rDNA), dextrose, guaiFENesin, sodium chloride flush, sodium chloride, polyethylene glycol, acetaminophen **OR** acetaminophen     Labs:  Personally reviewed and interpreted for clinical significance.     Recent Labs     01/27/24 1937 01/29/24  0910 01/30/24  1133   WBC 10.3 9.2 9.2   HGB 9.0* 8.6* 9.5*   HCT 27.7* 27.2* 29.7*    260 277     Recent Labs     01/27/24 1937 01/29/24  0910 01/30/24  1133   * 135* 136   K 3.6 3.2* 3.6   CL 96* 96* 98*   CO2 30 31 32   BUN 12 11 11   CREATININE <0.5* <0.5* <0.5*   CALCIUM 8.5 8.9 9.0   MG 1.60*  --   --    PHOS 2.5  --   --      No results for input(s): \"PROBNP\", \"TROPHS\" in the last 72 hours.  No results for input(s): \"LABA1C\" in the last 72 hours.  No results for input(s): \"AST\", \"ALT\", \"BILIDIR\", \"BILITOT\", \"ALKPHOS\" in the last 72 hours.  No results for input(s): \"INR\", \"LACTA\", \"TSH\" in the last 72 hours.    Urine Cultures: No results found for: \"LABURIN\"  Blood Cultures:   Lab Results   Component Value Date/Time    BC No Growth after 4 days of incubation. 12/29/2023 08:54 AM     Lab Results   Component Value Date/Time    BLOODCULT2 No Growth after 4 days of incubation. 01/01/2024 07:23 PM     Organism:   Lab Results   Component Value Date/Time    ORG Pseudomonas aeruginosa 01/15/2024 08:54 AM    ORG

## 2024-01-30 NOTE — ANESTHESIA PRE PROCEDURE
Department of Anesthesiology  Preprocedure Note       Name:  Isac Lemus   Age:  73 y.o.  :  1950                                          MRN:  [id number]         Date:  2024      Surgeon: Surgeon(s):  Casey Stephenson MD    Procedure: Procedure(s):  SACRAL WOUND DEBRIDEMENT    Medications prior to admission:   Prior to Admission medications    Medication Sig Start Date End Date Taking? Authorizing Provider   aspirin 81 MG EC tablet Take 1 tablet by mouth daily    Janessa Tucker MD   vitamin B-12 (CYANOCOBALAMIN) 1000 MCG tablet Take 1 tablet by mouth daily    Janessa Tucker MD   aspirin 325 MG EC tablet Take 1 tablet by mouth daily. 10/26/14   Isac Troy MD   atorvastatin (LIPITOR) 40 MG tablet Take 1 tablet by mouth nightly. 10/26/14   Isac Troy MD   lisinopril (PRINIVIL;ZESTRIL) 2.5 MG tablet Take 1 tablet by mouth daily.  Patient taking differently: Take 4 tablets by mouth daily 10/26/14   Isac Troy MD   metoprolol (LOPRESSOR) 25 MG tablet Take 1 tablet by mouth 2 times daily. 10/26/14   Isac Troy MD   potassium chloride (K-DUR) 10 MEQ tablet Take 1 tablet by mouth 3 times daily for 10 days. 10/26/14 11/5/14  Isac Troy MD   furosemide (LASIX) 40 MG tablet Take 1 tablet by mouth daily for 10 days. 10/26/14 11/5/14  Isac Troy MD   Glucosamine-Chondroit-Vit C-Mn (GLUCOSAMINE CHONDR 1500 COMPLX) CAPS Take  by mouth daily.      Janessa Tucker MD   metformin (GLUCOPHAGE) 500 MG tablet Take 2 tablets by mouth 2 times daily (with meals)    Janessa Tucker MD       Current medications:    Current Facility-Administered Medications   Medication Dose Route Frequency Provider Last Rate Last Admin    levalbuterol (XOPENEX) nebulizer solution 1.25 mg  1.25 mg Nebulization 4x Daily RT Aly Acharya MD   1.25 mg at 24 1136    sodium chloride nebulizer 0.9 % solution 3 mL  3 mL Nebulization PRN Aly Acharya MD   3

## 2024-01-30 NOTE — PROGRESS NOTES
01/30/24 0346   NIV Type   $NIV $Daily Charge   NIV Started/Stopped On   Equipment Type V60   Mode CPAP   Mask Type Total face   Mask Size Large   Assessment   Respirations 24   Settings/Measurements   CPAP/EPAP 12 cmH2O   Vt (Measured) 344 mL   FiO2  40 %   Mask Leak (lpm) 32 lpm   Patient's Home Machine No   Alarm Settings   Alarms On Y   Low Pressure (cmH2O) 6 cmH2O   High Pressure (cmH2O) 30 cmH2O

## 2024-01-30 NOTE — PROGRESS NOTES
Flushed PEG tube with 60 ml water and cardizem. The gauze pad around the PEG tube saturated. Removed dsg and pushed a little more water to find it coming out of the PEG tube hole. Informed LAEKSANDER Galvan. Virginia Barraza RN

## 2024-01-30 NOTE — PROGRESS NOTES
Occupational Therapy  Attempt: patient currently off of the floor for wound debridement procedure. Will hold therapy at this time and follow up another date as patient is appropriate.     Nereyda Peñaloza, OTR/L [id number]

## 2024-01-30 NOTE — OP NOTE
Date of Surgery: 1/30/24    Preop Dx:  Sacral wound    Postop Dx:  same    Procedure:  Sacral wound debridement    Surgeon:  Seema    Assistant:      Anesthesia:  GETA    EBL:   <50ml    Specimen:  Bone for culture    Complications: none    Drains/Lines:  none    Indications:  74 yo with progressive infection/necrosis of sacral wound    Description:  Patient's family was given adequate description of the risks and rewards of the procedure, including bleeding and freely consented.  He was given appropriate antibiotics and brought to the OR where GETA anesthesia was induced.  He was placed in lateral position.  Prepped and draped in usual sterile fashion.     Using sharp dissection the nonviable skin,subcutaneous tissue and muscle was excisionally debrided.  This amounted to about [id number].  There was small amount of exposed coccyx and a portion of this was sent for culture.   Pulsavac irrigation was done.  Hemostasis achieved with cautery and bone wax.     Saline gauze dressing placed.  All suture, sponge and instrument count correct times two at end of case.  Transferred to PACU in stable condition.    Lewis Stephenson MD

## 2024-01-30 NOTE — PLAN OF CARE
Problem: Chronic Conditions and Co-morbidities  Goal: Patient's chronic conditions and co-morbidity symptoms are monitored and maintained or improved  Outcome: Progressing     Problem: Safety - Adult  Goal: Free from fall injury  Outcome: Progressing     Problem: Pain  Goal: Verbalizes/displays adequate comfort level or baseline comfort level  Outcome: Progressing     Problem: ABCDS Injury Assessment  Goal: Absence of physical injury  Outcome: Progressing     Problem: Skin/Tissue Integrity  Goal: Absence of new skin breakdown  Description: 1.  Monitor for areas of redness and/or skin breakdown  2.  Assess vascular access sites hourly  3.  Every 4-6 hours minimum:  Change oxygen saturation probe site  4.  Every 4-6 hours:  If on nasal continuous positive airway pressure, respiratory therapy assess nares and determine need for appliance change or resting period.  Outcome: Progressing     Problem: Discharge Planning  Goal: Discharge to home or other facility with appropriate resources  Outcome: Progressing     Problem: Nutrition Deficit:  Goal: Optimize nutritional status  Outcome: Progressing     Problem: Respiratory - Adult  Goal: Achieves optimal ventilation and oxygenation  Outcome: Progressing     Problem: Cardiovascular - Adult  Goal: Maintains optimal cardiac output and hemodynamic stability  Outcome: Progressing     Problem: Cardiovascular - Adult  Goal: Absence of cardiac dysrhythmias or at baseline  Outcome: Progressing     Problem: Skin/Tissue Integrity - Adult  Goal: Skin integrity remains intact  Outcome: Progressing     Problem: Skin/Tissue Integrity - Adult  Goal: Incisions, wounds, or drain sites healing without S/S of infection  Outcome: Progressing     Problem: Skin/Tissue Integrity - Adult  Goal: Oral mucous membranes remain intact  Outcome: Progressing     Problem: Gastrointestinal - Adult  Goal: Minimal or absence of nausea and vomiting  Outcome: Progressing     Problem: Gastrointestinal -  Adult  Goal: Maintains or returns to baseline bowel function  Outcome: Progressing     Problem: Gastrointestinal - Adult  Goal: Maintains adequate nutritional intake  Outcome: Progressing     Problem: Genitourinary - Adult  Goal: Absence of urinary retention  Outcome: Progressing     Problem: Genitourinary - Adult  Goal: Urinary catheter remains patent  Outcome: Progressing

## 2024-01-30 NOTE — PROGRESS NOTES
01/29/24 2311   NIV Type   NIV Started/Stopped On   Equipment Type V60   Mode CPAP   Mask Type Total face   Mask Size Large   Assessment   Respirations 23   Settings/Measurements   CPAP/EPAP 12 cmH2O   Vt (Measured) 323 mL   FiO2  40 %   Mask Leak (lpm) 12 lpm   Alarm Settings   Alarms On Y   Low Pressure (cmH2O) 6 cmH2O   High Pressure (cmH2O) 30 cmH2O   Delay Alarm 0 sec(s)

## 2024-01-30 NOTE — PROGRESS NOTES
01/30/24 1625   NIV Type   NIV Started/Stopped On   Equipment Type v60   Mode (S)  Bilevel   Mask Type Full face mask   Mask Size Large   Assessment   Pulse (!) 112   Respirations (!) 32   SpO2 94 %   Comfort Level Good   Using Accessory Muscles Yes   Mask Compliance Good   Skin Assessment Skin breakdown present (see comment/note)  (nose sore)   Skin Protection for O2 Device Yes   Orientation Middle   Location Nose   Intervention(s) Skin Barrier   Settings/Measurements   PIP Observed 20 cm H20   IPAP 20 cmH20   CPAP/EPAP 10 cmH2O   Vt (Measured) 369 mL   Rate Ordered 16   FiO2  100 %   I Time/ I Time % 1 s   Minute Volume (L/min) 13.4 Liters   Mask Leak (lpm) 0 lpm   Patient's Home Machine No   Alarm Settings   Alarms On Y   Low Pressure (cmH2O) 6 cmH2O   High Pressure (cmH2O) 30 cmH2O   Apnea (secs) 20 secs   RR Low (bpm) 6   RR High (bpm) 45 br/min

## 2024-01-30 NOTE — PROGRESS NOTES
01/30/24 1706   Assessment   Pulse (!) 116   Respirations 28   Patient Transport   Time Spent Transporting 16-30   Transport Ventillation Type Noninvasive   Transport From Surgery   Transport Destination ICU   Emergency Equipment Included Yes

## 2024-01-30 NOTE — PROGRESS NOTES
Speech Therapy  Attempt Note      The is currently NPO for a procedure later today. The therapist attempted to see the pt to target his speech-language goals. However, the pt's RN informed the therapist that the pt is going to have an EKG completed soon. Will re-attempt at a later time.      Giovanny Felton MA, CCC-SLP  SP#[id number]  Speech-Language Pathologist  Phone: [phone]  Speech Desk: [phone]

## 2024-01-30 NOTE — CARE COORDINATION
Denial was overturned on appeal by [facility] for LTACH level of care and is now approved. They would like patient to admit within 72 hours and hopefully be able to come tomorrow. Patient is going back to OR with general surgeon today for further debridement of large complex sacral wound. Anticipate wound vac being placed after debridement again. Plan for [facility] when cleared medically and by surgery to transfer . Family now wants [facility] location for [facility].

## 2024-01-30 NOTE — PROGRESS NOTES
Informed Mandy, NP pulm critical care of 2 consecutive SBPs in the 80s. Order for 500 ml bolus LR. Virginia Barraza RN

## 2024-01-30 NOTE — PROGRESS NOTES
Patient arrived to PACU bay 6, phase one initiated. Placed on bedside monitor, VSS. Report obtained from OR RN and anesthesia. Patient on O2 via NRB at 15lpm. Oral airway in place. See flowsheets for assessment. Side rails in place, will monitor patient closely.

## 2024-01-30 NOTE — PROGRESS NOTES
INPATIENT PULMONARY CRITICAL CARE PROGRESS NOTE      Reason for visit    Respiratory failure    SUBJECTIVE: Patient when seen this morning was comfortably lying in the bed without any significant shortness of breath or increased work of breathing, patient was on 5 L of nasal cannula oxygen with saturation 92% when seen, patient continues to be in atrial fibrillation with relative rapid ventricular rate on the monitor, patient is afebrile, patient was generally maintained, patient glycemic control acceptable, patient is supposed to go for debridement of the wound this afternoon; patient is document urine output is on the lower side    Physical Exam:  Blood pressure 122/65, pulse (!) 115, temperature 97.6 °F (36.4 °C), temperature source Axillary, resp. rate 20, height 1.702 m (5' 7\"), weight 91.7 kg (202 lb 2.6 oz), SpO2 92 %.'   Constitutional:  No acute distress.   HENT:  Oropharynx is clear and moist. No thyromegaly.  Eyes:  Conjunctivae are normal. Pupils equal, round, and reactive to light. No scleral icterus.   Neck: . No tracheal deviation present. No obvious thyroid mass.  Short large neck  Cardiovascular: S1-S2 irregularly irregular normal heart sounds.  No right ventricular heave.  Some lower extremity edema.  Pulmonary/Chest: No wheezes.  Scattered rales.  Chest wall is not dull to percussion.  No accessory muscle usage or stridor.  Decreased breath sound intensity  Abdominal: Soft. Bowel sounds present. No distension or hernia. No tenderness.    Musculoskeletal: No cyanosis. No clubbing. No obvious joint deformity.   Lymphadenopathy: No cervical or supraclavicular adenopathy.   Skin: Skin is warm and dry. No rash or nodules on the exposed extremities.  Has sacral  decubitus ulcers  Neurologic: Not encephalopathic when seen         Results:  CBC:   Recent Labs     01/27/24  1937 01/29/24  0910   WBC 10.3 9.2   HGB 9.0* 8.6*   HCT 27.7* 27.2*   MCV 98.7 99.4    260       BMP:   Recent Labs      also is getting BiPAP on intermittent basis  Patient has gone into A-fib with RVR and evaluation management as per IM/cardiology  Cardiac medications as per cardiology/EP-patient is on enteral amiodarone along with Cardizem  Bronchodilators in the form of Xopenex to continue  Patient's wound cultures had shown patient to have Pseudomonas and Proteus infections  Patient is getting IV Zosyn at this time  Further titration as per clinical status and cultures  Patient has a significant decubitus ulcer in the sacrum region which is infected and is also having some bleeding issues as per nursing and for that reason patient's NOAC is on hold and patient was on Lovenox which also is being put on hold for possible debridement and surgery in the morning  BGM with SSI  Monitor for any hypoventilation  Monitor input output and BMP  Correct electrolytes on whenever necessary basis  Monitor for any hypoventilation  PUD and DVT prophylaxis as per primary team  Out of bed to chair with assistance        Case discussed with patient's family     Further management depending on patient clinical status and postop course this afternoon  Patient has moderate risk of acute postoperative pulmonary insufficiency on the basis of the clinical data available            Electronically signed by:  ABDIAS FLAHERTY MD    1/30/2024    8:24 AM.

## 2024-01-31 PROBLEM — J96.22 ACUTE ON CHRONIC RESPIRATORY FAILURE WITH HYPOXIA AND HYPERCAPNIA (HCC): Status: ACTIVE | Noted: 2024-01-31

## 2024-01-31 PROBLEM — J96.21 ACUTE ON CHRONIC RESPIRATORY FAILURE WITH HYPOXIA AND HYPERCAPNIA (HCC): Status: ACTIVE | Noted: 2024-01-31

## 2024-01-31 LAB
ANION GAP SERPL CALCULATED.3IONS-SCNC: 7 MMOL/L (ref 3–16)
BASE EXCESS BLDA CALC-SCNC: 8 MMOL/L (ref -3–3)
BUN SERPL-MCNC: 14 MG/DL (ref 7–20)
CALCIUM SERPL-MCNC: 8.8 MG/DL (ref 8.3–10.6)
CHLORIDE SERPL-SCNC: 97 MMOL/L (ref 99–110)
CO2 BLDA-SCNC: 35.5 MMOL/L
CO2 SERPL-SCNC: 31 MMOL/L (ref 21–32)
COHGB MFR BLDA: 0.6 % (ref 0–1.5)
CREAT SERPL-MCNC: <0.5 MG/DL (ref 0.8–1.3)
DEPRECATED RDW RBC AUTO: 18.7 % (ref 12.4–15.4)
GFR SERPLBLD CREATININE-BSD FMLA CKD-EPI: >60 ML/MIN/{1.73_M2}
GLUCOSE BLD-MCNC: 106 MG/DL (ref 70–99)
GLUCOSE BLD-MCNC: 109 MG/DL (ref 70–99)
GLUCOSE BLD-MCNC: 130 MG/DL (ref 70–99)
GLUCOSE BLD-MCNC: 171 MG/DL (ref 70–99)
GLUCOSE SERPL-MCNC: 114 MG/DL (ref 70–99)
HCO3 BLDA-SCNC: 33.8 MMOL/L (ref 21–29)
HCT VFR BLD AUTO: 23.5 % (ref 40.5–52.5)
HGB BLD-MCNC: 7.6 G/DL (ref 13.5–17.5)
HGB BLDA-MCNC: 8.6 G/DL (ref 13.5–17.5)
MCH RBC QN AUTO: 32.3 PG (ref 26–34)
MCHC RBC AUTO-ENTMCNC: 32.3 G/DL (ref 31–36)
MCV RBC AUTO: 100.2 FL (ref 80–100)
METHGB MFR BLDA: 0.4 %
O2 THERAPY: ABNORMAL
PCO2 BLDA: 55.1 MMHG (ref 35–45)
PERFORMED ON: ABNORMAL
PH BLDA: 7.41 [PH] (ref 7.35–7.45)
PLATELET # BLD AUTO: 256 K/UL (ref 135–450)
PMV BLD AUTO: 7.4 FL (ref 5–10.5)
PO2 BLDA: 139 MMHG (ref 75–108)
POTASSIUM SERPL-SCNC: 3.8 MMOL/L (ref 3.5–5.1)
RBC # BLD AUTO: 2.35 M/UL (ref 4.2–5.9)
SAO2 % BLDA: 99 %
SODIUM SERPL-SCNC: 135 MMOL/L (ref 136–145)
WBC # BLD AUTO: 7.4 K/UL (ref 4–11)

## 2024-01-31 PROCEDURE — 82803 BLOOD GASES ANY COMBINATION: CPT

## 2024-01-31 PROCEDURE — 94640 AIRWAY INHALATION TREATMENT: CPT

## 2024-01-31 PROCEDURE — 80048 BASIC METABOLIC PNL TOTAL CA: CPT

## 2024-01-31 PROCEDURE — 99232 SBSQ HOSP IP/OBS MODERATE 35: CPT | Performed by: INTERNAL MEDICINE

## 2024-01-31 PROCEDURE — 85027 COMPLETE CBC AUTOMATED: CPT

## 2024-01-31 PROCEDURE — 6370000000 HC RX 637 (ALT 250 FOR IP): Performed by: SURGERY

## 2024-01-31 PROCEDURE — 99233 SBSQ HOSP IP/OBS HIGH 50: CPT | Performed by: INTERNAL MEDICINE

## 2024-01-31 PROCEDURE — 99232 SBSQ HOSP IP/OBS MODERATE 35: CPT | Performed by: NURSE PRACTITIONER

## 2024-01-31 PROCEDURE — 6360000002 HC RX W HCPCS: Performed by: SURGERY

## 2024-01-31 PROCEDURE — 2580000003 HC RX 258: Performed by: SURGERY

## 2024-01-31 PROCEDURE — 2700000000 HC OXYGEN THERAPY PER DAY

## 2024-01-31 PROCEDURE — 2580000003 HC RX 258: Performed by: ANESTHESIOLOGY

## 2024-01-31 PROCEDURE — 51798 US URINE CAPACITY MEASURE: CPT

## 2024-01-31 PROCEDURE — 99232 SBSQ HOSP IP/OBS MODERATE 35: CPT | Performed by: SURGERY

## 2024-01-31 PROCEDURE — 2500000003 HC RX 250 WO HCPCS: Performed by: NURSE PRACTITIONER

## 2024-01-31 PROCEDURE — 6370000000 HC RX 637 (ALT 250 FOR IP): Performed by: INTERNAL MEDICINE

## 2024-01-31 PROCEDURE — 94761 N-INVAS EAR/PLS OXIMETRY MLT: CPT

## 2024-01-31 PROCEDURE — 2000000000 HC ICU R&B

## 2024-01-31 PROCEDURE — 6370000000 HC RX 637 (ALT 250 FOR IP): Performed by: NURSE PRACTITIONER

## 2024-01-31 PROCEDURE — 94660 CPAP INITIATION&MGMT: CPT

## 2024-01-31 RX ORDER — LEVETIRACETAM 100 MG/ML
750 SOLUTION ORAL 2 TIMES DAILY
Status: DISCONTINUED | OUTPATIENT
Start: 2024-01-31 | End: 2024-02-01 | Stop reason: HOSPADM

## 2024-01-31 RX ORDER — DILTIAZEM HYDROCHLORIDE 60 MG/1
60 TABLET, FILM COATED ORAL EVERY 6 HOURS SCHEDULED
Status: DISCONTINUED | OUTPATIENT
Start: 2024-01-31 | End: 2024-02-01 | Stop reason: HOSPADM

## 2024-01-31 RX ORDER — METOPROLOL TARTRATE 50 MG/1
50 TABLET, FILM COATED ORAL 2 TIMES DAILY
Status: DISCONTINUED | OUTPATIENT
Start: 2024-01-31 | End: 2024-02-01 | Stop reason: HOSPADM

## 2024-01-31 RX ADMIN — Medication 10 ML: at 20:41

## 2024-01-31 RX ADMIN — LEVETIRACETAM 750 MG: 100 SOLUTION ORAL at 11:55

## 2024-01-31 RX ADMIN — POLYETHYLENE GLYCOL 3350 17 G: 17 POWDER, FOR SOLUTION ORAL at 20:36

## 2024-01-31 RX ADMIN — PIPERACILLIN AND TAZOBACTAM 3375 MG: 3; .375 INJECTION, POWDER, FOR SOLUTION INTRAVENOUS at 22:02

## 2024-01-31 RX ADMIN — DILTIAZEM HYDROCHLORIDE 60 MG: 60 TABLET ORAL at 17:49

## 2024-01-31 RX ADMIN — METOPROLOL TARTRATE 50 MG: 50 TABLET, FILM COATED ORAL at 13:53

## 2024-01-31 RX ADMIN — LEVALBUTEROL 1.25 MG: 1.25 SOLUTION, CONCENTRATE RESPIRATORY (INHALATION) at 15:44

## 2024-01-31 RX ADMIN — MUPIROCIN: 20 OINTMENT TOPICAL at 20:36

## 2024-01-31 RX ADMIN — LEVALBUTEROL 1.25 MG: 1.25 SOLUTION, CONCENTRATE RESPIRATORY (INHALATION) at 12:15

## 2024-01-31 RX ADMIN — LEVALBUTEROL 1.25 MG: 1.25 SOLUTION, CONCENTRATE RESPIRATORY (INHALATION) at 07:54

## 2024-01-31 RX ADMIN — LEVALBUTEROL 1.25 MG: 1.25 SOLUTION, CONCENTRATE RESPIRATORY (INHALATION) at 19:53

## 2024-01-31 RX ADMIN — DONEPEZIL HYDROCHLORIDE 5 MG: 5 TABLET, FILM COATED ORAL at 20:36

## 2024-01-31 RX ADMIN — METOPROLOL TARTRATE 5 MG: 5 INJECTION INTRAVENOUS at 08:29

## 2024-01-31 RX ADMIN — PIPERACILLIN AND TAZOBACTAM 3375 MG: 3; .375 INJECTION, POWDER, FOR SOLUTION INTRAVENOUS at 13:57

## 2024-01-31 RX ADMIN — METOPROLOL TARTRATE 50 MG: 50 TABLET, FILM COATED ORAL at 20:36

## 2024-01-31 RX ADMIN — SENNOSIDES AND DOCUSATE SODIUM 2 TABLET: 50; 8.6 TABLET ORAL at 20:35

## 2024-01-31 RX ADMIN — ATORVASTATIN CALCIUM 40 MG: 40 TABLET, FILM COATED ORAL at 20:36

## 2024-01-31 RX ADMIN — DILTIAZEM HYDROCHLORIDE 60 MG: 60 TABLET ORAL at 23:26

## 2024-01-31 RX ADMIN — Medication 10 ML: at 08:29

## 2024-01-31 RX ADMIN — PIPERACILLIN AND TAZOBACTAM 3375 MG: 3; .375 INJECTION, POWDER, FOR SOLUTION INTRAVENOUS at 06:20

## 2024-01-31 RX ADMIN — LEVETIRACETAM 750 MG: 100 SOLUTION ORAL at 20:41

## 2024-01-31 RX ADMIN — MUPIROCIN: 20 OINTMENT TOPICAL at 08:30

## 2024-01-31 ASSESSMENT — PAIN SCALES - WONG BAKER
WONGBAKER_NUMERICALRESPONSE: 0

## 2024-01-31 NOTE — PROGRESS NOTES
Infectious Disease Follow up Notes    CC :  FUO, lethargy     Antibiotics:  Zosyn 3.375 q8 s 1/9/24    Admit Date:   1/14/2024  Hospital Day: 18    Subjective:   Was asked to see patient again re further deterioration of the sacral pressure wound, now with exposed bone at base of wound.  S/p wound debridement in OR 1/30/24 with bone culture sent.   Admitted to ICU post-op due to lethargy and respiratory failure req Bipap.  Resp failure resolved, back at baseline mentation.   Eating well, full trays.  Concern for PEG dysfunction.  GI to evaluate function and necessity of PEG at this point.      Objective:     Patient Vitals for the past 8 hrs:   BP Temp Temp src Pulse Resp SpO2 Weight   01/31/24 0900 98/63 -- -- 73 -- -- --   01/31/24 0800 106/65 98 °F (36.7 °C) Oral (!) 110 -- -- --   01/31/24 0634 (!) 86/50 -- -- 80 -- -- --   01/31/24 0600 113/64 -- -- 82 -- -- 92 kg (202 lb 13.2 oz)   01/31/24 0500 (!) 96/59 -- -- 89 -- -- --   01/31/24 0408 -- -- -- 98 29 100 % --   01/31/24 0400 102/76 97.4 °F (36.3 °C) Axillary (!) 103 -- -- --   01/31/24 0300 96/65 -- -- 93 -- -- --   01/31/24 0200 (!) 83/53 -- -- 92 -- -- --         EXAM:  Resting comfortably  Alert, conversant, NAD   Exposed skin without rash   Abd soft, flat, NT.  BS present   No pitting edema LE     PIV x2 in place       Wilks       Scheduled Meds:   levETIRAcetam  750 mg Oral BID    sodium chloride flush  5-40 mL IntraVENous 2 times per day    metoprolol  5 mg IntraVENous Q6H    levalbuterol  1.25 mg Nebulization 4x Daily RT    piperacillin-tazobactam  3,375 mg IntraVENous Q8H    [Held by provider] pantoprazole  40 mg Oral QAM AC    [Held by provider] sennosides-docusate sodium  2 tablet Oral BID    [Held by provider] polyethylene glycol  17 g Oral BID    [Held by provider] metoprolol tartrate  50 mg PEG Tube BID    insulin lispro  0-16 Units SubCUTAneous TID     insulin  01/01/2024    Uncontrolled type 2 diabetes mellitus with hyperglycemia (HCC) 01/01/2024    Macrocytic anemia 01/01/2024    Chronic respiratory failure with hypercapnia (HCC) 12/29/2023    SDH (subdural hematoma) (HCC) 12/22/2023    DM (diabetes mellitus) ([facility]) 10/16/2014    HTN (hypertension) 10/16/2014    Obesity 10/16/2014       Background of DM, HTN     Admitted to [facility]/28/23 with traumatic SDH and SAH and basilar skull fracture   Course complicated by   -DVT/SVT/HIT  -constipation / ogilve   -aspiration pna  -dysphagia s/p PEG      Transferred to ARU 12/18/23     Necrotic sacral pressure wound   S/p I&D 1/15/24 - d/w Sgy - extended to muscle but not bone  Operative culture with Proteus, Psa, Bacteroides  Wound further deteriorated with bone exposure  S/p repeat I&D to bone with bone biopsy on 1/30/24.  Bone culture pending   No fever or leukocytosis   -prognosis for wound healing poor  -continue loading pressure, frequent turns, managing moisture/urine and fecal soiling  -continue Zosyn for now.  Length, duration, route of aabx TBD by results of bone culture   -ok for DC to LTAC from ID standpoint, would have ID follow there       No abx allergies       D/w RN, Hospitalist       Marichuy Rinaldi MD  Phone: [phone]   Fax : [phone]

## 2024-01-31 NOTE — PROGRESS NOTES
01/31/24 0408   NIV Type   NIV Started/Stopped On   Equipment Type V60   Mode Bilevel   Mask Type Full face mask   Mask Size Large   Assessment   Pulse 98   Respirations 29   SpO2 100 %   Comfort Level Good   Using Accessory Muscles No   Mask Compliance Good   Skin Assessment Skin breakdown present (see comment/note)   Skin Protection for O2 Device Yes   Location Nose   Breath Sounds   Right Upper Lobe Diminished   Right Middle Lobe Diminished   Right Lower Lobe Diminished   Left Upper Lobe Diminished   Left Lower Lobe Diminished   Settings/Measurements   IPAP 20 cmH20   CPAP/EPAP 10 cmH2O   Vt (Measured) 708 mL   FiO2  50 %   Minute Volume (L/min) 20.5 Liters   Mask Leak (lpm) 0 lpm   Patient's Home Machine No   Alarm Settings   Alarms On Y

## 2024-01-31 NOTE — PROGRESS NOTES
Wound Care asked bedside nurse about what M.D. wished for dressing changed.  Per Dian bedside nurse Dr. Stephenson completed dressing change this morning with normal saline moist to moist dressing to sacrum wound.   Wound Care will change order from Vashe to normal saline.  Vac  not to be placed until patient is transferred and according to next facility's preference.

## 2024-01-31 NOTE — PROGRESS NOTES
Dr. St at bedside. Wound checked and dressing replaced. Still OK to go to [facility] from surgery stand point.

## 2024-01-31 NOTE — PROGRESS NOTES
Hospital Medicine Progress Note      Date of Admission: 1/14/2024  Hospital Day: 18    Chief Admission Complaint:  decub ulcer     Subjective:  resting in bed, on nc oxygen, much improved and more alert today after being on bipap, placed in ICU yesterday evening due to poor mentation/resp status, family at bedside currently       Presenting Admission History:          73 y.o. male w/ hx TBI w/ bleed who presented to [facility] in transfer from [email or web address] w/ decubitus ulcer for surgical debridement - done 15 Arturo w/out complications - discussed face to face w/ General Surgery, Dr. Stephenson 15 Arturo.      Patient is non-verbal with family at bedside.      Assessment/Plan:       Current Principal Problem:  Decubitus skin ulcer       #.  Intermittent fevers likely due to infected sacral wound: fever resolved.   #.  Suspected infected sacral wound  Infectious workup at [facility] unremarkable  S/p I&D 1/15/2024 without complication, without osteo  S/p  repeat wound debridement on 1/23/2024  S/P wound vac placement: planned for wound check and vac exchange planned on Monday.   -ID was following, apprec recs  Continued Zosyn: can Switch to oral augmentin at DC  Discharge pending gen surgery clearance and placement.    -reeval of wound noted bone involvement as of 1/29,   -I&D done 1/30, will need repeat wound VAC(plan to have placed at LTAC)     #.  Traumatic brain injury  History of traumatic brain injury, bilateral subdural hematoma, subarachnoid hemorrhage, skull base fracture, right transverse/sigmoid sinus thrombosis  Plan no surgical intervention and serial scans during prior acute stay stable  Continued Keppra  EKG showing epileptiform discharges during prior acute stay  Continued Aricept and provigil     #. Acute hypoxic respiratory failure  Suspected atelectasis, encourage insentive spirometry  Consulted pulmonary  for hypoxia and Cpap management for argenis , encouraged more pulm toilet     #.  Paroxysmal atrial  levalbuterol  1.25 mg Nebulization 4x Daily RT    piperacillin-tazobactam  3,375 mg IntraVENous Q8H    pantoprazole  40 mg Oral QAM AC    sennosides-docusate sodium  2 tablet Oral BID    polyethylene glycol  17 g Oral BID    insulin lispro  0-16 Units SubCUTAneous TID WC    insulin lispro  0-4 Units SubCUTAneous Nightly    atorvastatin  40 mg PEG Tube Nightly    donepezil  5 mg PEG Tube Nightly    [Held by provider] lisinopril  2.5 mg PEG Tube Daily    [Held by provider] modafinil  300 mg PEG Tube Daily    aspirin  81 mg PEG Tube Daily    mupirocin   Topical BID    vashe wound therapy   Topical BID     PRN Meds: perflutren lipid microspheres, sodium chloride flush, sodium chloride, sodium chloride nebulizer, traMADol, glucose, dextrose bolus **OR** dextrose bolus, glucagon (rDNA), dextrose, guaiFENesin, sodium chloride, polyethylene glycol, acetaminophen **OR** acetaminophen     Labs:  Personally reviewed and interpreted for clinical significance.     Recent Labs     01/29/24  0910 01/30/24  1133 01/30/24  1645 01/30/24  1718 01/31/24  0440   WBC 9.2 9.2  --   --  7.4   HGB 8.6* 9.5* 9.9* 10.0* 7.6*   HCT 27.2* 29.7*  --   --  23.5*    277  --   --  256     Recent Labs     01/29/24  0910 01/30/24  1133 01/31/24  0440   * 136 135*   K 3.2* 3.6 3.8   CL 96* 98* 97*   CO2 31 32 31   BUN 11 11 14   CREATININE <0.5* <0.5* <0.5*   CALCIUM 8.9 9.0 8.8     No results for input(s): \"PROBNP\", \"TROPHS\" in the last 72 hours.  No results for input(s): \"LABA1C\" in the last 72 hours.  No results for input(s): \"AST\", \"ALT\", \"BILIDIR\", \"BILITOT\", \"ALKPHOS\" in the last 72 hours.  No results for input(s): \"INR\", \"LACTA\", \"TSH\" in the last 72 hours.    Urine Cultures: No results found for: \"LABURIN\"  Blood Cultures:   Lab Results   Component Value Date/Time    BC No Growth after 4 days of incubation. 12/29/2023 08:54 AM     Lab Results   Component Value Date/Time    BLOODCULT2 No Growth after 4 days of incubation.

## 2024-01-31 NOTE — PROGRESS NOTES
01/30/24 0624   NIV Type   NIV Started/Stopped On   Equipment Type V60   Mode Bilevel   Mask Type Full face mask   Mask Size Large   Assessment   Pulse 92   Respirations 16   SpO2 99 %   Comfort Level Good   Using Accessory Muscles No   Mask Compliance Good   Skin Assessment Skin breakdown present (see comment/note)   Skin Protection for O2 Device Yes   Location Nose   Breath Sounds   Right Upper Lobe Diminished   Right Middle Lobe Diminished   Right Lower Lobe Diminished   Left Upper Lobe Diminished   Left Lower Lobe Diminished   Settings/Measurements   IPAP 20 cmH20   CPAP/EPAP 10 cmH2O   Vt (Measured) 544 mL   FiO2  50 %   Minute Volume (L/min) 8.9 Liters   Mask Leak (lpm) 14 lpm   Patient's Home Machine No   Alarm Settings   Alarms On Y

## 2024-01-31 NOTE — PLAN OF CARE
Problem: Chronic Conditions and Co-morbidities  Goal: Patient's chronic conditions and co-morbidity symptoms are monitored and maintained or improved  1/31/2024 0421 by Zainab Wang RN  Outcome: Progressing  1/30/2024 1837 by Virginia Barraza RN  Outcome: Progressing     Problem: Safety - Adult  Goal: Free from fall injury  1/31/2024 0421 by Zainab Wang RN  Outcome: Progressing  1/30/2024 1837 by Virginia Barraza RN  Outcome: Progressing     Problem: Pain  Goal: Verbalizes/displays adequate comfort level or baseline comfort level  1/31/2024 0421 by Zainab Wang RN  Outcome: Progressing  1/30/2024 1837 by Virginia Barraza RN  Outcome: Progressing     Problem: ABCDS Injury Assessment  Goal: Absence of physical injury  1/31/2024 0421 by Zainab Wang RN  Outcome: Progressing  1/30/2024 1837 by Virginia Barraza RN  Outcome: Progressing     Problem: Skin/Tissue Integrity  Goal: Absence of new skin breakdown  Description: 1.  Monitor for areas of redness and/or skin breakdown  2.  Assess vascular access sites hourly  3.  Every 4-6 hours minimum:  Change oxygen saturation probe site  4.  Every 4-6 hours:  If on nasal continuous positive airway pressure, respiratory therapy assess nares and determine need for appliance change or resting period.  1/31/2024 0421 by Zainab Wang RN  Outcome: Progressing  1/30/2024 1837 by Virginia Barraza RN  Outcome: Progressing     Problem: Discharge Planning  Goal: Discharge to home or other facility with appropriate resources  1/31/2024 0421 by Zainab Wang RN  Outcome: Progressing  1/30/2024 1837 by Virginia Barraza RN  Outcome: Progressing     Problem: Nutrition Deficit:  Goal: Optimize nutritional status  1/31/2024 0421 by Zainab Wang RN  Outcome: Progressing  1/30/2024 1837 by Virginia Barraza RN  Outcome: Progressing     Problem: Respiratory - Adult  Goal: Achieves optimal ventilation and oxygenation  1/31/2024 0421 by Kathleen  GABBY Lamb  Outcome: Progressing  1/30/2024 1837 by Virginia Barraza RN  Outcome: Progressing     Problem: Cardiovascular - Adult  Goal: Maintains optimal cardiac output and hemodynamic stability  1/31/2024 0421 by Zainab Wang RN  Outcome: Progressing  1/30/2024 1837 by Virginia Barraza RN  Outcome: Progressing  Goal: Absence of cardiac dysrhythmias or at baseline  1/31/2024 0421 by Zainab Wang RN  Outcome: Progressing  1/30/2024 1837 by Virginia Barraza RN  Outcome: Progressing     Problem: Skin/Tissue Integrity - Adult  Goal: Skin integrity remains intact  1/31/2024 0421 by Zainab aWng RN  Outcome: Progressing  1/30/2024 1837 by Virginia Barraza RN  Outcome: Progressing  Goal: Incisions, wounds, or drain sites healing without S/S of infection  1/31/2024 0421 by Zainab Wang RN  Outcome: Progressing  1/30/2024 1837 by Virginia Barraza RN  Outcome: Progressing  Goal: Oral mucous membranes remain intact  1/31/2024 0421 by Zainab Wang RN  Outcome: Progressing  1/30/2024 1837 by Virginia Barraza RN  Outcome: Progressing     Problem: Gastrointestinal - Adult  Goal: Minimal or absence of nausea and vomiting  1/31/2024 0421 by Zainab Wang RN  Outcome: Progressing  1/30/2024 1837 by Virginia Barraza RN  Outcome: Progressing  Goal: Maintains or returns to baseline bowel function  1/31/2024 0421 by Zainab Wang RN  Outcome: Progressing  1/30/2024 1837 by Virginia Barraza RN  Outcome: Progressing  Goal: Maintains adequate nutritional intake  1/31/2024 0421 by Zainab Wang RN  Outcome: Progressing  1/30/2024 1837 by Virginia Barraza RN  Outcome: Progressing     Problem: Genitourinary - Adult  Goal: Absence of urinary retention  1/31/2024 0421 by Zainab Wang RN  Outcome: Progressing  1/30/2024 1837 by Virginia Barraza RN  Outcome: Progressing  Goal: Urinary catheter remains patent  1/31/2024 0421 by Kathleen, Zainab, RN  Outcome: Progressing  1/30/2024 1834

## 2024-01-31 NOTE — PROGRESS NOTES
Speech Language Pathology      Name: Isac Lemus  : 1950  Medical Diagnosis: Unspecified open wound of lower back and pelvis without penetration into retroperitoneum, initial encounter [S31.000A]  Decubitus skin ulcer [L89.90]  Sacral wound, initial encounter [S31.000A]      Pt has been transferred from C4 to ICU due to change in medical status since last seen by SLP. Pt will be discharged from caseload at this time. Once medically appropriate, pt will need new SLP orders to resume therapy services.       Thank you,     Lindsay Mcknight M.A. CCC-SLP   Speech-Language Pathologist

## 2024-01-31 NOTE — CARE COORDINATION
Primary RN/Dian updated writer, pt will be ready for LTACH tomorrow per physicians, writer requested ambulance via [facility] to take [facility] to [facility] tomorrow at 1pm.  CM will confirm plan tomorrow.  CM Martinez-RN

## 2024-01-31 NOTE — PROGRESS NOTES
[facility]     Electrophysiology                                     Progress Note    Admission date:  2024    Reason for follow up visit: AF/AFL    HPI/CC: Isac Lemus was admitted on 2023 to the ARU from  after traumatic fall resulting in skull fracture and SAH/SDH. Complicated hospital course. EP consulted when EKG showed rapid AFL. An cardiac event monitor was placed on 2024 but this was removed 2024 at the family's request due to skin irritation.  On 2024, patient was admitted to the hospital for surgical debridement of his sacral wound.  Postoperatively he had RVR and IV diltiazem was started. Echo showed an EF of 55-60%. On 2024, he had a repeat debridement. Post op, he was transferred to the ICU for respiratory acidosis. Rhythm is atrial flutter with heart rates in the 80s and 90s and intermittent tachycardia.     Subjective: He is more awake today after wearing CPAP overnight. He is able to tell me good morning.       Vitals:  Blood pressure 98/63, pulse 73, temperature 98 °F (36.7 °C), temperature source Oral, resp. rate 29, height 1.702 m (5' 7\"), weight 92 kg (202 lb 13.2 oz), SpO2 100 %.  Temp  Av.8 °F (36.6 °C)  Min: 97.2 °F (36.2 °C)  Max: 98.7 °F (37.1 °C)  Pulse  Av.4  Min: 73  Max: 120  BP  Min: 76/55  Max: 127/55  SpO2  Av.7 %  Min: 88 %  Max: 100 %  FiO2   Av %  Min: 50 %  Max: 100 %    24 hour I/O    Intake/Output Summary (Last 24 hours) at 2024 1004  Last data filed at 2024 0200  Gross per 24 hour   Intake 90 ml   Output 1205 ml   Net -1115 ml       Current Facility-Administered Medications   Medication Dose Route Frequency Provider Last Rate Last Admin    levETIRAcetam (KEPPRA) 100 MG/ML oral solution 750 mg  750 mg Oral BID Darek Lemos MD        metoprolol tartrate (LOPRESSOR) tablet 50 mg  50 mg Oral BID Linda Srinivasan, APRN - CNP        dilTIAZem (CARDIZEM) tablet 60 mg  60 mg Oral 4 times per day  mucosa  Respiratory:  Normal excursion and expansion without use of accessory muscles  Resp auscultation: Normal breath sounds without wheezing, rhonchi, and rales  Cardiovascular:  The apical impulse is not displaced  Heart tones are crisp and normal. irregular S1 and S2.  Jugular venous pulsation Normal  The carotid upstroke is normal in amplitude and contour without delay or bruit  Peripheral pulses are symmetrical and full   Abdomen:  No masses or tenderness  Bowel sounds present  Extremities:   No cyanosis or clubbing   No lower extremity edema   Skin: warm and dry  Neurological:  Alert and oriented  Moves all extremities well  No abnormalities of mood, affect, memory, mentation, or behavior are noted    Data      Echo 11/2023:  Study Conclusions     - Left ventricle: The cavity size is normal. Wall thickness was increased in     a pattern of moderate LVH. Systolic function is normal. The estimated     ejection fraction is 60-65%. Wall motion is normal; there are no regional     wall motion abnormalities. Cannot assess LV diastolic function.   - Mitral valve: The annulus is mildly calcified.   - Right ventricle: Systolic function is normal.   - Atrial septum: Agitated saline contrast study at baseline or with     provocation, shows no right-to-left atrial level shunt.      All labs and testing reviewed.  Lab Review     Renal Profile:   Lab Results   Component Value Date/Time    CREATININE <0.5 01/31/2024 04:40 AM    BUN 14 01/31/2024 04:40 AM     01/31/2024 04:40 AM    K 3.8 01/31/2024 04:40 AM    K 4.1 01/24/2024 11:56 AM    CL 97 01/31/2024 04:40 AM    CO2 31 01/31/2024 04:40 AM     CBC:    Lab Results   Component Value Date/Time    WBC 7.4 01/31/2024 04:40 AM    RBC 2.35 01/31/2024 04:40 AM    HGB 7.6 01/31/2024 04:40 AM    HCT 23.5 01/31/2024 04:40 AM    .2 01/31/2024 04:40 AM    RDW 18.7 01/31/2024 04:40 AM     01/31/2024 04:40 AM     BNP:  No results found for: \"BNP\"  Fasting Lipid

## 2024-01-31 NOTE — CARE COORDINATION
Chart reviewed, LOS 17days.  Writer spoke with [facility], denial overturned by appeal and insurance approved LTACH as long as admitted by 2/5/24.  Per CM notes, family wants [facility].  CM will continue to follow pt's progress and coordinate discharge arrangements as appropriate.  CM Martinez-GABBY

## 2024-01-31 NOTE — CONSULTS
CONSULTATION  Isac Lemus is a 73 y.o. male asked to see us in consultation by  Taryn Rivera, VANGIE - ALAINA & Aly Acharya MD for evaluation of peg tube malfunction.    Mr. Lemus is a 73 year old male with past medical history of colon polyps, (followed by Dr. Garnica, last colonoscopy 11/2022),  afib on Eliquis, CAD s/p CABG (2014), HLD, DM2, HTN, and GERD who presented to [facility] on 11/28 from [facility] after falling suffering bilateral SDH, diffuse traumatic SAH, and skull base fractures through the sella turcica and right lateral wall of the sphenoid sinus.   Hospital course complicated by a-fib with RVR, fevers, DVT, RLL PE, HIT, fever, colonic pseudo obstruction requiring decompression and dysphagia for which he had peg tube placed 12/16.  He was transferred to our ARU with TBI, then admitted with decubitus ulcer requiring surgical debridement.  He was moved to ICU after procedure yesterday due to hypoxia and now on continuous bipap.  His peg leaked when used to give his meds last night and we have been consulted to evaluate.    Family and nursing states the patient is eating full meals. They want to know if the peg tube is still needed.          Medications Prior to Admission: aspirin 81 MG EC tablet, Take 1 tablet by mouth daily  vitamin B-12 (CYANOCOBALAMIN) 1000 MCG tablet, Take 1 tablet by mouth daily  aspirin 325 MG EC tablet, Take 1 tablet by mouth daily.  atorvastatin (LIPITOR) 40 MG tablet, Take 1 tablet by mouth nightly.  lisinopril (PRINIVIL;ZESTRIL) 2.5 MG tablet, Take 1 tablet by mouth daily. (Patient taking differently: Take 4 tablets by mouth daily)  metoprolol (LOPRESSOR) 25 MG tablet, Take 1 tablet by mouth 2 times daily.  potassium chloride (K-DUR) 10 MEQ tablet, Take 1 tablet by mouth 3 times daily for 10 days.  furosemide (LASIX) 40 MG tablet, Take 1 tablet by mouth daily for 10

## 2024-01-31 NOTE — PROGRESS NOTES
INPATIENT PULMONARY CRITICAL CARE PROGRESS NOTE      Reason for visit    Respiratory failure    SUBJECTIVE: Patient underwent debridement of the sacral wound yesterday and patient had estimated blood loss of less than 50 mL and patient had acute postop pulmonary insufficiency with hypercarbia and patient was put on BiPAP with improvement, patient is much more alert and communicative as compared to what he has been in the past, patient is afebrile, patient's blood pressure is borderline normal without any pressors, patient was on 3 L of nasal cannula since this morning with saturation of 98%, patient glycemic control was acceptable, patient urine output has been adequate, patient has sinus rhythm on the monitor, patient has a documented cumulative fluid balance of -3.5 L    Physical Exam:  Blood pressure (!) 86/50, pulse 80, temperature 97.4 °F (36.3 °C), temperature source Axillary, resp. rate 29, height 1.702 m (5' 7\"), weight 92 kg (202 lb 13.2 oz), SpO2 100 %.'   Constitutional:  No acute distress.   HENT:  Oropharynx is clear and moist. No thyromegaly.  Eyes:  Conjunctivae are normal. Pupils equal, round, and reactive to light. No scleral icterus.   Neck: . No tracheal deviation present. No obvious thyroid mass.  Short large neck  Cardiovascular: S1-S2 RRR, normal heart sounds.  No right ventricular heave.  Some lower extremity edema.  Pulmonary/Chest: No wheezes.  Scattered rales.  Chest wall is not dull to percussion.  No accessory muscle usage or stridor.  Decreased breath sound intensity  Abdominal: Soft. Bowel sounds present. No distension or hernia. No tenderness.    Musculoskeletal: No cyanosis. No clubbing. No obvious joint deformity.   Lymphadenopathy: No cervical or supraclavicular adenopathy.   Skin: Skin is warm and dry. No rash or nodules on the exposed extremities.  Has sacral  decubitus ulcers  Neurologic: More alert and communicative as compared to previous times      Results:  CBC:   Recent Labs

## 2024-01-31 NOTE — PROGRESS NOTES
Physical Therapy/Occupational Therapy    Per chart review, pt transferred to ICU from  since last seen by PT/OT and prior to sacral wound debridement surgery on 1/30/24.  Will need new PT/OT orders to resume therapy services.    Thank you.    Alissa Montilla  PT, DPT #[id number]  Lala Dueñas, OTR/L

## 2024-01-31 NOTE — PROGRESS NOTES
.Isac Lemus admitted 1/14/2024  5:47 PM FOR Decubitus skin ulcer    Patient transferred to ICU for worsening respiratory status.  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)     Hypertension     Seasonal allergies       Past Surgical History:   Procedure Laterality Date    BACK SURGERY N/A 1/15/2024    SACRAL WOUND DEBRIDEMENT performed by Casey Stephenson MD at [facility] OR    BACK SURGERY N/A 1/23/2024    SACRAL WOUND DEBRIDEMENT performed by Casey Stephenson MD at [facility] OR    COLONOSCOPY  2012    POLYPS    COLONOSCOPY  3/16/16    polyps    CORONARY ARTERY BYPASS GRAFT  10/2014    X5        BP 94/65   Pulse (!) 118   Temp 97.4 °F (36.3 °C)   Resp 25   Ht 1.702 m (5' 7\")   Wt 91.7 kg (202 lb 2.6 oz)   SpO2 100%   BMI 31.66 kg/m²          Plan:  Patient placed on BiPAP 20/10 FiO2 100% in PACU and transferred to ICU  Responded to sternal rub and repeat ABG after 30 min improving.  Trend ABG, pulling -580 with RR 20 with sats 100%  CXR images reviewed, tolerating O2 weaning  PEG tube leaking at site, hold on G-tube meds until evaluated in the am.  No recent BM despite miralax, senna. Consider enema/suppository but concern with fecal contamination w/ recent sacral wound debridement.  BP soft after receiving lopressor, responded to IVF bolus.  IV antibiotics ordered, surgical cultures + pseudomonas/proteus/bacteroides  Continue BiPAP at current settings, vbg improving.    Discussed with patient's family, RN and Dr. Acharya.    DVT ppx: SCD  GI ppx: pantoprazole      Mandy Raza, APRN-CNP

## 2024-01-31 NOTE — PROGRESS NOTES
[facility]GERY    PATIENT NAME: Isac Lemus     TODAY'S DATE: 1/31/2024    CHIEF COMPLAINT: none    INTERVAL HISTORY/HPI:    Pt more alert and communicating     REVIEW OF SYSTEMS:  Pertinent positives and negatives as per interval history section    OBJECTIVE:  VITALS:  /65   Pulse (!) 110   Temp 97.4 °F (36.3 °C) (Axillary)   Resp 29   Ht 1.702 m (5' 7\")   Wt 92 kg (202 lb 13.2 oz)   SpO2 100%   BMI 31.77 kg/m²     INTAKE/OUTPUT:    I/O last 3 completed shifts:  In: 550 [I.V.:10; NG/GT:540]  Out: 1505 [Urine:1500; Blood:5]  No intake/output data recorded.    CONSTITUTIONAL:  awake and alert  LUNGS:  Respirations easy and unlabored  CARD:  irregularly irregular rhythm  SKIN:  wound without purulence    Data:  CBC:   Recent Labs     01/29/24  0910 01/30/24  1133 01/30/24  1645 01/30/24  1718 01/31/24  0440   WBC 9.2 9.2  --   --  7.4   HGB 8.6* 9.5* 9.9* 10.0* 7.6*   HCT 27.2* 29.7*  --   --  23.5*    277  --   --  256       BMP:    Recent Labs     01/29/24  0910 01/30/24  1133 01/31/24  0440   * 136 135*   K 3.2* 3.6 3.8   CL 96* 98* 97*   CO2 31 32 31   BUN 11 11 14   CREATININE <0.5* <0.5* <0.5*   GLUCOSE 126* 105* 114*       Hepatic: No results for input(s): \"AST\", \"ALT\", \"ALB\", \"BILITOT\", \"ALKPHOS\" in the last 72 hours.  Mag:      No results for input(s): \"MG\" in the last 72 hours.     Phos:     No results for input(s): \"PHOS\" in the last 72 hours.     INR: No results for input(s): \"INR\" in the last 72 hours.    Radiology Review:  *Imaging personally reviewed by me.   NA      ASSESSMENT AND PLAN:  Sacral wound with osteo.   Continue wet to dry dressing changes.  No further need for debridement.  Mental status much improved.  Given bone exposure a culture was done.  Would treat presumptively for osteomyelitis for now which would require continuing IV abx at discharge.  Should still be ok to transfer to LTAC this week from my standpoint.  Can decide timing on restarting vac

## 2024-01-31 NOTE — PROGRESS NOTES
01/30/24 1942   NIV Type   NIV Started/Stopped On   Equipment Type V60   Mode Bilevel   Mask Type Full face mask   Mask Size Large   Assessment   Pulse (!) 118   Respirations 25   SpO2 100 %   Comfort Level Good   Using Accessory Muscles No   Mask Compliance Good   Skin Assessment Skin breakdown present (see comment/note)   Skin Protection for O2 Device Yes   Orientation Middle   Location Nose   Intervention(s) Skin Barrier   Breath Sounds   Right Upper Lobe Diminished   Right Middle Lobe Diminished   Right Lower Lobe Diminished   Left Upper Lobe Diminished   Left Lower Lobe Diminished   Settings/Measurements   IPAP 20 cmH20   CPAP/EPAP 10 cmH2O   Vt (Measured) 395 mL   Rate Ordered 16   FiO2  80 %   Minute Volume (L/min) 10.9 Liters   Mask Leak (lpm) 0 lpm   Patient's Home Machine No   Alarm Settings   Alarms On Y

## 2024-02-01 VITALS
HEIGHT: 67 IN | TEMPERATURE: 98.9 F | SYSTOLIC BLOOD PRESSURE: 94 MMHG | WEIGHT: 235.01 LBS | OXYGEN SATURATION: 97 % | HEART RATE: 95 BPM | BODY MASS INDEX: 36.89 KG/M2 | DIASTOLIC BLOOD PRESSURE: 68 MMHG | RESPIRATION RATE: 31 BRPM

## 2024-02-01 PROBLEM — A49.02 MRSA INFECTION: Status: ACTIVE | Noted: 2024-02-01

## 2024-02-01 PROBLEM — M46.28 SACRAL OSTEOMYELITIS (HCC): Status: ACTIVE | Noted: 2024-02-01

## 2024-02-01 LAB
CK SERPL-CCNC: 23 U/L (ref 39–308)
DEPRECATED RDW RBC AUTO: 19.3 % (ref 12.4–15.4)
GLUCOSE BLD-MCNC: 105 MG/DL (ref 70–99)
HCT VFR BLD AUTO: 30 % (ref 40.5–52.5)
HGB BLD-MCNC: 9.4 G/DL (ref 13.5–17.5)
MCH RBC QN AUTO: 32.2 PG (ref 26–34)
MCHC RBC AUTO-ENTMCNC: 31.4 G/DL (ref 31–36)
MCV RBC AUTO: 102.5 FL (ref 80–100)
PERFORMED ON: ABNORMAL
PLATELET # BLD AUTO: 273 K/UL (ref 135–450)
PMV BLD AUTO: 7.5 FL (ref 5–10.5)
RBC # BLD AUTO: 2.93 M/UL (ref 4.2–5.9)
WBC # BLD AUTO: 9.3 K/UL (ref 4–11)

## 2024-02-01 PROCEDURE — 2580000003 HC RX 258: Performed by: SURGERY

## 2024-02-01 PROCEDURE — 6370000000 HC RX 637 (ALT 250 FOR IP): Performed by: INTERNAL MEDICINE

## 2024-02-01 PROCEDURE — 94640 AIRWAY INHALATION TREATMENT: CPT

## 2024-02-01 PROCEDURE — 99024 POSTOP FOLLOW-UP VISIT: CPT | Performed by: SURGERY

## 2024-02-01 PROCEDURE — 6370000000 HC RX 637 (ALT 250 FOR IP): Performed by: SURGERY

## 2024-02-01 PROCEDURE — 6370000000 HC RX 637 (ALT 250 FOR IP): Performed by: NURSE PRACTITIONER

## 2024-02-01 PROCEDURE — 99232 SBSQ HOSP IP/OBS MODERATE 35: CPT | Performed by: INTERNAL MEDICINE

## 2024-02-01 PROCEDURE — 99232 SBSQ HOSP IP/OBS MODERATE 35: CPT | Performed by: NURSE PRACTITIONER

## 2024-02-01 PROCEDURE — 6360000002 HC RX W HCPCS: Performed by: SURGERY

## 2024-02-01 PROCEDURE — 94660 CPAP INITIATION&MGMT: CPT

## 2024-02-01 PROCEDURE — 99233 SBSQ HOSP IP/OBS HIGH 50: CPT | Performed by: INTERNAL MEDICINE

## 2024-02-01 PROCEDURE — 97110 THERAPEUTIC EXERCISES: CPT

## 2024-02-01 PROCEDURE — 2580000003 HC RX 258: Performed by: ANESTHESIOLOGY

## 2024-02-01 PROCEDURE — 82550 ASSAY OF CK (CPK): CPT

## 2024-02-01 PROCEDURE — 97168 OT RE-EVAL EST PLAN CARE: CPT

## 2024-02-01 PROCEDURE — 85027 COMPLETE CBC AUTOMATED: CPT

## 2024-02-01 PROCEDURE — 2700000000 HC OXYGEN THERAPY PER DAY

## 2024-02-01 PROCEDURE — 36415 COLL VENOUS BLD VENIPUNCTURE: CPT

## 2024-02-01 PROCEDURE — 97164 PT RE-EVAL EST PLAN CARE: CPT

## 2024-02-01 PROCEDURE — 94761 N-INVAS EAR/PLS OXIMETRY MLT: CPT

## 2024-02-01 PROCEDURE — 97530 THERAPEUTIC ACTIVITIES: CPT

## 2024-02-01 RX ORDER — METOPROLOL TARTRATE 50 MG/1
50 TABLET, FILM COATED ORAL 2 TIMES DAILY
Qty: 60 TABLET | Refills: 3 | Status: SHIPPED | OUTPATIENT
Start: 2024-02-01

## 2024-02-01 RX ORDER — SENNA AND DOCUSATE SODIUM 50; 8.6 MG/1; MG/1
2 TABLET, FILM COATED ORAL 2 TIMES DAILY
Qty: 10 TABLET | Refills: 0
Start: 2024-02-01

## 2024-02-01 RX ORDER — PANTOPRAZOLE SODIUM 40 MG/1
40 TABLET, DELAYED RELEASE ORAL
Qty: 30 TABLET | Refills: 3 | Status: SHIPPED | OUTPATIENT
Start: 2024-02-02

## 2024-02-01 RX ORDER — POLYETHYLENE GLYCOL 3350 17 G/17G
17 POWDER, FOR SOLUTION ORAL 2 TIMES DAILY
Qty: 527 G | Refills: 0
Start: 2024-02-01

## 2024-02-01 RX ORDER — SODIUM CHLORIDE FOR INHALATION 0.9 %
3 VIAL, NEBULIZER (ML) INHALATION PRN
Qty: 3 ML
Start: 2024-02-01 | End: 2024-03-02

## 2024-02-01 RX ORDER — LEVETIRACETAM 100 MG/ML
750 SOLUTION ORAL 2 TIMES DAILY
Qty: 5 ML | Refills: 0
Start: 2024-02-01

## 2024-02-01 RX ORDER — LEVALBUTEROL 1.25 MG/.5ML
1.25 SOLUTION, CONCENTRATE RESPIRATORY (INHALATION)
Qty: 1 EACH | Refills: 0
Start: 2024-02-01

## 2024-02-01 RX ORDER — ASPIRIN 81 MG/1
81 TABLET, CHEWABLE ORAL DAILY
Qty: 30 TABLET | Refills: 0
Start: 2024-02-02

## 2024-02-01 RX ORDER — GUAIFENESIN 200 MG/10ML
200 LIQUID ORAL EVERY 4 HOURS PRN
Qty: 5 ML | Refills: 0
Start: 2024-02-01

## 2024-02-01 RX ORDER — ATORVASTATIN CALCIUM 40 MG/1
40 TABLET, FILM COATED ORAL NIGHTLY
Qty: 30 TABLET | Refills: 3 | Status: SHIPPED | OUTPATIENT
Start: 2024-02-01

## 2024-02-01 RX ORDER — POLYETHYLENE GLYCOL 3350 17 G/17G
17 POWDER, FOR SOLUTION ORAL DAILY PRN
Qty: 527 G | Refills: 0
Start: 2024-02-01

## 2024-02-01 RX ORDER — DILTIAZEM HYDROCHLORIDE 60 MG/1
60 TABLET, FILM COATED ORAL EVERY 6 HOURS
Qty: 120 TABLET | Refills: 0
Start: 2024-02-01

## 2024-02-01 RX ORDER — SODIUM CHLOR/HYPOCHLOROUS ACID 0.033 %
1 SOLUTION, IRRIGATION IRRIGATION 2 TIMES DAILY
Qty: 250 ML | Refills: 0
Start: 2024-02-01

## 2024-02-01 RX ORDER — DONEPEZIL HYDROCHLORIDE 5 MG/1
5 TABLET, FILM COATED ORAL NIGHTLY
Qty: 30 TABLET | Refills: 0
Start: 2024-02-01

## 2024-02-01 RX ADMIN — POLYETHYLENE GLYCOL 3350 17 G: 17 POWDER, FOR SOLUTION ORAL at 09:46

## 2024-02-01 RX ADMIN — DILTIAZEM HYDROCHLORIDE 60 MG: 60 TABLET ORAL at 12:28

## 2024-02-01 RX ADMIN — PANTOPRAZOLE SODIUM 40 MG: 40 TABLET, DELAYED RELEASE ORAL at 05:28

## 2024-02-01 RX ADMIN — ASPIRIN 81 MG 81 MG: 81 TABLET ORAL at 09:45

## 2024-02-01 RX ADMIN — LEVALBUTEROL 1.25 MG: 1.25 SOLUTION, CONCENTRATE RESPIRATORY (INHALATION) at 07:40

## 2024-02-01 RX ADMIN — APIXABAN 5 MG: 5 TABLET, FILM COATED ORAL at 12:28

## 2024-02-01 RX ADMIN — DILTIAZEM HYDROCHLORIDE 60 MG: 60 TABLET ORAL at 05:28

## 2024-02-01 RX ADMIN — PIPERACILLIN AND TAZOBACTAM 3375 MG: 3; .375 INJECTION, POWDER, FOR SOLUTION INTRAVENOUS at 05:34

## 2024-02-01 RX ADMIN — METOPROLOL TARTRATE 50 MG: 50 TABLET, FILM COATED ORAL at 09:45

## 2024-02-01 RX ADMIN — LEVETIRACETAM 750 MG: 100 SOLUTION ORAL at 12:28

## 2024-02-01 RX ADMIN — MUPIROCIN: 20 OINTMENT TOPICAL at 10:57

## 2024-02-01 RX ADMIN — Medication 10 ML: at 09:45

## 2024-02-01 ASSESSMENT — PAIN SCALES - GENERAL: PAINLEVEL_OUTOF10: 0

## 2024-02-01 NOTE — PROGRESS NOTES
Dressing to sacral wound saturated, removed. Dr. Sung at bedside. Wound washed with saline, wet to dry dressing applied. Pt tolerated well. Positioned pt on right side with wedge pillow support. Martin boots Bilaterally. Pt resting quietly with eyes closed, spouse and daughter at bedside

## 2024-02-01 NOTE — DISCHARGE SUMMARY
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]     Acute on chronic respiratory failure with hypoxia and hypercapnia (HCC) [J96.21, J96.22]     Wound infection [T14.8XXA, L08.9]     Atrial fibrillation with RVR (HCC) [I48.91]     Right ventricular dilation [I51.7]     Right ventricular systolic dysfunction [I51.89]     FROYLAN (obstructive sleep apnea) [G47.33]     Traumatic brain injury with delayed recovery (HCC) [S06.9XAA]     Quadriplegia, functional (HCC) [R53.2]     Decubitus skin ulcer [L89.90]     Sacral wound, initial encounter [S31.000A]     Atrial flutter (HCC) [I48.92]     Chronic respiratory failure with hypercapnia (HCC) [J96.12]     Obesity [E66.9]        Presenting Admission History:          73 y.o. male w/ hx TBI w/ bleed who presented to [facility] in transfer from [facility] w/ decubitus ulcer for surgical debridement - done 15 Arturo w/out complications - discussed face to face w/ General Surgery, Dr. Stephenson 15 Arturo.      Patient is non-verbal with family at bedside.      Assessment/Plan:        #.  Intermittent fevers likely due to infected sacral wound: fever resolved.   #.  Suspected infected sacral wound  Infectious workup at [facility] unremarkable  S/p I&D 1/15/2024 without complication, without osteo  S/p  repeat wound debridement on 2024  S/P wound vac placement: planned for wound check and vac exchange planned on Monday but had bleeding so vac

## 2024-02-01 NOTE — PROGRESS NOTES
No significant events to note overnight. Pt alert to voice, intermittently follows simple commands and nods head appropriately to yes/no questions, but remained nonverbal overnight. LUE & LLE slightly weaker than R- baseline per family. In and out of AFIB/SR/ST overnight. Normotensive. T max 99. Tolerated BiPap 20/10 50%. Incontinent of stool and large amt of urine x2 overnight. EUD ineffective. Took meds crushed in pudding well. Sacral wound with dressing intact, shadowed but not breakthrough drainage. Bath given, linens changed. Pt's daughter, Eunice at bedside overnight and interactive in care. Will cont to monitor.

## 2024-02-01 NOTE — PROGRESS NOTES
Patient was discharge to LTACH at [facility]. Repot called to [facility]. Vital signs and assessment are stable at the time of discharge. Belongings all packed and sent home with family. No complications at the time of discharge.

## 2024-02-01 NOTE — PROGRESS NOTES
01/31/24 2111   NIV Type   NIV Started/Stopped On   Equipment Type v60   Mode Bilevel   Mask Type Full face mask   Mask Size Large   Assessment   Pulse (!) 103   Respirations 26   SpO2 93 %   Comfort Level Good   Using Accessory Muscles No   Mask Compliance Good   Skin Assessment Skin breakdown present (see comment/note)   Skin Protection for O2 Device Yes   Orientation Middle   Location Nose   Intervention(s) Skin Barrier   Settings/Measurements   PIP Observed 19 cm H20   IPAP 20 cmH20   CPAP/EPAP 10 cmH2O   Vt (Measured) 624 mL   Rate Ordered 16   FiO2  50 %   I Time/ I Time % 1 s   Minute Volume (L/min) 14.5 Liters   Mask Leak (lpm) 14 lpm   Patient's Home Machine No   Alarm Settings   Alarms On Y   Low Pressure (cmH2O) 6 cmH2O   High Pressure (cmH2O) 30 cmH2O   RR Low (bpm) 6   RR High (bpm) 50 br/min

## 2024-02-01 NOTE — CARE COORDINATION
CASE MANAGEMENT DISCHARGE SUMMARY      Discharge to: [facility] @ [facility]    Precertification completed: yes    IMM given: yes    Transportation:     Medical Transport explained to pt/family. Pt/family voice no agency preference.    Agency used: [facility]   time:1300   Ambulance form completed: Yes    Confirmed discharge plan with:     Patient: yes     Family:  yes    Name: Contact number:     Facility/Agency, name:  [facility] faxed   Phone number for report to facility:      RN, name: Nevaeh Davis RN    Note: Discharging nurse to complete [facility], reconcile [facility], and place final copy with patient's discharge packet. RN to ensure that written prescriptions for  Level II medications are sent with patient to the facility as per protocol.

## 2024-02-01 NOTE — PROGRESS NOTES
02/01/24 0353   NIV Type   Equipment Type v60   Mode Bilevel   Mask Type Full face mask   Mask Size Large   Assessment   Pulse 82   Comfort Level Good   Using Accessory Muscles No   Mask Compliance Good   Skin Protection for O2 Device Yes   Orientation Middle   Location Nose   Intervention(s) Skin Barrier   Settings/Measurements   PIP Observed 21 cm H20   IPAP 20 cmH20   CPAP/EPAP 10 cmH2O   Vt (Measured) 538 mL   Rate Ordered 16   FiO2  50 %   I Time/ I Time % 1 s   Minute Volume (L/min) 13 Liters   Mask Leak (lpm) 0 lpm   Patient's Home Machine No   Alarm Settings   Alarms On Y

## 2024-02-01 NOTE — PROGRESS NOTES
Infectious Disease Follow up Notes    CC :  FUO, lethargy     Antibiotics:  Zosyn 3.375 q8 s 1/9/24    Admit Date:   1/14/2024  Hospital Day: 19    Subjective:   No fever   Uneventful night  GI evaluated PEG, to be removed anytime at family's discretion  Plan for transfer to LTAC today     Objective:     Patient Vitals for the past 8 hrs:   BP Temp Temp src Pulse Resp SpO2 Weight   02/01/24 1110 116/71 -- -- (!) 128 -- 100 % --   02/01/24 0945 116/71 -- -- (!) 128 -- -- --   02/01/24 0849 95/62 98.3 °F (36.8 °C) Axillary 88 (!) 31 100 % --   02/01/24 0743 -- -- -- -- 21 100 % --   02/01/24 0600 115/61 -- -- (!) 105 -- 95 % --   02/01/24 0500 94/60 -- -- 90 -- 98 % 106.6 kg (235 lb 0.2 oz)   02/01/24 0400 (!) 103/57 98.5 °F (36.9 °C) Axillary 92 18 100 % --   02/01/24 0353 -- -- -- 82 -- -- --         EXAM:  Resting comfortably  No rash exposed skin     PIV x2 in place       Wilks       Scheduled Meds:   apixaban  5 mg Oral BID    levETIRAcetam  750 mg Oral BID    metoprolol tartrate  50 mg Oral BID    dilTIAZem  60 mg Oral 4 times per day    sodium chloride flush  5-40 mL IntraVENous 2 times per day    levalbuterol  1.25 mg Nebulization 4x Daily RT    piperacillin-tazobactam  3,375 mg IntraVENous Q8H    pantoprazole  40 mg Oral QAM AC    sennosides-docusate sodium  2 tablet Oral BID    polyethylene glycol  17 g Oral BID    insulin lispro  0-16 Units SubCUTAneous TID WC    insulin lispro  0-4 Units SubCUTAneous Nightly    atorvastatin  40 mg PEG Tube Nightly    donepezil  5 mg PEG Tube Nightly    [Held by provider] lisinopril  2.5 mg PEG Tube Daily    [Held by provider] modafinil  300 mg PEG Tube Daily    aspirin  81 mg PEG Tube Daily    mupirocin   Topical BID    vashe wound therapy   Topical BID       Continuous Infusions:   sodium chloride      dextrose      sodium chloride Stopped (01/31/24 3173)          Data Review:    Lab Results

## 2024-02-01 NOTE — PROGRESS NOTES
Physical Therapy  Facility/Department: [facility] C2 CARD TELEMETRY  Physical Therapy Re-evaluation    Name: Isac Lemus  : 1950  MRN: [id number]  Date of Service: 2024    Discharge Recommendations:  LTACH   PT Equipment Recommendations  Equipment Needed: No  Other: defer to facility      Patient Diagnosis(es): The encounter diagnosis was Wound of sacral region, initial encounter.  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  Past Surgical History:  has a past surgical history that includes Coronary artery bypass graft (10/2014); Colonoscopy (); Colonoscopy (3/16/16); back surgery (N/A, 1/15/2024); back surgery (N/A, 2024); and back surgery (N/A, 2024).    Assessment   Assessment: Pt seen for PT re-eval and treat after transfer to ICU for respiratory acidosis. Co-tx collaboration this date to safely meet goals and maximize functional outcomes. Pt functioning below his baseline, requiring maxA x2 for rolling bilaterally in bed and maxA x2 for sitting in upright positioning and bringing head/shoulders fwd. Pt able to follow simplified directions this date with increased time and cueing. Pt limited by his decreased activity tolerance, requiring rest breaks in between activities. Cont PT in acute care. Recommend skilled PT at LTACH  Treatment Diagnosis: impaired functional mobility  Therapy Prognosis: Fair  Decision Making: Medium Complexity  Requires PT Follow-Up: Yes  Activity Tolerance  Activity Tolerance: Treatment limited secondary to decreased cognition;Patient limited by endurance;Patient tolerated evaluation without incident     Plan   Physical Therapy Plan  General Plan: 2-3 times per week  Current Treatment Recommendations: Strengthening, ROM, Balance  Yes  Occupation: Retired  Type of Occupation: drove [employer]  Leisure & Hobbies: go camping with wife, go boating    Vision/Hearing  Vision  Vision: Impaired  Vision Exceptions: Wears glasses for reading  Hearing  Hearing: Exceptions to WFL  Hearing Exceptions: Hard of hearing/hearing concerns;No hearing aid      Cognition   Orientation  Overall Orientation Status: Impaired  Orientation Level: Unable to assess  Cognition  Overall Cognitive Status: Exceptions  Arousal/Alertness: Inconsistent responses to stimuli  Following Commands: Follows one step commands with repetition;Follows one step commands with increased time  Attention Span: Attends with cues to redirect  Safety Judgement: Decreased awareness of need for safety;Decreased awareness of need for assistance  Problem Solving: Decreased awareness of errors     Objective   Pulse: (!) 128  Heart Rate Source: Monitor  BP: 116/71  BP Location: Left upper arm  BP Method: Automatic  Patient Position: Semi fowlers  MAP (Calculated): 86  Respirations: (!) 31  SpO2: 100 %  O2 Device: PAP (positive airway pressure)  Temp: 98.3 °F (36.8 °C)     Observation/Palpation  Posture: Poor  Gross Assessment  AROM: Grossly decreased, non-functional  PROM: Generally decreased, functional  Strength: Grossly decreased, non-functional  Tone: Abnormal (Flaccidity in BLEs)  Sensation: Impaired (decreased sensation in BLEs)         Bed Mobility Training  Bed Mobility Training: Yes  Overall Level of Assistance: Total assistance;Assist X2;Additional time  Interventions: Verbal cues;Visual cues;Tactile cues  Rolling: Total assistance;Assist X2 (x1 to R, x1 to L with 15 sec holds. VC for hand placement and sequencing)  Scooting: Total assistance;Additional time (to scoot twd HOB)  Transfer Training  Transfer Training: No (GIA d/t pt weakness)           A/AROM Exercises: Supine BLE AAROM exercsies: x5 reps with BLEs. Ankle DF/PF (pt able to perform in limited range), heel slides with maxA, hip

## 2024-02-01 NOTE — DISCHARGE INSTR - DIET
Good nutrition is important when healing from an illness, injury, or surgery.  Follow any nutrition recommendations given to you during your hospital stay.   If you were given an oral nutrition supplement while in the hospital, continue to take this supplement at home.  You can take it with meals, in-between meals, and/or before bedtime. These supplements can be purchased at most local grocery stores, pharmacies, and chain [employer].   If you have any questions about your diet or nutrition, call the hospital and ask for the dietitian.    Dysphagia diet. Minced and moist. + Adult oral nutrition Supplement.

## 2024-02-01 NOTE — PROGRESS NOTES
INPATIENT PULMONARY CRITICAL CARE PROGRESS NOTE      Reason for visit    Respiratory failure    SUBJECTIVE: Patient when evaluated continues to be on BiPAP which was continued since overnight, patient was on 50% oxygen on the BiPAP to maintain oxygen saturation 100%, patient was afebrile, patient has intermittent atrial fibrillation, patient has maintain hemodynamics without any pressors, patient glycemic control acceptable, patient is document urine output is borderline, patient has a cumulative fluid balance of +1 L, no other pertinent review of system of concern    Physical Exam:  Blood pressure 115/61, pulse (!) 105, temperature 98.5 °F (36.9 °C), temperature source Axillary, resp. rate 21, height 1.702 m (5' 7\"), weight 106.6 kg (235 lb 0.2 oz), SpO2 100 %.'   Constitutional:  No acute distress.  On BiPAP  HENT: BiPAP mask intact. No thyromegaly.  Eyes:  Conjunctivae are normal. Pupils equal, round, and reactive to light. No scleral icterus.   Neck: . No tracheal deviation present. No obvious thyroid mass.  Short large neck  Cardiovascular: S1-S2 regularly irregular, normal heart sounds.  No right ventricular heave.  Some lower extremity edema.  Pulmonary/Chest: No wheezes.  Scattered rales.  Chest wall is not dull to percussion.  No accessory muscle usage or stridor.  Decreased breath sound intensity  Abdominal: Soft. Bowel sounds present. No distension or hernia. No tenderness.    Musculoskeletal: No cyanosis. No clubbing. No obvious joint deformity.   Lymphadenopathy: No cervical or supraclavicular adenopathy.   Skin: Skin is warm and dry. No rash or nodules on the exposed extremities.  Has sacral  decubitus ulcers  Neurologic: Sleeping on the BiPAP      Results:  CBC:   Recent Labs     01/30/24  1133 01/30/24  1645 01/30/24  1718 01/31/24  0440 02/01/24  0633   WBC 9.2  --   --  7.4 9.3   HGB 9.5*   < > 10.0* 7.6* 9.4*   HCT 29.7*  --   --  23.5* 30.0*   MCV 99.4  --   --  100.2* 102.5*     --   --   SUSCEPTIBILITY PANEL BY SAUD     ampicillin-sulbactam Sensitive <=4/2 mcg/mL BACTERIAL SUSCEPTIBILITY PANEL BY SAUD     ceFAZolin Sensitive <=2 mcg/mL BACTERIAL SUSCEPTIBILITY PANEL BY SAUD     cefepime Sensitive <=2 mcg/mL BACTERIAL SUSCEPTIBILITY PANEL BY SAUD     cefOXitin Sensitive <=8 mcg/mL BACTERIAL SUSCEPTIBILITY PANEL BY SAUD     cefTRIAXone Sensitive <=1 mcg/mL BACTERIAL SUSCEPTIBILITY PANEL BY SAUD     cefuroxime Sensitive <=4 mcg/mL BACTERIAL SUSCEPTIBILITY PANEL BY SAUD     ciprofloxacin Sensitive <=0.25 mcg/mL BACTERIAL SUSCEPTIBILITY PANEL BY SAUD     ertapenem Sensitive <=0.5 mcg/mL BACTERIAL SUSCEPTIBILITY PANEL BY SAUD     gentamicin Sensitive <=2 mcg/mL BACTERIAL SUSCEPTIBILITY PANEL BY SAUD     meropenem Sensitive <=1 mcg/mL BACTERIAL SUSCEPTIBILITY PANEL BY SAUD     piperacillin-tazobactam Sensitive <=8 mcg/mL BACTERIAL SUSCEPTIBILITY PANEL BY SAUD     trimethoprim-sulfamethoxazole Sensitive <=0.5/9.5 mcg/mL BACTERIAL SUSCEPTIBILITY PANEL BY SAUD                    Gram Stain Result  Abnormal   1+ WBC's (Polymorphonuclear)  1+ Gram positive rods  1+ Gram positive cocci  [facility]   Anaerobic Culture     Anaerobic culture further report to follow  No anaerobes isolated so far, Further report to follow P    [facility]   Organism Pseudomonas aeruginosa Abnormal  [facility]   Culture Surgical Light growth [facility]   Organism Staph aureus MRSA Abnormal  [facility]   Culture Surgical  Abnormal   Light growth  CONTACT PRECAUTIONS INDICATED  [facility]   Organism Proteus species Abnormal  [facility]   Culture Surgical Rare growth  No further workup [facility]        Susceptibility    Pseudomonas aeruginosa (1)    Antibiotic Interpretation Microscan  Method Status    cefepime Sensitive <=2 mcg/mL BACTERIAL SUSCEPTIBILITY PANEL BY SAUD     ciprofloxacin Sensitive <=0.25 mcg/mL BACTERIAL SUSCEPTIBILITY PANEL BY SAUD

## 2024-02-01 NOTE — PROGRESS NOTES
[facility]     Electrophysiology                                     Progress Note    Admission date:  2024    Reason for follow up visit: AF/AFL    HPI/CC: Isac Lemus was admitted on 2023 to the ARU from  after traumatic fall resulting in skull fracture and SAH/SDH. Complicated hospital course. EP consulted when EKG showed rapid AFL. An cardiac event monitor was placed on 2024 but this was removed 2024 at the family's request due to skin irritation.  On 2024, patient was admitted to the hospital for surgical debridement of his sacral wound.  Postoperatively he had RVR and IV diltiazem was started. Echo showed an EF of 55-60%. On 2024, he had a repeat debridement. Post op, he was transferred to the ICU for respiratory acidosis. Rhythm is atrial flutter with heart rates in the 80s and 90s and intermittent tachycardia.     Subjective: He is awake. Appears comfortable. Family at bedside. Plan for transfer to LTAC at [facility] today.       Vitals:  Blood pressure 116/71, pulse (!) 128, temperature 98.3 °F (36.8 °C), temperature source Axillary, resp. rate (!) 31, height 1.702 m (5' 7\"), weight 106.6 kg (235 lb 0.2 oz), SpO2 100 %.  Temp  Av.3 °F (36.8 °C)  Min: 97.9 °F (36.6 °C)  Max: 99 °F (37.2 °C)  Pulse  Av.8  Min: 75  Max: 128  BP  Min: 92/58  Max: 118/65  SpO2  Av.1 %  Min: 91 %  Max: 100 %  FiO2   Av %  Min: 50 %  Max: 50 %    24 hour I/O    Intake/Output Summary (Last 24 hours) at 2024 1103  Last data filed at 2024 2332  Gross per 24 hour   Intake 1340.22 ml   Output 400 ml   Net 940.22 ml       Current Facility-Administered Medications   Medication Dose Route Frequency Provider Last Rate Last Admin    levETIRAcetam (KEPPRA) 100 MG/ML oral solution 750 mg  750 mg Oral BID Darek Lemos MD   750 mg at 24 2041    metoprolol tartrate (LOPRESSOR) tablet 50 mg  50 mg Oral BID Linda Srinivasan APRN - CNP   50 mg at 24 0915     S2.  Jugular venous pulsation Normal  The carotid upstroke is normal in amplitude and contour without delay or bruit  Peripheral pulses are symmetrical and full   Abdomen:  No masses or tenderness  Bowel sounds present  Extremities:   No cyanosis or clubbing   No lower extremity edema   Skin: warm and dry  Neurological:  Alert and oriented  Moves all extremities well  No abnormalities of mood, affect, memory, mentation, or behavior are noted    Data      Echo 11/2023:  Study Conclusions     - Left ventricle: The cavity size is normal. Wall thickness was increased in     a pattern of moderate LVH. Systolic function is normal. The estimated     ejection fraction is 60-65%. Wall motion is normal; there are no regional     wall motion abnormalities. Cannot assess LV diastolic function.   - Mitral valve: The annulus is mildly calcified.   - Right ventricle: Systolic function is normal.   - Atrial septum: Agitated saline contrast study at baseline or with     provocation, shows no right-to-left atrial level shunt.      All labs and testing reviewed.  Lab Review     Renal Profile:   Lab Results   Component Value Date/Time    CREATININE <0.5 01/31/2024 04:40 AM    BUN 14 01/31/2024 04:40 AM     01/31/2024 04:40 AM    K 3.8 01/31/2024 04:40 AM    K 4.1 01/24/2024 11:56 AM    CL 97 01/31/2024 04:40 AM    CO2 31 01/31/2024 04:40 AM     CBC:    Lab Results   Component Value Date/Time    WBC 9.3 02/01/2024 06:33 AM    RBC 2.93 02/01/2024 06:33 AM    HGB 9.4 02/01/2024 06:33 AM    HCT 30.0 02/01/2024 06:33 AM    .5 02/01/2024 06:33 AM    RDW 19.3 02/01/2024 06:33 AM     02/01/2024 06:33 AM     BNP:  No results found for: \"BNP\"  Fasting Lipid Panel:    Lab Results   Component Value Date/Time    CHOL 147 10/17/2014 07:22 AM    HDL 31 10/17/2014 07:22 AM    TRIG 111 12/23/2023 08:33 AM     Cardiac Enzymes:  CK/MbTroponin  Lab Results   Component Value Date/Time    TROPONINI <0.01 04/06/2016 12:36 AM     PT/ INR

## 2024-02-01 NOTE — PROGRESS NOTES
02/01/24 0743   NIV Type   NIV Started/Stopped On   Equipment Type v60   Mode Bilevel   Mask Type Full face mask   Mask Size Large   Assessment   Respirations 21   SpO2 100 %   Comfort Level Good   Using Accessory Muscles No   Mask Compliance Good   Skin Assessment Clean, dry, & intact   Skin Protection for O2 Device Yes   Location Nose   Settings/Measurements   PIP Observed 22 cm H20   IPAP 20 cmH20   CPAP/EPAP 10 cmH2O   Vt (Measured) 485 mL   Rate Ordered 16   Insp Rise Time (%) 1 %   FiO2  50 %   I Time/ I Time % 1 s   Minute Volume (L/min) 11.2 Liters   Alarm Settings   Alarms On Y   Oxygen Therapy/Pulse Ox   $Pulse Oximeter $Spot check (multiple/continuous)   $Oxygen $Daily Charge   Oxygen Therapy   O2 Device PAP (positive airway pressure)

## 2024-02-01 NOTE — PLAN OF CARE
Problem: Safety - Adult  Goal: Free from fall injury  Outcome: Progressing     Problem: Pain  Goal: Verbalizes/displays adequate comfort level or baseline comfort level  Outcome: Progressing     Problem: ABCDS Injury Assessment  Goal: Absence of physical injury  Outcome: Progressing     Problem: Skin/Tissue Integrity  Goal: Absence of new skin breakdown  Description: 1.  Monitor for areas of redness and/or skin breakdown  2.  Assess vascular access sites hourly  3.  Every 4-6 hours minimum:  Change oxygen saturation probe site  4.  Every 4-6 hours:  If on nasal continuous positive airway pressure, respiratory therapy assess nares and determine need for appliance change or resting period.  Outcome: Progressing     Problem: Discharge Planning  Goal: Discharge to home or other facility with appropriate resources  Outcome: Progressing  Flowsheets (Taken 1/31/2024 2000)  Discharge to home or other facility with appropriate resources:   Identify barriers to discharge with patient and caregiver   Refer to discharge planning if patient needs post-hospital services based on physician order or complex needs related to functional status, cognitive ability or social support system

## 2024-02-01 NOTE — DISCHARGE INSTR - COC
Continuity of Care Form    Patient Name: Isac Lemus   :  1950  MRN:  [id number]    Admit date:  2024  Discharge date:  24    Code Status Order: Full Code   Advance Directives:   Advance Care Flowsheet Documentation       Date/Time Healthcare Directive Type of Healthcare Directive Copy in Chart Healthcare Agent Appointed Healthcare Agent's Name Healthcare Agent's Phone Number    01/15/24 0810 Yes, patient has an advance directive for healthcare treatment -- No, copy requested from family -- -- --            Admitting Physician:  Kenny Ernandez MD  PCP: Taryn Rivera APRN - CNP    Discharging Nurse: GABBY Bernstein   Discharging Hospital Unit/Room#: 0232/0232-01  Discharging Unit Phone Number: ***    Emergency Contact:   Extended Emergency Contact Information  Primary Emergency Contact: AllanNancy SILVIA  Address: [address] [place] [address] [address] [place] [id number] [place] [facility]  Home Phone: [phone]  Mobile Phone: [phone]  Relation: Spouse  Secondary Emergency Contact: TitusEunice  Home Phone: [phone]  Mobile Phone: [phone]  Relation: Child    Past Surgical History:  Past Surgical History:   Procedure Laterality Date    BACK SURGERY N/A 1/15/2024    SACRAL WOUND DEBRIDEMENT performed by Casey tSephenson MD at [facility] OR    BACK SURGERY N/A 2024    SACRAL WOUND DEBRIDEMENT performed by Casey Stephenson MD at [facility] OR    BACK SURGERY N/A 2024    SACRAL WOUND DEBRIDEMENT performed by Casey Stephenson MD at [facility] OR    COLONOSCOPY      POLYPS    COLONOSCOPY  3/16/16    polyps    CORONARY ARTERY BYPASS GRAFT  10/2014    X5       Immunization History:   Immunization History   Administered Date(s) Administered    COVID-19, PFIZER PURPLE top, DILUTE for use, (age 12 y+), 30mcg/0.3mL 04/10/2021, 2021       Active Problems:  Patient Active Problem List   Diagnosis Code    Chest pain R07.9    DM (diabetes mellitus) (HCC) E11.9    HTN (hypertension) I10     DME order):  hospital bed  Other Treatments: Sacral wound care BID- Wet to dry.     Patient's personal belongings (please select all that are sent with patient):      RN SIGNATURE:  Electronically signed by Day Aly RN on 2/1/24 at 12:43 PM EST    CASE MANAGEMENT/SOCIAL WORK SECTION    Inpatient Status Date: 1/14/24    Readmission Risk Assessment Score:  Readmission Risk              Risk of Unplanned Readmission:  20           Discharging to Facility/ Agency   [facility] -     / signature: Electronically signed by Nevaeh Davis RN on 2/1/24 at 9:02 AM EST    PHYSICIAN SECTION    Prognosis: Fair    Condition at Discharge: Stable    Rehab Potential (if transferring to Rehab): Fair    Recommended Labs or Other Treatments After Discharge:     Recommended Follow-up, Labs or Other Treatments After Discharge:       ID INFUSION ORDERS  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     Update Admission H&P: No change in H&P    PHYSICIAN SIGNATURE:  Electronically signed by Aly Acharya MD on 2/1/24 at 12:14 PM EST

## 2024-02-01 NOTE — PLAN OF CARE
Problem: Chronic Conditions and Co-morbidities  Goal: Patient's chronic conditions and co-morbidity symptoms are monitored and maintained or improved  Outcome: Completed  Flowsheets (Taken 2/1/2024 1140)  Care Plan - Patient's Chronic Conditions and Co-Morbidity Symptoms are Monitored and Maintained or Improved:   Collaborate with multidisciplinary team to address chronic and comorbid conditions and prevent exacerbation or deterioration   Monitor and assess patient's chronic conditions and comorbid symptoms for stability, deterioration, or improvement   Update acute care plan with appropriate goals if chronic or comorbid symptoms are exacerbated and prevent overall improvement and discharge     Problem: Safety - Adult  Goal: Free from fall injury  2/1/2024 1203 by Day Aly RN  Outcome: Completed  1/31/2024 2240 by Jeanette Slater RN  Outcome: Progressing     Problem: Pain  Goal: Verbalizes/displays adequate comfort level or baseline comfort level  2/1/2024 1203 by Day Aly RN  Outcome: Completed  1/31/2024 2240 by Jeanette Slater RN  Outcome: Progressing     Problem: ABCDS Injury Assessment  Goal: Absence of physical injury  2/1/2024 1203 by Day Aly RN  Outcome: Completed  1/31/2024 2240 by Jeanette Slater RN  Outcome: Progressing     Problem: Skin/Tissue Integrity  Goal: Absence of new skin breakdown  Description: 1.  Monitor for areas of redness and/or skin breakdown  2.  Assess vascular access sites hourly  3.  Every 4-6 hours minimum:  Change oxygen saturation probe site  4.  Every 4-6 hours:  If on nasal continuous positive airway pressure, respiratory therapy assess nares and determine need for appliance change or resting period.  2/1/2024 1203 by Day Aly RN  Outcome: Completed  1/31/2024 2240 by Jeanette Slater RN  Outcome: Progressing     Problem: Discharge Planning  Goal: Discharge to home or other facility with appropriate resources  2/1/2024 1203 by Jermain  void and empty bladder   Monitor intake/output and perform bladder scan as needed   Place urinary catheter per Licensed Independent Practitioner order if needed  Goal: Urinary catheter remains patent  Outcome: Completed  Flowsheets (Taken 2/1/2024 1140)  Urinary catheter remains patent:   Irrigate catheter per Licensed Independent Practitioner order if indicated and notify Licensed Independent Practitioner if unable to irrigate   Assess patency of urinary catheter   Assess need for a larger catheter size or a 3-way catheter for continuous bladder irrigation

## 2024-02-01 NOTE — PROGRESS NOTES
01/31/24 1717   NIV Type   NIV Started/Stopped On   Equipment Type v60   Mode Bilevel   Mask Type Full face mask   Mask Size Large   Assessment   Pulse 87   Comfort Level Good   Using Accessory Muscles No   Mask Compliance Good   Skin Protection for O2 Device Yes   Orientation Middle   Location Nose   Intervention(s) Skin Barrier   Settings/Measurements   PIP Observed 20 cm H20   IPAP 20 cmH20   CPAP/EPAP 10 cmH2O   Vt (Measured) 552 mL   Rate Ordered 16   FiO2  50 %   I Time/ I Time % 1 s   Minute Volume (L/min) 11.3 Liters   Mask Leak (lpm) 0 lpm   Patient's Home Machine No   Alarm Settings   Alarms On Y   Low Pressure (cmH2O) 6 cmH2O   High Pressure (cmH2O) 30 cmH2O   Apnea (secs) 20 secs   RR Low (bpm) 6   RR High (bpm) 50 br/min

## 2024-02-01 NOTE — PROGRESS NOTES
Occupational Therapy  Facility/Department: [facility] C2 CARD TELEMETRY  Occupational Therapy Re-evaluation and Treatment Note    Name: Isac Lemus  : 1950  MRN: [id number]  Date of Service: 2024    Discharge Recommendations:  LTACH      Therapy discharge recommendations take into account each patient's current medical complexities and are subject to input/oversight from the patient's healthcare team.     Barriers to Home Discharge:   [] Steps to access home entry or bed/bath:   [x] Unable to transfer, ambulate, or propel wheelchair household distances without assist   [] Limited available assist at home upon discharge    [x] Patient or family requests d/c to post-acute facility    [] Poor cognition/safety awareness for d/c to home alone    [] Unable to maintain ordered weight bearing status    [x] Patient with salient signs of long-standing immobility   [x] Decreased independence with ADLs   [x] Increased risk for falls   [] Other:  If pt is unable to be seen after this session, please let this note serve as discharge summary.  Please see case management note for discharge disposition.  Thank you.  Patient Diagnosis(es): The encounter diagnosis was Wound of sacral region, initial encounter.  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  Past Surgical History:  has a past surgical history that includes Coronary artery bypass graft (10/2014); Colonoscopy (); Colonoscopy (3/16/16); back surgery (N/A, 1/15/2024); back surgery (N/A, 2024); and back surgery (N/A, 2024).     Assessment   Performance deficits / Impairments: Decreased functional mobility ;Decreased endurance;Decreased ADL status;Decreased balance;Decreased strength;Decreased safe awareness;Decreased

## 2024-02-02 LAB
BACTERIA SPEC AEROBE CULT: ABNORMAL
BACTERIA SPEC ANAEROBE CULT: ABNORMAL
GRAM STN SPEC: ABNORMAL
ORGANISM: ABNORMAL

## 2024-02-08 DIAGNOSIS — I49.8 ATRIAL ARRHYTHMIA: ICD-10-CM

## 2024-02-16 DIAGNOSIS — I49.8 NODAL RHYTHM DISORDER: Primary | ICD-10-CM

## 2024-07-03 ENCOUNTER — HOSPITAL ENCOUNTER (OUTPATIENT)
Age: 74
Setting detail: SPECIMEN
Discharge: HOME | End: 2024-07-03
Payer: MEDICARE

## 2024-07-03 LAB
AMORPH SED URNS QL MICRO: ABNORMAL /HPF
BACTERIA URNS QL MICRO: ABNORMAL /HPF
BILIRUB UR QL STRIP.AUTO: NEGATIVE
CLARITY UR: ABNORMAL
COLOR UR: YELLOW
CRYSTALS URNS MICRO: ABNORMAL /HPF
EPI CELLS #/AREA URNS HPF: ABNORMAL /HPF (ref 0–5)
GLUCOSE UR STRIP.AUTO-MCNC: NEGATIVE MG/DL
HGB UR QL STRIP.AUTO: ABNORMAL
KETONES UR STRIP.AUTO-MCNC: NEGATIVE MG/DL
LEUKOCYTE ESTERASE UR QL STRIP.AUTO: ABNORMAL
NITRITE UR QL STRIP.AUTO: NEGATIVE
PH UR STRIP.AUTO: >=9 [PH] (ref 5–8)
PROT UR STRIP.AUTO-MCNC: ABNORMAL MG/DL
RBC #/AREA URNS HPF: ABNORMAL /HPF (ref 0–4)
SP GR UR STRIP.AUTO: <=1.005 (ref 1–1.03)
UA COMPLETE W REFLEX CULTURE PNL UR: ABNORMAL
UA DIPSTICK W REFLEX MICRO PNL UR: YES
URN SPEC COLLECT METH UR: ABNORMAL
UROBILINOGEN UR STRIP-ACNC: 0.2 E.U./DL
WBC #/AREA URNS HPF: ABNORMAL /HPF (ref 0–5)

## 2024-07-03 PROCEDURE — 81001 URINALYSIS AUTO W/SCOPE: CPT

## 2024-09-13 NOTE — PROGRESS NOTES
Message sent to SUJATA VENCES:  Pt SpO2 is 88 on 7 L. Pt had rebounded back up to mid 90's on a non- rebreather at max, then when back on the nasal cannula at 7 (increased from the 6 he had been on) he dropped again. The daughter at bedside reports that he had the \"bottom of his lungs deflated downstaris in the ARU, but no one ever checked to see if they reinflated.\" No noted follow up CXR. Can he get a portable?   Also there is a copious amount of mucoid discharge from his rectal region s/p debriedment yesterday of a sacral wound. It is clear and non odorous.    Please notify patient with result.  EUS FNA of the beverley hepatis lymph node and bile duct brushing biopsies are negative for cancer cells .  EGD revealed gastritis, neg HP. PPI.  Smooth muscle antibody positive with low titer, sometimes associated with autoimmune hepatitis.  Rest of the chronic liver disease workup pending including liver markers are negative.  Tumor markers are negative.  Follow-up with transplant hepatology as scheduled.  Please schedule for ERCP with biliary stent removal in 6 weeks

## (undated) DEVICE — WAX SURG 2.5GM HEMSTAT BNE BEESWAX PARAFFIN ISO PALMITATE

## (undated) DEVICE — SOLUTION IV IRRIG 500ML 0.9% SODIUM CHL 2F7123

## (undated) DEVICE — SPONGE LAP W18XL18IN WHT COT 4 PLY FLD STRUNG RADPQ DISP ST 2 PER PACK

## (undated) DEVICE — PAD,ABDOMINAL,8"X10",ST,LF: Brand: MEDLINE

## (undated) DEVICE — COVER LT HNDL PLAS RIG 2 PER PK

## (undated) DEVICE — GAUZE,SPONGE,4"X4",8PLY,STRL,LF,10/TRAY: Brand: MEDLINE

## (undated) DEVICE — PREMIUM WET SKIN PREP TRAY: Brand: MEDLINE INDUSTRIES, INC.

## (undated) DEVICE — CONTAINER,SPECIMEN,OR STERILE,4OZ: Brand: MEDLINE

## (undated) DEVICE — YANKAUER,OPEN TIP,W/O VENT,STERILE: Brand: MEDLINE INDUSTRIES, INC.

## (undated) DEVICE — BANDAGE,GAUZE,4.5"X4.1YD,STERILE,LF: Brand: MEDLINE

## (undated) DEVICE — TUBE IRRIG HNDPC HI FLO TP INTRPULS W/SUCTION TUBE

## (undated) DEVICE — STERILE PVP: Brand: MEDLINE INDUSTRIES, INC.

## (undated) DEVICE — MINOR SET UP: Brand: MEDLINE INDUSTRIES, INC.

## (undated) DEVICE — T-DRAPE,EXTREMITY,STERILE: Brand: MEDLINE